# Patient Record
Sex: FEMALE | Race: BLACK OR AFRICAN AMERICAN | NOT HISPANIC OR LATINO | Employment: OTHER | URBAN - METROPOLITAN AREA
[De-identification: names, ages, dates, MRNs, and addresses within clinical notes are randomized per-mention and may not be internally consistent; named-entity substitution may affect disease eponyms.]

---

## 2017-01-01 ASSESSMENT — ENCOUNTER SYMPTOMS
EYE IRRITATION: 0
FATIGUE: 0
EYE PAIN: 0
HALLUCINATIONS: 0
SORE THROAT: 0
SMELL DISTURBANCE: 0
INCREASED ENERGY: 0
WEIGHT GAIN: 1
EYE WATERING: 1
ALTERED TEMPERATURE REGULATION: 0
SINUS CONGESTION: 1
POLYDIPSIA: 0
NECK MASS: 0
HOARSE VOICE: 0
NIGHT SWEATS: 1
DOUBLE VISION: 0
CHILLS: 0
TASTE DISTURBANCE: 0
SINUS PAIN: 1
DECREASED APPETITE: 0
TROUBLE SWALLOWING: 1
EYE REDNESS: 0
WEIGHT LOSS: 0
POLYPHAGIA: 0
FEVER: 1

## 2017-01-06 ENCOUNTER — OFFICE VISIT (OUTPATIENT)
Dept: NEPHROLOGY | Facility: CLINIC | Age: 62
End: 2017-01-06
Attending: INTERNAL MEDICINE
Payer: COMMERCIAL

## 2017-01-06 VITALS
TEMPERATURE: 98.2 F | BODY MASS INDEX: 44.05 KG/M2 | DIASTOLIC BLOOD PRESSURE: 81 MMHG | HEART RATE: 60 BPM | HEIGHT: 64 IN | WEIGHT: 258 LBS | SYSTOLIC BLOOD PRESSURE: 137 MMHG | OXYGEN SATURATION: 98 %

## 2017-01-06 DIAGNOSIS — N17.9 ACUTE RENAL FAILURE, UNSPECIFIED ACUTE RENAL FAILURE TYPE (H): ICD-10-CM

## 2017-01-06 DIAGNOSIS — N18.30 CHRONIC KIDNEY DISEASE (CKD), STAGE III (MODERATE) (H): Primary | ICD-10-CM

## 2017-01-06 DIAGNOSIS — N18.30 CHRONIC KIDNEY DISEASE (CKD), STAGE III (MODERATE) (H): ICD-10-CM

## 2017-01-06 LAB
ALBUMIN SERPL-MCNC: 3.3 G/DL (ref 3.4–5)
ANION GAP SERPL CALCULATED.3IONS-SCNC: 6 MMOL/L (ref 3–14)
BUN SERPL-MCNC: 16 MG/DL (ref 7–30)
CALCIUM SERPL-MCNC: 9.6 MG/DL (ref 8.5–10.1)
CHLORIDE SERPL-SCNC: 106 MMOL/L (ref 94–109)
CO2 SERPL-SCNC: 30 MMOL/L (ref 20–32)
CREAT SERPL-MCNC: 1.36 MG/DL (ref 0.52–1.04)
CREAT UR-MCNC: 39 MG/DL
GFR SERPL CREATININE-BSD FRML MDRD: 39 ML/MIN/1.7M2
GLUCOSE SERPL-MCNC: 90 MG/DL (ref 70–99)
HGB BLD-MCNC: 13.4 G/DL (ref 11.7–15.7)
PHOSPHATE SERPL-MCNC: 2.8 MG/DL (ref 2.5–4.5)
POTASSIUM SERPL-SCNC: 4.5 MMOL/L (ref 3.4–5.3)
PROT UR-MCNC: 0.05 G/L
PROT/CREAT 24H UR: 0.13 G/G CR (ref 0–0.2)
SODIUM SERPL-SCNC: 142 MMOL/L (ref 133–144)

## 2017-01-06 PROCEDURE — 84156 ASSAY OF PROTEIN URINE: CPT | Performed by: INTERNAL MEDICINE

## 2017-01-06 PROCEDURE — 36415 COLL VENOUS BLD VENIPUNCTURE: CPT | Performed by: INTERNAL MEDICINE

## 2017-01-06 PROCEDURE — 80069 RENAL FUNCTION PANEL: CPT | Performed by: INTERNAL MEDICINE

## 2017-01-06 PROCEDURE — 99213 OFFICE O/P EST LOW 20 MIN: CPT | Mod: ZF

## 2017-01-06 PROCEDURE — 85018 HEMOGLOBIN: CPT | Performed by: INTERNAL MEDICINE

## 2017-01-06 ASSESSMENT — PAIN SCALES - GENERAL: PAINLEVEL: NO PAIN (0)

## 2017-01-06 NOTE — PROGRESS NOTES
Nephrology Follow-Up Visit    Patient Name: Teri Bain  Patient YOB: 1955  Date of Service: January 6, 2017  Patient's PCP: Norma Serrano    Assessment and Plan:   1. MILDRED on CKD: baseline Cr ~1.1-1.3 since 2009, MILDRED related to losartan with very convincing time course, Cr now returned nearly to baseline at 1.35. Urine bland. No further work-up necessary. Should avoid NSAID's (previous heavy use) and avoid ACEI/ARB.    2. CKD stage 3: eGFR around 50, likely hypertensive disease and prior heavy NSAID use, less likely diabetic given A1C's in 5's and no retinopathy or proteinuria. Her Ca, phos, hgb, lytes, and acid/base are optimal and BP is well controlled.  -BP goal <140/90 and preferably <130/80 -> will work on weight loss reduction and not add additional medication at this point with 's/80's currently.  -Will let PCP follow her with BMP every 4-6 months. If her Cr rises to 1.5 or above in the future, she can be referred back for CKD care.    Assessment and plan was discussed with patient and she voiced her understanding and agreement.    Patient staffed with Dr. Meade. Patient to follow-up prn.    Jose Francisco Rebolledo  Nephrology Fellow  Pager: [phone]    Reason for Visit:  Teri Bain is a 61 year old female with CKD stage 3 who is here for follow-up.    HPI:  Ms. Bain is a pleasant 61 year old Af Am female with CKD stage 3 that had MILDRED on CKD with peak Cr of 3 likely related to ARB. Her Cr has returned to 1.35 with her previously baseline Cr being 1.1-1.3. She has bland UA and no significant proteinuria. She has no complaints today and feels well. She has no swelling. She is no longer taking NSAID's (took a lot previously). She denies changes in urination or dysuria.     ROS:  A comprehensive review of systems was obtained and negative, except as noted in the HPI or PMH.    Active Medical Problems:  Patient Active Problem List   Diagnosis     Classical migraine     Trochanteric  bursitis     Allergic rhinitis     Osteoarthritis     Chronic venous insufficiency     Chronic kidney disease (CKD), stage III (moderate)     Intermittent asthma     Vitamin D deficiency     Hypertension goal BP (blood pressure) < 140/90     Hyperlipidemia LDL goal <100     Health Care Home     GERD (gastroesophageal reflux disease)     Advanced directives, counseling/discussion     Acute renal failure (H)       Personal Hx:   Social History     Social History     Marital Status:      Spouse Name: Fred     Number of Children: 2     Years of Education: 13     Occupational History     Customer Service Retired     [employer]     Social History Main Topics     Smoking status: Former Smoker     Quit date: 12/31/1996     Smokeless tobacco: Never Used     Alcohol Use: Yes      Comment: wine 1-2 times per year     Drug Use: No     Sexual Activity:     Partners: Male     Other Topics Concern     Parent/Sibling W/ Cabg, Mi Or Angioplasty Before 65f 55m? No     Social History Narrative       Allergies:  Allergies   Allergen Reactions     Benazepril Rash     Claritin [Loratadine]      Codeine Nausea     Lisinopril Rash     Penicillins      Tetracycline        Medications:  Prior to Admission medications    Medication Sig Start Date End Date Taking? Authorizing Provider   predniSONE (DELTASONE) 50 MG tablet Take 1 tablet (50 mg) by mouth daily As needed for asthma exacerbation 12/21/16  Yes Norma Serrano MD   albuterol (PROAIR HFA/PROVENTIL HFA/VENTOLIN HFA) 108 (90 BASE) MCG/ACT Inhaler Inhale 1-2 puffs into the lungs every 6 hours 12/12/16  Yes Norma Serrano MD   Flunisolide HFA 80 MCG/ACT AERS Inhale 2 puffs into the lungs 2 times daily 11/4/16  Yes Norma Serrano MD   triamterene-hydrochlorothiazide (MAXZIDE) 75-50 MG per tablet Take 1 tablet by mouth daily 10/5/16  Yes Norma Serrano MD   pravastatin (PRAVACHOL) 80 MG tablet Take 1 tablet (80 mg) by mouth daily 10/5/16  Yes Norma Serrano MD  "  amLODIPine (NORVASC) 5 MG tablet Take 1 tablet (5 mg) by mouth daily 7/26/16  Yes Norma Serrano MD   Ranitidine HCl (ZANTAC PO) Take 1 tablet by mouth daily as needed for heartburn   Yes Reported, Patient   Cyanocobalamin (VITAMIN B 12 PO) Take 1 tablet by mouth daily   Yes Reported, Patient   albuterol (2.5 MG/3ML) 0.083% nebulizer solution Take 1 vial (2.5 mg) by nebulization every 6 hours as needed for shortness of breath / dyspnea or wheezing 3/2/16  Yes Surekha Chin APRN CNP   Cholecalciferol (VITAMIN D) 2000 UNITS tablet Take 4,000 Units by mouth daily 11/7/14  Yes Norma Serrano MD   cetirizine (ZYRTEC) 10 MG tablet Take 1 tablet (10 mg) by mouth every evening 11/6/14  Yes Norma Serrano MD   fluticasone (FLONASE) 50 MCG/ACT nasal spray Spray 1-2 sprays into both nostrils daily. 5/29/13  Yes Norma Serrano MD   aspirin 81 MG tablet Take 1 tablet by mouth daily. 12/20/10  Yes Norma Serrano MD   MULTI-VITAMIN OR TABS  8/12/04  Yes Norma Serrano MD   VITAMIN C 500 MG OR TABS 1-2 tablets daily 8/12/04  Yes Norma Serrano MD       Vitals:  /81 mmHg  Pulse 60  Temp(Src) 98.2  F (36.8  C) (Oral)  Ht 1.626 m (5' 4\")  Wt 117.028 kg (258 lb)  BMI 44.26 kg/m2  SpO2 98%    Exam:  GENERAL APPEARANCE: alert and in no obvious distress  HENT: mouth without ulcers or lesions, MMM  LYMPHATICS: no cervical or supraclavicular nodes  CV: regular rhythm, normal rate, no rub, no murmur  RESP: lungs clear to auscultation - no rales, rhonchi or wheezes  ABDOMEN: soft, nondistended, nontender, bowel sounds normal  EXT: no edema in LE's b/l   MS: extremities normal - no gross deformities noted, no evidence of inflammation in joints, no muscle tenderness  SKIN: no rash    Results:  Recent Results (from the past 72 hour(s))   Renal panel    Collection Time: 01/06/17 10:27 AM   Result Value Ref Range    Sodium 142 133 - 144 mmol/L    Potassium 4.5 3.4 - 5.3 mmol/L    Chloride 106 94 - " 109 mmol/L    Carbon Dioxide 30 20 - 32 mmol/L    Anion Gap 6 3 - 14 mmol/L    Glucose 90 70 - 99 mg/dL    Urea Nitrogen 16 7 - 30 mg/dL    Creatinine 1.36 (H) 0.52 - 1.04 mg/dL    GFR Estimate 39 (L) >60 mL/min/1.7m2    GFR Estimate If Black 48 (L) >60 mL/min/1.7m2    Calcium 9.6 8.5 - 10.1 mg/dL    Phosphorus 2.8 2.5 - 4.5 mg/dL    Albumin 3.3 (L) 3.4 - 5.0 g/dL   Hemoglobin    Collection Time: 01/06/17 10:27 AM   Result Value Ref Range    Hemoglobin 13.4 11.7 - 15.7 g/dL                     Answers for HPI/ROS submitted by the patient on 1/1/2017   General Symptoms: Yes  Skin Symptoms: No  HENT Symptoms: Yes  EYE SYMPTOMS: Yes  HEART SYMPTOMS: No  LUNG SYMPTOMS: No  INTESTINAL SYMPTOMS: No  URINARY SYMPTOMS: No  GYNECOLOGIC SYMPTOMS: No  BREAST SYMPTOMS: No  SKELETAL SYMPTOMS: No  BLOOD SYMPTOMS: No  NERVOUS SYSTEM SYMPTOMS: No  MENTAL HEALTH SYMPTOMS: No  Fever: Yes  Loss of appetite: No  Weight loss: No  Weight gain: Yes  Fatigue: No  Night sweats: Yes  Chills: No  Increased stress: No  Excessive hunger: No  Excessive thirst: No  Feeling hot or cold when others believe the temperature is normal: No  Loss of height: No  Post-operative complications: No  Surgical site pain: No  Hallucinations: No  Change in or Loss of Energy: No  Hyperactivity: No  Confusion: No  Ear pain: No  Ear discharge: No  Hearing loss: No  Tinnitus: No  Nosebleeds: No  Congestion: Yes  Sinus pain: Yes  Trouble swallowing: Yes   Voice hoarseness: No  Mouth sores: No  Sore throat: No  Tooth pain: No  Gum tenderness: No  Bleeding gums: No  Change in taste: No  Change in sense of smell: No  Dry mouth: No  Hearing aid used: No  Neck lump: No  Eye pain: No  Vision loss: No  Dry eyes: Yes  Watery eyes: Yes  Eye bulging: No  Double vision: No  Flashing of lights: No  Spots: No  Floaters: No  Redness: No  Crossed eyes: No  Tunnel Vision: No  Yellowing of eyes: No  Eye irritation: No    I have seen and reviewed the patient with the fellow. The  fellow's note reflects our joint assessment and plan. -- Severo Meade M.D.

## 2017-01-06 NOTE — Clinical Note
1/6/2017       RE: Teri Bain  [address]  [facility] [address]     Dear Colleague,    Thank you for referring your patient, Teri Bain, to the [facility] NEPHROLOGY at [facility]. Please see a copy of my visit note below.    Nephrology Follow-Up Visit    Patient Name: Teri Bain  Patient YOB: 1955  Date of Service: January 6, 2017  Patient's PCP: Norma Serrano    Assessment and Plan:   1. MILDRED on CKD: baseline Cr ~1.1-1.3 since 2009, MILDRED related to losartan with very convincing time course, Cr now returned nearly to baseline at 1.35. Urine bland. No further work-up necessary. Should avoid NSAID's (previous heavy use) and avoid ACEI/ARB.    2. CKD stage 3: eGFR around 50, likely hypertensive disease and prior heavy NSAID use, less likely diabetic given A1C's in 5's and no retinopathy or proteinuria. Her Ca, phos, hgb, lytes, and acid/base are optimal and BP is well controlled.  -BP goal <140/90 and preferably <130/80 -> will work on weight loss reduction and not add additional medication at this point with 's/80's currently.  -Will let PCP follow her with BMP every 4-6 months. If her Cr rises to 1.5 or above in the future, she can be referred back for CKD care.    Assessment and plan was discussed with patient and she voiced her understanding and agreement.    Patient staffed with Dr. Meade. Patient to follow-up prn.    Jose Francisco Rebolledo  Nephrology Fellow  Pager: [phone]    Reason for Visit:  Teri Bain is a 61 year old female with CKD stage 3 who is here for follow-up.    HPI:  Ms. Bain is a pleasant 61 year old Af Am female with CKD stage 3 that had MILDRED on CKD with peak Cr of 3 likely related to ARB. Her Cr has returned to 1.35 with her previously baseline Cr being 1.1-1.3. She has bland UA and no significant proteinuria. She has no complaints today and feels well. She has no swelling. She is no longer taking NSAID's  (took a lot previously). She denies changes in urination or dysuria.     ROS:  A comprehensive review of systems was obtained and negative, except as noted in the HPI or PMH.    Active Medical Problems:  Patient Active Problem List   Diagnosis     Classical migraine     Trochanteric bursitis     Allergic rhinitis     Osteoarthritis     Chronic venous insufficiency     Chronic kidney disease (CKD), stage III (moderate)     Intermittent asthma     Vitamin D deficiency     Hypertension goal BP (blood pressure) < 140/90     Hyperlipidemia LDL goal <100     Health Care Home     GERD (gastroesophageal reflux disease)     Advanced directives, counseling/discussion     Acute renal failure (H)       Personal Hx:   Social History     Social History     Marital Status:      Spouse Name: Fred     Number of Children: 2     Years of Education: 13     Occupational History     Customer Service Retired     [employer]     Social History Main Topics     Smoking status: Former Smoker     Quit date: 12/31/1996     Smokeless tobacco: Never Used     Alcohol Use: Yes      Comment: wine 1-2 times per year     Drug Use: No     Sexual Activity:     Partners: Male     Other Topics Concern     Parent/Sibling W/ Cabg, Mi Or Angioplasty Before 65f 55m? No     Social History Narrative       Allergies:  Allergies   Allergen Reactions     Benazepril Rash     Claritin [Loratadine]      Codeine Nausea     Lisinopril Rash     Penicillins      Tetracycline        Medications:  Prior to Admission medications    Medication Sig Start Date End Date Taking? Authorizing Provider   predniSONE (DELTASONE) 50 MG tablet Take 1 tablet (50 mg) by mouth daily As needed for asthma exacerbation 12/21/16  Yes Norma Serrano MD   albuterol (PROAIR HFA/PROVENTIL HFA/VENTOLIN HFA) 108 (90 BASE) MCG/ACT Inhaler Inhale 1-2 puffs into the lungs every 6 hours 12/12/16  Yes Norma Serrano MD   Flunisolide HFA 80 MCG/ACT AERS Inhale 2 puffs into the lungs 2  "times daily 11/4/16  Yes Norma Serrano MD   triamterene-hydrochlorothiazide (MAXZIDE) 75-50 MG per tablet Take 1 tablet by mouth daily 10/5/16  Yes Norma Serrano MD   pravastatin (PRAVACHOL) 80 MG tablet Take 1 tablet (80 mg) by mouth daily 10/5/16  Yes Norma Serrano MD   amLODIPine (NORVASC) 5 MG tablet Take 1 tablet (5 mg) by mouth daily 7/26/16  Yes Norma Serrano MD   Ranitidine HCl (ZANTAC PO) Take 1 tablet by mouth daily as needed for heartburn   Yes Reported, Patient   Cyanocobalamin (VITAMIN B 12 PO) Take 1 tablet by mouth daily   Yes Reported, Patient   albuterol (2.5 MG/3ML) 0.083% nebulizer solution Take 1 vial (2.5 mg) by nebulization every 6 hours as needed for shortness of breath / dyspnea or wheezing 3/2/16  Yes Surekha Chin APRN CNP   Cholecalciferol (VITAMIN D) 2000 UNITS tablet Take 4,000 Units by mouth daily 11/7/14  Yes Norma Serrano MD   cetirizine (ZYRTEC) 10 MG tablet Take 1 tablet (10 mg) by mouth every evening 11/6/14  Yes Norma Serrano MD   fluticasone (FLONASE) 50 MCG/ACT nasal spray Spray 1-2 sprays into both nostrils daily. 5/29/13  Yes Norma Serrano MD   aspirin 81 MG tablet Take 1 tablet by mouth daily. 12/20/10  Yes Norma Serrano MD   MULTI-VITAMIN OR TABS  8/12/04  Yes Norma Serrano MD   VITAMIN C 500 MG OR TABS 1-2 tablets daily 8/12/04  Yes Norma Serrano MD       Vitals:  /81 mmHg  Pulse 60  Temp(Src) 98.2  F (36.8  C) (Oral)  Ht 1.626 m (5' 4\")  Wt 117.028 kg (258 lb)  BMI 44.26 kg/m2  SpO2 98%    Exam:  GENERAL APPEARANCE: alert and in no obvious distress  HENT: mouth without ulcers or lesions, MMM  LYMPHATICS: no cervical or supraclavicular nodes  CV: regular rhythm, normal rate, no rub, no murmur  RESP: lungs clear to auscultation - no rales, rhonchi or wheezes  ABDOMEN: soft, nondistended, nontender, bowel sounds normal  EXT: no edema in LE's b/l   MS: extremities normal - no gross deformities noted, no " evidence of inflammation in joints, no muscle tenderness  SKIN: no rash    Results:  Recent Results (from the past 72 hour(s))   Renal panel    Collection Time: 01/06/17 10:27 AM   Result Value Ref Range    Sodium 142 133 - 144 mmol/L    Potassium 4.5 3.4 - 5.3 mmol/L    Chloride 106 94 - 109 mmol/L    Carbon Dioxide 30 20 - 32 mmol/L    Anion Gap 6 3 - 14 mmol/L    Glucose 90 70 - 99 mg/dL    Urea Nitrogen 16 7 - 30 mg/dL    Creatinine 1.36 (H) 0.52 - 1.04 mg/dL    GFR Estimate 39 (L) >60 mL/min/1.7m2    GFR Estimate If Black 48 (L) >60 mL/min/1.7m2    Calcium 9.6 8.5 - 10.1 mg/dL    Phosphorus 2.8 2.5 - 4.5 mg/dL    Albumin 3.3 (L) 3.4 - 5.0 g/dL   Hemoglobin    Collection Time: 01/06/17 10:27 AM   Result Value Ref Range    Hemoglobin 13.4 11.7 - 15.7 g/dL                     Answers for HPI/ROS submitted by the patient on 1/1/2017   General Symptoms: Yes  Skin Symptoms: No  HENT Symptoms: Yes  EYE SYMPTOMS: Yes  HEART SYMPTOMS: No  LUNG SYMPTOMS: No  INTESTINAL SYMPTOMS: No  URINARY SYMPTOMS: No  GYNECOLOGIC SYMPTOMS: No  BREAST SYMPTOMS: No  SKELETAL SYMPTOMS: No  BLOOD SYMPTOMS: No  NERVOUS SYSTEM SYMPTOMS: No  MENTAL HEALTH SYMPTOMS: No  Fever: Yes  Loss of appetite: No  Weight loss: No  Weight gain: Yes  Fatigue: No  Night sweats: Yes  Chills: No  Increased stress: No  Excessive hunger: No  Excessive thirst: No  Feeling hot or cold when others believe the temperature is normal: No  Loss of height: No  Post-operative complications: No  Surgical site pain: No  Hallucinations: No  Change in or Loss of Energy: No  Hyperactivity: No  Confusion: No  Ear pain: No  Ear discharge: No  Hearing loss: No  Tinnitus: No  Nosebleeds: No  Congestion: Yes  Sinus pain: Yes  Trouble swallowing: Yes   Voice hoarseness: No  Mouth sores: No  Sore throat: No  Tooth pain: No  Gum tenderness: No  Bleeding gums: No  Change in taste: No  Change in sense of smell: No  Dry mouth: No  Hearing aid used: No  Neck lump: No  Eye pain:  No  Vision loss: No  Dry eyes: Yes  Watery eyes: Yes  Eye bulging: No  Double vision: No  Flashing of lights: No  Spots: No  Floaters: No  Redness: No  Crossed eyes: No  Tunnel Vision: No  Yellowing of eyes: No  Eye irritation: No    I have seen and reviewed the patient with the fellow. The fellow's note reflects our joint assessment and plan. -- Severo Meade M.D.        Again, thank you for allowing me to participate in the care of your patient.      Sincerely,    Harshil Rebolledo MD

## 2017-01-06 NOTE — NURSING NOTE
"Chief Complaint   Patient presents with     RECHECK     3 MO Ckd stage III       Initial /81 mmHg  Pulse 60  Temp(Src) 98.2  F (36.8  C) (Oral)  Ht 1.626 m (5' 4\")  Wt 117.028 kg (258 lb)  BMI 44.26 kg/m2  SpO2 98% Estimated body mass index is 44.26 kg/(m^2) as calculated from the following:    Height as of this encounter: 1.626 m (5' 4\").    Weight as of this encounter: 117.028 kg (258 lb).  BP completed using cuff size: regular    "

## 2017-01-25 ENCOUNTER — RADIANT APPOINTMENT (OUTPATIENT)
Dept: MAMMOGRAPHY | Facility: CLINIC | Age: 62
End: 2017-01-25
Attending: FAMILY MEDICINE
Payer: COMMERCIAL

## 2017-01-25 DIAGNOSIS — Z12.31 VISIT FOR SCREENING MAMMOGRAM: ICD-10-CM

## 2017-01-25 PROCEDURE — G0202 SCR MAMMO BI INCL CAD: HCPCS

## 2017-05-24 ENCOUNTER — OFFICE VISIT (OUTPATIENT)
Dept: FAMILY MEDICINE | Facility: CLINIC | Age: 62
End: 2017-05-24
Payer: COMMERCIAL

## 2017-05-24 VITALS
OXYGEN SATURATION: 99 % | BODY MASS INDEX: 45.54 KG/M2 | HEIGHT: 64 IN | DIASTOLIC BLOOD PRESSURE: 94 MMHG | RESPIRATION RATE: 20 BRPM | TEMPERATURE: 98.1 F | SYSTOLIC BLOOD PRESSURE: 168 MMHG | WEIGHT: 266.75 LBS | HEART RATE: 90 BPM

## 2017-05-24 DIAGNOSIS — R23.2 HOT FLASHES: ICD-10-CM

## 2017-05-24 DIAGNOSIS — J30.2 SEASONAL ALLERGIC RHINITIS, UNSPECIFIED ALLERGIC RHINITIS TRIGGER: ICD-10-CM

## 2017-05-24 DIAGNOSIS — E55.9 VITAMIN D DEFICIENCY: ICD-10-CM

## 2017-05-24 DIAGNOSIS — I10 HYPERTENSION GOAL BP (BLOOD PRESSURE) < 140/90: Primary | ICD-10-CM

## 2017-05-24 DIAGNOSIS — J45.20 UNCONTROLLED INTERMITTENT ASTHMA: ICD-10-CM

## 2017-05-24 DIAGNOSIS — M79.662 PAIN OF LEFT CALF: ICD-10-CM

## 2017-05-24 PROCEDURE — 82306 VITAMIN D 25 HYDROXY: CPT | Performed by: FAMILY MEDICINE

## 2017-05-24 PROCEDURE — 80048 BASIC METABOLIC PNL TOTAL CA: CPT | Performed by: FAMILY MEDICINE

## 2017-05-24 PROCEDURE — 99214 OFFICE O/P EST MOD 30 MIN: CPT | Performed by: FAMILY MEDICINE

## 2017-05-24 PROCEDURE — 36415 COLL VENOUS BLD VENIPUNCTURE: CPT | Performed by: FAMILY MEDICINE

## 2017-05-24 RX ORDER — AMLODIPINE BESYLATE 10 MG/1
10 TABLET ORAL DAILY
Qty: 90 TABLET | Refills: 0 | Status: SHIPPED | OUTPATIENT
Start: 2017-05-24 | End: 2017-06-15

## 2017-05-24 RX ORDER — PREDNISONE 50 MG/1
50 TABLET ORAL DAILY
Qty: 5 TABLET | Refills: 0 | Status: SHIPPED | OUTPATIENT
Start: 2017-05-24 | End: 2017-10-09

## 2017-05-24 NOTE — MR AVS SNAPSHOT
After Visit Summary   5/24/2017    Teri Bain    MRN: [id number]           Patient Information     Date Of Birth          1955        Visit Information        Provider Department      5/24/2017 10:40 AM Norma Serrano MD [facility]        Today's Diagnoses     Hypertension goal BP (blood pressure) < 140/90    -  1    Seasonal allergic rhinitis, unspecified allergic rhinitis trigger        Uncontrolled intermittent asthma        Pain of left calf           Follow-ups after your visit        Follow-up notes from your care team     Return in about 1 month (around 6/24/2017).      Who to contact     If you have questions or need follow up information about today's clinic visit or your schedule please contact [facility] directly at [phone].  Normal or non-critical lab and imaging results will be communicated to you by MyChart, letter or phone within 4 business days after the clinic has received the results. If you do not hear from us within 7 days, please contact the clinic through MyChart or phone. If you have a critical or abnormal lab result, we will notify you by phone as soon as possible.  Submit refill requests through [employer] or call your pharmacy and they will forward the refill request to us. Please allow 3 business days for your refill to be completed.          Additional Information About Your Visit        MyChart Information     [employer] gives you secure access to your electronic health record. If you see a primary care provider, you can also send messages to your care team and make appointments. If you have questions, please call your primary care clinic.  If you do not have a primary care provider, please call [phone] and they will assist you.        Care EveryWhere ID     This is your Care EveryWhere ID. This could be used by other organizations to access your [place] medical records  [id number]        Your Vitals Were     Pulse  "Temperature Respirations Height Pulse Oximetry Breastfeeding?    90 98.1  F (36.7  C) (Oral) 20 5' 4\" (1.626 m) 99% No    BMI (Body Mass Index)                   45.79 kg/m2            Blood Pressure from Last 3 Encounters:   05/24/17 (!) 168/94   01/06/17 137/81   12/19/16 139/82    Weight from Last 3 Encounters:   05/24/17 266 lb 12 oz (121 kg)   01/06/17 258 lb (117 kg)   12/19/16 257 lb 4 oz (116.7 kg)              We Performed the Following     Asthma Action Plan (AAP)     Basic metabolic panel          Today's Medication Changes          These changes are accurate as of: 5/24/17 11:20 AM.  If you have any questions, ask your nurse or doctor.               These medicines have changed or have updated prescriptions.        Dose/Directions    amLODIPine 10 MG tablet   Commonly known as:  NORVASC   This may have changed:    - medication strength  - how much to take   Used for:  Hypertension goal BP (blood pressure) < 140/90   Changed by:  Norma Serrano MD        Dose:  10 mg   Take 1 tablet (10 mg) by mouth daily   Quantity:  90 tablet   Refills:  0         Stop taking these medicines if you haven't already. Please contact your care team if you have questions.     vitamin D 2000 UNITS tablet   Stopped by:  Norma Serrano MD                Where to get your medicines      These medications were sent to [facility] [address] [place] [address] [address] AT [facility] [address]  [address] [facility] [address]     Phone:  [phone]     amLODIPine 10 MG tablet    predniSONE 50 MG tablet                Primary Care Provider Office Phone # Fax #    Norma Serrano [phone]       [facility] [address]  [facility] [address]        Thank you!     Thank you for choosing [facility]  for your care. Our goal is always to provide you with excellent care. Hearing back from our patients is one way we can continue to improve our " services. Please take a few minutes to complete the written survey that you may receive in the mail after your visit with us. Thank you!             Your Updated Medication List - Protect others around you: Learn how to safely use, store and throw away your medicines at www.disposemymeds.org.          This list is accurate as of: 5/24/17 11:20 AM.  Always use your most recent med list.                   Brand Name Dispense Instructions for use    * albuterol (2.5 MG/3ML) 0.083% neb solution     30 vial    Take 1 vial (2.5 mg) by nebulization every 6 hours as needed for shortness of breath / dyspnea or wheezing       * albuterol 108 (90 BASE) MCG/ACT Inhaler    PROAIR HFA/PROVENTIL HFA/VENTOLIN HFA    1 Inhaler    Inhale 1-2 puffs into the lungs every 6 hours       amLODIPine 10 MG tablet    NORVASC    90 tablet    Take 1 tablet (10 mg) by mouth daily       ascorbic acid 500 MG tablet    VITAMIN C    60    1-2 tablets daily       aspirin 81 MG tablet      Take 1 tablet by mouth daily.       cetirizine 10 MG tablet    zyrTEC    30 tablet    Take 1 tablet (10 mg) by mouth every evening       flunisolide HFA 80 MCG/ACT Aers oral inhaler    AEROSPAN    3 Inhaler    Inhale 2 puffs into the lungs 2 times daily       fluticasone 50 MCG/ACT spray    FLONASE    1 Package    Spray 1-2 sprays into both nostrils daily.       Multi-vitamin Tabs tablet   Generic drug:  multivitamin, therapeutic with minerals          pravastatin 80 MG tablet    PRAVACHOL    90 tablet    Take 1 tablet (80 mg) by mouth daily       predniSONE 50 MG tablet    DELTASONE    5 tablet    Take 1 tablet (50 mg) by mouth daily As needed for asthma exacerbation       triamterene-hydrochlorothiazide 75-50 MG per tablet    MAXZIDE    90 tablet    Take 1 tablet by mouth daily       VITAMIN B 12 PO      Take 1 tablet by mouth daily       ZANTAC PO      Take 1 tablet by mouth daily as needed for heartburn       * Notice:  This list has 2 medication(s) that are the  same as other medications prescribed for you. Read the directions carefully, and ask your doctor or other care provider to review them with you.

## 2017-05-24 NOTE — PROGRESS NOTES
SUBJECTIVE:                                                    Teri Bain is a 61 year old female who presents to clinic today for the following health issues:      Hypertension Follow-up      Outpatient blood pressures are not being checked.    Low Salt Diet: not monitoring salt    She continues to take amlodipine and maxzide with no problems or noted side effects.            Asthma Follow-Up  She has a longstanding history of intermittent asthma.   Is going to [place] in a week and notes that she often gets asthma exacerbations there - would like to bring a script for prednisone with her.    Was ACT completed today?    Yes    ACT Total Scores 5/24/2017   ACT TOTAL SCORE (Goal Greater than or Equal to 20) 18   In the past 12 months, how many times did you visit the emergency room for your asthma without being admitted to the hospital? 0   In the past 12 months, how many times were you hospitalized overnight because of your asthma? 0       Recent asthma triggers that patient is dealing with: spring allergies      Left calf pain  x months  Intermittent  Worse when she's on her feet  Feels good to stretch the calf  No swelling.  Has tried changing her footwear but no change in symptoms.  Takes acetaminophen periodically at HS.        Problem list and histories reviewed & adjusted, as indicated.  Additional history: as documented    Patient Active Problem List   Diagnosis     Classical migraine     Trochanteric bursitis     Allergic rhinitis     Osteoarthritis     Chronic venous insufficiency     Chronic kidney disease (CKD), stage III (moderate)     Intermittent asthma     Vitamin D deficiency     Hypertension goal BP (blood pressure) < 140/90     Hyperlipidemia LDL goal <100     Health Care Home     GERD (gastroesophageal reflux disease)     Advanced directives, counseling/discussion     Acute renal failure (H)     Past Surgical History:   Procedure Laterality Date     C DEXA, BONE DENSITY, AXIAL SKEL   11/04    [facility]     C LIGATE FALLOPIAN TUBE  1977     C REMOVAL GALLBLADDER  1977     COLONOSCOPY  11/04    [facility]     COLONOSCOPY  1/5/15    [facility]     HC DILATION/CURETTAGE DIAG/THER NON OB  5/89    [facility] C     HYSTERECTOMY, PAP NO LONGER INDICATED  1/90    Hysterectomy, Vaginal, ovaries intact     SURGICAL HISTORY OF -   1987    cystoscopy       Social History   Substance Use Topics     Smoking status: Former Smoker     Quit date: 12/31/1996     Smokeless tobacco: Never Used     Alcohol use Yes      Comment: wine 1-2 times per year     Family History   Problem Relation Age of Onset     C.A.D. Mother      DIABETES Mother      Hypertension Mother      Arthritis Mother      CEREBROVASCULAR DISEASE Mother      multiple     Obesity Mother      DIABETES Father      Hypertension Father      Asthma Father      Obesity Father      DIABETES Maternal Grandfather      Depression Daughter      Asthma Daughter      Deep Vein Thrombosis (DVT) Daughter      x 1     Asthma Daughter      Asthma Brother      Obesity Brother      Coronary Artery Disease Brother      Pacemaker Brother      Asthma Grandchild      Asthma Grandchild      Asthma Grandchild      Asthma Grandchild          BP Readings from Last 3 Encounters:   05/24/17 (!) 168/94   01/06/17 137/81   12/19/16 139/82    Wt Readings from Last 3 Encounters:   05/24/17 266 lb 12 oz (121 kg)   01/06/17 258 lb (117 kg)   12/19/16 257 lb 4 oz (116.7 kg)            Reviewed and updated as needed this visit by clinical staff  Tobacco  Allergies  Med Hx  Surg Hx  Fam Hx  Soc Hx        ROS:  RESP:  NEGATIVE for shortness of breath  CV:  NEGATIVE for chest pain   NEURO: POSITIVE for mild headaches but no full-blown migraines  : POSITIVE for hot flashes recurring over past couple months    OBJECTIVE:                                                    BP (!) 168/94 (BP Location: Right arm, Patient Position: Chair, Cuff Size: Adult Large)  Pulse 90  Temp 98.1  F (36.7  C)  "(Oral)  Resp 20  Ht 5' 4\" (1.626 m)  Wt 266 lb 12 oz (121 kg)  SpO2 99%  Breastfeeding? No  BMI 45.79 kg/m2  Body mass index is 45.79 kg/(m^2).  GEN:  no apparent distress  LUNGS:  normal respiratory effort, and lungs clear to auscultation bilaterally - no rales, rhonchi or wheezes  CV: regular rate and rhythm, normal S1 S2, no S3 or S4 and no murmur, click or rub   EXTREM: no swelling, redness, varicosities of legs.  Skin is warm and dry.  She has focal tenderness to palpation over the lower left gastroc.           ASSESSMENT/PLAN:                                                        1. Hypertension goal BP (blood pressure) < 140/90  Uncontrolled.  We'll have her increase amlodipine dose to 10 mg daily and see me in 3-4 weeks.   - amLODIPine (NORVASC) 10 MG tablet; Take 1 tablet (10 mg) by mouth daily  Dispense: 90 tablet; Refill: 0  - Basic metabolic panel    2. Seasonal allergic rhinitis, unspecified allergic rhinitis trigger  Recommended she use the flonase along with the cetirizine    3. Uncontrolled intermittent asthma  Currently not wheezing but I did give her a script for prednisone burst to take with her to [place].  - Asthma Action Plan (AAP)  - predniSONE (DELTASONE) 50 MG tablet; Take 1 tablet (50 mg) by mouth daily As needed for asthma exacerbation  Dispense: 5 tablet; Refill: 0    4. Pain of left calf  Unclear etiology/diagnosis but this does seem to be musculoskeletal.  If persisting we could refer to Sports Med.    5. Vitamin D deficiency  - Vitamin D Deficiency    6. Hot flashes  Unclear why these have recurred recently.  She'll monitor symptoms for now.  We discussed management strategies (dress in layers, personal fan, cool packs).      FUTURE APPOINTMENTS:       - Follow-up visit in 1 month.    Norma Serrano MD  [facility]   "

## 2017-05-24 NOTE — NURSING NOTE
"Chief Complaint   Patient presents with     Hypertension     Asthma       Initial BP (!) 168/94 (BP Location: Right arm, Patient Position: Chair, Cuff Size: Adult Large)  Pulse 90  Temp 98.1  F (36.7  C) (Oral)  Resp 20  Ht 5' 4\" (1.626 m)  Wt 266 lb 12 oz (121 kg)  SpO2 99%  Breastfeeding? No  BMI 45.79 kg/m2 Estimated body mass index is 45.79 kg/(m^2) as calculated from the following:    Height as of this encounter: 5' 4\" (1.626 m).    Weight as of this encounter: 266 lb 12 oz (121 kg).  Medication Reconciliation: complete     Aleja Orellana MA      "

## 2017-05-25 LAB
ANION GAP SERPL CALCULATED.3IONS-SCNC: 5 MMOL/L (ref 3–14)
BUN SERPL-MCNC: 17 MG/DL (ref 7–30)
CALCIUM SERPL-MCNC: 9.4 MG/DL (ref 8.5–10.1)
CHLORIDE SERPL-SCNC: 107 MMOL/L (ref 94–109)
CO2 SERPL-SCNC: 29 MMOL/L (ref 20–32)
CREAT SERPL-MCNC: 1.21 MG/DL (ref 0.52–1.04)
DEPRECATED CALCIDIOL+CALCIFEROL SERPL-MC: 23 UG/L (ref 20–75)
GFR SERPL CREATININE-BSD FRML MDRD: 45 ML/MIN/1.7M2
GLUCOSE SERPL-MCNC: 87 MG/DL (ref 70–99)
POTASSIUM SERPL-SCNC: 4.4 MMOL/L (ref 3.4–5.3)
SODIUM SERPL-SCNC: 141 MMOL/L (ref 133–144)

## 2017-05-25 ASSESSMENT — ASTHMA QUESTIONNAIRES: ACT_TOTALSCORE: 18

## 2017-05-25 NOTE — PROGRESS NOTES
García Williamson,  Thanks for coming to clinic.  Your test results are below.  This looks good.  Your kidney function (creatinine and eGFR) continues to improve a bit.    Norma Serrano MD

## 2017-06-14 ENCOUNTER — TELEPHONE (OUTPATIENT)
Dept: FAMILY MEDICINE | Facility: CLINIC | Age: 62
End: 2017-06-14

## 2017-06-14 NOTE — PROGRESS NOTES
SUBJECTIVE:                                                    Teri Bain is a 61 year old female who presents to clinic today for the following health issues:      Hypertension Follow-up      Outpatient blood pressures are not being checked.    Low Salt Diet: not monitoring salt    At our last visit a month ago her blood pressure was quite elevated and I had her increase her amlodipine dose to 10 mg daily.     However, she developed marked bilateral lower extremity swelling.    We instructed her to cut her dose back down to 5 mg daily which she did 2 days ago and swelling has resolved - almost completely.         Asthma Follow-Up  She has a longstanding history of intermittent asthma.   When I saw her last month this was uncontrolled with flare of spring allergies.  I did give her an Rx for prednisone to bring on her trip to [facility] - in case of worsening asthma.  Was ACT completed today?    Yes    ACT Total Scores 6/15/2017   ACT TOTAL SCORE (Goal Greater than or Equal to 20) 20   In the past 12 months, how many times did you visit the emergency room for your asthma without being admitted to the hospital? 0   In the past 12 months, how many times were you hospitalized overnight because of your asthma? 0       Recent asthma triggers that patient is dealing with: None  No problems while in [place].      Hot flashes improved when she cut down on sugar.  Still has same pain in left calf.  Had more pain with the increase in swelling.          Problem list and histories reviewed & adjusted, as indicated.  Additional history: as documented    Patient Active Problem List   Diagnosis     Classical migraine     Trochanteric bursitis     Allergic rhinitis     Osteoarthritis     Chronic venous insufficiency     Chronic kidney disease (CKD), stage III (moderate)     Intermittent asthma     Vitamin D deficiency     Hypertension goal BP (blood pressure) < 140/90     Hyperlipidemia LDL goal <100     Health Care Home      GERD (gastroesophageal reflux disease)     Advanced directives, counseling/discussion     Acute renal failure (H)     Past Surgical History:   Procedure Laterality Date     C DEXA, BONE DENSITY, AXIAL SKEL  11/04    [facility]     C LIGATE FALLOPIAN TUBE  1977     C REMOVAL GALLBLADDER  1977     COLONOSCOPY  11/04    [facility]     COLONOSCOPY  1/5/15    [facility]     HC DILATION/CURETTAGE DIAG/THER NON OB  5/89    D & C     HYSTERECTOMY, PAP NO LONGER INDICATED  1/90    Hysterectomy, Vaginal, ovaries intact     SURGICAL HISTORY OF -   1987    cystoscopy       Social History   Substance Use Topics     Smoking status: Former Smoker     Quit date: 12/31/1996     Smokeless tobacco: Never Used     Alcohol use Yes      Comment: wine 1-2 times per year     Family History   Problem Relation Age of Onset     C.A.D. Mother      DIABETES Mother      Hypertension Mother      Arthritis Mother      CEREBROVASCULAR DISEASE Mother      multiple     Obesity Mother      DIABETES Father      Hypertension Father      Asthma Father      Obesity Father      DIABETES Maternal Grandfather      Depression Daughter      Asthma Daughter      Deep Vein Thrombosis (DVT) Daughter      x 1     Asthma Daughter      Asthma Brother      Obesity Brother      Coronary Artery Disease Brother      Pacemaker Brother      Asthma Grandchild      Asthma Grandchild      Asthma Grandchild      Asthma Grandchild          BP Readings from Last 3 Encounters:   06/15/17 131/75   05/24/17 (!) 168/94   01/06/17 137/81    Wt Readings from Last 3 Encounters:   06/15/17 263 lb 4 oz (119.4 kg)   05/24/17 266 lb 12 oz (121 kg)   01/06/17 258 lb (117 kg)          Reviewed and updated as needed this visit by clinical staff  Tobacco  Allergies  Meds  Med Hx  Surg Hx  Fam Hx  Soc Hx            OBJECTIVE:                                                    /75 (BP Location: Right arm, Patient Position: Chair, Cuff Size: Adult Large)  Pulse 112  Temp 98.1  F (36.7  C)  "(Oral)  Resp 16  Ht 5' 4\" (1.626 m)  Wt 263 lb 4 oz (119.4 kg)  SpO2 100%  Breastfeeding? No  BMI 45.19 kg/m2  Body mass index is 45.19 kg/(m^2).  GEN:  no apparent distress  BILATERAL LOWER EXTREMITIES:  with trace edema, no obvious lesion, skin dry with no erythema or focal warmth       ASSESSMENT/PLAN:                                                        1. Hypertension goal BP (blood pressure) < 140/90  2. Bilateral lower extremity edema  I believe she developed lower extremity swelling due to CCB dose increase.  We'll have her hold dose at 5 mg daily as she is tolerating that well.  We'll need to monitor her blood pressure on that and I've asked her to return in a month.  I think DVT is unlikely as her focal left calf pain has been chronic and her new/acute swelling was bilateral/symmetric and improved within a couple days of dropping the amlodipine dose back down to 5 mg daily.    - amLODIPine (NORVASC) 10 MG tablet; Take 0.5 tablets (5 mg) by mouth daily    3. Intermittent asthma, uncomplicated  Improved control.      FUTURE APPOINTMENTS:       - Follow-up visit in 1 month    Norma Serrano MD  [facility]   "

## 2017-06-14 NOTE — TELEPHONE ENCOUNTER
Writer called patient and reviewed message per below from Dr. Garcia.    Patient verbalized understanding and stated:  1. Did tolerate 5 mg dose, will switch to that dose    Appt scheduled with PCP on 6/15/17.  Appt date, time and location confirmed with patient.    Appt on 6/22/17 will be kept in case PCP wishes patient to return in 1 week.    ALYSSA CollierN, RN

## 2017-06-14 NOTE — TELEPHONE ENCOUNTER
Previous blood pressure was elevated. Did she tolerate Norvasc 5 mg? If she did, I would restart Norvasc 5 mg daily and schedule blood pressure visit prior to OV on 06/22/17.

## 2017-06-14 NOTE — TELEPHONE ENCOUNTER
"Patient called and spoke with writer.    Per patient:  1. Started increased Amlodipine dose of 10 mg daily on 6/4/17   A. Since then, edema in BLE and pain in LLE-\"leslie horse\"  2. Stopped taking Amlodipine altogether on 6/11/17  3. Today edema is a little bit better  4. Does stand on feet for 4 hour period three days per week  5. Right pedal edema almost completely resolved  6. Denied:    A. Chest pain   B. SOB   C. Headache-but does get headaches from time to time   D. Dizziness   E. Weakness   F. Nausea   G. Edema located elsewhere in body  7. Still taking Maxzide  8. Has follow up appt with Dr. Serrano on 6/22/17.    Covering providers-Please review and advise.    Thank you!  CIARRA Lynne, ALYSSAN, RN    "

## 2017-06-15 ENCOUNTER — OFFICE VISIT (OUTPATIENT)
Dept: FAMILY MEDICINE | Facility: CLINIC | Age: 62
End: 2017-06-15
Payer: COMMERCIAL

## 2017-06-15 VITALS
SYSTOLIC BLOOD PRESSURE: 131 MMHG | TEMPERATURE: 98.1 F | DIASTOLIC BLOOD PRESSURE: 75 MMHG | WEIGHT: 263.25 LBS | HEART RATE: 112 BPM | RESPIRATION RATE: 16 BRPM | OXYGEN SATURATION: 100 % | BODY MASS INDEX: 44.94 KG/M2 | HEIGHT: 64 IN

## 2017-06-15 DIAGNOSIS — I10 HYPERTENSION GOAL BP (BLOOD PRESSURE) < 140/90: ICD-10-CM

## 2017-06-15 DIAGNOSIS — R60.0 BILATERAL LOWER EXTREMITY EDEMA: ICD-10-CM

## 2017-06-15 DIAGNOSIS — J45.20 INTERMITTENT ASTHMA, UNCOMPLICATED: Primary | ICD-10-CM

## 2017-06-15 PROCEDURE — 99213 OFFICE O/P EST LOW 20 MIN: CPT | Performed by: FAMILY MEDICINE

## 2017-06-15 RX ORDER — AMLODIPINE BESYLATE 10 MG/1
5 TABLET ORAL DAILY
Start: 2017-06-15 | End: 2017-10-09

## 2017-06-15 NOTE — MR AVS SNAPSHOT
After Visit Summary   6/15/2017    Teri Bain    MRN: [id number]           Patient Information     Date Of Birth          1955        Visit Information        Provider Department      6/15/2017 12:20 PM Norma Serrano MD [facility]        Today's Diagnoses     Intermittent asthma, uncomplicated    -  1    Hypertension goal BP (blood pressure) < 140/90           Follow-ups after your visit        Follow-up notes from your care team     Return in about 1 month (around 7/15/2017).      Your next 10 appointments already scheduled     Jun 22, 2017  8:40 AM CDT   SHORT with Norma Serrano MD   [facility]    [address] [place] [id number] [phone]              Who to contact     If you have questions or need follow up information about today's clinic visit or your schedule please contact [facility] directly at [phone].  Normal or non-critical lab and imaging results will be communicated to you by [employer], letter or phone within 4 business days after the clinic has received the results. If you do not hear from us within 7 days, please contact the clinic through [employer] or phone. If you have a critical or abnormal lab result, we will notify you by phone as soon as possible.  Submit refill requests through [employer] or call your pharmacy and they will forward the refill request to us. Please allow 3 business days for your refill to be completed.          Additional Information About Your Visit        [employer] Information     [employer] gives you secure access to your electronic health record. If you see a primary care provider, you can also send messages to your care team and make appointments. If you have questions, please call your primary care clinic.  If you do not have a primary care provider, please call [phone] and they will assist you.        Care EveryWhere ID     This is your Care  "EveryWhere ID. This could be used by other organizations to access your [facility] medical records  [id number]        Your Vitals Were     Pulse Temperature Respirations Height Pulse Oximetry Breastfeeding?    112 98.1  F (36.7  C) (Oral) 16 5' 4\" (1.626 m) 100% No    BMI (Body Mass Index)                   45.19 kg/m2            Blood Pressure from Last 3 Encounters:   06/15/17 131/75   05/24/17 (!) 168/94   01/06/17 137/81    Weight from Last 3 Encounters:   06/15/17 263 lb 4 oz (119.4 kg)   05/24/17 266 lb 12 oz (121 kg)   01/06/17 258 lb (117 kg)              Today, you had the following     No orders found for display         Today's Medication Changes          These changes are accurate as of: 6/15/17  1:16 PM.  If you have any questions, ask your nurse or doctor.               These medicines have changed or have updated prescriptions.        Dose/Directions    amLODIPine 10 MG tablet   Commonly known as:  NORVASC   This may have changed:  how much to take   Used for:  Hypertension goal BP (blood pressure) < 140/90   Changed by:  Norma Serrano MD        Dose:  5 mg   Take 0.5 tablets (5 mg) by mouth daily   Refills:  0            Where to get your medicines      Some of these will need a paper prescription and others can be bought over the counter.  Ask your nurse if you have questions.     You don't need a prescription for these medications     amLODIPine 10 MG tablet                Primary Care Provider Office Phone # Fax #    Norma Serrano [phone]       [facility] [address] [facility] [address]        Thank you!     Thank you for choosing [facility]  for your care. Our goal is always to provide you with excellent care. Hearing back from our patients is one way we can continue to improve our services. Please take a few minutes to complete the written survey that you may receive in the mail after your visit with us. Thank you!             Your " Updated Medication List - Protect others around you: Learn how to safely use, store and throw away your medicines at www.disposemymeds.org.          This list is accurate as of: 6/15/17  1:16 PM.  Always use your most recent med list.                   Brand Name Dispense Instructions for use    * albuterol (2.5 MG/3ML) 0.083% neb solution     30 vial    Take 1 vial (2.5 mg) by nebulization every 6 hours as needed for shortness of breath / dyspnea or wheezing       * albuterol 108 (90 BASE) MCG/ACT Inhaler    PROAIR HFA/PROVENTIL HFA/VENTOLIN HFA    1 Inhaler    Inhale 1-2 puffs into the lungs every 6 hours       amLODIPine 10 MG tablet    NORVASC     Take 0.5 tablets (5 mg) by mouth daily       ascorbic acid 500 MG tablet    VITAMIN C    60    1-2 tablets daily       aspirin 81 MG tablet      Take 1 tablet by mouth daily.       cetirizine 10 MG tablet    zyrTEC    30 tablet    Take 1 tablet (10 mg) by mouth every evening       flunisolide HFA 80 MCG/ACT Aers oral inhaler    AEROSPAN    3 Inhaler    Inhale 2 puffs into the lungs 2 times daily       fluticasone 50 MCG/ACT spray    FLONASE    1 Package    Spray 1-2 sprays into both nostrils daily.       Multi-vitamin Tabs tablet   Generic drug:  multivitamin, therapeutic with minerals          pravastatin 80 MG tablet    PRAVACHOL    90 tablet    Take 1 tablet (80 mg) by mouth daily       predniSONE 50 MG tablet    DELTASONE    5 tablet    Take 1 tablet (50 mg) by mouth daily As needed for asthma exacerbation       triamterene-hydrochlorothiazide 75-50 MG per tablet    MAXZIDE    90 tablet    Take 1 tablet by mouth daily       VITAMIN B 12 PO      Take 1 tablet by mouth daily       ZANTAC PO      Take 1 tablet by mouth daily as needed for heartburn       * Notice:  This list has 2 medication(s) that are the same as other medications prescribed for you. Read the directions carefully, and ask your doctor or other care provider to review them with you.

## 2017-06-15 NOTE — NURSING NOTE
"Chief Complaint   Patient presents with     Hypertension     Asthma     Musculoskeletal Problem       Initial /75 (BP Location: Right arm, Patient Position: Chair, Cuff Size: Adult Large)  Pulse 112  Temp 98.1  F (36.7  C) (Oral)  Resp 16  Ht 5' 4\" (1.626 m)  Wt 263 lb 4 oz (119.4 kg)  SpO2 100%  Breastfeeding? No  BMI 45.19 kg/m2 Estimated body mass index is 45.19 kg/(m^2) as calculated from the following:    Height as of this encounter: 5' 4\" (1.626 m).    Weight as of this encounter: 263 lb 4 oz (119.4 kg).  Medication Reconciliation: complete     Aleja Orellana MA      "

## 2017-06-16 ASSESSMENT — ASTHMA QUESTIONNAIRES: ACT_TOTALSCORE: 20

## 2017-07-11 NOTE — PROGRESS NOTES
SUBJECTIVE:                                                    Teri Bain is a 61 year old female who presents to clinic today with her daughter for the following health issues:      Hypertension Follow-up    We had increased her amlodipine from 5 mg to 10 mg daily but she developed marked lower extremity edema on that so we did decide to drop the dose back to 5 mg when I saw her a month ago.  Today she states swelling has resolved.      She is also on maxzide.    Outpatient blood pressures are not being checked.    Low Salt Diet: low salt      Amount of exercise or physical activity: None    Problems taking medications regularly: No    Medication side effects: none    Diet: regular (no restrictions)      Problem list and histories reviewed & adjusted, as indicated.  Additional history: as documented      Past Medical History:   Diagnosis Date     Allergic rhinitis      Chronic kidney disease (CKD), stage III (moderate)      Chronic venous insufficiency      Classic migraines     fiorinal and darvocet prn     Diabetes mellitus, type 2 (H) 1998    age 42     Dysplasia of cervix     treated with hysterectomy     GERD (gastroesophageal reflux disease)      History of tobacco use     quit 1/97     Hyperlipidemia LDL goal <100      Hypertension goal BP (blood pressure) < 140/90      Mild persistent asthma      Osteoarthritis     LT ankle & L>R knee, hands     Trochanteric bursitis      Vitamin D deficiency 2/15/2010     Patient Active Problem List   Diagnosis     Classical migraine     Trochanteric bursitis     Allergic rhinitis     Osteoarthritis     Chronic venous insufficiency     Chronic kidney disease (CKD), stage III (moderate)     Intermittent asthma     Vitamin D deficiency     Hypertension goal BP (blood pressure) < 140/90     Hyperlipidemia LDL goal <100     Health Care Home     GERD (gastroesophageal reflux disease)     Advanced directives, counseling/discussion     Acute renal failure (H)      BP  "Readings from Last 3 Encounters:   07/13/17 128/74   06/15/17 131/75   05/24/17 (!) 168/94    Wt Readings from Last 3 Encounters:   07/13/17 266 lb (120.7 kg)   06/15/17 263 lb 4 oz (119.4 kg)   05/24/17 266 lb 12 oz (121 kg)           Reviewed and updated as needed this visit by clinical staff  Tobacco  Allergies  Meds       Reviewed and updated as needed this visit by Provider  Meds             OBJECTIVE:     /74 (BP Location: Right arm)  Pulse 87  Temp 98.6  F (37  C) (Oral)  Resp 12  Ht 5' 4\" (1.626 m)  Wt 266 lb (120.7 kg)  SpO2 98%  BMI 45.66 kg/m2  Body mass index is 45.66 kg/(m^2).  GEN:  no apparent distress  CV: bilateral lower extremities without edema         ASSESSMENT/PLAN:       1. Hypertension goal BP (blood pressure) < 140/90  Continue amlodipine 5 mg daily in addition to maxzide 75/50 daily.  She has a large supply of 10 mg tabs so will continue to cut those in half.      2. Hyperlipidemia LDL goal <100  Due for fasting lipid panel and routine preventive physical in October.    - pravastatin (PRAVACHOL) 80 MG tablet; Take 1 tablet (80 mg) by mouth daily  Dispense: 90 tablet; Refill: 0      Norma Serrano MD  [facility]   "

## 2017-07-13 ENCOUNTER — OFFICE VISIT (OUTPATIENT)
Dept: FAMILY MEDICINE | Facility: CLINIC | Age: 62
End: 2017-07-13
Payer: COMMERCIAL

## 2017-07-13 VITALS
WEIGHT: 266 LBS | TEMPERATURE: 98.6 F | BODY MASS INDEX: 45.41 KG/M2 | HEIGHT: 64 IN | HEART RATE: 87 BPM | OXYGEN SATURATION: 98 % | RESPIRATION RATE: 12 BRPM | DIASTOLIC BLOOD PRESSURE: 74 MMHG | SYSTOLIC BLOOD PRESSURE: 128 MMHG

## 2017-07-13 DIAGNOSIS — E78.5 HYPERLIPIDEMIA LDL GOAL <100: ICD-10-CM

## 2017-07-13 DIAGNOSIS — I10 HYPERTENSION GOAL BP (BLOOD PRESSURE) < 140/90: Primary | ICD-10-CM

## 2017-07-13 PROCEDURE — 99213 OFFICE O/P EST LOW 20 MIN: CPT | Performed by: FAMILY MEDICINE

## 2017-07-13 RX ORDER — PRAVASTATIN SODIUM 80 MG/1
80 TABLET ORAL DAILY
Qty: 90 TABLET | Refills: 0 | Status: SHIPPED | OUTPATIENT
Start: 2017-07-13 | End: 2017-12-07

## 2017-07-13 NOTE — MR AVS SNAPSHOT
After Visit Summary   7/13/2017    Teri Bain    MRN: [id number]           Patient Information     Date Of Birth          1955        Visit Information        Provider Department      7/13/2017 10:00 AM Norma Serrano MD [facility]        Today's Diagnoses     Hypertension goal BP (blood pressure) < 140/90    -  1    Hyperlipidemia LDL goal <100          Care Instructions    Schedule routine preventive physical after Oct 5, 2017.    Mammogram and Colonoscopy scheduling number: [phone].          Follow-ups after your visit        Who to contact     If you have questions or need follow up information about today's clinic visit or your schedule please contact [facility] directly at [phone].  Normal or non-critical lab and imaging results will be communicated to you by Hittahemhart, letter or phone within 4 business days after the clinic has received the results. If you do not hear from us within 7 days, please contact the clinic through Hittahemhart or phone. If you have a critical or abnormal lab result, we will notify you by phone as soon as possible.  Submit refill requests through [employer] or call your pharmacy and they will forward the refill request to us. Please allow 3 business days for your refill to be completed.          Additional Information About Your Visit        MyCharYbrain Information     [employer] gives you secure access to your electronic health record. If you see a primary care provider, you can also send messages to your care team and make appointments. If you have questions, please call your primary care clinic.  If you do not have a primary care provider, please call [phone] and they will assist you.        Care EveryWhere ID     This is your Care EveryWhere ID. This could be used by other organizations to access your [facility] medical records  [id number]        Your Vitals Were     Pulse Temperature Respirations Height Pulse Oximetry BMI  "(Body Mass Index)    87 98.6  F (37  C) (Oral) 12 5' 4\" (1.626 m) 98% 45.66 kg/m2       Blood Pressure from Last 3 Encounters:   07/13/17 128/74   06/15/17 131/75   05/24/17 (!) 168/94    Weight from Last 3 Encounters:   07/13/17 266 lb (120.7 kg)   06/15/17 263 lb 4 oz (119.4 kg)   05/24/17 266 lb 12 oz (121 kg)              Today, you had the following     No orders found for display         Where to get your medicines      These medications were sent to [facility] [address] [place] [address] [address] AT [facility] OF  [address]  [address] [facility] [address]     Phone:  [phone]     pravastatin 80 MG tablet          Primary Care Provider Office Phone # Fax #    Norma Serrano [phone]       [facility] [address] [facility] [address]        Equal Access to Services     WILDER BALLESTEROS AH: Hadii rhea ku hadasho Soomaali, waaxda luqadaha, qaybta kaalmada adeegyada, eb weeks . So [facility] [phone].    ATENCIÓN: Si habla español, tiene a nails disposición servicios gratuitos de asistencia lingüística. [facility] [phone].    We comply with applicable federal civil rights laws and [place] laws. We do not discriminate on the basis of race, color, national origin, age, disability sex, sexual orientation or gender identity.            Thank you!     Thank you for choosing [facility]  for your care. Our goal is always to provide you with excellent care. Hearing back from our patients is one way we can continue to improve our services. Please take a few minutes to complete the written survey that you may receive in the mail after your visit with us. Thank you!             Your Updated Medication List - Protect others around you: Learn how to safely use, store and throw away your medicines at [email or web address] [email or web address] Have [email or web address].          This list is accurate as of: 7/13/17 10:26 AM.  Always use your most recent med " list.                   Brand Name Dispense Instructions for use Diagnosis    * albuterol (2.5 MG/3ML) 0.083% neb solution     30 vial    Take 1 vial (2.5 mg) by nebulization every 6 hours as needed for shortness of breath / dyspnea or wheezing    Intermittent asthma, with acute exacerbation       * albuterol 108 (90 BASE) MCG/ACT Inhaler    PROAIR HFA/PROVENTIL HFA/VENTOLIN HFA    1 Inhaler    Inhale 1-2 puffs into the lungs every 6 hours    Mild persistent asthma without complication       amLODIPine 10 MG tablet    NORVASC     Take 0.5 tablets (5 mg) by mouth daily    Hypertension goal BP (blood pressure) < 140/90       ascorbic acid 500 MG tablet    VITAMIN C    60    1-2 tablets daily        aspirin 81 MG tablet      Take 1 tablet by mouth daily.    Chronic kidney disease (CKD), stage III (moderate), Type 2 diabetes, HbA1c goal < 7% (H)       cetirizine 10 MG tablet    zyrTEC    30 tablet    Take 1 tablet (10 mg) by mouth every evening    Sore throat       flunisolide HFA 80 MCG/ACT Aers oral inhaler    AEROSPAN    3 Inhaler    Inhale 2 puffs into the lungs 2 times daily    Mild persistent asthma without complication       fluticasone 50 MCG/ACT spray    FLONASE    1 Package    Spray 1-2 sprays into both nostrils daily.    Chronic rhinitis, Allergic rhinitis       Multi-vitamin Tabs tablet   Generic drug:  multivitamin, therapeutic with minerals           pravastatin 80 MG tablet    PRAVACHOL    90 tablet    Take 1 tablet (80 mg) by mouth daily    Hyperlipidemia LDL goal <100       predniSONE 50 MG tablet    DELTASONE    5 tablet    Take 1 tablet (50 mg) by mouth daily As needed for asthma exacerbation    Uncontrolled intermittent asthma       triamterene-hydrochlorothiazide 75-50 MG per tablet    MAXZIDE    90 tablet    Take 1 tablet by mouth daily    Chronic venous insufficiency, Essential hypertension with goal blood pressure less than 140/90       VITAMIN B 12 PO      Take 1 tablet by mouth daily         ZANTAC PO      Take 1 tablet by mouth daily as needed for heartburn        * Notice:  This list has 2 medication(s) that are the same as other medications prescribed for you. Read the directions carefully, and ask your doctor or other care provider to review them with you.

## 2017-07-13 NOTE — NURSING NOTE
"Chief Complaint   Patient presents with     Hypertension       Initial /74 (BP Location: Right arm)  Pulse 87  Temp 98.6  F (37  C) (Oral)  Resp 12  Ht 5' 4\" (1.626 m)  Wt 266 lb (120.7 kg)  SpO2 98%  BMI 45.66 kg/m2 Estimated body mass index is 45.66 kg/(m^2) as calculated from the following:    Height as of this encounter: 5' 4\" (1.626 m).    Weight as of this encounter: 266 lb (120.7 kg).  Medication Reconciliation: complete     Taylor Mancera CMA      "

## 2017-07-13 NOTE — PATIENT INSTRUCTIONS
Schedule routine preventive physical after Oct 5, 2017.    Mammogram and Colonoscopy scheduling number: [phone].

## 2017-10-09 ENCOUNTER — OFFICE VISIT (OUTPATIENT)
Dept: FAMILY MEDICINE | Facility: CLINIC | Age: 62
End: 2017-10-09
Payer: COMMERCIAL

## 2017-10-09 VITALS
HEIGHT: 64 IN | RESPIRATION RATE: 20 BRPM | HEART RATE: 70 BPM | BODY MASS INDEX: 44.43 KG/M2 | DIASTOLIC BLOOD PRESSURE: 72 MMHG | SYSTOLIC BLOOD PRESSURE: 128 MMHG | TEMPERATURE: 97.7 F | WEIGHT: 260.25 LBS

## 2017-10-09 DIAGNOSIS — M89.8X1 PAIN OF LEFT CLAVICLE: ICD-10-CM

## 2017-10-09 DIAGNOSIS — Z23 NEED FOR PROPHYLACTIC VACCINATION AND INOCULATION AGAINST INFLUENZA: ICD-10-CM

## 2017-10-09 DIAGNOSIS — I10 HYPERTENSION GOAL BP (BLOOD PRESSURE) < 140/90: ICD-10-CM

## 2017-10-09 DIAGNOSIS — N18.30 CHRONIC KIDNEY DISEASE (CKD), STAGE III (MODERATE) (H): ICD-10-CM

## 2017-10-09 DIAGNOSIS — J45.30 MILD PERSISTENT ASTHMA WITHOUT COMPLICATION: ICD-10-CM

## 2017-10-09 DIAGNOSIS — E78.5 HYPERLIPIDEMIA LDL GOAL <100: ICD-10-CM

## 2017-10-09 DIAGNOSIS — I87.2 CHRONIC VENOUS INSUFFICIENCY: ICD-10-CM

## 2017-10-09 DIAGNOSIS — Z00.00 ROUTINE GENERAL MEDICAL EXAMINATION AT A HEALTH CARE FACILITY: Primary | ICD-10-CM

## 2017-10-09 LAB — HGB BLD-MCNC: 13.7 G/DL (ref 11.7–15.7)

## 2017-10-09 PROCEDURE — 90686 IIV4 VACC NO PRSV 0.5 ML IM: CPT | Performed by: FAMILY MEDICINE

## 2017-10-09 PROCEDURE — 80061 LIPID PANEL: CPT | Performed by: FAMILY MEDICINE

## 2017-10-09 PROCEDURE — 85018 HEMOGLOBIN: CPT | Performed by: FAMILY MEDICINE

## 2017-10-09 PROCEDURE — 90471 IMMUNIZATION ADMIN: CPT | Performed by: FAMILY MEDICINE

## 2017-10-09 PROCEDURE — 36415 COLL VENOUS BLD VENIPUNCTURE: CPT | Performed by: FAMILY MEDICINE

## 2017-10-09 PROCEDURE — 99396 PREV VISIT EST AGE 40-64: CPT | Mod: 25 | Performed by: FAMILY MEDICINE

## 2017-10-09 PROCEDURE — 80048 BASIC METABOLIC PNL TOTAL CA: CPT | Performed by: FAMILY MEDICINE

## 2017-10-09 PROCEDURE — 82043 UR ALBUMIN QUANTITATIVE: CPT | Performed by: FAMILY MEDICINE

## 2017-10-09 RX ORDER — PREDNISONE 50 MG/1
50 TABLET ORAL DAILY
Qty: 5 TABLET | Refills: 0 | Status: SHIPPED | OUTPATIENT
Start: 2017-10-09 | End: 2018-05-07

## 2017-10-09 RX ORDER — ALBUTEROL SULFATE 0.83 MG/ML
1 SOLUTION RESPIRATORY (INHALATION) EVERY 6 HOURS PRN
Qty: 30 VIAL | Refills: 1 | Status: SHIPPED | OUTPATIENT
Start: 2017-10-09

## 2017-10-09 RX ORDER — TRIAMTERENE AND HYDROCHLOROTHIAZIDE 75; 50 MG/1; MG/1
1 TABLET ORAL DAILY
Qty: 90 TABLET | Refills: 3 | Status: SHIPPED | OUTPATIENT
Start: 2017-10-09 | End: 2018-10-11

## 2017-10-09 RX ORDER — AMLODIPINE BESYLATE 5 MG/1
5 TABLET ORAL DAILY
Qty: 90 TABLET | Refills: 3 | Status: SHIPPED | OUTPATIENT
Start: 2017-10-09 | End: 2018-10-11

## 2017-10-09 NOTE — PROGRESS NOTES
SUBJECTIVE:   CC: Teri Bain is an 62 year old woman who presents for preventive health visit.     Physical   Annual:     Getting at least 3 servings of Calcium per day::  NO    Bi-annual eye exam::  Yes    Dental care twice a year::  Yes    Sleep apnea or symptoms of sleep apnea::  None    Diet::  Low salt    Frequency of exercise::  1 day/week    Duration of exercise::  30-45 minutes    Taking medications regularly::  Yes    Medication side effects::  Other    Additional concerns today::  YES    Other non-preventive concerns to address:  1)  Left clavicle pain x 6 weeks.  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  No trauma.      Today's PHQ-2 Score:   PHQ-2 ( 1999 [employer]) 10/9/2017   Q1: Little interest or pleasure in doing things 0   Q2: Feeling down, depressed or hopeless 0   PHQ-2 Score 0   Q1: Little interest or pleasure in doing things Not at all   Q2: Feeling down, depressed or hopeless Not at all   PHQ-2 Score 0       Abuse: Current or Past(Physical, Sexual or Emotional)- No  Do you feel safe in your environment - Yes    Social History   Substance Use Topics     Smoking status: Former Smoker     Quit date: 12/31/1996     Smokeless tobacco: Never Used     Alcohol use Yes      Comment: wine 1-2 times per year     The patient does not drink >3 drinks per day nor >7 drinks per week.    Reviewed orders with patient.  Reviewed health maintenance and updated orders accordingly - Yes  BP Readings from Last 3 Encounters:   10/09/17 128/72   07/13/17 128/74   06/15/17 131/75    Wt Readings from Last 3 Encounters:   10/09/17 260 lb 4 oz (118 kg)   07/13/17 266 lb (120.7 kg)   06/15/17 263 lb 4 oz (119.4 kg)             Pertinent mammograms are reviewed under the imaging tab.    History of abnormal Pap smear: Status post benign hysterectomy. Health Maintenance and Surgical History updated.    Reviewed and updated as needed this visit by clinical staff  Tobacco  Allergies   Meds  Med Hx  Surg Hx  Fam Hx  Soc Hx        Reviewed and updated as needed this visit by Provider  Tobacco  Meds  Med Hx  Surg Hx  Fam Hx  Soc Hx       Past Medical History:   Diagnosis Date     Allergic rhinitis      Chronic kidney disease (CKD), stage III (moderate)      Chronic venous insufficiency      Classic migraines     fiorinal and darvocet prn     Diabetes mellitus, type 2 (H)     age 42     Dysplasia of cervix     treated with hysterectomy     GERD (gastroesophageal reflux disease)      History of tobacco use     quit      Hyperlipidemia LDL goal <100      Hypertension goal BP (blood pressure) < 140/90      Mild persistent asthma      Osteoarthritis     LT ankle & L>R knee, hands     Trochanteric bursitis      Vitamin D deficiency 2/15/2010      Past Surgical History:   Procedure Laterality Date     C DEXA, BONE DENSITY, AXIAL SKEL      WNL     C LIGATE FALLOPIAN TUBE       COLONOSCOPY      [place]     COLONOSCOPY  1/5/15    [facility]NL[facility]     HC DILATION/CURETTAGE DIAG/THER NON OB      D & C     HC REMOVAL GALLBLADDER       HYSTERECTOMY, PAP NO LONGER INDICATED      Hysterectomy, Vaginal, ovaries intact     SURGICAL HISTORY OF -       cystoscopy     Obstetric History       T2      L2     SAB0   TAB0   Ectopic0   Multiple0   Live Births0       # Outcome Date GA Lbr René/2nd Weight Sex Delivery Anes PTL Lv   2 Term            1 Term                     ROS:  CONST: NEGATIVE for fevers/chills/sweats, unexplained weight loss/gain, and fatigue  EYES: NEGATIVE for change in vision  ENT: NEGATIVE for difficulty hearing/tinnitus, and problems with teeth/gums  BREAST: NEGATIVE for breast lump/discharge  CV: NEGATIVE for chest pain/discomfort, leg pain with exercise, and palpitations  RESP: POSITIVE for cough/wheeze (recent URI), and NEGATIVE for difficulty breathing  GI: NEGATIVE for abdominal pain, blood in bowel movement, and  "nausea/vomiting/diarrhea  : NEGATIVE for nighttime urination, leaking urine, unusual vaginal bleeding, penile/vaginal discharge, and concerns about sexual function  MS: POSITIVE for muscle/joint pain (knees)  SKIN: NEGATIVE for rash or mole change  NEURO: POSITIVE for headaches, and NEGATIVE for dizziness/lightheadedness, numbness, memory loss, and loss of coordination  PSYCH: NEGATIVE for anxiety/stress, problems with sleep, and depression  HEME: NEGATIVE for unexplained lumps, and easy bruising/bleeding  ENDO: NEGATIVE for excessive thirst or urination  ALL: NEGATIVE for hay fever/allergies      OBJECTIVE:   /72 (BP Location: Right arm, Patient Position: Chair, Cuff Size: Adult Large)  Pulse 70  Temp 97.7  F (36.5  C) (Oral)  Resp 20  Ht 5' 4\" (1.626 m)  Wt 260 lb 4 oz (118 kg)  Breastfeeding? No  BMI 44.67 kg/m2  EXAM:  GENERAL: healthy, alert and no distress  EYES: Eyes grossly normal to inspection, PERRL and conjunctivae and sclerae normal  HENT: ear canals and TM's normal, nose and mouth without ulcers or lesions  NECK: no adenopathy, no asymmetry, masses, or scars and thyroid normal to palpation  RESP: lungs clear to auscultation - no rales, rhonchi or wheezes  BREAST: normal without masses, tenderness or nipple discharge and no palpable axillary masses or adenopathy  CV: regular rate and rhythm, normal S1 S2, no S3 or S4, no murmur, click or rub, no peripheral edema and peripheral pulses strong  CHEST: left mid-clavicle is somewhat tender to palpation   ABDOMEN: soft, nontender, no hepatosplenomegaly, no masses and bowel sounds normal  MS: no gross musculoskeletal defects noted, no edema  SKIN: no suspicious lesions or rashes  NEURO: Normal strength and tone, mentation intact and speech normal  PSYCH: mentation appears normal, affect normal/bright    ASSESSMENT/PLAN:     1. Routine general medical examination at a health care facility      2. Need for prophylactic vaccination and inoculation " against influenza  - FLU VAC, SPLIT VIRUS IM > 3 YO (QUADRIVALENT) [41347]  - Vaccine Administration, Initial [74956]    3. Chronic kidney disease (CKD), stage III (moderate)  - Albumin Random Urine Quantitative with Creat Ratio  - Basic metabolic panel  - Hemoglobin    4. Hypertension goal BP (blood pressure) < 140/90  stable/well controlled   - Albumin Random Urine Quantitative with Creat Ratio  - Basic metabolic panel  - triamterene-hydrochlorothiazide (MAXZIDE) 75-50 MG per tablet; Take 1 tablet by mouth daily  Dispense: 90 tablet; Refill: 3  - amLODIPine (NORVASC) 5 MG tablet; Take 1 tablet (5 mg) by mouth daily  Dispense: 90 tablet; Refill: 3    5. Chronic venous insufficiency  stable/well controlled   - triamterene-hydrochlorothiazide (MAXZIDE) 75-50 MG per tablet; Take 1 tablet by mouth daily  Dispense: 90 tablet; Refill: 3    6. Hyperlipidemia LDL goal <100  - Lipid panel reflex to direct LDL    7. Mild persistent asthma without complication  stable/well controlled - refilled her prednisone burst for her to have on hand for future exacerbations  - flunisolide HFA (AEROSPAN) 80 MCG/ACT AERS oral inhaler; Inhale 2 puffs into the lungs 2 times daily  Dispense: 3 Inhaler; Refill: 3  - predniSONE (DELTASONE) 50 MG tablet; Take 1 tablet (50 mg) by mouth daily As needed for asthma exacerbation  Dispense: 5 tablet; Refill: 0  - albuterol (2.5 MG/3ML) 0.083% neb solution; Take 1 vial (2.5 mg) by nebulization every 6 hours as needed for shortness of breath / dyspnea or wheezing  Dispense: 30 vial; Refill: 1      8. Pain of left clavicle  Unclear etiology.  Recommended she monitor for now and avoid lifting heavy objects.  Patient instructed to contact clinic if symptoms persist, worsen, or do not resolve as anticipated.         COUNSELING:  Reviewed preventive health counseling, as reflected in patient instructions          Wt Readings from Last 5 Encounters:   10/09/17 260 lb 4 oz (118 kg)   07/13/17 266 lb (120.7  "kg)   06/15/17 263 lb 4 oz (119.4 kg)   05/24/17 266 lb 12 oz (121 kg)   01/06/17 258 lb (117 kg)      Estimated body mass index is 44.67 kg/(m^2) as calculated from the following:    Height as of this encounter: 5' 4\" (1.626 m).    Weight as of this encounter: 260 lb 4 oz (118 kg).   Weight management plan: Discussed healthy diet and exercise guidelines and patient will follow up in 12 months in clinic to re-evaluate.    Counseling Resources:  ATP IV Guidelines  Pooled Cohorts Equation Calculator  Breast Cancer Risk Calculator  FRAX Risk Assessment  ICSI Preventive Guidelines  Dietary Guidelines for Americans, 2010  [employer]'s MyPlate  ASA Prophylaxis  Lung CA Screening    Norma Serrano MD  [facility]        Answers for HPI/ROS submitted by the patient on 10/9/2017   PHQ-2 Score: 0    Injectable Influenza Immunization Documentation    1.  Is the person to be vaccinated sick today?   No    2. Does the person to be vaccinated have an allergy to a component   of the vaccine?   No    3. Has the person to be vaccinated ever had a serious reaction   to influenza vaccine in the past?   No    4. Has the person to be vaccinated ever had Guillain-Barré syndrome?   No    Form completed by Aleja Orellana MA           "

## 2017-10-09 NOTE — NURSING NOTE
"Chief Complaint   Patient presents with     Physical       Initial /72 (BP Location: Right arm, Patient Position: Chair, Cuff Size: Adult Large)  Pulse 70  Temp 97.7  F (36.5  C) (Oral)  Resp 20  Ht 5' 4\" (1.626 m)  Wt 260 lb 4 oz (118 kg)  Breastfeeding? No  BMI 44.67 kg/m2 Estimated body mass index is 44.67 kg/(m^2) as calculated from the following:    Height as of this encounter: 5' 4\" (1.626 m).    Weight as of this encounter: 260 lb 4 oz (118 kg).  Medication Reconciliation: complete   Aleja Orellana MA      "

## 2017-10-09 NOTE — MR AVS SNAPSHOT
After Visit Summary   10/9/2017    Teri Bain    MRN: [id number]           Patient Information     Date Of Birth          1955        Visit Information        Provider Department      10/9/2017 10:40 AM Norma Serrano MD [facility]        Today's Diagnoses     Routine general medical examination at a health care facility    -  1    Need for prophylactic vaccination and inoculation against influenza        Chronic kidney disease (CKD), stage III (moderate)        Hypertension goal BP (blood pressure) < 140/90        Hyperlipidemia LDL goal <100        Chronic venous insufficiency        Essential hypertension with goal blood pressure less than 140/90        Mild persistent asthma without complication        Uncontrolled intermittent asthma        Intermittent asthma, with acute exacerbation          Care Instructions      Preventive Health Recommendations  Female Ages 50 - 64    Yearly exam: See your health care provider every year in order to  o Review health changes.   o Discuss preventive care.    o Review your medicines if your doctor has prescribed any.      Get a Pap test every three years (unless you have an abnormal result and your provider advises testing more often).    If you get Pap tests with HPV test, you only need to test every 5 years, unless you have an abnormal result.     You do not need a Pap test if your uterus was removed (hysterectomy) and you have not had cancer.    You should be tested each year for STDs (sexually transmitted diseases) if you're at risk.     Have a mammogram every 1 to 2 years.    Have a colonoscopy at age 50, or have a yearly FIT test (stool test). These exams screen for colon cancer.      Have a cholesterol test every 5 years, or more often if advised.    Have a diabetes test (fasting glucose) every three years. If you are at risk for diabetes, you should have this test more often.     If you are at risk for osteoporosis (brittle  bone disease), think about having a bone density scan (DEXA).    Shots: Get a flu shot each year. Get a tetanus shot every 10 years.    Nutrition:     Eat at least 5 servings of fruits and vegetables each day.    Eat whole-grain bread, whole-wheat pasta and brown rice instead of white grains and rice.    Talk to your provider about Calcium and Vitamin D.     Lifestyle    Exercise at least 150 minutes a week (30 minutes a day, 5 days a week). This will help you control your weight and prevent disease.    Limit alcohol to one drink per day.    No smoking.     Wear sunscreen to prevent skin cancer.     See your dentist every six months for an exam and cleaning.    See your eye doctor every 1 to 2 years.            Follow-ups after your visit        Who to contact     If you have questions or need follow up information about today's clinic visit or your schedule please contact [facility] directly at [phone].  Normal or non-critical lab and imaging results will be communicated to you by Mobeehart, letter or phone within 4 business days after the clinic has received the results. If you do not hear from us within 7 days, please contact the clinic through Informatics In Contextt or phone. If you have a critical or abnormal lab result, we will notify you by phone as soon as possible.  Submit refill requests through [email or web address] or call your pharmacy and they will forward the refill request to us. Please allow 3 business days for your refill to be completed.          Additional Information About Your Visit        [email or web address] Information     [email or web address] gives you secure access to your electronic health record. If you see a primary care provider, you can also send messages to your care team and make appointments. If you have questions, please call your primary care clinic.  If you do not have a primary care provider, please call [phone] and they will assist you.        Care EveryWhere ID     This is your Care EveryWhere ID. This  "could be used by other organizations to access your [facility] records  [id number]        Your Vitals Were     Pulse Temperature Respirations Height Breastfeeding? BMI (Body Mass Index)    70 97.7  F (36.5  C) (Oral) 20 5' 4\" (1.626 m) No 44.67 kg/m2       Blood Pressure from Last 3 Encounters:   10/09/17 128/72   07/13/17 128/74   06/15/17 131/75    Weight from Last 3 Encounters:   10/09/17 260 lb 4 oz (118 kg)   07/13/17 266 lb (120.7 kg)   06/15/17 263 lb 4 oz (119.4 kg)              We Performed the Following     Albumin Random Urine Quantitative with Creat Ratio     Basic metabolic panel     FLU VAC, SPLIT VIRUS IM > 3 YO (QUADRIVALENT) [01823]     Hemoglobin     Lipid panel reflex to direct LDL     Vaccine Administration, Initial [93272]          Today's Medication Changes          These changes are accurate as of: 10/9/17 11:57 AM.  If you have any questions, ask your nurse or doctor.               Start taking these medicines.        Dose/Directions    amLODIPine 5 MG tablet   Commonly known as:  NORVASC   Used for:  Hypertension goal BP (blood pressure) < 140/90   Started by:  Norma Serrano MD        Dose:  5 mg   Take 1 tablet (5 mg) by mouth daily   Quantity:  90 tablet   Refills:  3            Where to get your medicines      [facility] [address] [place] [address] [address] AT [facility] OF [address]  [address] [facility] [address]     Phone:  [phone]     albuterol (2.5 MG/3ML) 0.083% neb solution    amLODIPine 5 MG tablet    flunisolide HFA 80 MCG/ACT Aers oral inhaler    predniSONE 50 MG tablet    triamterene-hydrochlorothiazide 75-50 MG per tablet                Primary Care Provider Office Phone # Fax #    Norma Serrano [phone] [address]  [facility] [address]        Equal Access to Services     DEBBIE BALLESTEROS AH: July Jack, marija tang, newton lambert, " eb jaimes quinten castellon'aan ah. So [facility] [phone].    ATENCIÓN: Si rachanala ilan, tiene a nails disposición servicios gratuitos de asistencia lingüística. Julito sam [phone].    We comply with applicable federal civil rights laws and [place] laws. We do not discriminate on the basis of race, color, national origin, age, disability, sex, sexual orientation, or gender identity.            Thank you!     Thank you for choosing [facility]  for your care. Our goal is always to provide you with excellent care. Hearing back from our patients is one way we can continue to improve our services. Please take a few minutes to complete the written survey that you may receive in the mail after your visit with us. Thank you!             Your Updated Medication List - Protect others around you: Learn how to safely use, store and throw away your medicines at www.disposemymeds.org.          This list is accurate as of: 10/9/17 11:57 AM.  Always use your most recent med list.                   Brand Name Dispense Instructions for use Diagnosis    * albuterol 108 (90 BASE) MCG/ACT Inhaler    PROAIR HFA/PROVENTIL HFA/VENTOLIN HFA    1 Inhaler    Inhale 1-2 puffs into the lungs every 6 hours    Mild persistent asthma without complication       * albuterol (2.5 MG/3ML) 0.083% neb solution     30 vial    Take 1 vial (2.5 mg) by nebulization every 6 hours as needed for shortness of breath / dyspnea or wheezing    Intermittent asthma, with acute exacerbation       amLODIPine 5 MG tablet    NORVASC    90 tablet    Take 1 tablet (5 mg) by mouth daily    Hypertension goal BP (blood pressure) < 140/90       ascorbic acid 500 MG tablet    VITAMIN C    60    1-2 tablets daily        aspirin 81 MG tablet      Take 1 tablet by mouth daily.    Chronic kidney disease (CKD), stage III (moderate), Type 2 diabetes, HbA1c goal < 7% (H)       cetirizine 10 MG tablet    zyrTEC    30 tablet    Take 1 tablet (10 mg) by mouth  every evening    Sore throat       flunisolide HFA 80 MCG/ACT Aers oral inhaler    AEROSPAN    3 Inhaler    Inhale 2 puffs into the lungs 2 times daily    Mild persistent asthma without complication       fluticasone 50 MCG/ACT spray    FLONASE    1 Package    Spray 1-2 sprays into both nostrils daily.    Chronic rhinitis, Allergic rhinitis       Multi-vitamin Tabs tablet   Generic drug:  multivitamin, therapeutic with minerals           pravastatin 80 MG tablet    PRAVACHOL    90 tablet    Take 1 tablet (80 mg) by mouth daily    Hyperlipidemia LDL goal <100       predniSONE 50 MG tablet    DELTASONE    5 tablet    Take 1 tablet (50 mg) by mouth daily As needed for asthma exacerbation    Uncontrolled intermittent asthma       triamterene-hydrochlorothiazide 75-50 MG per tablet    MAXZIDE    90 tablet    Take 1 tablet by mouth daily    Chronic venous insufficiency, Essential hypertension with goal blood pressure less than 140/90       VITAMIN B 12 PO      Take 1 tablet by mouth daily        ZANTAC PO      Take 1 tablet by mouth daily as needed for heartburn        * Notice:  This list has 2 medication(s) that are the same as other medications prescribed for you. Read the directions carefully, and ask your doctor or other care provider to review them with you.

## 2017-10-10 LAB
ANION GAP SERPL CALCULATED.3IONS-SCNC: 12 MMOL/L (ref 3–14)
BUN SERPL-MCNC: 28 MG/DL (ref 7–30)
CALCIUM SERPL-MCNC: 9.9 MG/DL (ref 8.5–10.1)
CHLORIDE SERPL-SCNC: 104 MMOL/L (ref 94–109)
CHOLEST SERPL-MCNC: 221 MG/DL
CO2 SERPL-SCNC: 24 MMOL/L (ref 20–32)
CREAT SERPL-MCNC: 1.34 MG/DL (ref 0.52–1.04)
CREAT UR-MCNC: 88 MG/DL
GFR SERPL CREATININE-BSD FRML MDRD: 40 ML/MIN/1.7M2
GLUCOSE SERPL-MCNC: 84 MG/DL (ref 70–99)
HDLC SERPL-MCNC: 93 MG/DL
LDLC SERPL CALC-MCNC: 116 MG/DL
MICROALBUMIN UR-MCNC: <5 MG/L
MICROALBUMIN/CREAT UR: NORMAL MG/G CR (ref 0–25)
NONHDLC SERPL-MCNC: 128 MG/DL
POTASSIUM SERPL-SCNC: 4.2 MMOL/L (ref 3.4–5.3)
SODIUM SERPL-SCNC: 140 MMOL/L (ref 133–144)
TRIGL SERPL-MCNC: 61 MG/DL

## 2017-10-10 NOTE — PROGRESS NOTES
García Williamson,  Overall this looks stable but with your chronic kidney disease I do still recommend that you take a daily statin.  (When you are taking the pravastatin your LDL runs around 70!)  Do you want to try a low dose of atorvastatin instead?  Or do you want to check with your insurance on price/preferred statins?  For now you do still have the pravastatin prescription on file with your pharmacy (that we sent in July).  Norma Serrano MD

## 2017-10-12 ENCOUNTER — MYC MEDICAL ADVICE (OUTPATIENT)
Dept: FAMILY MEDICINE | Facility: CLINIC | Age: 62
End: 2017-10-12

## 2017-10-12 NOTE — TELEPHONE ENCOUNTER
I called Teri to discuss her sx.    PATIENT COMPLAINS OF :  --Mild URI type symptoms since last Friday evening.  --She thought she was going to beat the cold but Tuesday she started feeling worse.  --She has a cough and her chest feels some tightness when she coughs.  --She is having some wheezing.  --When she coughs she says she can feel her chest loosen up.  --She has used her rescue inhaler but has not tried her neb yet.      DENIES:  --No chest pain.  --No nausea.  --No colored sputum.  --No pain anywhere per patient.  --No fever.    PLAN :   --Per protocol can try home care.  --I encouraged her to try her neb and follow instructions on her neb and MDI.  --I reviewed she should call back 24/7 if sx worsen or do not improve within a few days.  --I reviewed home care from Wheezing guideline but encouraged her to avoid OTC medication if she is feeling only mildly ill and is able to sleep.  She has cold remedy for people with HTN.  --I encouraged her to stay home if possible because she does have asthma so needs to be more careful of URI's.  --Patient agrees with this plan, she did not want to come in for appointment.    Protocol consulted - Wheezing.  Luana French Telephone Triage Protocols For Nurses 5th Edition guideline.    Sarahy Gregory RN

## 2017-12-07 DIAGNOSIS — E78.5 HYPERLIPIDEMIA LDL GOAL <100: ICD-10-CM

## 2017-12-07 NOTE — TELEPHONE ENCOUNTER
Medication Detail      Disp Refills Start End RAJENDRA   pravastatin (PRAVACHOL) 80 MG tablet 90 tablet 0 7/13/2017  No   Sig: Take 1 tablet (80 mg) by mouth daily     lov 10/9/17

## 2017-12-11 RX ORDER — PRAVASTATIN SODIUM 80 MG/1
TABLET ORAL
Qty: 90 TABLET | Refills: 1 | Status: SHIPPED | OUTPATIENT
Start: 2017-12-11 | End: 2018-06-18

## 2017-12-11 NOTE — TELEPHONE ENCOUNTER
Routing refill request to provider for review/approval because:  Labs out of range:  Creatinine and LDL    Dr. Serrano-please review and sign if agree.    Thank you!  MARCUS Velazco, RN          Creatinine   Date Value Ref Range Status   10/09/2017 1.34 (H) 0.52 - 1.04 mg/dL Final   ]    Requested Prescriptions   Pending Prescriptions Disp Refills     pravastatin (PRAVACHOL) 80 MG tablet [Pharmacy Med Name: PRAVASTATIN 80MG TABLETS] 90 tablet 0     Sig: TAKE 1 TABLET(80 MG) BY MOUTH DAILY    Statins Protocol Passed    12/7/2017 11:30 AM       Passed - LDL on file in past 12 months    Recent Labs   Lab Test  10/09/17   1206   LDL  116*            Passed - No abnormal creatine kinase in past 12 months    No lab results found.         Passed - Recent or future visit with authorizing provider    Patient had office visit in the last year or has a visit in the next 30 days with authorizing provider.  See chart review.              Passed - Patient is age 18 or older       Passed - No active pregnancy on record       Passed - No positive pregnancy test in past 12 months

## 2017-12-26 ENCOUNTER — OFFICE VISIT (OUTPATIENT)
Dept: FAMILY MEDICINE | Facility: CLINIC | Age: 62
End: 2017-12-26
Payer: COMMERCIAL

## 2017-12-26 VITALS
TEMPERATURE: 102.1 F | RESPIRATION RATE: 14 BRPM | OXYGEN SATURATION: 95 % | WEIGHT: 253 LBS | BODY MASS INDEX: 43.43 KG/M2 | SYSTOLIC BLOOD PRESSURE: 139 MMHG | HEART RATE: 106 BPM | DIASTOLIC BLOOD PRESSURE: 87 MMHG

## 2017-12-26 DIAGNOSIS — J10.1 INFLUENZA A: Primary | ICD-10-CM

## 2017-12-26 DIAGNOSIS — J45.21 INTERMITTENT ASTHMA WITH ACUTE EXACERBATION, UNSPECIFIED ASTHMA SEVERITY: ICD-10-CM

## 2017-12-26 LAB
FLUAV+FLUBV AG SPEC QL: NEGATIVE
FLUAV+FLUBV AG SPEC QL: POSITIVE
SPECIMEN SOURCE: ABNORMAL

## 2017-12-26 PROCEDURE — 99213 OFFICE O/P EST LOW 20 MIN: CPT | Performed by: NURSE PRACTITIONER

## 2017-12-26 PROCEDURE — 87804 INFLUENZA ASSAY W/OPTIC: CPT | Performed by: NURSE PRACTITIONER

## 2017-12-26 RX ORDER — OSELTAMIVIR PHOSPHATE 75 MG/1
75 CAPSULE ORAL 2 TIMES DAILY
Qty: 10 CAPSULE | Refills: 0 | Status: SHIPPED | OUTPATIENT
Start: 2017-12-26 | End: 2018-05-07

## 2017-12-26 RX ORDER — PREDNISONE 20 MG/1
20 TABLET ORAL 2 TIMES DAILY
Qty: 10 TABLET | Refills: 0 | Status: SHIPPED | OUTPATIENT
Start: 2017-12-26 | End: 2018-05-07

## 2017-12-26 NOTE — PROGRESS NOTES
Teri,    You tested positive for influenza A.  We will continue with the tamiflu and prednisone per our plan in clinic.    Surekha Avina, CNP

## 2017-12-26 NOTE — LETTER
[address] [address]  Phone: [phone]    December 26, 2017        Teri Bain  [address] [facility] [address]          To whom it may concern:    RE: Teri Bain    Patient was seen and treated today at our clinic and missed work.  She was diagnosed with influenza and instructed not to return to work until symptoms resolve, which I would anticipate to be Tuesday 1/2/18.    Please contact me for questions or concerns.      Sincerely,        SUZY Escamilla CNP

## 2017-12-26 NOTE — PATIENT INSTRUCTIONS
1.  For influenza start tamiflu and prednisone 1 pill twice a day for 5 days.  Follow up if worsening cough, breathing, fever, or not improving over the next week.  Stay home to avoid spreading to others, dont go out until fevr is gone.  Letter for work.

## 2017-12-26 NOTE — MR AVS SNAPSHOT
After Visit Summary   12/26/2017    Teri Bain    MRN: [id number]           Patient Information     Date Of Birth          1955        Visit Information        Provider Department      12/26/2017 8:40 AM Surekha Avina APRN CNP [facility]        Today's Diagnoses     Fever    -  1    Influenza-like illness          Care Instructions    1.  For influenza start tamiflu and prednisone 1 pill twice a day for 5 days.  Follow up if worsening cough, breathing, fever, or not improving over the next week.  Stay home to avoid spreading to others, dont go out until fevr is gone.  Letter for work.            Follow-ups after your visit        Who to contact     If you have questions or need follow up information about today's clinic visit or your schedule please contact [facility] directly at [phone].  Normal or non-critical lab and imaging results will be communicated to you by MyChart, letter or phone within 4 business days after the clinic has received the results. If you do not hear from us within 7 days, please contact the clinic through trippiecehart or phone. If you have a critical or abnormal lab result, we will notify you by phone as soon as possible.  Submit refill requests through SavvyCard or call your pharmacy and they will forward the refill request to us. Please allow 3 business days for your refill to be completed.          Additional Information About Your Visit        MyChart Information     SavvyCard gives you secure access to your electronic health record. If you see a primary care provider, you can also send messages to your care team and make appointments. If you have questions, please call your primary care clinic.  If you do not have a primary care provider, please call [phone] and they will assist you.        Care EveryWhere ID     This is your Care EveryWhere ID. This could be used by other organizations to access your [facility]  records  [id number]        Your Vitals Were     Pulse Temperature Respirations Pulse Oximetry BMI (Body Mass Index)       106 102.1  F (38.9  C) (Oral) 14 95% 43.43 kg/m2        Blood Pressure from Last 3 Encounters:   12/26/17 139/87   10/09/17 128/72   07/13/17 128/74    Weight from Last 3 Encounters:   12/26/17 253 lb (114.8 kg)   10/09/17 260 lb 4 oz (118 kg)   07/13/17 266 lb (120.7 kg)              We Performed the Following     Influenza A/B antigen          Today's Medication Changes          These changes are accurate as of: 12/26/17  8:50 AM.  If you have any questions, ask your nurse or doctor.               Start taking these medicines.        Dose/Directions    oseltamivir 75 MG capsule   Commonly known as:  TAMIFLU   Used for:  Influenza-like illness   Started by:  Surekha Avina APRN CNP        Dose:  75 mg   Take 1 capsule (75 mg) by mouth 2 times daily   Quantity:  10 capsule   Refills:  0         These medicines have changed or have updated prescriptions.        Dose/Directions    * predniSONE 50 MG tablet   Commonly known as:  DELTASONE   This may have changed:  Another medication with the same name was added. Make sure you understand how and when to take each.   Used for:  Mild persistent asthma without complication   Changed by:  Norma Serrano MD        Dose:  50 mg   Take 1 tablet (50 mg) by mouth daily As needed for asthma exacerbation   Quantity:  5 tablet   Refills:  0       * predniSONE 20 MG tablet   Commonly known as:  DELTASONE   This may have changed:  You were already taking a medication with the same name, and this prescription was added. Make sure you understand how and when to take each.   Used for:  Influenza-like illness   Changed by:  Surekha Avina APRN CNP        Dose:  20 mg   Take 1 tablet (20 mg) by mouth 2 times daily   Quantity:  10 tablet   Refills:  0       * Notice:  This list has 2 medication(s) that are the same as other medications prescribed  for you. Read the directions carefully, and ask your doctor or other care provider to review them with you.         Where to get your medicines      These medications were sent to [facility] [address] [place] [address] [place], [place] - [address] AT [facility] OF [address]  [address] [place] [address]     Phone:  [phone]     oseltamivir 75 MG capsule    predniSONE 20 MG tablet                Primary Care Provider Office Phone # Fax #    Norma Serrano [phone] [address]  [facility] [address]        Equal Access to Services     DEBBIE BALLESTEROS : Hadii aad ku hadasho Sobrandonali, waaxda luqadaha, qaybta kaalmada adeegyada, waxelmer weeks . So [facility] [phone].    ATENCIÓN: Si habla español, tiene a nails disposición servicios gratuitos de asistencia lingüística. [facility] [phone].    We comply with applicable federal civil rights laws and [place] laws. We do not discriminate on the basis of race, color, national origin, age, disability, sex, sexual orientation, or gender identity.            Thank you!     Thank you for choosing [facility]  for your care. Our goal is always to provide you with excellent care. Hearing back from our patients is one way we can continue to improve our services. Please take a few minutes to complete the written survey that you may receive in the mail after your visit with us. Thank you!             Your Updated Medication List - Protect others around you: Learn how to safely use, store and throw away your medicines at www.disposemymeds.org.          This list is accurate as of: 12/26/17  8:50 AM.  Always use your most recent med list.                   Brand Name Dispense Instructions for use Diagnosis    * albuterol 108 (90 BASE) MCG/ACT Inhaler    PROAIR HFA/PROVENTIL HFA/VENTOLIN HFA    1 Inhaler    Inhale 1-2 puffs into the lungs every 6 hours    Mild persistent asthma without complication        * albuterol (2.5 MG/3ML) 0.083% neb solution     30 vial    Take 1 vial (2.5 mg) by nebulization every 6 hours as needed for shortness of breath / dyspnea or wheezing    Mild persistent asthma without complication       amLODIPine 5 MG tablet    NORVASC    90 tablet    Take 1 tablet (5 mg) by mouth daily    Hypertension goal BP (blood pressure) < 140/90       ascorbic acid 500 MG tablet    VITAMIN C    60    1-2 tablets daily        aspirin 81 MG tablet      Take 1 tablet by mouth daily.    Chronic kidney disease (CKD), stage III (moderate), Type 2 diabetes, HbA1c goal < 7% (H)       cetirizine 10 MG tablet    zyrTEC    30 tablet    Take 1 tablet (10 mg) by mouth every evening    Sore throat       flunisolide HFA 80 MCG/ACT Aers oral inhaler    AEROSPAN    3 Inhaler    Inhale 2 puffs into the lungs 2 times daily    Mild persistent asthma without complication       fluticasone 50 MCG/ACT spray    FLONASE    1 Package    Spray 1-2 sprays into both nostrils daily.    Chronic rhinitis, Allergic rhinitis       Multi-vitamin Tabs tablet   Generic drug:  multivitamin, therapeutic with minerals           oseltamivir 75 MG capsule    TAMIFLU    10 capsule    Take 1 capsule (75 mg) by mouth 2 times daily    Influenza-like illness       pravastatin 80 MG tablet    PRAVACHOL    90 tablet    TAKE 1 TABLET(80 MG) BY MOUTH DAILY    Hyperlipidemia LDL goal <100       * predniSONE 50 MG tablet    DELTASONE    5 tablet    Take 1 tablet (50 mg) by mouth daily As needed for asthma exacerbation    Mild persistent asthma without complication       * predniSONE 20 MG tablet    DELTASONE    10 tablet    Take 1 tablet (20 mg) by mouth 2 times daily    Influenza-like illness       triamterene-hydrochlorothiazide 75-50 MG per tablet    MAXZIDE    90 tablet    Take 1 tablet by mouth daily    Chronic venous insufficiency, Hypertension goal BP (blood pressure) < 140/90       VITAMIN B 12 PO      Take 1 tablet by mouth daily        ZANTAC  PO      Take 1 tablet by mouth daily as needed for heartburn        * Notice:  This list has 4 medication(s) that are the same as other medications prescribed for you. Read the directions carefully, and ask your doctor or other care provider to review them with you.

## 2017-12-26 NOTE — PROGRESS NOTES
SUBJECTIVE:   Teri Bain is a 62 year old female who presents to clinic today for the following health issues:      Acute Illness   Acute illness concerns: cough, headache, fever  Onset: 3 days    Fever: no     Chills/Sweats: YES    Headache (location?): YES    Sinus Pressure:no    Conjunctivitis:  burning    Ear Pain: no    Rhinorrhea: YES    Congestion: YES    Sore Throat: no     Cough: YES-productive of clear sputum    Wheeze: YES    Decreased Appetite: YES    Nausea: YES    Vomiting: no     Diarrhea:  no     Dysuria/Freq.: no     Fatigue/Achiness: no     Sick/Strep Exposure: YES     Therapies Tried and outcome: tylenol, inhalers, benzoate      Developed chest tightness and heaviness 12/24/17.  She gave herself a neb and was using inhalers which helped.  Breathing was improved last night, but then developed rhinitis, fever, she is coughing a lot and developed chest and abd soreness.  Not monitoring temp at home but thinks developed 12/24/17.  No facial pain, does have headache from cough.  Not currently short of breath.      Great grandson went to  Friday with temp of 103, tested positive for influenza.      No abd pain, no diarrhea.  No dysuria.      Hx of int asthma.    Patient Active Problem List   Diagnosis     Classical migraine     Trochanteric bursitis     Allergic rhinitis     Osteoarthritis     Chronic venous insufficiency     Chronic kidney disease (CKD), stage III (moderate)     Intermittent asthma     Vitamin D deficiency     Hypertension goal BP (blood pressure) < 140/90     Hyperlipidemia LDL goal <100     Health Care Home     GERD (gastroesophageal reflux disease)     Advanced directives, counseling/discussion     Acute renal failure (H)     Past Surgical History:   Procedure Laterality Date     C DEXA, BONE DENSITY, AXIAL SKEL  11/04    [facility]     C LIGATE FALLOPIAN TUBE  1977     COLONOSCOPY  11/04    [facility]     COLONOSCOPY  1/5/15    [facility]     HC DILATION/CURETTAGE DIAG/THER NON  OB  5/89    [facility]     HC REMOVAL GALLBLADDER  1977     HYSTERECTOMY, PAP NO LONGER INDICATED  1/90    Hysterectomy, Vaginal, ovaries intact     SURGICAL HISTORY OF -   1987    cystoscopy       Social History   Substance Use Topics     Smoking status: Former Smoker     Quit date: 12/31/1996     Smokeless tobacco: Never Used     Alcohol use Yes      Comment: wine 1-2 times per year     Family History   Problem Relation Age of Onset     C.A.D. Mother      DIABETES Mother      Hypertension Mother      Arthritis Mother      CEREBROVASCULAR DISEASE Mother      multiple     Obesity Mother      DIABETES Father      Hypertension Father      Asthma Father      Obesity Father      DIABETES Maternal Grandfather      Depression Daughter      Asthma Daughter      Deep Vein Thrombosis (DVT) Daughter      x 1     Asthma Daughter      Asthma Brother      Obesity Brother      Coronary Artery Disease Brother      Pacemaker Brother      Asthma Grandchild      Asthma Grandchild      Asthma Grandchild      Asthma Grandchild      Asthma Daughter      Both daughters & 5 grand children         Current Outpatient Prescriptions   Medication Sig Dispense Refill     oseltamivir (TAMIFLU) 75 MG capsule Take 1 capsule (75 mg) by mouth 2 times daily 10 capsule 0     predniSONE (DELTASONE) 20 MG tablet Take 1 tablet (20 mg) by mouth 2 times daily 10 tablet 0     pravastatin (PRAVACHOL) 80 MG tablet TAKE 1 TABLET(80 MG) BY MOUTH DAILY 90 tablet 1     triamterene-hydrochlorothiazide (MAXZIDE) 75-50 MG per tablet Take 1 tablet by mouth daily 90 tablet 3     flunisolide HFA (AEROSPAN) 80 MCG/ACT AERS oral inhaler Inhale 2 puffs into the lungs 2 times daily 3 Inhaler 3     predniSONE (DELTASONE) 50 MG tablet Take 1 tablet (50 mg) by mouth daily As needed for asthma exacerbation 5 tablet 0     albuterol (2.5 MG/3ML) 0.083% neb solution Take 1 vial (2.5 mg) by nebulization every 6 hours as needed for shortness of breath / dyspnea or wheezing 30 vial 1      amLODIPine (NORVASC) 5 MG tablet Take 1 tablet (5 mg) by mouth daily 90 tablet 3     albuterol (PROAIR HFA/PROVENTIL HFA/VENTOLIN HFA) 108 (90 BASE) MCG/ACT Inhaler Inhale 1-2 puffs into the lungs every 6 hours 1 Inhaler 3     Ranitidine HCl (ZANTAC PO) Take 1 tablet by mouth daily as needed for heartburn       Cyanocobalamin (VITAMIN B 12 PO) Take 1 tablet by mouth daily       cetirizine (ZYRTEC) 10 MG tablet Take 1 tablet (10 mg) by mouth every evening 30 tablet 0     fluticasone (FLONASE) 50 MCG/ACT nasal spray Spray 1-2 sprays into both nostrils daily. 1 Package 1     aspirin 81 MG tablet Take 1 tablet by mouth daily.       MULTI-VITAMIN OR TABS   0     VITAMIN C 500 MG OR TABS 1-2 tablets daily 60 0       ROS:  Const, HEENT, Resp as above, otherwise negative       OBJECTIVE:                                                    /87 (BP Location: Left arm)  Pulse 106  Temp 102.1  F (38.9  C) (Oral)  Resp 14  Wt 253 lb (114.8 kg)  SpO2 95%  BMI 43.43 kg/m2   GENERAL APPEARANCE: healthy, alert and no distress  EYES: conjunctiva with injection bilaterally   HENT: ear canals and TM's normal and nose and mouth without ulcers or lesions  NECK: no adenopathy  RESP: lungs clear to auscultation - no rales, rhonchi or wheezes  CV: regular rates and rhythm, normal S1 S2, no S3 or S4 and no murmur, click or rub  PSYCH: mentation appears normal and affect normal/bright     Results for orders placed or performed in visit on 12/26/17   Influenza A/B antigen   Result Value Ref Range    Influenza A/B Agn Specimen Nasal     Influenza A Positive (A) NEG^Negative    Influenza B Negative NEG^Negative          ASSESSMENT/PLAN:                                                    (J10.1) Influenza A  (primary encounter diagnosis)  Comment: with comorbidity of asthma.  Considered complication of pneumonia buyt clear lungs today   Plan: Influenza A/B antigen, oseltamivir (TAMIFLU) 75        MG capsule, predniSONE (DELTASONE) 20  MG tablet        Start tamiflu and prednisone, discussed staying home to avoid exposing others, lot of fluids and rest. Cont albuterol every 4 hours.  Reviewed risk of pneumonia, she will follow up if worsening symptoms or not improving by next week.  Letter for off work this week.    (R50.9) Fever  Plan: Influenza A/B antigen        As above     (J45.21) Intermittent asthma with acute exacerbation, unspecified asthma severity  Plan: prednisone as above         See Patient Instructions    Surekha Avina, [facility]    Patient Instructions   1.  For influenza start tamiflu and prednisone 1 pill twice a day for 5 days.  Follow up if worsening cough, breathing, fever, or not improving over the next week.  Stay home to avoid spreading to others, dont go out until fevr is gone.  Letter for work.

## 2018-01-02 ENCOUNTER — TELEPHONE (OUTPATIENT)
Dept: FAMILY MEDICINE | Facility: CLINIC | Age: 63
End: 2018-01-02

## 2018-01-02 NOTE — TELEPHONE ENCOUNTER
"Patient called and spoke with writer.    Per patient:  1. Seen in clinic on 12/26/17 and diagnosed with the flu   A. Prescribed medication (Tamiflu and Prednisone)  2. On 12/30/17 dizziness upon standing   A. Sat down to steady herself  3. Improved yesterday and today but still dizziness with standing/change in position  4. Drinks \"lots\" of water    Writer discussed with patient when body is fighting an illness, can become dehydrated quicker, so very important to drink 6-8, 8 oz water daily.    Writer also recommended patient change positions slowly-make sure you feel steady on feet before walking.    Patient verbalized understanding and in agreement with plan.    Patient also asks for input from covering providers.    Covering providers-Please review and advise.    Thank you!  ALYSSA VelazcoN, RN    "

## 2018-01-03 NOTE — TELEPHONE ENCOUNTER
Gave pt this message from DM.  Pt is pushing fluids.  Will continue to assess her sx.  She was getting off work this am. Still has some dizziness.  JACLYN Branham

## 2018-01-03 NOTE — TELEPHONE ENCOUNTER
Agree with below. Likely due to orthostatic hypotension, push fluids and change positions slowly. Follow up if symptoms worsen or persist.  DM

## 2018-01-11 ASSESSMENT — ASTHMA QUESTIONNAIRES: ACT_TOTALSCORE: 19

## 2018-05-04 ENCOUNTER — TELEPHONE (OUTPATIENT)
Dept: FAMILY MEDICINE | Facility: CLINIC | Age: 63
End: 2018-05-04

## 2018-05-04 DIAGNOSIS — J45.30 MILD PERSISTENT ASTHMA WITHOUT COMPLICATION: ICD-10-CM

## 2018-05-04 RX ORDER — ALBUTEROL SULFATE 90 UG/1
1-2 AEROSOL, METERED RESPIRATORY (INHALATION) EVERY 6 HOURS
Qty: 1 INHALER | Refills: 0 | Status: SHIPPED | OUTPATIENT
Start: 2018-05-04 | End: 2018-10-11

## 2018-05-04 NOTE — TELEPHONE ENCOUNTER
"S-(situation): Patient reporting intermittent wheezing for the past month or so. Patient confirms increased cough with intermittent mucus production. Patient states she is able to complete all work, house and life work without issue. Increased use of nebulizer and increase ue in rescue inhaler. Patient states notices the wheezing when at rest. Patient taking generic \"allergy relief\" denies fever. Claritin with some relief. ACT score today : 13  Patient speaking clearly in full sentences with no audible wheezing or coughing while on phone call.     B-(background): Patient dx with intermittent asthma. Patient confirms allergies. Patient prescribes: albuterol (PROAIR HFA/PROVENTIL HFA/VENTOLIN HFA) 108 (90 BASE)   albuterol (2.5 MG/3ML) 0.083% neb solution    Patient has had Prednisone prescribed previously    A-(assessment): Patient with allergy induced asthma    R-(recommendations): Patient to see Dr. Serrano Monday 5/7. Patient declined the need to be seen today in office visit. Patient states \" I am able to wait.\" Appt. On 5/7 with PCP on 5/7. Albuterol ordered. Patient to seek immediate medical attention if SOB worsens and if wheezing worsens. Patient in agreement with plan and verbalizes understanding.   Thanks!   Effie Mckeon RN      "

## 2018-05-05 ASSESSMENT — ASTHMA QUESTIONNAIRES: ACT_TOTALSCORE: 13

## 2018-05-05 NOTE — PROGRESS NOTES
"SUBJECTIVE:  Teri Bain, a 62 year old female, is here to discuss the following issues:     Wheezing   x1 month  Wakes her up at night  Associated with cough.  Also allergy symptoms with itchy eyes.  Cough is sometimes productive.  Uses aerospan but is almost out so has been cutting back on that.  Using albuterol MDI 2 puffs twice daily.  Used albuterol nebs last weekend but had gets a lot of jittery side effects.    Has used prednisone in the past - most recently in December when she had influenza.    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      Problem list and histories reviewed & updated, as indicated.  Patient Active Problem List   Diagnosis     Classical migraine     Trochanteric bursitis     Allergic rhinitis     Osteoarthritis     Chronic venous insufficiency     Chronic kidney disease (CKD), stage III (moderate)     Intermittent asthma     Vitamin D deficiency     Hypertension goal BP (blood pressure) < 140/90     Hyperlipidemia LDL goal <100     Health Care Home     GERD (gastroesophageal reflux disease)     Advanced directives, counseling/discussion     Acute renal failure (H)       BP Readings from Last 3 Encounters:   05/07/18 137/79   12/26/17 139/87   10/09/17 128/72    Wt Readings from Last 3 Encounters:   05/07/18 262 lb 8 oz (119.1 kg)   12/26/17 253 lb (114.8 kg)   10/09/17 260 lb 4 oz (118 kg)             ROS:  CONST: NEGATIVE for fever    ENT: NEGATIVE for nasal congestion, sinus pain, sore throat.    OBJECTIVE:    /79 (Patient Position: Sitting, Cuff Size: Adult Large)  Pulse 68  Temp 98.1  F (36.7  C) (Oral)  Resp 12  Ht 5' 4\" (1.626 m)  Wt 262 lb 8 oz (119.1 kg)  SpO2 97%  BMI 45.06 kg/m2  GENERAL: No apparent distress   HEAD: Normocephalic.  EYES:  No discharge or erythema.   NOSE: Normal without discharge.  LUNGS: Normal respiratory effort.  Wheezes diffusely (exp>insp).  HEART: Regular rhythm. Normal S1/S2. No murmurs.  SKIN: Clear. No significant rash, " abnormal pigmentation or lesions  NEUROLOGIC: No focal findings. Cranial nerves grossly intact. Normal gait, strength and tone      ASSESSMENT/PLAN:  1. Mild persistent asthma with exacerbation  I considered increasing her therapy and switching her ICS to a combined ICS-LABA.  However, as she has not recently been using her ICS consistently I decided to stick with ICS alone and encouraged her to use this regularly.  We'll also treat with prednisone burst.    - Asthma Action Plan (AAP)  - flunisolide HFA (AEROSPAN) 80 MCG/ACT AERS oral inhaler; Inhale 2 puffs into the lungs 2 times daily  Dispense: 3 Inhaler; Refill: 3  - predniSONE (DELTASONE) 50 MG tablet; Take 1 tablet (50 mg) by mouth daily As needed for asthma exacerbation  Dispense: 5 tablet; Refill: 0    2. Chronic seasonal allergic rhinitis due to pollen  - fluticasone (FLONASE) 50 MCG/ACT spray; Spray 1-2 sprays into both nostrils daily  Dispense: 1 Bottle; Refill: 11    3. Hypertension goal BP (blood pressure) < 140/90  BP is a bit high today but she is having asthma exacerbation.  She will be returning shortly and we'll reassess BP then.   - BASIC METABOLIC PANEL    4. Screening for human immunodeficiency virus without presence of risk factors  - HIV Antigen Antibody Combo    5. Encounter for screening mammogram for breast cancer  - MA Screening Digital Bilateral; Future       Patient Instructions   If the aerospan is not covered we'll try flovent.    Start on the prednisone.    Start using flonase.          Norma Serrano MD   [facility]

## 2018-05-07 ENCOUNTER — OFFICE VISIT (OUTPATIENT)
Dept: FAMILY MEDICINE | Facility: CLINIC | Age: 63
End: 2018-05-07
Payer: COMMERCIAL

## 2018-05-07 VITALS
HEART RATE: 68 BPM | WEIGHT: 262.5 LBS | SYSTOLIC BLOOD PRESSURE: 137 MMHG | HEIGHT: 64 IN | DIASTOLIC BLOOD PRESSURE: 79 MMHG | RESPIRATION RATE: 12 BRPM | BODY MASS INDEX: 44.82 KG/M2 | OXYGEN SATURATION: 97 % | TEMPERATURE: 98.1 F

## 2018-05-07 DIAGNOSIS — Z11.4 SCREENING FOR HUMAN IMMUNODEFICIENCY VIRUS WITHOUT PRESENCE OF RISK FACTORS: ICD-10-CM

## 2018-05-07 DIAGNOSIS — J45.31 MILD PERSISTENT ASTHMA WITH EXACERBATION: Primary | ICD-10-CM

## 2018-05-07 DIAGNOSIS — J30.1 CHRONIC SEASONAL ALLERGIC RHINITIS DUE TO POLLEN: ICD-10-CM

## 2018-05-07 DIAGNOSIS — Z12.31 ENCOUNTER FOR SCREENING MAMMOGRAM FOR BREAST CANCER: ICD-10-CM

## 2018-05-07 DIAGNOSIS — I10 HYPERTENSION GOAL BP (BLOOD PRESSURE) < 140/90: ICD-10-CM

## 2018-05-07 PROCEDURE — 99214 OFFICE O/P EST MOD 30 MIN: CPT | Performed by: FAMILY MEDICINE

## 2018-05-07 PROCEDURE — 87389 HIV-1 AG W/HIV-1&-2 AB AG IA: CPT | Performed by: FAMILY MEDICINE

## 2018-05-07 PROCEDURE — 36415 COLL VENOUS BLD VENIPUNCTURE: CPT | Performed by: FAMILY MEDICINE

## 2018-05-07 PROCEDURE — 80048 BASIC METABOLIC PNL TOTAL CA: CPT | Performed by: FAMILY MEDICINE

## 2018-05-07 RX ORDER — FLUTICASONE PROPIONATE 50 MCG
1-2 SPRAY, SUSPENSION (ML) NASAL DAILY
Qty: 1 BOTTLE | Refills: 11 | Status: SHIPPED | OUTPATIENT
Start: 2018-05-07 | End: 2019-10-14

## 2018-05-07 RX ORDER — PREDNISONE 50 MG/1
50 TABLET ORAL DAILY
Qty: 5 TABLET | Refills: 0 | Status: SHIPPED | OUTPATIENT
Start: 2018-05-07 | End: 2018-06-28

## 2018-05-07 NOTE — MR AVS SNAPSHOT
After Visit Summary   5/7/2018    Teri Bain    MRN: [id number]           Patient Information     Date Of Birth          1955        Visit Information        Provider Department      5/7/2018 2:00 PM Norma Serrano MD [facility]        Today's Diagnoses     Mild persistent asthma without complication    -  1    Encounter for screening mammogram for breast cancer        Chronic seasonal allergic rhinitis due to pollen        Screening for human immunodeficiency virus without presence of risk factors        Hypertension goal BP (blood pressure) < 140/90          Care Instructions    If the aerospan is not covered we'll try flovent.    Start on the prednisone.    Start using flonase.              Follow-ups after your visit        Your next 10 appointments already scheduled     May 09, 2018 10:15 AM CDT   MA SCREENING DIGITAL BILATERAL with [id number]   [facility]    [address] [place] [address], [address]  [facility] [address] [phone]           Do not use any powder, lotion or deodorant under your arms or on your breast. If you do, we will ask you to remove it before your exam.  Wear comfortable, two-piece clothing.  If you have any allergies, tell your care team.  Bring any previous mammograms from other facilities or have them mailed to the breast center.              Future tests that were ordered for you today     Open Future Orders        Priority Expected Expires Ordered    MA Screening Digital Bilateral Routine  5/7/2019 5/7/2018            Who to contact     If you have questions or need follow up information about today's clinic visit or your schedule please contact [facility] directly at [phone].  Normal or non-critical lab and imaging results will be communicated to you by MyChart, letter or phone within 4 business days after the clinic has received the results. If you do not hear from us within 7 days, please  "contact the clinic through [employer] or phone. If you have a critical or abnormal lab result, we will notify you by phone as soon as possible.  Submit refill requests through [employer] or call your pharmacy and they will forward the refill request to us. Please allow 3 business days for your refill to be completed.          Additional Information About Your Visit        [employer] Information     [employer] gives you secure access to your electronic health record. If you see a primary care provider, you can also send messages to your care team and make appointments. If you have questions, please call your primary care clinic.  If you do not have a primary care provider, please call [phone] and they will assist you.        Care EveryWhere ID     This is your Care EveryWhere ID. This could be used by other organizations to access your [facility] medical records  [id number]        Your Vitals Were     Pulse Temperature Respirations Height Pulse Oximetry BMI (Body Mass Index)    68 98.1  F (36.7  C) (Oral) 12 5' 4\" (1.626 m) 97% 45.06 kg/m2       Blood Pressure from Last 3 Encounters:   05/07/18 137/79   12/26/17 139/87   10/09/17 128/72    Weight from Last 3 Encounters:   05/07/18 262 lb 8 oz (119.1 kg)   12/26/17 253 lb (114.8 kg)   10/09/17 260 lb 4 oz (118 kg)              We Performed the Following     Asthma Action Plan (AAP)     BASIC METABOLIC PANEL     HIV Antigen Antibody Combo          Today's Medication Changes          These changes are accurate as of 5/7/18  2:28 PM.  If you have any questions, ask your nurse or doctor.               These medicines have changed or have updated prescriptions.        Dose/Directions    predniSONE 50 MG tablet   Commonly known as:  DELTASONE   This may have changed:  Another medication with the same name was removed. Continue taking this medication, and follow the directions you see here.   Used for:  Mild persistent asthma without complication   Changed by:  Norma Serrano, " MD        Dose:  50 mg   Take 1 tablet (50 mg) by mouth daily As needed for asthma exacerbation   Quantity:  5 tablet   Refills:  0            Where to get your medicines      These medications were sent to [facility] [address] [place] [address] [place] - [address] AT [facility] [address]  [address] [place] [address]     Phone:  [phone]     flunisolide HFA 80 MCG/ACT Aers oral inhaler    fluticasone 50 MCG/ACT spray    predniSONE 50 MG tablet                Primary Care Provider Office Phone # Fax #    Norma Serrano [phone]       [address]  [facility] [address]        Equal Access to Services     DEBBIE BALLESTEROS : July juarezo Soishmael, waaxda luqadaha, qaybta kaalmada adeegyada, eb garcia. So [facility] [phone].    ATENCIÓN: Si habla español, tiene a nails disposición servicios gratuitos de asistencia lingüística. [facility] [phone].    We comply with applicable federal civil rights laws and [place] laws. We do not discriminate on the basis of race, color, national origin, age, disability, sex, sexual orientation, or gender identity.            Thank you!     Thank you for choosing [facility]  for your care. Our goal is always to provide you with excellent care. Hearing back from our patients is one way we can continue to improve our services. Please take a few minutes to complete the written survey that you may receive in the mail after your visit with us. Thank you!             Your Updated Medication List - Protect others around you: Learn how to safely use, store and throw away your medicines at www.disposemymeds.org.          This list is accurate as of 5/7/18  2:28 PM.  Always use your most recent med list.                   Brand Name Dispense Instructions for use Diagnosis    * albuterol (2.5 MG/3ML) 0.083% neb solution     30 vial    Take 1 vial (2.5 mg) by nebulization every 6 hours as  needed for shortness of breath / dyspnea or wheezing    Mild persistent asthma without complication       * albuterol 108 (90 Base) MCG/ACT Inhaler    PROAIR HFA/PROVENTIL HFA/VENTOLIN HFA    1 Inhaler    Inhale 1-2 puffs into the lungs every 6 hours    Mild persistent asthma without complication       amLODIPine 5 MG tablet    NORVASC    90 tablet    Take 1 tablet (5 mg) by mouth daily    Hypertension goal BP (blood pressure) < 140/90       ascorbic acid 500 MG tablet    VITAMIN C    60    1-2 tablets daily        aspirin 81 MG tablet      Take 1 tablet by mouth daily.    Chronic kidney disease (CKD), stage III (moderate), Type 2 diabetes, HbA1c goal < 7% (H)       cetirizine 10 MG tablet    zyrTEC    30 tablet    Take 1 tablet (10 mg) by mouth every evening    Sore throat       flunisolide HFA 80 MCG/ACT Aers oral inhaler    AEROSPAN    3 Inhaler    Inhale 2 puffs into the lungs 2 times daily    Mild persistent asthma without complication       fluticasone 50 MCG/ACT spray    FLONASE    1 Bottle    Spray 1-2 sprays into both nostrils daily    Chronic seasonal allergic rhinitis due to pollen       Multi-vitamin Tabs tablet   Generic drug:  multivitamin, therapeutic with minerals           pravastatin 80 MG tablet    PRAVACHOL    90 tablet    TAKE 1 TABLET(80 MG) BY MOUTH DAILY    Hyperlipidemia LDL goal <100       predniSONE 50 MG tablet    DELTASONE    5 tablet    Take 1 tablet (50 mg) by mouth daily As needed for asthma exacerbation    Mild persistent asthma without complication       triamterene-hydrochlorothiazide 75-50 MG per tablet    MAXZIDE    90 tablet    Take 1 tablet by mouth daily    Chronic venous insufficiency, Hypertension goal BP (blood pressure) < 140/90       VITAMIN B 12 PO      Take 1 tablet by mouth daily        ZANTAC PO      Take 1 tablet by mouth daily as needed for heartburn        * Notice:  This list has 2 medication(s) that are the same as other medications prescribed for you. Read the  directions carefully, and ask your doctor or other care provider to review them with you.

## 2018-05-07 NOTE — LETTER
My Asthma Action Plan  Name: Teri Bain   YOB: 1955  Date: 5/7/2018   My doctor: Norma Serrano MD   My clinic: [facility]        My Control Medicine: Flunisolide (Aerospan) 80 mcg inhaler  My Rescue Medicine: Albuterol (Proair/Ventolin/Proventil) inhaler -  My Oral Steroid Medicine: Prednisone 50 mg My Asthma Severity: mild persistent  Avoid your asthma triggers: smoke, upper respiratory infections, dust mites, pollens, animal dander, mold, humidity and strong odors and fumes  None            GREEN ZONE   Good Control    I feel good    No cough or wheeze    Can work, sleep and play without asthma symptoms       Take your asthma control medicine every day.     1. If exercise triggers your asthma, take your rescue medication    15 minutes before exercise or sports, and    During exercise if you have asthma symptoms  2. Spacer to use with inhaler: If you have a spacer, make sure to use it with your inhaler             YELLOW ZONE Getting Worse  I have ANY of these:    I do not feel good    Cough or wheeze    Chest feels tight    Wake up at night   1. Keep taking your Green Zone medications  2. Start taking your rescue medicine:    every 20 minutes for up to 1 hour. Then every 4 hours for 24-48 hours.  3. If you stay in the Yellow Zone for more than 12-24 hours, contact your doctor.  4. If you do not return to the Green Zone in 12-24 hours or you get worse, start taking your oral steroid medicine if prescribed by your provider.           RED ZONE Medical Alert - Get Help  I have ANY of these:    I feel awful    Medicine is not helping    Breathing getting harder    Trouble walking or talking    Nose opens wide to breathe       1. Take your rescue medicine NOW  2. If your provider has prescribed an oral steroid medicine, start taking it NOW  3. Call your doctor NOW  4. If you are still in the Red Zone after 20 minutes and you have not reached your doctor:    Take your rescue  medicine again and    Call 911 or go to the emergency room right away    See your regular doctor within 2 weeks of an Emergency Room or Urgent Care visit for follow-up treatment.          Annual Reminders:  Meet with Asthma Educator,  Flu Shot in the Fall, consider Pneumonia Vaccination for patients with asthma (aged 19 and older).    [facility] [address] [place] [address] [address] AT [facility] [address]  [facility] - A MAIL ORDER PHMAC[facility]                      Asthma Triggers  How To Control Things That Make Your Asthma Worse    Triggers are things that make your asthma worse.  Look at the list below to help you find your triggers and what you can do about them.  You can help prevent asthma flare-ups by staying away from your triggers.      Trigger                                                          What you can do   Cigarette Smoke  Tobacco smoke can make asthma worse. Do not allow smoking in your home, car or around you.  Be sure no one smokes at a child s day care or school.  If you smoke, ask your health care provider for ways to help you quit.  Ask family members to quit too.  Ask your health care provider for a referral to Quit Plan to help you quit smoking, or call 1-002-260-PLAN.     Colds, Flu, Bronchitis  These are common triggers of asthma. Wash your hands often.  Don t touch your eyes, nose or mouth.  Get a flu shot every year.     Dust Mites  These are tiny bugs that live in cloth or carpet. They are too small to see. Wash sheets and blankets in hot water every week.   Encase pillows and mattress in dust mite proof covers.  Avoid having carpet if you can. If you have carpet, vacuum weekly.   Use a dust mask and HEPA vacuum.   Pollen and Outdoor Mold  Some people are allergic to trees, grass, or weed pollen, or molds. Try to keep your windows closed.  Limit time out doors when pollen count is high.   Ask you health care provider about taking medicine during  allergy season.     Animal Dander  Some people are allergic to skin flakes, urine or saliva from pets with fur or feathers. Keep pets with fur or feathers out of your home.    If you can t keep the pet outdoors, then keep the pet out of your bedroom.  Keep the bedroom door closed.  Keep pets off cloth furniture and away from stuffed toys.     Mice, Rats, and Cockroaches  Some people are allergic to the waste from these pests.   Cover food and garbage.  Clean up spills and food crumbs.  Store grease in the refrigerator.   Keep food out of the bedroom.   Indoor Mold  This can be a trigger if your home has high moisture. Fix leaking faucets, pipes, or other sources of water.   Clean moldy surfaces.  Dehumidify basement if it is damp and smelly.   Smoke, Strong Odors, and Sprays  These can reduce air quality. Stay away from strong odors and sprays, such as perfume, powder, hair spray, paints, smoke incense, paint, cleaning products, candles and new carpet.   Exercise or Sports  Some people with asthma have this trigger. Be active!  Ask your doctor about taking medicine before sports or exercise to prevent symptoms.    Warm up for 5-10 minutes before and after sports or exercise.     Other Triggers of Asthma  Cold air:  Cover your nose and mouth with a scarf.  Sometimes laughing or crying can be a trigger.  Some medicines and food can trigger asthma.

## 2018-05-07 NOTE — PATIENT INSTRUCTIONS
If the aerospan is not covered we'll try flovent.    Start on the prednisone.    Start using flonase.

## 2018-05-08 LAB
ANION GAP SERPL CALCULATED.3IONS-SCNC: 9 MMOL/L (ref 3–14)
BUN SERPL-MCNC: 22 MG/DL (ref 7–30)
CALCIUM SERPL-MCNC: 10 MG/DL (ref 8.5–10.1)
CHLORIDE SERPL-SCNC: 105 MMOL/L (ref 94–109)
CO2 SERPL-SCNC: 29 MMOL/L (ref 20–32)
CREAT SERPL-MCNC: 1.31 MG/DL (ref 0.52–1.04)
GFR SERPL CREATININE-BSD FRML MDRD: 41 ML/MIN/1.7M2
GLUCOSE SERPL-MCNC: 82 MG/DL (ref 70–99)
HIV 1+2 AB+HIV1 P24 AG SERPL QL IA: NONREACTIVE
POTASSIUM SERPL-SCNC: 4.1 MMOL/L (ref 3.4–5.3)
SODIUM SERPL-SCNC: 143 MMOL/L (ref 133–144)

## 2018-05-08 ASSESSMENT — ASTHMA QUESTIONNAIRES: ACT_TOTALSCORE: 13

## 2018-05-09 ENCOUNTER — RADIANT APPOINTMENT (OUTPATIENT)
Dept: MAMMOGRAPHY | Facility: CLINIC | Age: 63
End: 2018-05-09
Attending: FAMILY MEDICINE
Payer: COMMERCIAL

## 2018-05-09 DIAGNOSIS — Z12.31 ENCOUNTER FOR SCREENING MAMMOGRAM FOR BREAST CANCER: ICD-10-CM

## 2018-05-09 PROCEDURE — 77067 SCR MAMMO BI INCL CAD: CPT

## 2018-05-14 DIAGNOSIS — J45.31 MILD PERSISTENT ASTHMA WITH EXACERBATION: ICD-10-CM

## 2018-05-15 NOTE — TELEPHONE ENCOUNTER
flunisolide HFA (AEROSPAN) 80 MCG/ACT AERS oral inhaler        Last Written Prescription Date:  5/7/2018  Last Fill Quantity: 3,   # refills: 3  Last Office Visit: 5/7/2018  Future Office visit:       Routing refill request to provider for review/approval because:  Drug not on the [facility], [facility] or [facility] refill protocol or controlled substance

## 2018-05-17 ENCOUNTER — TELEPHONE (OUTPATIENT)
Dept: FAMILY MEDICINE | Facility: CLINIC | Age: 63
End: 2018-05-17

## 2018-05-20 NOTE — TELEPHONE ENCOUNTER
The message states that her plan doesn't cover aerospan .... but the loaded medication is aerospan - for a year.  And I just sent a script for aerospan for a year - on 5/7.  But her plan apparently isn't covering that.  So why are you sending me another refill request for the same medication that is not covered?   I don't think that is going to help the patient here.    Let's have her find out what inhaled corticosteroid her formulary covers and then approve that one.    Norma Serrano MD

## 2018-05-21 NOTE — TELEPHONE ENCOUNTER
"Patient will reluctantly call her insurance, but states that last year they would not cover any inhaled corticosteroid and she cannot afford $600 for an inhaler.  Says, \"I can guarantee you they aren't going to cover.\"    Patient had a $50 copay for the albuterol.  Regla Noguera RN    "

## 2018-05-22 NOTE — TELEPHONE ENCOUNTER
I will forward to Emili Koehler, PharmD to see if there are discount plans available for an inhaled corticosteroid.

## 2018-05-24 NOTE — TELEPHONE ENCOUNTER
It looks like based upon her formulary that Asmanex HFA or Twisthaler OR Flovent are the two inhaled corticosteroids covered.  Flovent does not currently have any co-pay cards.  Asmanex does have a one time voucher for a free month and a Co-pay care that reduces the cost by $90 per month.      Likely Asmanex (either HFA or Twisthaler) would be the best formulary option to start with given the potential savings.  There may be a fluticasone generic coming out later this year that would also have some savings, but it is not yet on the market.    Carlitos Hernandez, SayraD, BCACP  Medication Therapy Management Pharmacist  Pager: [phone]

## 2018-05-25 ENCOUNTER — TELEPHONE (OUTPATIENT)
Dept: FAMILY MEDICINE | Facility: CLINIC | Age: 63
End: 2018-05-25

## 2018-05-25 NOTE — TELEPHONE ENCOUNTER
Prior Authorization Approval    Authorization Effective Date: 4/25/2018  Authorization Expiration Date: 5/25/2019  Medication: asmanex 30 Approved  Approved Dose/Quantity: 1/30 days  Reference #: [id number]   Insurance Company: [employer] - Phone [phone] Fax [phone]  Which Pharmacy is filling the prescription (Not needed for infusion/clinic administered): [facility] [address] [place] [address] AT [facility] [address]  Pharmacy Notified: Yes  Patient Notified: Yes

## 2018-05-25 NOTE — TELEPHONE ENCOUNTER
Talked to patient and discussed message from Dr. Serrano and pharmacist. Pt to follow up with [facility].    ALYSSA LunaN, RN  [facility]

## 2018-05-25 NOTE — TELEPHONE ENCOUNTER
Prior Authorization Retail Medication Request    Medication/Dose: asmanex 30  ICD code (if different than what is on RX):  unnknown  Previously Tried and Failed:  unknown  Rationale:  unknown    Insurance Name:  unknown  Insurance ID:   Key: [id number]      Pharmacy Information (if different than what is on RX)  Name:  [facility]  Phone:  [phone]

## 2018-05-25 NOTE — TELEPHONE ENCOUNTER
PA Initiation    Medication: asmanex 30  Insurance Company: [employer] - Phone [phone] Fax [phone]  Pharmacy Filling the Rx: [facility] [address] [place] [address] [address] AT [facility] OF  [address]  Filling Pharmacy Phone: [phone]  Filling Pharmacy Fax:    Start Date: 5/25/2018    Central Prior Authorization Team   Phone: [phone]

## 2018-06-18 DIAGNOSIS — E78.5 HYPERLIPIDEMIA LDL GOAL <100: ICD-10-CM

## 2018-06-18 RX ORDER — PRAVASTATIN SODIUM 80 MG/1
TABLET ORAL
Qty: 90 TABLET | Refills: 1 | Status: SHIPPED | OUTPATIENT
Start: 2018-06-18 | End: 2018-10-12

## 2018-06-18 NOTE — TELEPHONE ENCOUNTER
"Requested Prescriptions   Pending Prescriptions Disp Refills     pravastatin (PRAVACHOL) 80 MG tablet [Pharmacy Med Name: PRAVASTATIN 80MG TABLETS] 90 tablet 0     Sig: TAKE 1 TABLET(80 MG) BY MOUTH DAILY    Statins Protocol Passed    6/18/2018 10:39 AM       Passed - LDL on file in past 12 months    Recent Labs   Lab Test  10/09/17   1206   LDL  116*            Passed - No abnormal creatine kinase in past 12 months    No lab results found.            Passed - Recent (12 mo) or future (30 days) visit within the authorizing provider's specialty    Patient had office visit in the last 12 months or has a visit in the next 30 days with authorizing provider or within the authorizing provider's specialty.  See \"Patient Info\" tab in inbasket, or \"Choose Columns\" in Meds & Orders section of the refill encounter.           Passed - Patient is age 18 or older       Passed - No active pregnancy on record       Passed - No positive pregnancy test in past 12 months        Last office visit 5/7/18    Prescription approved per [facility] Refill Protocol.    Signed Prescriptions:                        Disp   Refills    pravastatin (PRAVACHOL) 80 MG tablet       90 tab*1        Sig: TAKE 1 TABLET(80 MG) BY MOUTH DAILY  Authorizing Provider: KELLY HOWARD  Ordering User: BUSHRA SHAFER      Closing encounter - no further actions needed at this time    Bushra Shafer RN    "

## 2018-06-26 NOTE — PROGRESS NOTES
SUBJECTIVE:  Teri Bain, a 62 year old female, is here to discuss the following issues:     ASTHMA FOLLOW-UP    She has a longstanding history of mild persistent asthma.   Unfortunately this has been poorly controlled as she has not been able to afford any inhaled corticosteroids.    She's been working on getting an inhaled steroid covered through a patient assistance plan.    She is using albuterol 2-3 times/day.  Current triggers are the hot and humid weather.  Was ACT completed today?  No      FOOT PAIN  Left foot pain since a woman stepped on the foot with high heels in April.  Pain persists over lateral metatarsals.  Gets numbness/tingling at work when she's on her feet.  No swelling.      RASH  Itchy and sore along pubic hair line.  No intravaginal symptoms.      Problem list and histories reviewed & updated, as indicated.  Patient Active Problem List   Diagnosis     Classical migraine     Trochanteric bursitis     Allergic rhinitis     Osteoarthritis     Chronic venous insufficiency     Chronic kidney disease (CKD), stage III (moderate)     Intermittent asthma     Vitamin D deficiency     Hypertension goal BP (blood pressure) < 140/90     Hyperlipidemia LDL goal <100     Health Care Home     GERD (gastroesophageal reflux disease)     Advanced directives, counseling/discussion     Acute renal failure (H)     Morbid obesity (H)       BP Readings from Last 3 Encounters:   06/28/18 128/83   05/07/18 137/79   12/26/17 139/87    Wt Readings from Last 3 Encounters:   06/28/18 264 lb 8 oz (120 kg)   05/07/18 262 lb 8 oz (119.1 kg)   12/26/17 253 lb (114.8 kg)           ROS:  CONST: NEGATIVE for fevers/chills/sweats, unexplained weight loss/gain, and fatigue  EYES: NEGATIVE for change in vision  ENT: NEGATIVE for difficulty hearing/tinnitus, and problems with teeth/gums  BREAST: NEGATIVE for breast lump/discharge  CV: POSITIVE for chest pain/discomfort and palpitations.  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  Chronic episodic palpitations lasting seconds to minutes with no associated dizziness, chest pain, shortness of breath.    RESP: POSITIVE for cough/wheeze, and NEGATIVE for difficulty breathing  GI: NEGATIVE for abdominal pain, blood in bowel movement, and nausea/vomiting/diarrhea  : NEGATIVE for nighttime urination, leaking urine, unusual vaginal bleeding, vaginal discharge, and concerns about sexual function  MS: NEGATIVE for muscle/joint pain  SKIN: NEGATIVE for mole change  NEURO: NEGATIVE for headache, dizziness/lightheadedness, memory loss, and loss of coordination  PSYCH: NEGATIVE for anxiety/stress, problems with sleep, and depression  HEME: NEGATIVE for unexplained lumps, and easy bruising/bleeding  ENDO: NEGATIVE for excessive thirst or urination  ALL: POSITIVE for hay fever/allergies     OBJECTIVE:    /83  Pulse 76  Temp 98.7  F (37.1  C) (Oral)  Resp 16  Ht 5' 4.5\" (1.638 m)  Wt 264 lb 8 oz (120 kg)  SpO2 100%  BMI 44.7 kg/m2  GEN:  no apparent distress  EYES: PERRL, conjunctivae and sclerae clear   LUNGS:  normal respiratory effort, and lungs with scattered expiratory wheezes   CV: regular rate and rhythm, normal S1 S2, no S3 or S4, no murmur, click or rub and bilateral lower extremities without edema   CHEST:  focal tenderness to palpation over costo-chondral joints   SKIN:  normal to inspection and palpation, no rashes or abnormal-appearing lesions of lower abdomen/mons pubis  FOOT:  left foot with no swelling, no warmth, no redness. no ecchymoses.  She has marked pes planus and bunion deformity.  Mild focal tenderness to palpation over lateral metatarsals.   PSYCH: mood/affect appear normal/bright.        ASSESSMENT/PLAN:  1. Mild persistent asthma with exacerbation  I sent a script for prednisone to her pharmacy and gave her one to carry on her upcoming trip.  Hopefully she can get an inhaled corticosteroid soon.    - predniSONE (DELTASONE) " 50 MG tablet; Take 1 tablet (50 mg) by mouth daily As needed for asthma exacerbation  Dispense: 5 tablet; Refill: 0    2. Left foot pain  Discussed that I think this is still related to soft-tissue injury but she is concerned about a stress fracture so I did x-ray foot.  I recommended that she follow-up with podiatry.    - XR Foot Left G/E 3 Views; Future  - PODIATRY/FOOT & ANKLE SURGERY REFERRAL    3. Costochondral pain  Recommended ice and OTC analgesics.      4. Body mass index (BMI) of 40.0-44.9 in adult (H)  Noted weight gain and encouraged her to monitory portions, diet, exercise.      5. Itchy skin  No clear rash but we'll treat with topical steroid cream for possible emerging neurodermatitis.    - triamcinolone (KENALOG) 0.1 % cream; Apply sparingly to affected area three times daily for 14 days.  Dispense: 15 g; Refill: 0       Patient Instructions   Try using the triamcinolone cream to the itchy pubic area.    If your foot xray is normal but the pain and numbness/tingling persists schedule with Dr. Hess - podiatry at [facility].        Norma Serrano MD   [facility]

## 2018-06-28 ENCOUNTER — OFFICE VISIT (OUTPATIENT)
Dept: FAMILY MEDICINE | Facility: CLINIC | Age: 63
End: 2018-06-28
Payer: COMMERCIAL

## 2018-06-28 ENCOUNTER — RADIANT APPOINTMENT (OUTPATIENT)
Dept: GENERAL RADIOLOGY | Facility: CLINIC | Age: 63
End: 2018-06-28
Attending: FAMILY MEDICINE
Payer: COMMERCIAL

## 2018-06-28 VITALS
SYSTOLIC BLOOD PRESSURE: 128 MMHG | DIASTOLIC BLOOD PRESSURE: 83 MMHG | HEART RATE: 76 BPM | OXYGEN SATURATION: 100 % | BODY MASS INDEX: 44.07 KG/M2 | WEIGHT: 264.5 LBS | HEIGHT: 65 IN | TEMPERATURE: 98.7 F | RESPIRATION RATE: 16 BRPM

## 2018-06-28 DIAGNOSIS — J45.31 MILD PERSISTENT ASTHMA WITH EXACERBATION: Primary | ICD-10-CM

## 2018-06-28 DIAGNOSIS — M79.672 LEFT FOOT PAIN: ICD-10-CM

## 2018-06-28 DIAGNOSIS — L29.9 ITCHY SKIN: ICD-10-CM

## 2018-06-28 DIAGNOSIS — R07.89 COSTOCHONDRAL PAIN: ICD-10-CM

## 2018-06-28 PROCEDURE — 73630 X-RAY EXAM OF FOOT: CPT | Mod: LT

## 2018-06-28 PROCEDURE — 99214 OFFICE O/P EST MOD 30 MIN: CPT | Performed by: FAMILY MEDICINE

## 2018-06-28 RX ORDER — PREDNISONE 50 MG/1
50 TABLET ORAL DAILY
Qty: 5 TABLET | Refills: 0 | Status: SHIPPED | OUTPATIENT
Start: 2018-06-28 | End: 2019-03-11

## 2018-06-28 RX ORDER — TRIAMCINOLONE ACETONIDE 1 MG/G
CREAM TOPICAL
Qty: 15 G | Refills: 0 | Status: SHIPPED | OUTPATIENT
Start: 2018-06-28 | End: 2019-10-14

## 2018-06-28 RX ORDER — PREDNISONE 50 MG/1
50 TABLET ORAL DAILY
Qty: 5 TABLET | Refills: 0 | Status: SHIPPED | OUTPATIENT
Start: 2018-06-28 | End: 2018-06-28

## 2018-06-28 NOTE — PATIENT INSTRUCTIONS
Try using the triamcinolone cream to the itchy pubic area.    If your foot xray is normal but the pain and numbness/tingling persists schedule with Dr. Hess - podiatry at [facility].

## 2018-06-28 NOTE — MR AVS SNAPSHOT
After Visit Summary   6/28/2018    Teri Bain    MRN: [id number]           Patient Information     Date Of Birth          1955        Visit Information        Provider Department      6/28/2018 9:40 AM Norma Serrano MD [facility]        Today's Diagnoses     Mild persistent asthma with exacerbation    -  1    Left foot pain        Costochondral pain        Body mass index (BMI) of 40.0-44.9 in adult (H)        Itchy skin          Care Instructions    Try using the triamcinolone cream to the itchy pubic area.    If your foot xray is normal but the pain and numbness/tingling persists schedule with Dr. Hess - podiatry at [facility].                Follow-ups after your visit        Additional Services     PODIATRY/FOOT & ANKLE SURGERY REFERRAL       Your provider has referred you to: [facility]: [facility] [phone]   [email or web address]    Please be aware that coverage of these services is subject to the terms and limitations of your health insurance plan.  Call member services at your health plan with any benefit or coverage questions.      Please bring the following to your appointment:  >>   Any x-rays, CTs or MRIs which have been performed.  Contact the facility where they were done to arrange for  prior to your scheduled appointment.  Any new CT, MRI or other procedures ordered by your specialist must be performed at a [facility] facility or coordinated by your clinic's referral office.    >>   List of current medications   >>   This referral request   >>   Any documents/labs given to you for this referral                  Future tests that were ordered for you today     Open Future Orders        Priority Expected Expires Ordered    XR Foot Left G/E 3 Views Routine 6/28/2018 6/28/2019 6/28/2018            Who to contact     If you have questions or need follow up information about today's clinic visit or  "your schedule please contact [facility] directly at [phone].  Normal or non-critical lab and imaging results will be communicated to you by Tripeesehart, letter or phone within 4 business days after the clinic has received the results. If you do not hear from us within 7 days, please contact the clinic through [employer] or phone. If you have a critical or abnormal lab result, we will notify you by phone as soon as possible.  Submit refill requests through [employer] or call your pharmacy and they will forward the refill request to us. Please allow 3 business days for your refill to be completed.          Additional Information About Your Visit        Mo[employer] Information     [employer] gives you secure access to your electronic health record. If you see a primary care provider, you can also send messages to your care team and make appointments. If you have questions, please call your primary care clinic.  If you do not have a primary care provider, please call [phone] and they will assist you.        Care EveryWhere ID     This is your Care EveryWhere ID. This could be used by other organizations to access your [facility] medical records  [id number]        Your Vitals Were     Pulse Temperature Respirations Height Pulse Oximetry BMI (Body Mass Index)    76 98.7  F (37.1  C) (Oral) 16 5' 4.5\" (1.638 m) 100% 44.7 kg/m2       Blood Pressure from Last 3 Encounters:   06/28/18 128/83   05/07/18 137/79   12/26/17 139/87    Weight from Last 3 Encounters:   06/28/18 264 lb 8 oz (120 kg)   05/07/18 262 lb 8 oz (119.1 kg)   12/26/17 253 lb (114.8 kg)              We Performed the Following     PAF COMPLETED     PODIATRY/FOOT & ANKLE SURGERY REFERRAL          Today's Medication Changes          These changes are accurate as of 6/28/18 10:17 AM.  If you have any questions, ask your nurse or doctor.               Start taking these medicines.        Dose/Directions    predniSONE 50 MG tablet   Commonly known as:  " DELTASONE   Used for:  Mild persistent asthma with exacerbation   Started by:  Norma Serrano MD        Dose:  50 mg   Take 1 tablet (50 mg) by mouth daily As needed for asthma exacerbation   Quantity:  5 tablet   Refills:  0       triamcinolone 0.1 % cream   Commonly known as:  KENALOG   Used for:  Itchy skin   Started by:  Norma Serrano MD        Apply sparingly to affected area three times daily for 14 days.   Quantity:  15 g   Refills:  0         Stop taking these medicines if you haven't already. Please contact your care team if you have questions.     flunisolide HFA 80 MCG/ACT Aers oral inhaler   Commonly known as:  AEROSPAN   Stopped by:  Norma Serrano MD                Where to get your medicines      These medications were sent to [facility] [address] [place] [address] [address] AT [facility] [address]  [address] [facility] [address]     Phone:  [phone]     triamcinolone 0.1 % cream         Some of these will need a paper prescription and others can be bought over the counter.  Ask your nurse if you have questions.     Bring a paper prescription for each of these medications     predniSONE 50 MG tablet                Primary Care Provider Office Phone # Fax #    Norma Serrano [phone] [address]  [facility] [address]        Equal Access to Services     DEBBIE BALLESTEROS AH: Hadii rhea juarezo Soishmael, waaxda luqadaha, qaybta kaalmada fausto, eb weeks . So [facility] [phone].    ATENCIÓN: Si habla español, tiene a nails disposición servicios gratuitos de asistencia lingüística. Llame al [phone].    We comply with applicable federal civil rights laws and [place] laws. We do not discriminate on the basis of race, color, national origin, age, disability, sex, sexual orientation, or gender identity.            Thank you!     Thank you for choosing [facility]  for your care.  Our goal is always to provide you with excellent care. Hearing back from our patients is one way we can continue to improve our services. Please take a few minutes to complete the written survey that you may receive in the mail after your visit with us. Thank you!             Your Updated Medication List - Protect others around you: Learn how to safely use, store and throw away your medicines at www.disposemymeds.org.          This list is accurate as of 6/28/18 10:17 AM.  Always use your most recent med list.                   Brand Name Dispense Instructions for use Diagnosis    * albuterol (2.5 MG/3ML) 0.083% neb solution     30 vial    Take 1 vial (2.5 mg) by nebulization every 6 hours as needed for shortness of breath / dyspnea or wheezing    Mild persistent asthma without complication       * albuterol 108 (90 Base) MCG/ACT Inhaler    PROAIR HFA/PROVENTIL HFA/VENTOLIN HFA    1 Inhaler    Inhale 1-2 puffs into the lungs every 6 hours    Mild persistent asthma without complication       amLODIPine 5 MG tablet    NORVASC    90 tablet    Take 1 tablet (5 mg) by mouth daily    Hypertension goal BP (blood pressure) < 140/90       ascorbic acid 500 MG tablet    VITAMIN C    60    1-2 tablets daily        aspirin 81 MG tablet      Take 1 tablet by mouth daily.    Chronic kidney disease (CKD), stage III (moderate), Type 2 diabetes, HbA1c goal < 7% (H)       cetirizine 10 MG tablet    zyrTEC    30 tablet    Take 1 tablet (10 mg) by mouth every evening    Sore throat       fluticasone 50 MCG/ACT spray    FLONASE    1 Bottle    Spray 1-2 sprays into both nostrils daily    Chronic seasonal allergic rhinitis due to pollen       mometasone 220 MCG/INH Inhaler    ASMANEX 30 METERED DOSES    3 Inhaler    Inhale 1 puff into the lungs daily    Mild persistent asthma with exacerbation       Multi-vitamin Tabs tablet   Generic drug:  multivitamin, therapeutic with minerals           pravastatin 80 MG tablet    PRAVACHOL    90  tablet    TAKE 1 TABLET(80 MG) BY MOUTH DAILY    Hyperlipidemia LDL goal <100       predniSONE 50 MG tablet    DELTASONE    5 tablet    Take 1 tablet (50 mg) by mouth daily As needed for asthma exacerbation    Mild persistent asthma with exacerbation       triamcinolone 0.1 % cream    KENALOG    15 g    Apply sparingly to affected area three times daily for 14 days.    Itchy skin       triamterene-hydrochlorothiazide 75-50 MG per tablet    MAXZIDE    90 tablet    Take 1 tablet by mouth daily    Chronic venous insufficiency, Hypertension goal BP (blood pressure) < 140/90       VITAMIN B 12 PO      Take 1 tablet by mouth daily        ZANTAC PO      Take 1 tablet by mouth daily as needed for heartburn        * Notice:  This list has 2 medication(s) that are the same as other medications prescribed for you. Read the directions carefully, and ask your doctor or other care provider to review them with you.

## 2018-07-02 ENCOUNTER — TELEPHONE (OUTPATIENT)
Dept: FAMILY MEDICINE | Facility: CLINIC | Age: 63
End: 2018-07-02

## 2018-07-19 ENCOUNTER — OFFICE VISIT (OUTPATIENT)
Dept: PODIATRY | Facility: CLINIC | Age: 63
End: 2018-07-19
Payer: COMMERCIAL

## 2018-07-19 VITALS
DIASTOLIC BLOOD PRESSURE: 80 MMHG | WEIGHT: 264 LBS | SYSTOLIC BLOOD PRESSURE: 122 MMHG | HEIGHT: 65 IN | BODY MASS INDEX: 43.99 KG/M2

## 2018-07-19 DIAGNOSIS — S90.32XA CONTUSION OF LEFT FOOT, INITIAL ENCOUNTER: ICD-10-CM

## 2018-07-19 DIAGNOSIS — R20.2 PARESTHESIA OF LEFT FOOT: ICD-10-CM

## 2018-07-19 DIAGNOSIS — M79.672 LEFT FOOT PAIN: Primary | ICD-10-CM

## 2018-07-19 PROCEDURE — 99203 OFFICE O/P NEW LOW 30 MIN: CPT | Performed by: PODIATRIST

## 2018-07-19 NOTE — PROGRESS NOTES
ASSESSMENT/PLAN:    Encounter Diagnoses   Name Primary?     Left foot pain Yes     Contusion of left foot, initial encounter      Paresthesia of left foot        Stress reaction of bone unlikely, given type of injury.     Ice  Rest  CAM walker protection and immobilization    She reported some intermittentnumbness in the foot.  She experiences this when standing at work.  I think this will resolve and might be from direct injury to a dorsal foot nerve or from associated edema.      Follow up if symptoms worsen or are persisting in a month.    Body mass index is 44.62 kg/(m^2).    Weight management plan: Patient was referred to their PCP to discuss a diet and exercise plan.      Lan Hess DPM, FACFAS, MS    [facility] Department of Podiatry/Foot & Ankle Surgery      ____________________________________________________________________    HPI:       I was asked by Dr. Serrano to evaluate this patient regarding left foot pain.     Chief Complaint: left foot pain  Onset of problem: 3 weeks; her left foot was stepped on by a person wearing a stiletto high heeled shoe.  Pain/ discomfort is described as:  Deep ache, throbbing, tingling  Ratin/10   Frequency:  daily    The pain is made worse with standing at work  Previous treatment: She saw Dr. Serrano. X-rays were done.  *  Patient Active Problem List   Diagnosis     Classical migraine     Trochanteric bursitis     Allergic rhinitis     Osteoarthritis     Chronic venous insufficiency     Chronic kidney disease (CKD), stage III (moderate)     Intermittent asthma     Vitamin D deficiency     Hypertension goal BP (blood pressure) < 140/90     Hyperlipidemia LDL goal <100     Health Care Home     GERD (gastroesophageal reflux disease)     Advanced directives, counseling/discussion     Acute renal failure (H)     Morbid obesity (H)   *  *  Past Surgical History:   Procedure Laterality Date     C DEXA, BONE DENSITY, AXIAL SKEL      [facility]     C LIGATE FALLOPIAN TUBE   1977     COLONOSCOPY  11/04    [facility]     COLONOSCOPY  1/5/15    [facility]     HC DILATION/CURETTAGE DIAG/THER NON OB  5/89    D & C     HC REMOVAL GALLBLADDER  1977     HYSTERECTOMY, PAP NO LONGER INDICATED  1/90    Hysterectomy, Vaginal, ovaries intact     SURGICAL HISTORY OF -   1987    cystoscopy   *  *  Current Outpatient Prescriptions   Medication Sig Dispense Refill     albuterol (2.5 MG/3ML) 0.083% neb solution Take 1 vial (2.5 mg) by nebulization every 6 hours as needed for shortness of breath / dyspnea or wheezing 30 vial 1     albuterol (PROAIR HFA/PROVENTIL HFA/VENTOLIN HFA) 108 (90 Base) MCG/ACT Inhaler Inhale 1-2 puffs into the lungs every 6 hours 1 Inhaler 0     amLODIPine (NORVASC) 5 MG tablet Take 1 tablet (5 mg) by mouth daily 90 tablet 3     aspirin 81 MG tablet Take 1 tablet by mouth daily.       cetirizine (ZYRTEC) 10 MG tablet Take 1 tablet (10 mg) by mouth every evening 30 tablet 0     Cyanocobalamin (VITAMIN B 12 PO) Take 1 tablet by mouth daily       fluticasone (FLONASE) 50 MCG/ACT spray Spray 1-2 sprays into both nostrils daily 1 Bottle 11     mometasone (ASMANEX 30 METERED DOSES) 220 MCG/INH Inhaler Inhale 1 puff into the lungs daily 3 Inhaler 3     MULTI-VITAMIN OR TABS   0     pravastatin (PRAVACHOL) 80 MG tablet TAKE 1 TABLET(80 MG) BY MOUTH DAILY 90 tablet 1     predniSONE (DELTASONE) 50 MG tablet Take 1 tablet (50 mg) by mouth daily As needed for asthma exacerbation 5 tablet 0     Ranitidine HCl (ZANTAC PO) Take 1 tablet by mouth daily as needed for heartburn       triamcinolone (KENALOG) 0.1 % cream Apply sparingly to affected area three times daily for 14 days. 15 g 0     triamterene-hydrochlorothiazide (MAXZIDE) 75-50 MG per tablet Take 1 tablet by mouth daily 90 tablet 3     VITAMIN C 500 MG OR TABS 1-2 tablets daily 60 0       ROS:     A 10-point review of systems was performed and is positive for that noted in the HPI and as seen below.  All other areas are negative.  "    Numbness in feet?  yes   Calf pain with walking? no  Recent foot/ankle injury? no  Weight change over past 12 months? 11# gain  Self perception as overweight? yes  Recent flu-like symptoms? no  Joint pain other than feet ? Knees and low back    Social History: Employment:  yes;  Exercise/Physical activity:  no;  Tobacco use:  no  Social History     Social History     Marital status:      Spouse name: Fred     Number of children: 2     Years of education: 13     Occupational History     Customer Service Retired     [employer]     Social History Main Topics     Smoking status: Former Smoker     Quit date: 12/31/1996     Smokeless tobacco: Never Used     Alcohol use Yes      Comment: wine 1-2 times per year     Drug use: No     Sexual activity: Yes     Partners: Male     Other Topics Concern     Parent/Sibling W/ Cabg, Mi Or Angioplasty Before 65f 55m? No     My brother had a pace maker put in feb. 2017 he is 63     Social History Narrative       Family history:  Family History   Problem Relation Age of Onset     C.A.D. Mother      Diabetes Mother      Hypertension Mother      Arthritis Mother      Cerebrovascular Disease Mother      multiple     Obesity Mother      Diabetes Father      Hypertension Father      Asthma Father      Obesity Father      Diabetes Maternal Grandfather      Depression Daughter      Asthma Daughter      Deep Vein Thrombosis (DVT) Daughter      x 1     Asthma Daughter      Asthma Brother      Obesity Brother      Coronary Artery Disease Brother      Pacemaker Brother      Asthma Grandchild      Asthma Grandchild      Asthma Grandchild      Asthma Grandchild      Asthma Daughter      Both daughters & 5 grand children       Rheumatoid arthritis:  parent  Foot Problems: parent - gout  Diabetes: parent      EXAM:    Vitals: Ht 5' 4.5\" (1.638 m)  Wt 264 lb (119.7 kg)  BMI 44.62 kg/m2  BMI: Body mass index is 44.62 kg/(m^2).  Height: 5' 4.5\"    Constitutional/ general:  Pt is in no " apparent distress, appears well-nourished.  Cooperative with history and physical exam.     Vascular:  Pedal pulses are palpable bilaterally for both the DP and PT arteries.  CFT < 3 sec.  No edema.  Pedal hair growth noted.     Neuro:  Alert and oriented x 3. Coordinated gait.  Light touch sensation is intact to the L4, L5, S1 distributions. No obvious deficits.  No evidence of neurological-based weakness, spasticity, or contracture in the lower extremities.     Derm: Normal texture and turgor.  No erythema, ecchymosis, or cyanosis.  No open lesions.     Musculoskeletal:    Lower extremity muscle strength is normal.  Patient is ambulatory without an assistive device or brace .  Decrease in medial longitudinal arch with weight bearing.  Hallux abducto valgus bilateral foot.  The deformity is not fully reducible in the transverse plane - there is tracking.  Pain on palpation over the left 4th metatarsal region.     Radiographic Exam:  X-Ray Findings:  I personally reviewed the left foot  films.  No acute findings, specifically around the 4th metatarsal region.      Lan Hess DPM, FACFAS, MS    [place] [facility] Department of Podiatry/Foot & Ankle Surgery

## 2018-07-19 NOTE — LETTER
2018         RE: Teri Bain  [address]  [facility] [address]        Dear Colleague,    Thank you for referring your patient, Teri Bain, to the [facility]. Please see a copy of my visit note below.      ASSESSMENT/PLAN:    Encounter Diagnoses   Name Primary?     Left foot pain Yes     Contusion of left foot, initial encounter      Paresthesia of left foot        Stress reaction of bone unlikely, given type of injury.     Ice  Rest  CAM walker protection and immobilization    She reported some intermittentnumbness in the foot.  She experiences this when standing at work.  I think this will resolve and might be from direct injury to a dorsal foot nerve or from associated edema.      Follow up if symptoms worsen or are persisting in a month.    Body mass index is 44.62 kg/(m^2).    Weight management plan: Patient was referred to their PCP to discuss a diet and exercise plan.      Lan Hess, DAYLIN, FACFAS, MS    [place] Department of Podiatry/Foot & Ankle Surgery      ____________________________________________________________________    HPI:       I was asked by Dr. Serrano to evaluate this patient regarding left foot pain.     Chief Complaint: left foot pain  Onset of problem: 3 weeks; her left foot was stepped on by a person wearing a stiletto high heeled shoe.  Pain/ discomfort is described as:  Deep ache, throbbing, tingling  Ratin/10   Frequency:  daily    The pain is made worse with standing at work  Previous treatment: She saw Dr. Serrano. X-rays were done.  *  Patient Active Problem List   Diagnosis     Classical migraine     Trochanteric bursitis     Allergic rhinitis     Osteoarthritis     Chronic venous insufficiency     Chronic kidney disease (CKD), stage III (moderate)     Intermittent asthma     Vitamin D deficiency     Hypertension goal BP (blood pressure) < 140/90     Hyperlipidemia LDL goal <100     Health Care Home     GERD (gastroesophageal  reflux disease)     Advanced directives, counseling/discussion     Acute renal failure (H)     Morbid obesity (H)   *  *  Past Surgical History:   Procedure Laterality Date     C DEXA, BONE DENSITY, AXIAL SKEL  11/04    [facility]     C LIGATE FALLOPIAN TUBE  1977     COLONOSCOPY  11/04    [facility]     COLONOSCOPY  1/5/15    [facility]     HC DILATION/CURETTAGE DIAG/THER NON OB  5/89    D & C     HC REMOVAL GALLBLADDER  1977     HYSTERECTOMY, PAP NO LONGER INDICATED  1/90    Hysterectomy, Vaginal, ovaries intact     SURGICAL HISTORY OF -   1987    cystoscopy   *  *  Current Outpatient Prescriptions   Medication Sig Dispense Refill     albuterol (2.5 MG/3ML) 0.083% neb solution Take 1 vial (2.5 mg) by nebulization every 6 hours as needed for shortness of breath / dyspnea or wheezing 30 vial 1     albuterol (PROAIR HFA/PROVENTIL HFA/VENTOLIN HFA) 108 (90 Base) MCG/ACT Inhaler Inhale 1-2 puffs into the lungs every 6 hours 1 Inhaler 0     amLODIPine (NORVASC) 5 MG tablet Take 1 tablet (5 mg) by mouth daily 90 tablet 3     aspirin 81 MG tablet Take 1 tablet by mouth daily.       cetirizine (ZYRTEC) 10 MG tablet Take 1 tablet (10 mg) by mouth every evening 30 tablet 0     Cyanocobalamin (VITAMIN B 12 PO) Take 1 tablet by mouth daily       fluticasone (FLONASE) 50 MCG/ACT spray Spray 1-2 sprays into both nostrils daily 1 Bottle 11     mometasone (ASMANEX 30 METERED DOSES) 220 MCG/INH Inhaler Inhale 1 puff into the lungs daily 3 Inhaler 3     MULTI-VITAMIN OR TABS   0     pravastatin (PRAVACHOL) 80 MG tablet TAKE 1 TABLET(80 MG) BY MOUTH DAILY 90 tablet 1     predniSONE (DELTASONE) 50 MG tablet Take 1 tablet (50 mg) by mouth daily As needed for asthma exacerbation 5 tablet 0     Ranitidine HCl (ZANTAC PO) Take 1 tablet by mouth daily as needed for heartburn       triamcinolone (KENALOG) 0.1 % cream Apply sparingly to affected area three times daily for 14 days. 15 g 0     triamterene-hydrochlorothiazide (MAXZIDE) 75-50 MG per  tablet Take 1 tablet by mouth daily 90 tablet 3     VITAMIN C 500 MG OR TABS 1-2 tablets daily 60 0       ROS:     A 10-point review of systems was performed and is positive for that noted in the HPI and as seen below.  All other areas are negative.     Numbness in feet?  yes   Calf pain with walking? no  Recent foot/ankle injury? no  Weight change over past 12 months? 11# gain  Self perception as overweight? yes  Recent flu-like symptoms? no  Joint pain other than feet ? Knees and low back    Social History: Employment:  yes;  Exercise/Physical activity:  no;  Tobacco use:  no  Social History     Social History     Marital status:      Spouse name: Fred     Number of children: 2     Years of education: 13     Occupational History     Customer Service Retired     [employer]     Social History Main Topics     Smoking status: Former Smoker     Quit date: 12/31/1996     Smokeless tobacco: Never Used     Alcohol use Yes      Comment: wine 1-2 times per year     Drug use: No     Sexual activity: Yes     Partners: Male     Other Topics Concern     Parent/Sibling W/ Cabg, Mi Or Angioplasty Before 65f 55m? No     My brother had a pace maker put in feb. 2017 he is 63     Social History Narrative       Family history:  Family History   Problem Relation Age of Onset     C.A.D. Mother      Diabetes Mother      Hypertension Mother      Arthritis Mother      Cerebrovascular Disease Mother      multiple     Obesity Mother      Diabetes Father      Hypertension Father      Asthma Father      Obesity Father      Diabetes Maternal Grandfather      Depression Daughter      Asthma Daughter      Deep Vein Thrombosis (DVT) Daughter      x 1     Asthma Daughter      Asthma Brother      Obesity Brother      Coronary Artery Disease Brother      Pacemaker Brother      Asthma Grandchild      Asthma Grandchild      Asthma Grandchild      Asthma Grandchild      Asthma Daughter      Both daughters & 5 grand children       Rheumatoid  "arthritis:  parent  Foot Problems: parent - gout  Diabetes: parent      EXAM:    Vitals: Ht 5' 4.5\" (1.638 m)  Wt 264 lb (119.7 kg)  BMI 44.62 kg/m2  BMI: Body mass index is 44.62 kg/(m^2).  Height: 5' 4.5\"    Constitutional/ general:  Pt is in no apparent distress, appears well-nourished.  Cooperative with history and physical exam.     Vascular:  Pedal pulses are palpable bilaterally for both the DP and PT arteries.  CFT < 3 sec.  No edema.  Pedal hair growth noted.     Neuro:  Alert and oriented x 3. Coordinated gait.  Light touch sensation is intact to the L4, L5, S1 distributions. No obvious deficits.  No evidence of neurological-based weakness, spasticity, or contracture in the lower extremities.     Derm: Normal texture and turgor.  No erythema, ecchymosis, or cyanosis.  No open lesions.     Musculoskeletal:    Lower extremity muscle strength is normal.  Patient is ambulatory without an assistive device or brace .  Decrease in medial longitudinal arch with weight bearing.  Hallux abducto valgus bilateral foot.  The deformity is not fully reducible in the transverse plane - there is tracking.  Pain on palpation over the left 4th metatarsal region.     Radiographic Exam:  X-Ray Findings:  I personally reviewed the left foot  films.  No acute findings, specifically around the 4th metatarsal region.      Lan Hess DPM, FACFAS, MS    [place] Department of Podiatry/Foot & Ankle Surgery                Again, thank you for allowing me to participate in the care of your patient.        Sincerely,        Lan Hess DPM    "

## 2018-07-19 NOTE — MR AVS SNAPSHOT
"              After Visit Summary   7/19/2018    Teri Bain    MRN: [id number]           Patient Information     Date Of Birth          1955        Visit Information        Provider Department      7/19/2018 9:30 AM Lan Hess DPM [facility]        Today's Diagnoses     Left foot pain    -  1    Contusion of left foot, initial encounter        Paresthesia of left foot           Follow-ups after your visit        Who to contact     If you have questions or need follow up information about today's clinic visit or your schedule please contact [facility] directly at [phone].  Normal or non-critical lab and imaging results will be communicated to you by AlienVaulthart, letter or phone within 4 business days after the clinic has received the results. If you do not hear from us within 7 days, please contact the clinic through AlienVaulthart or phone. If you have a critical or abnormal lab result, we will notify you by phone as soon as possible.  Submit refill requests through WhatsOpen or call your pharmacy and they will forward the refill request to us. Please allow 3 business days for your refill to be completed.          Additional Information About Your Visit        MyChart Information     WhatsOpen gives you secure access to your electronic health record. If you see a primary care provider, you can also send messages to your care team and make appointments. If you have questions, please call your primary care clinic.  If you do not have a primary care provider, please call [phone] and they will assist you.        Care EveryWhere ID     This is your Care EveryWhere ID. This could be used by other organizations to access your [place] medical records  [id number]        Your Vitals Were     Height BMI (Body Mass Index)                5' 4.5\" (1.638 m) 44.62 kg/m2           Blood Pressure from Last 3 Encounters:   06/28/18 128/83   05/07/18 137/79   12/26/17 139/87    Weight from " Last 3 Encounters:   07/19/18 264 lb (119.7 kg)   06/28/18 264 lb 8 oz (120 kg)   05/07/18 262 lb 8 oz (119.1 kg)              Today, you had the following     No orders found for display         Today's Medication Changes          These changes are accurate as of 7/19/18 10:13 AM.  If you have any questions, ask your nurse or doctor.               Start taking these medicines.        Dose/Directions    order for DME   Used for:  Contusion of left foot, initial encounter, Left foot pain, Paresthesia of left foot   Started by:  Lan Hess DPM        Equipment being ordered: short CAM walker   Quantity:  1 Device   Refills:  0            Where to get your medicines      Some of these will need a paper prescription and others can be bought over the counter.  Ask your nurse if you have questions.     Bring a paper prescription for each of these medications     order for DME                Primary Care Provider Office Phone # Fax #    Norma Serrano [phone]       [facility] [place] [address]        Equal Access to Services     [facility]E [facility] AH: Hadii rhea ku hadasho Soomaali, waaxda luqadaha, qaybta kaalmada adeegyada, waxelmer weeks . So [facility] [place] [phone].    ATENCIÓN: Si habla español, tiene a nails disposición servicios gratuitos de asistencia lingüística. Llame al [phone].    We comply with applicable federal civil rights laws and [place] laws. We do not discriminate on the basis of race, color, national origin, age, disability, sex, sexual orientation, or gender identity.            Thank you!     Thank you for choosing [facility]  for your care. Our goal is always to provide you with excellent care. Hearing back from our patients is one way we can continue to improve our services. Please take a few minutes to complete the written survey that you may receive in the mail after your visit with us. Thank you!             Your Updated  Medication List - Protect others around you: Learn how to safely use, store and throw away your medicines at www.disposemymeds.org.          This list is accurate as of 7/19/18 10:13 AM.  Always use your most recent med list.                   Brand Name Dispense Instructions for use Diagnosis    * albuterol (2.5 MG/3ML) 0.083% neb solution     30 vial    Take 1 vial (2.5 mg) by nebulization every 6 hours as needed for shortness of breath / dyspnea or wheezing    Mild persistent asthma without complication       * albuterol 108 (90 Base) MCG/ACT Inhaler    PROAIR HFA/PROVENTIL HFA/VENTOLIN HFA    1 Inhaler    Inhale 1-2 puffs into the lungs every 6 hours    Mild persistent asthma without complication       amLODIPine 5 MG tablet    NORVASC    90 tablet    Take 1 tablet (5 mg) by mouth daily    Hypertension goal BP (blood pressure) < 140/90       ascorbic acid 500 MG tablet    VITAMIN C    60    1-2 tablets daily        aspirin 81 MG tablet      Take 1 tablet by mouth daily.    Chronic kidney disease (CKD), stage III (moderate), Type 2 diabetes, HbA1c goal < 7% (H)       cetirizine 10 MG tablet    zyrTEC    30 tablet    Take 1 tablet (10 mg) by mouth every evening    Sore throat       fluticasone 50 MCG/ACT spray    FLONASE    1 Bottle    Spray 1-2 sprays into both nostrils daily    Chronic seasonal allergic rhinitis due to pollen       mometasone 220 MCG/INH Inhaler    ASMANEX 30 METERED DOSES    3 Inhaler    Inhale 1 puff into the lungs daily    Mild persistent asthma with exacerbation       Multi-vitamin Tabs tablet   Generic drug:  multivitamin, therapeutic with minerals           order for DME     1 Device    Equipment being ordered: short CAM walker    Contusion of left foot, initial encounter, Left foot pain, Paresthesia of left foot       pravastatin 80 MG tablet    PRAVACHOL    90 tablet    TAKE 1 TABLET(80 MG) BY MOUTH DAILY    Hyperlipidemia LDL goal <100       predniSONE 50 MG tablet    DELTASONE    5  tablet    Take 1 tablet (50 mg) by mouth daily As needed for asthma exacerbation    Mild persistent asthma with exacerbation       triamcinolone 0.1 % cream    KENALOG    15 g    Apply sparingly to affected area three times daily for 14 days.    Itchy skin       triamterene-hydrochlorothiazide 75-50 MG per tablet    MAXZIDE    90 tablet    Take 1 tablet by mouth daily    Chronic venous insufficiency, Hypertension goal BP (blood pressure) < 140/90       VITAMIN B 12 PO      Take 1 tablet by mouth daily        ZANTAC PO      Take 1 tablet by mouth daily as needed for heartburn        * Notice:  This list has 2 medication(s) that are the same as other medications prescribed for you. Read the directions carefully, and ask your doctor or other care provider to review them with you.

## 2018-07-23 ENCOUNTER — TELEPHONE (OUTPATIENT)
Dept: PODIATRY | Facility: CLINIC | Age: 63
End: 2018-07-23

## 2018-07-23 NOTE — TELEPHONE ENCOUNTER
Reason for Call:  Other call back    Detailed comments: Pt saw Dr Hess at [facility] 7/19/18.  He gave her a boot for her lt foot.  Pt tried the boot, but her hip started to hurt and she became nauseous.    Phone Number Patient can be reached at: Home number on file [phone] (home)    Best Time: anytime    Can we leave a detailed message on this number? YES    Call taken on 7/23/2018 at 11:37 AM by LENNY MCCOY

## 2018-07-23 NOTE — TELEPHONE ENCOUNTER
Hip pain is likely from the weight of the CAM walker.  If the hip pain persists, she will have to discontinue using it.      If her foot is still hurting, I recommend ice, rest, Tylenol.  Physical therapy might be helpful.  If pain persists or worsens, an MRI can be done. I recommend she give it more time.    Lan Hess DPM, FACFAS, MS    [facility]

## 2018-08-10 ENCOUNTER — OFFICE VISIT (OUTPATIENT)
Dept: FAMILY MEDICINE | Facility: CLINIC | Age: 63
End: 2018-08-10
Payer: COMMERCIAL

## 2018-08-10 ENCOUNTER — RADIANT APPOINTMENT (OUTPATIENT)
Dept: GENERAL RADIOLOGY | Facility: CLINIC | Age: 63
End: 2018-08-10
Attending: FAMILY MEDICINE
Payer: COMMERCIAL

## 2018-08-10 VITALS
DIASTOLIC BLOOD PRESSURE: 74 MMHG | HEART RATE: 78 BPM | HEIGHT: 65 IN | RESPIRATION RATE: 16 BRPM | SYSTOLIC BLOOD PRESSURE: 132 MMHG | OXYGEN SATURATION: 98 % | TEMPERATURE: 98.4 F | BODY MASS INDEX: 44.32 KG/M2 | WEIGHT: 266 LBS

## 2018-08-10 DIAGNOSIS — M25.531 PAIN IN JOINT, FOREARM, RIGHT: ICD-10-CM

## 2018-08-10 DIAGNOSIS — M89.8X1 PAIN OF RIGHT CLAVICLE: ICD-10-CM

## 2018-08-10 DIAGNOSIS — M89.8X1 PAIN OF RIGHT CLAVICLE: Primary | ICD-10-CM

## 2018-08-10 PROCEDURE — 99213 OFFICE O/P EST LOW 20 MIN: CPT | Performed by: FAMILY MEDICINE

## 2018-08-10 PROCEDURE — 73090 X-RAY EXAM OF FOREARM: CPT | Mod: RT

## 2018-08-10 PROCEDURE — 73000 X-RAY EXAM OF COLLAR BONE: CPT | Mod: RT

## 2018-08-10 NOTE — MR AVS SNAPSHOT
"              After Visit Summary   8/10/2018    Teri Bain    MRN: [id number]           Patient Information     Date Of Birth          1955        Visit Information        Provider Department      8/10/2018 2:00 PM Flash Goddard MD [facility]        Today's Diagnoses     Pain of right clavicle    -  1    Pain in joint, forearm, right           Follow-ups after your visit        Who to contact     If you have questions or need follow up information about today's clinic visit or your schedule please contact [facility] directly at [phone].  Normal or non-critical lab and imaging results will be communicated to you by MyChart, letter or phone within 4 business days after the clinic has received the results. If you do not hear from us within 7 days, please contact the clinic through iZocahart or phone. If you have a critical or abnormal lab result, we will notify you by phone as soon as possible.  Submit refill requests through TravelMuse or call your pharmacy and they will forward the refill request to us. Please allow 3 business days for your refill to be completed.          Additional Information About Your Visit        MyChart Information     TravelMuse gives you secure access to your electronic health record. If you see a primary care provider, you can also send messages to your care team and make appointments. If you have questions, please call your primary care clinic.  If you do not have a primary care provider, please call [phone] and they will assist you.        Care EveryWhere ID     This is your Care EveryWhere ID. This could be used by other organizations to access your [place] medical records  [id number]        Your Vitals Were     Pulse Temperature Respirations Height Pulse Oximetry BMI (Body Mass Index)    78 98.4  F (36.9  C) (Oral) 16 5' 4.5\" (1.638 m) 98% 44.95 kg/m2       Blood Pressure from Last 3 Encounters:   08/10/18 132/74 "   07/19/18 122/80   06/28/18 128/83    Weight from Last 3 Encounters:   08/10/18 266 lb (120.7 kg)   07/19/18 264 lb (119.7 kg)   06/28/18 264 lb 8 oz (120 kg)               Primary Care Provider Office Phone # Fax #    Norma Serrano [phone]       [address]  [facility] [address]        Equal Access to Services     DEBBIE BALLESTEROS : Hadii aad ku hadasho Soomaali, waaxda luqadaha, qaybta kaalmada adeegyada, waxay idiin hayaan adeeg kharash la'kathie . So [facility] [phone].    ATENCIÓN: Si habla español, tiene a nails disposición servicios gratuitos de asistencia lingüística. [facility] [phone].    We comply with applicable federal civil rights laws and [place] laws. We do not discriminate on the basis of race, color, national origin, age, disability, sex, sexual orientation, or gender identity.            Thank you!     Thank you for choosing [facility]  for your care. Our goal is always to provide you with excellent care. Hearing back from our patients is one way we can continue to improve our services. Please take a few minutes to complete the written survey that you may receive in the mail after your visit with us. Thank you!             Your Updated Medication List - Protect others around you: Learn how to safely use, store and throw away your medicines at www.disposemymeds.org.          This list is accurate as of 8/10/18  6:05 PM.  Always use your most recent med list.                   Brand Name Dispense Instructions for use Diagnosis    * albuterol (2.5 MG/3ML) 0.083% neb solution     30 vial    Take 1 vial (2.5 mg) by nebulization every 6 hours as needed for shortness of breath / dyspnea or wheezing    Mild persistent asthma without complication       * albuterol 108 (90 Base) MCG/ACT inhaler    PROAIR HFA/PROVENTIL HFA/VENTOLIN HFA    1 Inhaler    Inhale 1-2 puffs into the lungs every 6 hours    Mild persistent asthma without complication       amLODIPine 5 MG  tablet    NORVASC    90 tablet    Take 1 tablet (5 mg) by mouth daily    Hypertension goal BP (blood pressure) < 140/90       ascorbic acid 500 MG tablet    VITAMIN C    60    1-2 tablets daily        aspirin 81 MG tablet      Take 1 tablet by mouth daily.    Chronic kidney disease (CKD), stage III (moderate), Type 2 diabetes, HbA1c goal < 7% (H)       cetirizine 10 MG tablet    zyrTEC    30 tablet    Take 1 tablet (10 mg) by mouth every evening    Sore throat       fluticasone 50 MCG/ACT spray    FLONASE    1 Bottle    Spray 1-2 sprays into both nostrils daily    Chronic seasonal allergic rhinitis due to pollen       mometasone 220 MCG/INH Inhaler    ASMANEX 30 METERED DOSES    3 Inhaler    Inhale 1 puff into the lungs daily    Mild persistent asthma with exacerbation       Multi-vitamin Tabs tablet   Generic drug:  multivitamin, therapeutic with minerals           order for DME     1 Device    Equipment being ordered: short CAM walker    Contusion of left foot, initial encounter, Left foot pain, Paresthesia of left foot       pravastatin 80 MG tablet    PRAVACHOL    90 tablet    TAKE 1 TABLET(80 MG) BY MOUTH DAILY    Hyperlipidemia LDL goal <100       predniSONE 50 MG tablet    DELTASONE    5 tablet    Take 1 tablet (50 mg) by mouth daily As needed for asthma exacerbation    Mild persistent asthma with exacerbation       triamcinolone 0.1 % cream    KENALOG    15 g    Apply sparingly to affected area three times daily for 14 days.    Itchy skin       triamterene-hydrochlorothiazide 75-50 MG per tablet    MAXZIDE    90 tablet    Take 1 tablet by mouth daily    Chronic venous insufficiency, Hypertension goal BP (blood pressure) < 140/90       VITAMIN B 12 PO      Take 1 tablet by mouth daily        ZANTAC PO      Take 1 tablet by mouth daily as needed for heartburn        * Notice:  This list has 2 medication(s) that are the same as other medications prescribed for you. Read the directions carefully, and ask your  doctor or other care provider to review them with you.

## 2018-08-10 NOTE — PROGRESS NOTES
"  SUBJECTIVE:   Teri Bain is a 62 year old female who presents to clinic today for the following health issues:      Musculoskeletal problem due to fall      Duration: 4 days post fall in front of the house    Description  Location: pain in chest, arms, wrists, legs    Intensity:  moderate    Accompanying signs and symptoms:  radiation of pain from right chest/clavicle area towards shoulder and back, weakness bilateral wrists and swelling of the right right and arm.    History  Previous similar problem: no   Previous evaluation:  none    Precipitating or alleviating factors:  Trauma or overuse: YES- fall  Aggravating factors include: walking and lifting, rotating wrists    Therapies tried and outcome: Aleve    She had returned home from a trip to the airport and due to construction on the street which made for uneven ground.    Hasn't yet been to the doctor.    Pain is worst in chest on right side. Also notes pain in wrist area.    No rib pain with laughing. Notes it on occasion with breathing deeply.    EXAM:  /74 (BP Location: Left arm, Patient Position: Sitting)  Pulse 78  Temp 98.4  F (36.9  C) (Oral)  Resp 16  Ht 5' 4.5\" (1.638 m)  Wt 266 lb (120.7 kg)  SpO2 98%  BMI 44.95 kg/m2  Constitutional: Healthy, alert, no distress   Cardiovascular: RRR. No murmurs   Musculoskeletal: tenderness of right clavicle, discomfort with pronation/supination of forearm, minimal discomfort with gentle pressure applied by patient to tender area of chest wall. Ambulates without difficulty, minimal discomfort and minimal swelling of left shin/tibial region.    Xrays, no fracture of forearm or clavicle, await radiology read    ASSESSMENT    ICD-10-CM    1. Pain of right clavicle M89.8X1 XR Clavicle Right   2. Pain in joint, forearm, right M25.531 XR Forearm Right 2 Views      Plan:  Consistent with bruising of ribs, clavicle, right forearm, left tibia/shin  Anticipate healing in coming 3-4 weeks.  Recommended " tylenol/ibuprofen/aleve one at a time if needed for pain.  If not healing as expected may consider PT.    Flash Peñaloza MD  Family Medicine Physician

## 2018-10-11 ENCOUNTER — OFFICE VISIT (OUTPATIENT)
Dept: FAMILY MEDICINE | Facility: CLINIC | Age: 63
End: 2018-10-11
Payer: COMMERCIAL

## 2018-10-11 VITALS
BODY MASS INDEX: 45.38 KG/M2 | DIASTOLIC BLOOD PRESSURE: 80 MMHG | RESPIRATION RATE: 16 BRPM | WEIGHT: 268.5 LBS | TEMPERATURE: 97.1 F | SYSTOLIC BLOOD PRESSURE: 139 MMHG | OXYGEN SATURATION: 100 % | HEART RATE: 69 BPM

## 2018-10-11 DIAGNOSIS — Z23 NEED FOR PROPHYLACTIC VACCINATION AND INOCULATION AGAINST INFLUENZA: ICD-10-CM

## 2018-10-11 DIAGNOSIS — E78.5 HYPERLIPIDEMIA LDL GOAL <100: ICD-10-CM

## 2018-10-11 DIAGNOSIS — N18.30 CHRONIC KIDNEY DISEASE (CKD), STAGE III (MODERATE) (H): ICD-10-CM

## 2018-10-11 DIAGNOSIS — M25.511 ARTHRALGIA OF RIGHT ACROMIOCLAVICULAR JOINT: ICD-10-CM

## 2018-10-11 DIAGNOSIS — I10 HYPERTENSION GOAL BP (BLOOD PRESSURE) < 140/90: ICD-10-CM

## 2018-10-11 DIAGNOSIS — M25.531 RIGHT WRIST PAIN: ICD-10-CM

## 2018-10-11 DIAGNOSIS — I87.2 CHRONIC VENOUS INSUFFICIENCY: ICD-10-CM

## 2018-10-11 DIAGNOSIS — Z00.00 ROUTINE GENERAL MEDICAL EXAMINATION AT A HEALTH CARE FACILITY: Primary | ICD-10-CM

## 2018-10-11 DIAGNOSIS — J45.30 MILD PERSISTENT ASTHMA WITHOUT COMPLICATION: ICD-10-CM

## 2018-10-11 LAB — HGB BLD-MCNC: 13.6 G/DL (ref 11.7–15.7)

## 2018-10-11 PROCEDURE — 90471 IMMUNIZATION ADMIN: CPT | Performed by: FAMILY MEDICINE

## 2018-10-11 PROCEDURE — 82043 UR ALBUMIN QUANTITATIVE: CPT | Performed by: FAMILY MEDICINE

## 2018-10-11 PROCEDURE — 36415 COLL VENOUS BLD VENIPUNCTURE: CPT | Performed by: FAMILY MEDICINE

## 2018-10-11 PROCEDURE — 80061 LIPID PANEL: CPT | Performed by: FAMILY MEDICINE

## 2018-10-11 PROCEDURE — 99396 PREV VISIT EST AGE 40-64: CPT | Mod: 25 | Performed by: FAMILY MEDICINE

## 2018-10-11 PROCEDURE — 90682 RIV4 VACC RECOMBINANT DNA IM: CPT | Performed by: FAMILY MEDICINE

## 2018-10-11 PROCEDURE — 80053 COMPREHEN METABOLIC PANEL: CPT | Performed by: FAMILY MEDICINE

## 2018-10-11 PROCEDURE — 85018 HEMOGLOBIN: CPT | Performed by: FAMILY MEDICINE

## 2018-10-11 PROCEDURE — 99213 OFFICE O/P EST LOW 20 MIN: CPT | Mod: 25 | Performed by: FAMILY MEDICINE

## 2018-10-11 PROCEDURE — 99207 C PAF COMPLETED  NO CHARGE: CPT | Performed by: FAMILY MEDICINE

## 2018-10-11 RX ORDER — AMLODIPINE BESYLATE 5 MG/1
5 TABLET ORAL DAILY
Qty: 90 TABLET | Refills: 3 | Status: SHIPPED | OUTPATIENT
Start: 2018-10-11 | End: 2019-10-15

## 2018-10-11 RX ORDER — ALBUTEROL SULFATE 90 UG/1
1-2 AEROSOL, METERED RESPIRATORY (INHALATION) EVERY 6 HOURS
Qty: 1 INHALER | Refills: 0 | Status: SHIPPED | OUTPATIENT
Start: 2018-10-11 | End: 2019-12-06

## 2018-10-11 RX ORDER — TRIAMTERENE AND HYDROCHLOROTHIAZIDE 75; 50 MG/1; MG/1
1 TABLET ORAL DAILY
Qty: 90 TABLET | Refills: 3 | Status: SHIPPED | OUTPATIENT
Start: 2018-10-11 | End: 2019-03-11

## 2018-10-11 NOTE — PROGRESS NOTES

## 2018-10-11 NOTE — LETTER
[facility]  [address] [place] [address] [id number] [phone]        April 25, 2019    Teri Bain  [address] [facility] [address]              Dear Teri Bain    This is to remind you that your non-fasting lab is due.    You may call our office at [phone] to schedule an appointment.    Please disregard this notice if you have already had your labs drawn or made an appointment.        Sincerely,        Norma Serrano MD

## 2018-10-11 NOTE — PROGRESS NOTES
"   SUBJECTIVE:   CC: Teri Bain is an 63 year old woman who presents for preventive health visit.     Physical   Annual:     Getting at least 3 servings of Calcium per day:  Yes    Bi-annual eye exam:  Yes    Dental care twice a year:  NO    Sleep apnea or symptoms of sleep apnea:  None    Frequency of exercise:  None    Taking medications regularly:  Yes    Medication side effects:  Muscle aches and Lightheadedness    Additional concerns today:  YES        PROBLEMS TO ADD ON...  1) Wrist and chest pain.  Had a bad fall in early August.  Was walking in a dark street and tripped, falling forward.  \"I flew through the air.\"  She landed on her outstretched arms and chest.  She was seen in urgent care and had x-rays of right forearm and clavicle.  She continues to have right wrist pain and upper right chest pain along the clavicle.  The wrist pain is particularly bothersome as she is RHD and has pain with holding a coffee cup, opening a jar, etc.   No numbness, tingling, weakness of arm.        Today's PHQ-2 Score:   PHQ-2 ( 1999 Pfizer) 10/8/2018   Q1: Little interest or pleasure in doing things 1   Q2: Feeling down, depressed or hopeless 0   PHQ-2 Score 1   Q1: Little interest or pleasure in doing things Several days   Q2: Feeling down, depressed or hopeless Not at all   PHQ-2 Score 1       Abuse: Current or Past(Physical, Sexual or Emotional)- NO  Do you feel safe in your environment - Yes    Social History   Substance Use Topics     Smoking status: Former Smoker     Quit date: 12/31/1996     Smokeless tobacco: Never Used     Alcohol use Yes      Comment: wine 1-2 times per year     Alcohol Use 10/8/2018   If you drink alcohol do you typically have greater than 3 drinks per day OR greater than 7 drinks per week? No   No flowsheet data found.    Reviewed orders with patient.  Reviewed health maintenance and updated orders accordingly - Yes  BP Readings from Last 3 Encounters:   10/11/18 139/80   08/10/18 132/74 "   18 122/80    Wt Readings from Last 3 Encounters:   10/11/18 268 lb 8 oz (121.8 kg)   08/10/18 266 lb (120.7 kg)   18 264 lb (119.7 kg)             Pertinent mammograms are reviewed under the imaging tab.  History of abnormal Pap smear: Status post benign hysterectomy. Health Maintenance and Surgical History updated.  PAP / HPV 2005   PAP NIL     Reviewed and updated as needed this visit by clinical staff  Tobacco  Allergies  Meds  Med Hx  Surg Hx  Fam Hx  Soc Hx        Reviewed and updated as needed this visit by Provider        Past Medical History:   Diagnosis Date     Allergic rhinitis      Chronic kidney disease (CKD), stage III (moderate) (H)      Chronic venous insufficiency      Classic migraines     fiorinal and darvocet prn     Diabetes mellitus, type 2 (H)     age 42     Dysplasia of cervix     treated with hysterectomy     GERD (gastroesophageal reflux disease)      History of tobacco use     quit      Hyperlipidemia LDL goal <100      Hypertension goal BP (blood pressure) < 140/90      Mild persistent asthma      Osteoarthritis     LT ankle & L>R knee, hands     Trochanteric bursitis      Vitamin D deficiency 2/15/2010      Past Surgical History:   Procedure Laterality Date     C DEXA, BONE DENSITY, AXIAL SKEL      WNL     C LIGATE FALLOPIAN TUBE       COLONOSCOPY      [place] [facility]     COLONOSCOPY  1/5/15    [facility] [place] [facility]     HC DILATION/CURETTAGE DIAG/THER NON OB      D & C     HC REMOVAL GALLBLADDER       HYSTERECTOMY, PAP NO LONGER INDICATED      Hysterectomy, Vaginal, ovaries intact     SURGICAL HISTORY OF -       cystoscopy     Obstetric History       T2      L2     SAB0   TAB0   Ectopic0   Multiple0   Live Births0       # Outcome Date GA Lbr René/2nd Weight Sex Delivery Anes PTL Lv   2 Term            1 Term                   Review of Systems  CONST: NEGATIVE for fevers/chills/sweats, unexplained weight loss/gain, and  fatigue  EYES: NEGATIVE for change in vision  ENT: NEGATIVE for difficulty hearing/tinnitus, and problems with teeth/gums  BREAST: NEGATIVE for breast lump/discharge  CV: NEGATIVE for leg pain with exercise, and palpitations  RESP: NEGATIVE for cough/wheeze, and difficulty breathing  GI: NEGATIVE for abdominal pain, blood in bowel movement, and nausea/vomiting/diarrhea  : NEGATIVE for nighttime urination, leaking urine, unusual vaginal bleeding, vaginal discharge, and concerns about sexual function  SKIN: NEGATIVE for rash or mole change  NEURO: POSITIVE for headaches, NEGATIVE for dizziness/lightheadedness, numbness, memory loss, and loss of coordination  PSYCH: NEGATIVE for anxiety/stress, problems with sleep, and depression  HEME: NEGATIVE for unexplained lumps, and easy bruising/bleeding  ENDO: NEGATIVE for excessive thirst or urination  ALL: NEGATIVE for hay fever/allergies      OBJECTIVE:   /80  Pulse 69  Temp 97.1  F (36.2  C) (Oral)  Resp 16  Wt 268 lb 8 oz (121.8 kg)  SpO2 100%  BMI 45.38 kg/m2  Physical Exam  GENERAL APPEARANCE: healthy, alert and no distress  EYES: Eyes grossly normal to inspection, PERRL and conjunctivae and sclerae normal  HENT: ear canals and TM's normal, nose and mouth without ulcers or lesions, oropharynx clear and oral mucous membranes moist  NECK: no adenopathy, no asymmetry, masses, or scars and thyroid normal to palpation  RESP: lungs clear to auscultation - no rales, rhonchi or wheezes  BREAST: normal without masses, tenderness or nipple discharge and no palpable axillary masses or adenopathy  CV: regular rate and rhythm, normal S1 S2, no S3 or S4, no murmur, click or rub, no peripheral edema and peripheral pulses strong  ABDOMEN: soft, nontender, no hepatosplenomegaly, no masses and bowel sounds normal  MS: no musculoskeletal defects are noted and gait is age appropriate without ataxia  SKIN: no suspicious lesions or rashes  NEURO: Normal strength and tone,  sensory exam grossly normal, mentation intact and speech normal  PSYCH: mentation appears normal and affect normal/bright    SHOULDER:  Inspection of right shoulder reveals no deformity/asymmetry.  ROM is full and painless.  There is tenderness to palpation over the AC joint.  WRIST:  right wrist with focal tenderness to palpation over 1st CMC.        ASSESSMENT/PLAN:     1. Routine general medical examination at a health care facility  Recommended dental visit.      2. Mild persistent asthma without complication  - albuterol (PROAIR HFA/PROVENTIL HFA/VENTOLIN HFA) 108 (90 Base) MCG/ACT inhaler; Inhale 1-2 puffs into the lungs every 6 hours  Dispense: 1 Inhaler; Refill: 0    3. Hypertension goal BP (blood pressure) < 140/90  stable/well controlled   - amLODIPine (NORVASC) 5 MG tablet; Take 1 tablet (5 mg) by mouth daily  Dispense: 90 tablet; Refill: 3  - triamterene-hydrochlorothiazide (MAXZIDE) 75-50 MG per tablet; Take 1 tablet by mouth daily  Dispense: 90 tablet; Refill: 3  - Comprehensive metabolic panel    4. Chronic venous insufficiency  stable/well controlled   - triamterene-hydrochlorothiazide (MAXZIDE) 75-50 MG per tablet; Take 1 tablet by mouth daily  Dispense: 90 tablet; Refill: 3    5. Chronic kidney disease (CKD), stage III (moderate) (H)  - Hemoglobin  - Comprehensive metabolic panel  - Albumin Random Urine Quantitative with Creat Ratio    6. Hyperlipidemia LDL goal <100  - Lipid panel reflex to direct LDL Fasting    7. Right wrist pain  I reviewed the x-rays from urgent care - no fractures.  May be exacerbation of CMC OA due to the trauma.  I recommended hand therapy.  Consider Ortho Hand referral  - [facility], HAND, AND CHIROPRACTIC REFERRAL; Future    8. Arthralgia of right acromioclavicular joint  Continue rest.  Consider Physical Therapy vs AC joint injection vs Sports Med referral.      9. Need for prophylactic vaccination and inoculation against influenza  - FLU VACCINE, (RIV4) RECOMBINANT HA  , IM  "(FluBlok, egg free) [09291]- >18 YRS (FMG recommended  50-64 YRS)  - Vaccine Administration, Initial [09716]     COUNSELING:  Reviewed preventive health counseling, as reflected in patient instructions    BP Readings from Last 1 Encounters:   10/11/18 139/80     Estimated body mass index is 45.38 kg/(m^2) as calculated from the following:    Height as of 8/10/18: 5' 4.5\" (1.638 m).    Weight as of this encounter: 268 lb 8 oz (121.8 kg).      Weight management plan: Discussed healthy diet and exercise guidelines and patient will follow up in 12 months in clinic to re-evaluate.    Counseling Resources:  ATP IV Guidelines  Pooled Cohorts Equation Calculator  Breast Cancer Risk Calculator  FRAX Risk Assessment  ICSI Preventive Guidelines  Dietary Guidelines for Americans, 2010  USDA's MyPlate  ASA Prophylaxis  Lung CA Screening    Norma Serrano MD  [facility]      "

## 2018-10-11 NOTE — MR AVS SNAPSHOT
After Visit Summary   10/11/2018    Teri Bain    MRN: [id number]           Patient Information     Date Of Birth          1955        Visit Information        Provider Department      10/11/2018 10:20 AM Norma Serrano MD [facility]        Today's Diagnoses     Routine general medical examination at a health care facility    -  1    Mild persistent asthma without complication        Hypertension goal BP (blood pressure) < 140/90        Chronic venous insufficiency        Chronic kidney disease (CKD), stage III (moderate) (H)        Hyperlipidemia LDL goal <100        Right wrist pain          Care Instructions      Preventive Health Recommendations  Female Ages 50 - 64    Yearly exam: See your health care provider every year in order to  o Review health changes.   o Discuss preventive care.    o Review your medicines if your doctor has prescribed any.      Get a Pap test every three years (unless you have an abnormal result and your provider advises testing more often).    If you get Pap tests with HPV test, you only need to test every 5 years, unless you have an abnormal result.     You do not need a Pap test if your uterus was removed (hysterectomy) and you have not had cancer.    You should be tested each year for STDs (sexually transmitted diseases) if you're at risk.     Have a mammogram every 1 to 2 years.    Have a colonoscopy at age 50, or have a yearly FIT test (stool test). These exams screen for colon cancer.      Have a cholesterol test every 5 years, or more often if advised.    Have a diabetes test (fasting glucose) every three years. If you are at risk for diabetes, you should have this test more often.     If you are at risk for osteoporosis (brittle bone disease), think about having a bone density scan (DEXA).    Shots: Get a flu shot each year. Get a tetanus shot every 10 years.    Nutrition:     Eat at least 5 servings of fruits and vegetables each  day.    Eat whole-grain bread, whole-wheat pasta and brown rice instead of white grains and rice.    Get adequate Calcium and Vitamin D.     Lifestyle    Exercise at least 150 minutes a week (30 minutes a day, 5 days a week). This will help you control your weight and prevent disease.    Limit alcohol to one drink per day.    No smoking.     Wear sunscreen to prevent skin cancer.     See your dentist every six months for an exam and cleaning.    See your eye doctor every 1 to 2 years.            Follow-ups after your visit        Additional Services     [facility], HAND, AND CHIROPRACTIC REFERRAL       Physical Therapy, Hand Therapy and Chiropractic Care are available through:  *[facility]  *Hand Therapy (Occupational Therapy or Physical Therapy)  *[facility]    Call one number to schedule at any of the above locations: [phone].    Physical therapy, Hand therapy and/or Chiropractic care has been recommended by your physician as an excellent treatment option to reduce pain and help people return to normal activities, including sports.  Therapy and/or chiropractic care services are a great complement or alternative to expensive and invasive surgery, injections, or long-term use of prescription medications. The primary goal is to identify the underlying problem and provide you the tools to manage your condition on your own.     Please be aware that coverage of these services is subject to the terms and limitations of your health insurance plan.  Call member services at your health plan with any benefit or coverage questions.      Please bring the following to your appointment:  *Your personal calendar for scheduling future appointments  *Comfortable clothing                  Future tests that were ordered for you today     Open Future Orders        Priority Expected Expires Ordered    [facility] HAND, AND CHIROPRACTIC REFERRAL Routine  10/11/2019 10/11/2018            Who  to contact     If you have questions or need follow up information about today's clinic visit or your schedule please contact [facility] directly at [phone].  Normal or non-critical lab and imaging results will be communicated to you by Amakemhart, letter or phone within 4 business days after the clinic has received the results. If you do not hear from us within 7 days, please contact the clinic through Amakemhart or phone. If you have a critical or abnormal lab result, we will notify you by phone as soon as possible.  Submit refill requests through [employer] or call your pharmacy and they will forward the refill request to us. Please allow 3 business days for your refill to be completed.          Additional Information About Your Visit        Road Hero Information     [employer] gives you secure access to your electronic health record. If you see a primary care provider, you can also send messages to your care team and make appointments. If you have questions, please call your primary care clinic.  If you do not have a primary care provider, please call [phone] and they will assist you.        Care EveryWhere ID     This is your Care EveryWhere ID. This could be used by other organizations to access your [facility] medical records  [id number]        Your Vitals Were     Pulse Temperature Respirations Pulse Oximetry BMI (Body Mass Index)       69 97.1  F (36.2  C) (Oral) 16 100% 45.38 kg/m2        Blood Pressure from Last 3 Encounters:   10/11/18 139/80   08/10/18 132/74   07/19/18 122/80    Weight from Last 3 Encounters:   10/11/18 268 lb 8 oz (121.8 kg)   08/10/18 266 lb (120.7 kg)   07/19/18 264 lb (119.7 kg)              We Performed the Following     Albumin Random Urine Quantitative with Creat Ratio     Comprehensive metabolic panel     Hemoglobin     Lipid panel reflex to direct LDL Fasting          Where to get your medicines      These medications were sent to [id number] -  [place], [place] - [address] AT [facility] [address]  [address], [place] [address]     Phone:  [phone]     albuterol 108 (90 Base) MCG/ACT inhaler    amLODIPine 5 MG tablet    triamterene-hydrochlorothiazide 75-50 MG per tablet          Primary Care Provider Office Phone # Fax #    Norma Serrano [phone]       [address]  [facility] [address]        Equal Access to Services     [facility]: Hadii aad ku hadasho Soomaali, waaxda luqadaha, qaybta kaalmada adeegyada, waxelmer powellin hayaan erin weeks . So [facility] [phone].    ATENCIÓN: Si habla español, tiene a nails disposición servicios gratuitos de asistencia lingüística. [facility] [phone].    We comply with applicable federal civil rights laws and [place] laws. We do not discriminate on the basis of race, color, national origin, age, disability, sex, sexual orientation, or gender identity.            Thank you!     Thank you for choosing [facility]  for your care. Our goal is always to provide you with excellent care. Hearing back from our patients is one way we can continue to improve our services. Please take a few minutes to complete the written survey that you may receive in the mail after your visit with us. Thank you!             Your Updated Medication List - Protect others around you: Learn how to safely use, store and throw away your medicines at www.disposemymeds.org.          This list is accurate as of 10/11/18 10:59 AM.  Always use your most recent med list.                   Brand Name Dispense Instructions for use Diagnosis    * albuterol (2.5 MG/3ML) 0.083% neb solution     30 vial    Take 1 vial (2.5 mg) by nebulization every 6 hours as needed for shortness of breath / dyspnea or wheezing    Mild persistent asthma without complication       * albuterol 108 (90 Base) MCG/ACT inhaler    PROAIR HFA/PROVENTIL HFA/VENTOLIN HFA    1 Inhaler    Inhale 1-2 puffs  into the lungs every 6 hours    Mild persistent asthma without complication       amLODIPine 5 MG tablet    NORVASC    90 tablet    Take 1 tablet (5 mg) by mouth daily    Hypertension goal BP (blood pressure) < 140/90       ascorbic acid 500 MG tablet    VITAMIN C    60    1-2 tablets daily        aspirin 81 MG tablet      Take 1 tablet by mouth daily.    Chronic kidney disease (CKD), stage III (moderate) (H), Type 2 diabetes, HbA1c goal < 7% (H)       cetirizine 10 MG tablet    zyrTEC    30 tablet    Take 1 tablet (10 mg) by mouth every evening    Sore throat       fluticasone 50 MCG/ACT spray    FLONASE    1 Bottle    Spray 1-2 sprays into both nostrils daily    Chronic seasonal allergic rhinitis due to pollen       mometasone 220 MCG/INH Inhaler    ASMANEX 30 METERED DOSES    3 Inhaler    Inhale 1 puff into the lungs daily    Mild persistent asthma with exacerbation       Multi-vitamin Tabs tablet   Generic drug:  multivitamin, therapeutic with minerals           order for DME     1 Device    Equipment being ordered: short CAM walker    Contusion of left foot, initial encounter, Left foot pain, Paresthesia of left foot       pravastatin 80 MG tablet    PRAVACHOL    90 tablet    TAKE 1 TABLET(80 MG) BY MOUTH DAILY    Hyperlipidemia LDL goal <100       predniSONE 50 MG tablet    DELTASONE    5 tablet    Take 1 tablet (50 mg) by mouth daily As needed for asthma exacerbation    Mild persistent asthma with exacerbation       triamcinolone 0.1 % cream    KENALOG    15 g    Apply sparingly to affected area three times daily for 14 days.    Itchy skin       triamterene-hydrochlorothiazide 75-50 MG per tablet    MAXZIDE    90 tablet    Take 1 tablet by mouth daily    Chronic venous insufficiency, Hypertension goal BP (blood pressure) < 140/90       VITAMIN B 12 PO      Take 1 tablet by mouth daily        ZANTAC PO      Take 1 tablet by mouth daily as needed for heartburn        * Notice:  This list has 2 medication(s)  that are the same as other medications prescribed for you. Read the directions carefully, and ask your doctor or other care provider to review them with you.

## 2018-10-12 LAB
ALBUMIN SERPL-MCNC: 3.8 G/DL (ref 3.4–5)
ALP SERPL-CCNC: 58 U/L (ref 40–150)
ALT SERPL W P-5'-P-CCNC: 32 U/L (ref 0–50)
ANION GAP SERPL CALCULATED.3IONS-SCNC: 7 MMOL/L (ref 3–14)
AST SERPL W P-5'-P-CCNC: 27 U/L (ref 0–45)
BILIRUB SERPL-MCNC: 0.7 MG/DL (ref 0.2–1.3)
BUN SERPL-MCNC: 31 MG/DL (ref 7–30)
CALCIUM SERPL-MCNC: 9.6 MG/DL (ref 8.5–10.1)
CHLORIDE SERPL-SCNC: 105 MMOL/L (ref 94–109)
CHOLEST SERPL-MCNC: 163 MG/DL
CO2 SERPL-SCNC: 28 MMOL/L (ref 20–32)
CREAT SERPL-MCNC: 1.61 MG/DL (ref 0.52–1.04)
CREAT UR-MCNC: 106 MG/DL
GFR SERPL CREATININE-BSD FRML MDRD: 32 ML/MIN/1.7M2
GLUCOSE SERPL-MCNC: 76 MG/DL (ref 70–99)
HDLC SERPL-MCNC: 78 MG/DL
LDLC SERPL CALC-MCNC: 71 MG/DL
MICROALBUMIN UR-MCNC: 9 MG/L
MICROALBUMIN/CREAT UR: 8.85 MG/G CR (ref 0–25)
NONHDLC SERPL-MCNC: 85 MG/DL
POTASSIUM SERPL-SCNC: 4 MMOL/L (ref 3.4–5.3)
PROT SERPL-MCNC: 7.9 G/DL (ref 6.8–8.8)
SODIUM SERPL-SCNC: 140 MMOL/L (ref 133–144)
TRIGL SERPL-MCNC: 69 MG/DL

## 2018-10-12 RX ORDER — PRAVASTATIN SODIUM 80 MG/1
80 TABLET ORAL EVERY EVENING
Qty: 90 TABLET | Refills: 3 | Status: SHIPPED | OUTPATIENT
Start: 2018-10-12 | End: 2019-03-11

## 2018-10-12 ASSESSMENT — ASTHMA QUESTIONNAIRES: ACT_TOTALSCORE: 20

## 2018-10-12 NOTE — PROGRESS NOTES
García Williamson,  Your lipid panel (cholesterol) results are fantastic and I sent authorization to your pharmacy to continue filling the pravastatin at the same dose for another year.     Your hemoglobin and urine albumin (protein) level look good but your kidney function (creatinine and eGFR) has worsened a bit.  I'd like to recheck this in 2-4 weeks and see if it returns to your prior baseline or if the creatinine remains more elevated.  (If it remains elevated we should probably have you revisit the kidney doctor at the .)  Please schedule a lab-only appointment in 2-4 weeks.   Norma Serrano MD

## 2018-10-16 DIAGNOSIS — I87.2 CHRONIC VENOUS INSUFFICIENCY: ICD-10-CM

## 2018-10-16 DIAGNOSIS — I10 HYPERTENSION GOAL BP (BLOOD PRESSURE) < 140/90: ICD-10-CM

## 2018-10-16 NOTE — TELEPHONE ENCOUNTER
"Requested Prescriptions   Pending Prescriptions Disp Refills     triamterene-hydrochlorothiazide (MAXZIDE) 75-50 MG per tablet [Pharmacy Med Name: TRIAMTERENE 75MG/ HCTZ 50MG TABLETS]  Last Written Prescription Date:  10/11/2018  Last Fill Quantity: 90 tablet,  # refills: 3   Last Office Visit: 10/11/2018   Future Office Visit:      90 tablet 0     Sig: TAKE 1 TABLET BY MOUTH DAILY    Diuretics (Including Combos) Protocol Failed    10/16/2018  3:04 AM       Failed - Normal serum creatinine on file in past 12 months    Recent Labs   Lab Test  10/11/18   1116   CR  1.61*           Passed - Blood pressure under 140/90 in past 12 months    BP Readings from Last 3 Encounters:   10/11/18 139/80   08/10/18 132/74   07/19/18 122/80          Passed - Recent (12 mo) or future (30 days) visit within the authorizing provider's specialty    Patient had office visit in the last 12 months or has a visit in the next 30 days with authorizing provider or within the authorizing provider's specialty.  See \"Patient Info\" tab in inbasket, or \"Choose Columns\" in Meds & Orders section of the refill encounter.           Passed - Patient is age 18 or older       Passed - No active pregancy on record       Passed - Normal serum potassium on file in past 12 months    Recent Labs   Lab Test  10/11/18   1116   POTASSIUM  4.0           Passed - Normal serum sodium on file in past 12 months    Recent Labs   Lab Test  10/11/18   1116   NA  140           Passed - No positive pregnancy test in past 12 months          "

## 2018-10-18 RX ORDER — TRIAMTERENE AND HYDROCHLOROTHIAZIDE 75; 50 MG/1; MG/1
TABLET ORAL
Qty: 90 TABLET | Refills: 0 | OUTPATIENT
Start: 2018-10-18

## 2018-10-18 NOTE — TELEPHONE ENCOUNTER
Medication refill declined as duplicate   rx sent by pcp on 10/11/18     Maddy Cruz Registered Nurse   [facility]

## 2019-01-03 DIAGNOSIS — E78.5 HYPERLIPIDEMIA LDL GOAL <100: ICD-10-CM

## 2019-01-04 RX ORDER — PRAVASTATIN SODIUM 80 MG/1
TABLET ORAL
Qty: 90 TABLET | Refills: 0 | OUTPATIENT
Start: 2019-01-04

## 2019-01-04 NOTE — TELEPHONE ENCOUNTER
"Requested Prescriptions   Pending Prescriptions Disp Refills     pravastatin (PRAVACHOL) 80 MG tablet [Pharmacy Med Name: PRAVASTATIN 80MG TABLETS]  This medication may be not due for a refill  Last Written Prescription Date:  10/12/2018  Last Fill Quantity: 90 tabs,  # refills: 3   Last office visit: 10/11/2018 with prescribing provider:  Maggie   Future Office Visit:     90 tablet 0     Sig: TAKE 1 TABLET(80 MG) BY MOUTH DAILY    Statins Protocol Passed - 1/3/2019  6:30 PM       Passed - LDL on file in past 12 months    Recent Labs   Lab Test 10/11/18  1116   LDL 71            Passed - No abnormal creatine kinase in past 12 months    No lab results found.            Passed - Recent (12 mo) or future (30 days) visit within the authorizing provider's specialty    Patient had office visit in the last 12 months or has a visit in the next 30 days with authorizing provider or within the authorizing provider's specialty.  See \"Patient Info\" tab in inbasket, or \"Choose Columns\" in Meds & Orders section of the refill encounter.             Passed - Patient is age 18 or older       Passed - No active pregnancy on record       Passed - No positive pregnancy test in past 12 months          "

## 2019-03-11 ENCOUNTER — OFFICE VISIT (OUTPATIENT)
Dept: FAMILY MEDICINE | Facility: CLINIC | Age: 64
End: 2019-03-11
Payer: COMMERCIAL

## 2019-03-11 ENCOUNTER — ANCILLARY PROCEDURE (OUTPATIENT)
Dept: GENERAL RADIOLOGY | Facility: CLINIC | Age: 64
End: 2019-03-11
Attending: FAMILY MEDICINE
Payer: COMMERCIAL

## 2019-03-11 ENCOUNTER — TELEPHONE (OUTPATIENT)
Dept: FAMILY MEDICINE | Facility: CLINIC | Age: 64
End: 2019-03-11

## 2019-03-11 VITALS
RESPIRATION RATE: 16 BRPM | OXYGEN SATURATION: 95 % | TEMPERATURE: 98.4 F | DIASTOLIC BLOOD PRESSURE: 78 MMHG | BODY MASS INDEX: 43.26 KG/M2 | HEART RATE: 80 BPM | WEIGHT: 256 LBS | SYSTOLIC BLOOD PRESSURE: 118 MMHG

## 2019-03-11 DIAGNOSIS — R07.81 PLEURITIC CHEST PAIN: ICD-10-CM

## 2019-03-11 DIAGNOSIS — R51.9 NONINTRACTABLE HEADACHE, UNSPECIFIED CHRONICITY PATTERN, UNSPECIFIED HEADACHE TYPE: ICD-10-CM

## 2019-03-11 DIAGNOSIS — R05.9 COUGH: Primary | ICD-10-CM

## 2019-03-11 DIAGNOSIS — E78.5 HYPERLIPIDEMIA LDL GOAL <100: ICD-10-CM

## 2019-03-11 DIAGNOSIS — J45.31 MILD PERSISTENT ASTHMA WITH EXACERBATION: ICD-10-CM

## 2019-03-11 DIAGNOSIS — R05.9 COUGH: ICD-10-CM

## 2019-03-11 DIAGNOSIS — J06.9 ACUTE URI: ICD-10-CM

## 2019-03-11 PROCEDURE — 99214 OFFICE O/P EST MOD 30 MIN: CPT | Performed by: FAMILY MEDICINE

## 2019-03-11 PROCEDURE — 71046 X-RAY EXAM CHEST 2 VIEWS: CPT

## 2019-03-11 RX ORDER — PRAVASTATIN SODIUM 80 MG/1
80 TABLET ORAL EVERY EVENING
Qty: 90 TABLET | Refills: 3 | Status: SHIPPED | OUTPATIENT
Start: 2019-03-11 | End: 2019-10-15

## 2019-03-11 RX ORDER — PREDNISONE 50 MG/1
50 TABLET ORAL DAILY
Qty: 5 TABLET | Refills: 0 | Status: SHIPPED | OUTPATIENT
Start: 2019-03-11 | End: 2019-12-13

## 2019-03-11 NOTE — TELEPHONE ENCOUNTER
"Patient called and spoke with writer.    Per patient:  1. On 3/6/19 was at bible study and chest got tight   A. Tightness continued on 3/7/19 and started coughing   B. Nebulizer BID on 3/7/19   C. Nebulizer in AM of 3/8/19 and chest tightness resolved  2. Cough improved but persistent and she feels it \"deep down\" in her chest  3. Hot flashes and chills-thinks she is febrile  4. Took EmergenC and over the counter cough medicine  5. Not SOB and no chest pain  6. Runny nose  7. Appt scheduled for today at 1000 with Dr. Serrano  8. Currently at work    Writer recommended:  1. Keep today's appt  2. Use Nebulizer or rescue inhaler as needed  3. Stay well hydrated  4. Breathe steam to help with congestion  5. If increasing SOB, despite use of nebulizer/rescue inhaler and/or chest pain, emergency room for evaluation  6. Call back with any new, changing or worsening symptoms    Patient verbalized understanding and in agreement with plan.  CIARRA Lucero, ALYSSAN, RN       "

## 2019-03-11 NOTE — PATIENT INSTRUCTIONS
Your symptoms and exam today indicate that you have a viral upper respiratory illness.  This includes viral rhinosinusitis and viral bronchitis.  Antibiotics do not help viral illnesses; the best remedies treat the symptoms (see below).  The typical course of a viral illness is that you feel rather miserable for the first few days - with sore throat, runny nose/nasal congestion, cough, and sometimes fever and body aches.  You should start to feel better after about 5-7 days and much better by 10-14 days.  If you develop sudden worsening of symptoms or fever after the first 5-7 days, or if you have persistence of your symptoms beyond 14 days, let us know as you may have developed a secondary bacterial infection.      For symptom relief I suggest tryin. Steam.  Take a long, hot shower.  Or if you don't want to get in the shower just run it with the bathroom door shut for a few minutes and breathe the steam.  2. Drink hot liquids frequently such as tea or hot water with honey and lemon.  3. Acetaminophen (Tylenol) and ibuprofen (Motrin or Advil) as needed for headache, sore throat, body aches, or fever.  4. For loosening phlegm and sputum try guaifenesin which is available in many combination products or alone as plain Robitussin or plain Mucinex.  5.  For cough suppression try dextromethorphan (DM) which is available in combination with guaifenesin (Robitussin DM or Mucinex DM) or as a plain syrup (Delsym). Or try Zarbee's Natural (honey-based) cough remedies.   6. For nasal congestion try:    An oral decongestant.  The only decongestant I recommend is pseudoephedrine. Ask the pharmacist for the over the counter (but real) pseudoephedrine - not phenylephrine.  This can raise your blood pressure and heart rate so do not use this if you have hypertension.      Afrin spray for up to 3 days.  This can also raise your blood pressure and heart rate so do not use this if you have hypertension.  (And never use afrin  nasal spray for more than 3 days as there is a risk of developing tolerance and rebound/worsening nasal congestion if used longer than this.)    Nealmed sinus rinses.    Nasal steroid spray such as nasacort or flonase, which are over-the-counter.  7. And most importantly: plenty of rest and sleep

## 2019-03-11 NOTE — PROGRESS NOTES
SUBJECTIVE:   Teri Bain is a 63 year old female who presents to clinic today with daughter for the following health issues:      Acute Illness   Acute illness concerns: URI  Onset: x 5 days    Fever: YES    Chills/Sweats: YES    Headache (location?): YES    Sinus Pressure:YES    Conjunctivitis:  YES: both    Ear Pain: no    Rhinorrhea: YES    Congestion: YES    Sore Throat: no    Chest tightness: YES     Cough: YES    Wheeze: YES    Decreased Appetite: YES    Nausea: YES    Vomiting: no    Diarrhea:  YES- a little    Dysuria/Freq.: no    Fatigue/Achiness: YES    Sick/Strep Exposure: no     Therapies Tried and outcome: HBP cold and flu    Coughing is better but fever/chills started yesterday.  Some dyspnea on exertion.    Not sleeping.      Back Pain     Duration: since this morning        Specific cause: none - started after she walked into cooler at work this morning    Description:   Location of pain: upper back right - near shoulder blade  Character of pain: constant sharp  Pain radiation: none  New numbness or weakness in arms, not attributed to pain:  no     Intensity: Currently 8/10    History:   Pain interferes with job: YES  History of back problems: no prior back problems  Any previous MRI or X-rays: None  Sees a specialist for back pain:  No  Therapies tried without relief: acetaminophen (Tylenol)    Alleviating factors:   Improved by: none    Precipitating factors:  Worsened by: Coughing and breathing, sneezing, turning to the right    Problem list and histories reviewed & adjusted, as indicated.  Additional history: as documented    BP Readings from Last 3 Encounters:   03/11/19 118/78   10/11/18 139/80   08/10/18 132/74    Wt Readings from Last 3 Encounters:   03/11/19 116.1 kg (256 lb)   10/11/18 121.8 kg (268 lb 8 oz)   08/10/18 120.7 kg (266 lb)           Reviewed and updated as needed this visit by clinical staff  Tobacco  Allergies  Meds  Problems  Med Hx  Surg Hx  Fam Hx  Soc Hx         Reviewed and updated as needed this visit by Provider  Meds  Problems         ROS:  Constitutional, HEENT, cardiovascular, pulmonary, GI, , musculoskeletal, neuro, skin, endocrine and psych systems are negative, except as otherwise noted.    OBJECTIVE:     /78 (BP Location: Right arm, Patient Position: Sitting, Cuff Size: Adult Large)   Pulse 80   Temp 98.4  F (36.9  C) (Oral)   Resp 16   Wt 116.1 kg (256 lb)   SpO2 95%   BMI 43.26 kg/m    Body mass index is 43.26 kg/m .  GEN:  no apparent distress, coughing and sneezing during visit  EYES: sclerae and conjunctivae clear with no discharge   ENT: external ears and nose without lesions or scars, TM's and canals clear bilaterally and oropharynx clear with moist mucus membranes and normal landmarks  NECK:  Supple without adenopathy, mass, or thyromegaly  LUNGS:  normal respiratory effort, and lungs clear to auscultation bilaterally - no rales, rhonchi or wheezes  CV: regular rate and rhythm, normal S1 S2, no S3 or S4 and no murmur, click or rub   BACK:  There is focal tenderness to palpation over right rhomboids/medial border of scapula    Diagnostic Test Results:  CXR - negative by my interpretation - no evidence of pneumonia or pleural fluid    ASSESSMENT/PLAN:         ICD-10-CM    1. Cough R05 XR Chest 2 Views   2. Pleuritic chest pain R07.81 XR Chest 2 Views   3. Acute URI J06.9    4. Mild persistent asthma with exacerbation J45.31 predniSONE (DELTASONE) 50 MG tablet   5. Nonintractable headache, unspecified chronicity pattern, unspecified headache type R51 predniSONE (DELTASONE) 50 MG tablet   6. Hyperlipidemia LDL goal <100 E78.5 pravastatin (PRAVACHOL) 80 MG tablet     We'll treat URI/asthma exacerbation with prednisone.  She has responded well to short bursts of oral prednisone in the past and has not needed prednisone for past 9 months.  Discussed that if symptoms worsen or do not improve over the next several days she can call clinic and  I would add antibiotic at that point.      For her posterior chest pain we discussed that this could represent pleural irritation and/or muscle strain from coughing.  I recommended local therapies - ice and/or heat.  She'll continue to avoid NSAIDs.  Reviewed that she can use acetaminophen prn.          Patient Instructions   Your symptoms and exam today indicate that you have a viral upper respiratory illness.  This includes viral rhinosinusitis and viral bronchitis.  Antibiotics do not help viral illnesses; the best remedies treat the symptoms (see below).  The typical course of a viral illness is that you feel rather miserable for the first few days - with sore throat, runny nose/nasal congestion, cough, and sometimes fever and body aches.  You should start to feel better after about 5-7 days and much better by 10-14 days.  If you develop sudden worsening of symptoms or fever after the first 5-7 days, or if you have persistence of your symptoms beyond 14 days, let us know as you may have developed a secondary bacterial infection.      For symptom relief I suggest tryin. Steam.  Take a long, hot shower.  Or if you don't want to get in the shower just run it with the bathroom door shut for a few minutes and breathe the steam.  2. Drink hot liquids frequently such as tea or hot water with honey and lemon.  3. Acetaminophen (Tylenol) and ibuprofen (Motrin or Advil) as needed for headache, sore throat, body aches, or fever.  4. For loosening phlegm and sputum try guaifenesin which is available in many combination products or alone as plain Robitussin or plain Mucinex.  5.  For cough suppression try dextromethorphan (DM) which is available in combination with guaifenesin (Robitussin DM or Mucinex DM) or as a plain syrup (Delsym). Or try Zarbee's Natural (honey-based) cough remedies.   6. For nasal congestion try:    An oral decongestant.  The only decongestant I recommend is pseudoephedrine. Ask the pharmacist for  the over the counter (but real) pseudoephedrine - not phenylephrine.  This can raise your blood pressure and heart rate so do not use this if you have hypertension.      Afrin spray for up to 3 days.  This can also raise your blood pressure and heart rate so do not use this if you have hypertension.  (And never use afrin nasal spray for more than 3 days as there is a risk of developing tolerance and rebound/worsening nasal congestion if used longer than this.)    Nealmed sinus rinses.    Nasal steroid spray such as nasacort or flonase, which are over-the-counter.  7. And most importantly: plenty of rest and sleep        Norma Serrano MD  [facility]

## 2019-05-06 ENCOUNTER — OFFICE VISIT (OUTPATIENT)
Dept: FAMILY MEDICINE | Facility: CLINIC | Age: 64
End: 2019-05-06
Payer: COMMERCIAL

## 2019-05-06 VITALS
RESPIRATION RATE: 16 BRPM | OXYGEN SATURATION: 98 % | BODY MASS INDEX: 44.48 KG/M2 | HEART RATE: 61 BPM | TEMPERATURE: 97.2 F | SYSTOLIC BLOOD PRESSURE: 132 MMHG | DIASTOLIC BLOOD PRESSURE: 77 MMHG | HEIGHT: 65 IN | WEIGHT: 267 LBS

## 2019-05-06 DIAGNOSIS — I87.2 CHRONIC VENOUS INSUFFICIENCY: ICD-10-CM

## 2019-05-06 DIAGNOSIS — J45.31 MILD PERSISTENT ASTHMA WITH EXACERBATION: ICD-10-CM

## 2019-05-06 DIAGNOSIS — M25.511 CHRONIC RIGHT SHOULDER PAIN: Primary | ICD-10-CM

## 2019-05-06 DIAGNOSIS — G89.29 CHRONIC RIGHT SHOULDER PAIN: Primary | ICD-10-CM

## 2019-05-06 DIAGNOSIS — I10 HYPERTENSION GOAL BP (BLOOD PRESSURE) < 140/90: ICD-10-CM

## 2019-05-06 PROCEDURE — 99214 OFFICE O/P EST MOD 30 MIN: CPT | Performed by: FAMILY MEDICINE

## 2019-05-06 PROCEDURE — 36415 COLL VENOUS BLD VENIPUNCTURE: CPT | Performed by: FAMILY MEDICINE

## 2019-05-06 PROCEDURE — 80048 BASIC METABOLIC PNL TOTAL CA: CPT | Performed by: FAMILY MEDICINE

## 2019-05-06 RX ORDER — TRIAMTERENE AND HYDROCHLOROTHIAZIDE 75; 50 MG/1; MG/1
TABLET ORAL
COMMUNITY
Start: 2019-05-01 | End: 2019-05-06

## 2019-05-06 RX ORDER — TRIAMTERENE AND HYDROCHLOROTHIAZIDE 75; 50 MG/1; MG/1
1 TABLET ORAL DAILY
Qty: 90 TABLET | Refills: 3 | Status: SHIPPED | OUTPATIENT
Start: 2019-05-06 | End: 2019-10-15

## 2019-05-06 ASSESSMENT — ASTHMA QUESTIONNAIRES
QUESTION_2 LAST FOUR WEEKS HOW OFTEN HAVE YOU HAD SHORTNESS OF BREATH: ONCE A DAY
QUESTION_3 LAST FOUR WEEKS HOW OFTEN DID YOUR ASTHMA SYMPTOMS (WHEEZING, COUGHING, SHORTNESS OF BREATH, CHEST TIGHTNESS OR PAIN) WAKE YOU UP AT NIGHT OR EARLIER THAN USUAL IN THE MORNING: NOT AT ALL
ACT_TOTALSCORE: 20
QUESTION_5 LAST FOUR WEEKS HOW WOULD YOU RATE YOUR ASTHMA CONTROL: WELL CONTROLLED
QUESTION_4 LAST FOUR WEEKS HOW OFTEN HAVE YOU USED YOUR RESCUE INHALER OR NEBULIZER MEDICATION (SUCH AS ALBUTEROL): ONCE A WEEK OR LESS
QUESTION_1 LAST FOUR WEEKS HOW MUCH OF THE TIME DID YOUR ASTHMA KEEP YOU FROM GETTING AS MUCH DONE AT WORK, SCHOOL OR AT HOME: NONE OF THE TIME

## 2019-05-06 ASSESSMENT — MIFFLIN-ST. JEOR: SCORE: 1754.04
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
## 2019-05-06 NOTE — LETTER
My Asthma Action Plan  Name: Teri Bain   YOB: 1955  Date: 5/6/2019   My doctor: Norma Serrano MD   My clinic: [facility]        My Control Medicine: Albuterol  My Rescue Medicine: Asmanex (mometasone) one puff daily  My Oral Steroid Medicine: Prednisone My Asthma Severity: mild persistent  Avoid asthma triggers: smoke, upper respiratory infections, dust mites, pollens, animal dander, insects/rodents, mold, humidity, strong odors and fumes and cold air            GREEN ZONE   Good Control    I feel good    No cough or wheeze    Can work, sleep and play without asthma symptoms       Take your asthma control medicine every day.     1. If exercise triggers your asthma, take your rescue medication    15 minutes before exercise or sports, and    During exercise if you have asthma symptoms  2. Spacer to use with inhaler: If you have a spacer, make sure to use it with your inhaler             YELLOW ZONE Getting Worse  I have ANY of these:    I do not feel good    Cough or wheeze    Chest feels tight    Wake up at night   1. Keep taking your Green Zone medications  2. Start taking your rescue medicine:    every 20 minutes for up to 1 hour. Then every 4 hours for 24-48 hours.  3. If you stay in the Yellow Zone for more than 12-24 hours, contact your doctor.  4. If you do not return to the Green Zone in 12-24 hours or you get worse, start taking your oral steroid medicine if prescribed by your provider.           RED ZONE Medical Alert - Get Help  I have ANY of these:    I feel awful    Medicine is not helping    Breathing getting harder    Trouble walking or talking    Nose opens wide to breathe       1. Take your rescue medicine NOW  2. If your provider has prescribed an oral steroid medicine, start taking it NOW  3. Call your doctor NOW  4. If you are still in the Red Zone after 20 minutes and you have not reached your doctor:    Take your rescue medicine again and    Call 911 or  go to the emergency room right away    See your regular doctor within 2 weeks of an Emergency Room or Urgent Care visit for follow-up treatment.          Annual Reminders:  Meet with Asthma Educator,  Flu Shot in the Fall, consider Pneumonia Vaccination for patients with asthma (aged 19 and older).    Pharmacy:    [facility] [address] [place] [address] AVE AT [facility] OF  [address] - A MAIL ORDER PHMACY                          Asthma Triggers  How To Control Things That Make Your Asthma Worse    Triggers are things that make your asthma worse.  Look at the list below to help you find your triggers and what you can do about them.  You can help prevent asthma flare-ups by staying away from your triggers.      Trigger                                                          What you can do   Cigarette Smoke  Tobacco smoke can make asthma worse. Do not allow smoking in your home, car or around you.  Be sure no one smokes at a child s day care or school.  If you smoke, ask your health care provider for ways to help you quit.  Ask family members to quit too.  Ask your health care provider for a referral to Quit Plan to help you quit smoking, or call 3-133-569-PLAN.     Colds, Flu, Bronchitis  These are common triggers of asthma. Wash your hands often.  Don t touch your eyes, nose or mouth.  Get a flu shot every year.     Dust Mites  These are tiny bugs that live in cloth or carpet. They are too small to see. Wash sheets and blankets in hot water every week.   Encase pillows and mattress in dust mite proof covers.  Avoid having carpet if you can. If you have carpet, vacuum weekly.   Use a dust mask and HEPA vacuum.   Pollen and Outdoor Mold  Some people are allergic to trees, grass, or weed pollen, or molds. Try to keep your windows closed.  Limit time out doors when pollen count is high.   Ask you health care provider about taking medicine during allergy season.     Animal  Dander  Some people are allergic to skin flakes, urine or saliva from pets with fur or feathers. Keep pets with fur or feathers out of your home.    If you can t keep the pet outdoors, then keep the pet out of your bedroom.  Keep the bedroom door closed.  Keep pets off cloth furniture and away from stuffed toys.     Mice, Rats, and Cockroaches  Some people are allergic to the waste from these pests.   Cover food and garbage.  Clean up spills and food crumbs.  Store grease in the refrigerator.   Keep food out of the bedroom.   Indoor Mold  This can be a trigger if your home has high moisture. Fix leaking faucets, pipes, or other sources of water.   Clean moldy surfaces.  Dehumidify basement if it is damp and smelly.   Smoke, Strong Odors, and Sprays  These can reduce air quality. Stay away from strong odors and sprays, such as perfume, powder, hair spray, paints, smoke incense, paint, cleaning products, candles and new carpet.   Exercise or Sports  Some people with asthma have this trigger. Be active!  Ask your doctor about taking medicine before sports or exercise to prevent symptoms.    Warm up for 5-10 minutes before and after sports or exercise.     Other Triggers of Asthma  Cold air:  Cover your nose and mouth with a scarf.  Sometimes laughing or crying can be a trigger.  Some medicines and food can trigger asthma.

## 2019-05-07 LAB
ANION GAP SERPL CALCULATED.3IONS-SCNC: 5 MMOL/L (ref 3–14)
BUN SERPL-MCNC: 16 MG/DL (ref 7–30)
CALCIUM SERPL-MCNC: 9 MG/DL (ref 8.5–10.1)
CHLORIDE SERPL-SCNC: 109 MMOL/L (ref 94–109)
CO2 SERPL-SCNC: 28 MMOL/L (ref 20–32)
CREAT SERPL-MCNC: 1.19 MG/DL (ref 0.52–1.04)
GFR SERPL CREATININE-BSD FRML MDRD: 48 ML/MIN/{1.73_M2}
GLUCOSE SERPL-MCNC: 77 MG/DL (ref 70–99)
POTASSIUM SERPL-SCNC: 4.2 MMOL/L (ref 3.4–5.3)
SODIUM SERPL-SCNC: 142 MMOL/L (ref 133–144)

## 2019-05-07 ASSESSMENT — ASTHMA QUESTIONNAIRES: ACT_TOTALSCORE: 20

## 2019-05-09 NOTE — RESULT ENCOUNTER NOTE
García Williamson,  Your results look just fine.  Your kidney function (creatinine and eGFR) is improved from your baseline.      Norma Serrano MD

## 2019-05-23 ENCOUNTER — ANCILLARY PROCEDURE (OUTPATIENT)
Dept: MAMMOGRAPHY | Facility: CLINIC | Age: 64
End: 2019-05-23
Attending: FAMILY MEDICINE
Payer: COMMERCIAL

## 2019-05-23 PROCEDURE — 77067 SCR MAMMO BI INCL CAD: CPT

## 2019-06-04 NOTE — TELEPHONE ENCOUNTER
RECORDS RECEIVED FROM: Chronic right shoulder/clavicle pain. Referred by Norma Serrano. Records in [facility]. No history of surgery. Xray done at  Urgent Care 08/10/18.   DATE RECEIVED: 5/4   NOTES STATUS DETAILS   OFFICE NOTE from referring provider Internal  Norma Serrano   OFFICE NOTE from other specialist Internal Flash Goddard MD      DISCHARGE SUMMARY from hospital N/A    DISCHARGE REPORT from the ER N/A    OPERATIVE REPORT N/A    MEDICATION LIST Internal    IMPLANT RECORD/STICKER N/A    LABS     CBC/DIFF N/A    CULTURES N/A    INJECTIONS DONE IN RADIOLOGY N/A    MRI n/a    CT SCAN N/A    XRAYS (IMAGES & REPORTS) Internal 8/10/18   TUMOR     PATHOLOGY  Slides & report N/A

## 2019-06-05 ENCOUNTER — PRE VISIT (OUTPATIENT)
Dept: ORTHOPEDICS | Facility: CLINIC | Age: 64
End: 2019-06-05

## 2019-06-05 ENCOUNTER — OFFICE VISIT (OUTPATIENT)
Dept: ORTHOPEDICS | Facility: CLINIC | Age: 64
End: 2019-06-05
Attending: FAMILY MEDICINE
Payer: COMMERCIAL

## 2019-06-05 VITALS
BODY MASS INDEX: 45.12 KG/M2 | DIASTOLIC BLOOD PRESSURE: 74 MMHG | SYSTOLIC BLOOD PRESSURE: 137 MMHG | HEART RATE: 76 BPM | HEIGHT: 65 IN

## 2019-06-05 DIAGNOSIS — M79.18 CERVICAL MYOFASCIAL PAIN SYNDROME: Primary | ICD-10-CM

## 2019-06-05 RX ORDER — CYCLOBENZAPRINE HCL 10 MG
10 TABLET ORAL
Qty: 31 TABLET | Refills: 0 | Status: SHIPPED | OUTPATIENT
Start: 2019-06-05 | End: 2019-07-20

## 2019-06-05 NOTE — PROGRESS NOTES
Subjective:   Teri Bain is a 63 year old female who complains of shoulder and chest pain, right sided is worse than left. She states that she was getting off a shuttle in front of her house, when she tripped over a lip in the pavement. She tried to catch herself but landed right on her chest.     Background:   Date of injury: 08/2018   Duration of symptoms: 10 months  Aggravating factors: Lifting anything, use of the arms  Relieving Factors: acetaminophen  Prior Evaluation: Prior Physician Evalutation: Dr. Serrano and X-rays    Sleeping isn't too bad.   R>L with pain up into anterior neck along SCM, scalenes.    Better with resting.    She never had pain in this area before.     PAST MEDICAL, SOCIAL, SURGICAL AND FAMILY HISTORY: She  has a past medical history of Allergic rhinitis, Chronic kidney disease (CKD), stage III (moderate) (H), Chronic venous insufficiency, Classic migraines, Diabetes mellitus, type 2 (H) (1998), Dysplasia of cervix, GERD (gastroesophageal reflux disease), History of tobacco use, Hyperlipidemia LDL goal <100, Hypertension goal BP (blood pressure) < 140/90, Mild persistent asthma, Osteoarthritis, Trochanteric bursitis, and Vitamin D deficiency (2/15/2010).      She  has a past surgical history that includes hysterectomy, pap no longer indicated (1/90); REMOVAL GALLBLADDER (1977); LIGATE FALLOPIAN TUBE (1977); surgical history of -  (1987); DILATION/CURETTAGE DIAG/THER NON OB (5/89); Colonoscopy (11/04); DEXA, BONE DENSITY, AXIAL SKEL (11/04); and colonoscopy (1/5/15).     Fam Hx:  Her family history includes Arthritis in her mother; Asthma in her brother, daughter, daughter, daughter, father, grandchild, grandchild, grandchild, and grandchild; C.A.D. in her mother; Cerebrovascular Disease in her mother; Coronary Artery Disease in her brother; Deep Vein Thrombosis (DVT) in her daughter; Depression in her daughter; Diabetes in her father, maternal grandfather, and mother; Hypertension  "in her father and mother; Obesity in her brother, father, and mother; Pacemaker in her brother.      SH:  She reports that she quit smoking about 22 years ago. She has never used smokeless tobacco. She reports that she drinks alcohol. She reports that she does not use drugs.    ALLERGIES: She is allergic to benazepril; claritin [loratadine]; codeine; lisinopril; losartan; penicillins; and tetracycline.    CURRENT MEDICATIONS: She has a current medication list which includes the following prescription(s): albuterol, albuterol, amlodipine, aspirin, cetirizine, chlorpheniramine-acetaminophen, cyanocobalamin, fluticasone, mometasone, multi-vitamin, pravastatin, prednisone, ranitidine hcl, triamcinolone, triamterene-hctz, and vitamin c.     REVIEW OF SYSTEMS: 6 point review of systems is negative except as noted above.     Exam:   /74   Pulse 76   Ht 1.638 m (5' 4.5\")   BMI 45.12 kg/m             CONSTITUTIONAL: healthy, alert and no distress  NEUROLOGIC: Non-focal  PSYCHIATRIC: affect normal/bright and mentation appears normal.    MUSCULOSKELETAL:   Neck;  FROM but pain with extension, left side bending and right rotation that localizes to the anterior scalene and RIGHT SCM muscles, nttp over the posterior neck, sp or paracervical musculature, Neg Spurling's  Chest:  +Swelling and ttp over the RIGHT sternoclavicular jt with minor ttp over the LEFT scj   Minor ttp over the R AC joint but not the LEFT  Nttp over the first rib B  R shoulder:  FROM and 5-/5 strength but this reproduces pain over the scj on the RIGHT  Clavicle has mild ttp over the mid portion  Reflexes B UE 2+=  Neg Toledo's  Sensation intact B UE            Assessment/Plan:   A:  Probable occult right sternoclavicular subluxation/dislocation vs medial clavicular fracture vs arthritis of the SCJ   Cervical myofascial pain of the RIGHT SCM and the anterior scalenes  P:  I have reviewed all of her imaging. I have explained to her that it is " difficult to see SCJ injuries on xray and that a CT scan would the next best option.  Given the troubles she is having with sleeping, I have prescribed flexeril 10 mg at bedtime to help with pain.  She can also try heat to her tight muscles and an OTC lidocaine patch for pain.   RTC in Sports Medicine for f/u of the CT scan.     Kellee Montilla MD, CAQ, FACSM, CCD  [facility]  Sports Medicine and Bone Health  Team Physician;  Athletics

## 2019-06-05 NOTE — LETTER
6/5/2019      RE: eTri Bain  [address]  [facility] [address]        Subjective:   Teri Bain is a 63 year old female who complains of shoulder and chest pain, right sided is worse than left. She states that she was getting off a shuttle in front of her house, when she tripped over a lip in the pavement. She tried to catch herself but landed right on her chest.     Background:   Date of injury: 08/2018   Duration of symptoms: 10 months  Aggravating factors: Lifting anything, use of the arms  Relieving Factors: acetaminophen  Prior Evaluation: Prior Physician Evalutation: Dr. Serrano and X-rays    Sleeping isn't too bad.   R>L with pain up into anterior neck along SCM, scalenes.    Better with resting.    She never had pain in this area before.     PAST MEDICAL, SOCIAL, SURGICAL AND FAMILY HISTORY: She  has a past medical history of Allergic rhinitis, Chronic kidney disease (CKD), stage III (moderate) (H), Chronic venous insufficiency, Classic migraines, Diabetes mellitus, type 2 (H) (1998), Dysplasia of cervix, GERD (gastroesophageal reflux disease), History of tobacco use, Hyperlipidemia LDL goal <100, Hypertension goal BP (blood pressure) < 140/90, Mild persistent asthma, Osteoarthritis, Trochanteric bursitis, and Vitamin D deficiency (2/15/2010).      She  has a past surgical history that includes hysterectomy, pap no longer indicated (1/90); REMOVAL GALLBLADDER (1977); LIGATE FALLOPIAN TUBE (1977); surgical history of -  (1987); DILATION/CURETTAGE DIAG/THER NON OB (5/89); Colonoscopy (11/04); DEXA, BONE DENSITY, AXIAL SKEL (11/04); and colonoscopy (1/5/15).     Fam Hx:  Her family history includes Arthritis in her mother; Asthma in her brother, daughter, daughter, daughter, father, grandchild, grandchild, grandchild, and grandchild; C.A.D. in her mother; Cerebrovascular Disease in her mother; Coronary Artery Disease in her brother; Deep Vein Thrombosis (DVT) in her daughter; Depression  "in her daughter; Diabetes in her father, maternal grandfather, and mother; Hypertension in her father and mother; Obesity in her brother, father, and mother; Pacemaker in her brother.      SH:  She reports that she quit smoking about 22 years ago. She has never used smokeless tobacco. She reports that she drinks alcohol. She reports that she does not use drugs.    ALLERGIES: She is allergic to benazepril; claritin [loratadine]; codeine; lisinopril; losartan; penicillins; and tetracycline.    CURRENT MEDICATIONS: She has a current medication list which includes the following prescription(s): albuterol, albuterol, amlodipine, aspirin, cetirizine, chlorpheniramine-acetaminophen, cyanocobalamin, fluticasone, mometasone, multi-vitamin, pravastatin, prednisone, ranitidine hcl, triamcinolone, triamterene-hctz, and vitamin c.     REVIEW OF SYSTEMS: 6 point review of systems is negative except as noted above.     Exam:   /74   Pulse 76   Ht 1.638 m (5' 4.5\")   BMI 45.12 kg/m              CONSTITUTIONAL: healthy, alert and no distress  NEUROLOGIC: Non-focal  PSYCHIATRIC: affect normal/bright and mentation appears normal.    MUSCULOSKELETAL:   Neck;  FROM but pain with extension, left side bending and right rotation that localizes to the anterior scalene and RIGHT SCM muscles, nttp over the posterior neck, sp or paracervical musculature, Neg Spurling's  Chest:  +Swelling and ttp over the RIGHT sternoclavicular jt with minor ttp over the LEFT scj   Minor ttp over the R AC joint but not the LEFT  Nttp over the first rib B  R shoulder:  FROM and 5-/5 strength but this reproduces pain over the scj on the RIGHT  Clavicle has mild ttp over the mid portion  Reflexes B UE 2+=  Neg Toledo's  Sensation intact B UE            Assessment/Plan:   A:  Probable occult right sternoclavicular subluxation/dislocation vs medial clavicular fracture vs arthritis of the SCJ   Cervical myofascial pain of the RIGHT SCM and the anterior " alison  P:  I have reviewed all of her imaging. I have explained to her that it is difficult to see SCJ injuries on xray and that a CT scan would the next best option.  Given the troubles she is having with sleeping, I have prescribed flexeril 10 mg at bedtime to help with pain.  She can also try heat to her tight muscles and an OTC lidocaine patch for pain.   RTC in Sports Medicine for f/u of the CT scan.     Kellee Montilla MD, CAQ, FACSM, CCD  [facility]  Sports Medicine and Bone Health  Team Physician;  Athletics      Kellee Montilla MD

## 2019-06-06 ENCOUNTER — ANCILLARY PROCEDURE (OUTPATIENT)
Dept: CT IMAGING | Facility: CLINIC | Age: 64
End: 2019-06-06
Attending: FAMILY MEDICINE
Payer: COMMERCIAL

## 2019-06-06 DIAGNOSIS — M79.18 CERVICAL MYOFASCIAL PAIN SYNDROME: ICD-10-CM

## 2019-06-14 ENCOUNTER — TELEPHONE (OUTPATIENT)
Dept: ORTHOPEDICS | Facility: CLINIC | Age: 64
End: 2019-06-14

## 2019-06-14 ENCOUNTER — OFFICE VISIT (OUTPATIENT)
Dept: ORTHOPEDICS | Facility: CLINIC | Age: 64
End: 2019-06-14
Payer: COMMERCIAL

## 2019-06-14 VITALS — HEIGHT: 65 IN | BODY MASS INDEX: 44.48 KG/M2 | RESPIRATION RATE: 18 BRPM | WEIGHT: 267 LBS

## 2019-06-14 DIAGNOSIS — M19.019 ARTHRITIS OF STERNOCLAVICULAR JOINT: Primary | ICD-10-CM

## 2019-06-14 DIAGNOSIS — M19.019 ARTHRITIS OF STERNOCLAVICULAR JOINT: ICD-10-CM

## 2019-06-14 LAB
BASOPHILS # BLD AUTO: 0 10E9/L (ref 0–0.2)
BASOPHILS NFR BLD AUTO: 0.6 %
CRP SERPL-MCNC: 12.6 MG/L (ref 0–8)
DIFFERENTIAL METHOD BLD: ABNORMAL
EOSINOPHIL # BLD AUTO: 0.4 10E9/L (ref 0–0.7)
EOSINOPHIL NFR BLD AUTO: 7.4 %
ERYTHROCYTE [DISTWIDTH] IN BLOOD BY AUTOMATED COUNT: 15.3 % (ref 10–15)
ERYTHROCYTE [SEDIMENTATION RATE] IN BLOOD BY WESTERGREN METHOD: 12 MM/H (ref 0–30)
HCT VFR BLD AUTO: 42 % (ref 35–47)
HGB BLD-MCNC: 14.5 G/DL (ref 11.7–15.7)
IMM GRANULOCYTES # BLD: 0 10E9/L (ref 0–0.4)
IMM GRANULOCYTES NFR BLD: 0.4 %
LYMPHOCYTES # BLD AUTO: 2.3 10E9/L (ref 0.8–5.3)
LYMPHOCYTES NFR BLD AUTO: 44.4 %
MCH RBC QN AUTO: 25 PG (ref 26.5–33)
MCHC RBC AUTO-ENTMCNC: 34.5 G/DL (ref 31.5–36.5)
MCV RBC AUTO: 72 FL (ref 78–100)
MONOCYTES # BLD AUTO: 0.5 10E9/L (ref 0–1.3)
MONOCYTES NFR BLD AUTO: 8.9 %
NEUTROPHILS # BLD AUTO: 2 10E9/L (ref 1.6–8.3)
NEUTROPHILS NFR BLD AUTO: 38.3 %
NRBC # BLD AUTO: 0 10*3/UL
NRBC BLD AUTO-RTO: 0 /100
PLATELET # BLD AUTO: 243 10E9/L (ref 150–450)
RBC # BLD AUTO: 5.8 10E12/L (ref 3.8–5.2)
RHEUMATOID FACT SER NEPH-ACNC: <20 IU/ML (ref 0–20)
WBC # BLD AUTO: 5.3 10E9/L (ref 4–11)

## 2019-06-14 RX ORDER — DEXAMETHASONE SODIUM PHOSPHATE 4 MG/ML
INJECTION, SOLUTION INTRA-ARTICULAR; INTRALESIONAL; INTRAMUSCULAR; INTRAVENOUS; SOFT TISSUE
Qty: 5 ML | Refills: 1 | Status: SHIPPED | OUTPATIENT
Start: 2019-06-14 | End: 2019-10-14

## 2019-06-14 ASSESSMENT — MIFFLIN-ST. JEOR: SCORE: 1754.04

## 2019-06-14 NOTE — PROGRESS NOTES
Subjective:   Teri Bain is a 63 year old female who presents for follow up of b/l sternoclavicular joint pain.     History: in august 2018 she fell on her chest after tripping on a curb and has had pain since.     She had an event last week when lifting heaving water bottle packages (24 pack of water) where her symptoms flared.     Her mother has a history of hip OA. No known family history of rheumatologic disease. Teri also has knee, ankle, and bilateral CMC arthritis.     PAST MEDICAL, SOCIAL, SURGICAL AND FAMILY HISTORY: She  has a past medical history of Allergic rhinitis, Chronic kidney disease (CKD), stage III (moderate) (H), Chronic venous insufficiency, Classic migraines, Diabetes mellitus, type 2 (H) (1998), Dysplasia of cervix, GERD (gastroesophageal reflux disease), History of tobacco use, Hyperlipidemia LDL goal <100, Hypertension goal BP (blood pressure) < 140/90, Mild persistent asthma, Osteoarthritis, Trochanteric bursitis, and Vitamin D deficiency (2/15/2010).      She  has a past surgical history that includes hysterectomy, pap no longer indicated (1/90); REMOVAL GALLBLADDER (1977); LIGATE FALLOPIAN TUBE (1977); surgical history of -  (1987); DILATION/CURETTAGE DIAG/THER NON OB (5/89); Colonoscopy (11/04); DEXA, BONE DENSITY, AXIAL SKEL (11/04); and colonoscopy (1/5/15).     Fam Hx:  Her family history includes Arthritis in her mother; Asthma in her brother, daughter, daughter, daughter, father, grandchild, grandchild, grandchild, and grandchild; C.A.D. in her mother; Cerebrovascular Disease in her mother; Coronary Artery Disease in her brother; Deep Vein Thrombosis (DVT) in her daughter; Depression in her daughter; Diabetes in her father, maternal grandfather, and mother; Hypertension in her father and mother; Obesity in her brother, father, and mother; Pacemaker in her brother.      SH:  She reports that she quit smoking about 22 years ago. She has never used smokeless tobacco. She reports  that she drinks alcohol. She reports that she does not use drugs.    ALLERGIES: She is allergic to benazepril; claritin [loratadine]; codeine; lisinopril; losartan; penicillins; and tetracycline.    CURRENT MEDICATIONS: She has a current medication list which includes the following prescription(s): albuterol, albuterol, amlodipine, aspirin, cetirizine, chlorpheniramine-acetaminophen, cyanocobalamin, cyclobenzaprine, fluticasone, mometasone, multi-vitamin, pravastatin, prednisone, ranitidine hcl, triamcinolone, triamterene-hctz, and vitamin c.          Exam:   There were no vitals taken for this visit.      CONSTITUTIONAL: healthy, alert and no distress  HEAD: Normocephalic. No masses, lesions, tenderness or abnormalities  SKIN: no suspicious lesions or rashes  GAIT: normal  NEUROLOGIC: Non-focal  PSYCHIATRIC: affect normal/bright and mentation appears normal.    CT reviewed today showing subchondral cysts of the medial clavicle (R>L) at sternoclavicular joints.       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 Roldan Darling MD  Primary Care Sports Medicine Fellow

## 2019-06-14 NOTE — TELEPHONE ENCOUNTER
[facility] Call Center    Phone Message    May a detailed message be left on voicemail: yes    Reason for Call: Medication Question or concern regarding medication   Prescription Clarification  Name of Medication: Diclofenac  Prescribing Provider: Dr. Montilla   Pharmacy: [facility]   What on the order needs clarification? Will need medication Rx dosed out in Frequency and grams.  Currently just states use pea sized.          Action Taken: Message routed to:  [facility] ump sports

## 2019-06-14 NOTE — TELEPHONE ENCOUNTER
After discussing with  she stated 1 gram to use as an amount this information was relayed to Shy.

## 2019-06-14 NOTE — LETTER
6/14/2019      RE: Teri Bain  [address]  [facility] [address]        Subjective:   Teri Bain is a 63 year old female who presents for follow up of b/l sternoclavicular joint pain.     History: in august 2018 she fell on her chest after tripping on a curb and has had pain since.     She had an event last week when lifting heaving water bottle packages (24 pack of water) where her symptoms flared.     Her mother has a history of hip OA. No known family history of rheumatologic disease. Teri also has knee, ankle, and bilateral CMC arthritis.     PAST MEDICAL, SOCIAL, SURGICAL AND FAMILY HISTORY: She  has a past medical history of Allergic rhinitis, Chronic kidney disease (CKD), stage III (moderate) (H), Chronic venous insufficiency, Classic migraines, Diabetes mellitus, type 2 (H) (1998), Dysplasia of cervix, GERD (gastroesophageal reflux disease), History of tobacco use, Hyperlipidemia LDL goal <100, Hypertension goal BP (blood pressure) < 140/90, Mild persistent asthma, Osteoarthritis, Trochanteric bursitis, and Vitamin D deficiency (2/15/2010).      She  has a past surgical history that includes hysterectomy, pap no longer indicated (1/90); REMOVAL GALLBLADDER (1977); LIGATE FALLOPIAN TUBE (1977); surgical history of -  (1987); DILATION/CURETTAGE DIAG/THER NON OB (5/89); Colonoscopy (11/04); DEXA, BONE DENSITY, AXIAL SKEL (11/04); and colonoscopy (1/5/15).     Fam Hx:  Her family history includes Arthritis in her mother; Asthma in her brother, daughter, daughter, daughter, father, grandchild, grandchild, grandchild, and grandchild; C.A.D. in her mother; Cerebrovascular Disease in her mother; Coronary Artery Disease in her brother; Deep Vein Thrombosis (DVT) in her daughter; Depression in her daughter; Diabetes in her father, maternal grandfather, and mother; Hypertension in her father and mother; Obesity in her brother, father, and mother; Pacemaker in her brother.      SH:  She reports  that she quit smoking about 22 years ago. She has never used smokeless tobacco. She reports that she drinks alcohol. She reports that she does not use drugs.    ALLERGIES: She is allergic to benazepril; claritin [loratadine]; codeine; lisinopril; losartan; penicillins; and tetracycline.    CURRENT MEDICATIONS: She has a current medication list which includes the following prescription(s): albuterol, albuterol, amlodipine, aspirin, cetirizine, chlorpheniramine-acetaminophen, cyanocobalamin, cyclobenzaprine, fluticasone, mometasone, multi-vitamin, pravastatin, prednisone, ranitidine hcl, triamcinolone, triamterene-hctz, and vitamin c.          Exam:   There were no vitals taken for this visit.      CONSTITUTIONAL: healthy, alert and no distress  HEAD: Normocephalic. No masses, lesions, tenderness or abnormalities  SKIN: no suspicious lesions or rashes  GAIT: normal  NEUROLOGIC: Non-focal  PSYCHIATRIC: affect normal/bright and mentation appears normal.    CT reviewed today showing subchondral cysts of the medial clavicle (R>L) at sternoclavicular joints.       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 Roldan Darling MD  Primary Care Sports Medicine Fellow      Attending Note:   I have personally examined this patient and have reviewed the clinical presentation and progress note with the fellow. I agree with the treatment plan as outlined. The plan was  formulated with the fellow on the day of the patient's visit. I have reviewed all imaging with the fellow and agree with the findings in the documentation.     Kelele Montilla MD, CAQ, CCD  [facility]  Sports Medicine and Bone Health    Kellee Montilla MD

## 2019-06-17 LAB
ANA SER QL IF: NEGATIVE
CCP AB SER IA-ACNC: 2 U/ML

## 2019-06-17 NOTE — PROGRESS NOTES
Attending Note:   I have personally examined this patient and have reviewed the clinical presentation and progress note with the fellow. I agree with the treatment plan as outlined. The plan was formulated with the fellow on the day of the patient's visit. I have reviewed all imaging with the fellow and agree with the findings in the documentation.     Kellee Montilla MD, CAQ, CCD  [facility]  Sports Medicine and Bone Health

## 2019-06-18 PROBLEM — M19.012: Status: ACTIVE | Noted: 2019-06-18

## 2019-06-18 PROBLEM — M19.011: Status: ACTIVE | Noted: 2019-06-18

## 2019-06-19 DIAGNOSIS — M19.019 ARTHRITIS OF STERNOCLAVICULAR JOINT: ICD-10-CM

## 2019-06-21 ENCOUNTER — TELEPHONE (OUTPATIENT)
Dept: ORTHOPEDICS | Facility: CLINIC | Age: 64
End: 2019-06-21

## 2019-06-21 NOTE — TELEPHONE ENCOUNTER
[facility] Call Center    Phone Message    May a detailed message be left on voicemail: yes    Reason for Call: Other: Patient called as her left side now has flared up.  Patient is wondering if she can stop using the steroid cream and take some ibuprofen or tylenol, to reduce the inflammation.  Please follow up with patient.  Thank you!     Action Taken: Message routed to:  [facility]

## 2019-06-23 NOTE — TELEPHONE ENCOUNTER
"diclofenac (VOLTAREN) 1 % topical gel   \"Fax received from [employer] on 06/14/19 that reads, \"Please clarify how many times per day patient can use and how many grams per dose should be used. Medication comes with a dosing card for 2 or 4 grams\"  "

## 2019-06-25 NOTE — TELEPHONE ENCOUNTER
Per Dr. Darling, the patient should not be taking ibuprofen due to her kidney disease. She is ok to use voltaren gel or tylenol for the pain on the left side. The patient verbalized understanding.

## 2019-06-28 ENCOUNTER — THERAPY VISIT (OUTPATIENT)
Dept: PHYSICAL THERAPY | Facility: CLINIC | Age: 64
End: 2019-06-28
Payer: COMMERCIAL

## 2019-06-28 DIAGNOSIS — M19.019 ARTHRITIS OF STERNOCLAVICULAR JOINT: ICD-10-CM

## 2019-06-28 PROCEDURE — 97161 PT EVAL LOW COMPLEX 20 MIN: CPT | Mod: GP | Performed by: PHYSICAL THERAPIST

## 2019-06-28 PROCEDURE — 97112 NEUROMUSCULAR REEDUCATION: CPT | Mod: GP | Performed by: PHYSICAL THERAPIST

## 2019-06-28 PROCEDURE — 97033 APP MDLTY 1+IONTPHRSIS EA 15: CPT | Mod: GP | Performed by: PHYSICAL THERAPIST

## 2019-06-28 NOTE — PROGRESS NOTES
[place] [facility] Initial Evaluation  Subjective:    Teri Bain being seen for right shoulder and not sure how to spell the rest.   Date of Onset: 8/2018. Where condition occurred: in the community.Problem occurred: i fail down in front of my house  and reported as 8/10 on pain scale. General health as reported by patient is good. Pertinent medical history includes:  Asthma, calf pain-swelling-warmth, chest pain, diabetes, high blood pressure, kidney disease, overweight, pain at night/rest and rheumatoid arthritis.  Medical allergies: other. Other medical allergies details: cehpalosporins penicillins tetracyclines and related codeine sulf 30mg tablets lisinopril 10 mg tablets.    Current medications:  High blood pressure medication, muscle relaxants and pain medication.   Primary job tasks include:  Lifting/carrying, pushing/pulling, repetitive tasks and other. Other job/home tasks details: standing for 4 hours.  Pain is described as sharp and is constant. Pain is the same all the time. Since onset symptoms are gradually worsening. Special tests:  CT scan and x-ray. Previous treatment includes other. There was moderate improvement following previous treatment.   Patient is semi retired 3 days a week at [employer]. Restrictions include:  Working in normal job with restrictions.      Red flags:  Chest pain and pain at rest/night.                    Pain worsens with lifting, reaching.  Difficulty sleeping on side.  Pain seems to be switching sides as well.  Patient is RHD.    Objective:  System                   Shoulder Evaluation:  ROM:  AROM:    Flexion:  Left:  160    Right:  160    Abduction:  Left: 150   Right:  150    Internal Rotation:  Left:  T12    Right:  T12  External Rotation:  Left:  80    Right:  80                      Strength:    Flexion: Left:5-/5    Pain: -    Right: 5-/5      Pain:  -    Abduction:  Left: 5-/5   Pain:-    Right: 5-/5      Pain:-    Internal Rotation:   Left:5/5      Pain:-    Right: 5/5      Pain:-  External Rotation:   Left:5-/5      Pain:-   Right:5-/5      Pain:-                Palpation:    Left shoulder tenderness present at:  Sternoclavicular and Upper Trap  Right shoulder tenderness present at: Sternoclavicular and Upper Trap  Mobility Tests:  normal                                                 General     ROS    Assessment/Plan:    Patient is a 63 year old female with both sides shoulder complaints.    Patient has the following significant findings with corresponding treatment plan.                Diagnosis 1:  Bilateral sternoclavicular Joint arthritis     Pain -  hot/cold therapy, US, electric stimulation, manual therapy, splint/taping/bracing/orthotics, self management, education and home program  Decreased strength - therapeutic exercise and therapeutic activities  Impaired balance - neuro re-education and therapeutic activities  Decreased proprioception - neuro re-education and therapeutic activities  Impaired muscle performance - neuro re-education  Decreased function - therapeutic activities    Therapy Evaluation Codes:   1) History comprised of:   Personal factors that impact the plan of care:      None.    Comorbidity factors that impact the plan of care are:      None.     Medications impacting care: None.  2) Examination of Body Systems comprised of:   Body structures and functions that impact the plan of care:      Shoulder.   Activity limitations that impact the plan of care are:      Lifting.  3) Clinical presentation characteristics are:   Stable/Uncomplicated.  4) Decision-Making    Low complexity using standardized patient assessment instrument and/or measureable assessment of functional outcome.  Cumulative Therapy Evaluation is: Low complexity.    Previous and current functional limitations:  (See Goal Flow Sheet for this information)    Short term and Long term goals: (See Goal Flow Sheet for this information)     Communication  ability:  Patient appears to be able to clearly communicate and understand verbal and written communication and follow directions correctly.  Treatment Explanation - The following has been discussed with the patient:   RX ordered/plan of care  Anticipated outcomes  Possible risks and side effects  This patient would benefit from PT intervention to resume normal activities.   Rehab potential is good.    Frequency:  1 X week, once daily  Duration:  for 6 weeks  Discharge Plan:  Achieve all LTG.  Independent in home treatment program.  Reach maximal therapeutic benefit.    Please refer to the daily flowsheet for treatment today, total treatment time and time spent performing 1:1 timed codes.

## 2019-06-28 NOTE — PROGRESS NOTES
[place] [place] [facility] Initial Evaluation  Subjective:  HPI            Objective:  System    Physical Exam    General     ROS    Assessment/Plan:    {REHAB NOTES:102596}

## 2019-07-02 ENCOUNTER — THERAPY VISIT (OUTPATIENT)
Dept: PHYSICAL THERAPY | Facility: CLINIC | Age: 64
End: 2019-07-02
Payer: COMMERCIAL

## 2019-07-02 DIAGNOSIS — M19.019 ARTHRITIS OF STERNOCLAVICULAR JOINT: Primary | ICD-10-CM

## 2019-07-02 PROCEDURE — 97112 NEUROMUSCULAR REEDUCATION: CPT | Mod: GP | Performed by: PHYSICAL THERAPIST

## 2019-07-02 PROCEDURE — 97033 APP MDLTY 1+IONTPHRSIS EA 15: CPT | Mod: GP | Performed by: PHYSICAL THERAPIST

## 2019-07-09 ENCOUNTER — THERAPY VISIT (OUTPATIENT)
Dept: PHYSICAL THERAPY | Facility: CLINIC | Age: 64
End: 2019-07-09
Payer: COMMERCIAL

## 2019-07-09 DIAGNOSIS — M19.019 ARTHRITIS OF STERNOCLAVICULAR JOINT: Primary | ICD-10-CM

## 2019-07-09 PROCEDURE — 97033 APP MDLTY 1+IONTPHRSIS EA 15: CPT | Mod: GP | Performed by: PHYSICAL THERAPIST

## 2019-07-09 PROCEDURE — 97112 NEUROMUSCULAR REEDUCATION: CPT | Mod: GP | Performed by: PHYSICAL THERAPIST

## 2019-07-17 ENCOUNTER — THERAPY VISIT (OUTPATIENT)
Dept: PHYSICAL THERAPY | Facility: CLINIC | Age: 64
End: 2019-07-17
Payer: COMMERCIAL

## 2019-07-17 DIAGNOSIS — M19.019 ARTHRITIS OF STERNOCLAVICULAR JOINT: Primary | ICD-10-CM

## 2019-07-17 PROCEDURE — 97033 APP MDLTY 1+IONTPHRSIS EA 15: CPT | Mod: GP | Performed by: PHYSICAL THERAPIST

## 2019-07-17 PROCEDURE — 97112 NEUROMUSCULAR REEDUCATION: CPT | Mod: GP | Performed by: PHYSICAL THERAPIST

## 2019-07-19 DIAGNOSIS — M79.18 CERVICAL MYOFASCIAL PAIN SYNDROME: ICD-10-CM

## 2019-07-20 NOTE — TELEPHONE ENCOUNTER
cyclobenzaprine (FLEXERIL) 10 MG tablet  Last Written Prescription Date:  6/5/19  Last Fill Quantity: 31,   # refills: 0  Last Office Visit :6/14/19  Future Office visit:  none    Routing refill request to provider for review/approval because: not on protocol

## 2019-07-22 ENCOUNTER — THERAPY VISIT (OUTPATIENT)
Dept: PHYSICAL THERAPY | Facility: CLINIC | Age: 64
End: 2019-07-22
Payer: COMMERCIAL

## 2019-07-22 DIAGNOSIS — M19.019 ARTHRITIS OF STERNOCLAVICULAR JOINT: Primary | ICD-10-CM

## 2019-07-22 PROCEDURE — 97033 APP MDLTY 1+IONTPHRSIS EA 15: CPT | Mod: GP | Performed by: PHYSICAL THERAPIST

## 2019-07-22 PROCEDURE — 97112 NEUROMUSCULAR REEDUCATION: CPT | Mod: GP | Performed by: PHYSICAL THERAPIST

## 2019-07-23 DIAGNOSIS — I10 HYPERTENSION GOAL BP (BLOOD PRESSURE) < 140/90: ICD-10-CM

## 2019-07-23 RX ORDER — AMLODIPINE BESYLATE 5 MG/1
TABLET ORAL
Qty: 0.1 TABLET | Refills: 0 | OUTPATIENT
Start: 2019-07-23

## 2019-07-23 NOTE — TELEPHONE ENCOUNTER
Refused Prescriptions:                       Disp   Refills    amLODIPine (NORVASC) 5 MG tablet [Pharmacy*0.1 ta*0        Sig: TAKE 1 TABLET(5 MG) BY MOUTH DAILY  Refused By: BUSHRA SHAFER  Reason for Refusal: Duplicate

## 2019-07-23 NOTE — TELEPHONE ENCOUNTER
"Requested Prescriptions   Pending Prescriptions Disp Refills     amLODIPine (NORVASC) 5 MG tablet [Pharmacy Med Name: AMLODIPINE BESYLATE 5MG TABLETS] 90 tablet 0     Sig: TAKE 1 TABLET(5 MG) BY MOUTH DAILY  Last Written Prescription Date:  10/11/2018  Last Fill Quantity: 90 tablet,  # refills: 3   Last Office Visit: 5/6/2019   Future Office Visit:            Calcium Channel Blockers Protocol  Failed - 7/23/2019  9:36 AM        Failed - Normal serum creatinine on file in past 12 months     Recent Labs   Lab Test 05/06/19  1131   CR 1.19*           Passed - Blood pressure under 140/90 in past 12 months     BP Readings from Last 3 Encounters:   06/05/19 137/74   05/06/19 132/77   03/11/19 118/78           Passed - Recent (12 mo) or future (30 days) visit within the authorizing provider's specialty     Patient had office visit in the last 12 months or has a visit in the next 30 days with authorizing provider or within the authorizing provider's specialty.  See \"Patient Info\" tab in inbasket, or \"Choose Columns\" in Meds & Orders section of the refill encounter.            Passed - Medication is active on med list        Passed - Patient is age 18 or older        Passed - No active pregnancy on record        Passed - No positive pregnancy test in past 12 months          "

## 2019-07-24 RX ORDER — CYCLOBENZAPRINE HCL 10 MG
10 TABLET ORAL
Qty: 31 TABLET | Refills: 0 | Status: SHIPPED | OUTPATIENT
Start: 2019-07-24 | End: 2019-08-25

## 2019-08-07 ENCOUNTER — TELEPHONE (OUTPATIENT)
Dept: FAMILY MEDICINE | Facility: CLINIC | Age: 64
End: 2019-08-07

## 2019-08-07 NOTE — TELEPHONE ENCOUNTER
The patient is requesting a return call as she has questions regarding her inhalers. It was not entirely clear to the writer what specifically her questions(s) are.

## 2019-08-07 NOTE — TELEPHONE ENCOUNTER
Norma Serrano MD,  Patient states he insurance will be changing September of 2019 to [employer]    Patient states she is currently getting her mometasone (ASMANEX 30 METERED DOSES) 220 MCG/INH inhaler through [facility].  - She is wondering if she should cancel this service    Writer advised patient to hold off on canceling and waiting until insurance change happens. When her insurance changes, pharmacy will contact us to see if PA is needed. Patient agreeable to this. Patient states she has enough inhaler for right now and is not in need of any more.     Thank You!  Kiley Curry, RN  Triage Nurse

## 2019-08-23 DIAGNOSIS — M79.18 CERVICAL MYOFASCIAL PAIN SYNDROME: ICD-10-CM

## 2019-08-25 NOTE — TELEPHONE ENCOUNTER
cyclobenzaprine (FLEXERIL) 10 MG tablet  Last Written Prescription Date:  7/24/19  Last Fill Quantity: 31,   # refills: 0  Last Office Visit :6/14/19  Future Office visit:  None    Routing refill request to provider for review/approval because: not on protocol

## 2019-08-28 RX ORDER — CYCLOBENZAPRINE HCL 10 MG
10 TABLET ORAL
Qty: 31 TABLET | Refills: 0 | Status: SHIPPED | OUTPATIENT
Start: 2019-08-28 | End: 2019-10-14

## 2019-10-03 ENCOUNTER — HEALTH MAINTENANCE LETTER (OUTPATIENT)
Age: 64
End: 2019-10-03

## 2019-10-09 NOTE — PROGRESS NOTES
"SUBJECTIVE:   Teri Bain is a 64 year old female who presents for Preventive Visit.  Are you in the first 12 months of your Medicare coverage?  Yes,  Visual Acuity:  Right Eye: 20/40   Left Eye: 20/20  Both Eyes: 20/25    Healthy Habits:     In general, how would you rate your overall health?  Good    Frequency of exercise:  None    Do you usually eat at least 4 servings of fruit and vegetables a day, include whole grains    & fiber and avoid regularly eating high fat or \"junk\" foods?  No    Taking medications regularly:  Yes    Medication side effects:  None    Ability to successfully perform activities of daily living:  No assistance needed    Home Safety:  Lack of grab bars in the bathroom    Hearing Impairment:  No hearing concerns    In the past 6 months, have you been bothered by leaking of urine?  No    In general, how would you rate your overall mental or emotional health?  Excellent      PHQ-2 Total Score: 0    Additional concerns today:  No    Other non-preventive concerns to address:  1)  Palpitations  Gets occasional episodes of rapid heart beat.  She had an episode recently.  She was with grandson and got into her car after shopping.  Sudden onset of palpitations that lasted no more than a minute.  No associated chest pain, shortness of breath, dizziness, nausea.  She's had similar symptoms before but rarely - maybe a couple times per year.  Never with associated symptoms.  She does have chronic chest pain from sternoclavicular joint pain - presumably OA.      Do you feel safe in your environment? Yes    Do you have a Health Care Directive? No: Advance care planning reviewed with patient; information given to patient to review.      Fall risk  Fallen 2 or more times in the past year?: No  Any fall with injury in the past year?: Yes  Timed Up and Go Test (>13.5 is fall risk; contact physician) : 11.5    Cognitive Screening   1) Repeat 3 items (Leader, Season, Table)    2) Clock draw: NORMAL   3) 3 " item recall: Recalls 2 objects   Results: NORMAL clock, 1-2 items recalled: COGNITIVE IMPAIRMENT LESS LIKELY    Mini-CogTM Copyright SUGEY Ndiaye. Licensed by the author for use in [facility]; reprinted with permission (remi@[facility]). All rights reserved.      Do you have sleep apnea, excessive snoring or daytime drowsiness?: no    Reviewed and updated as needed this visit by clinical staff  Tobacco  Allergies  Meds  Problems  Med Hx  Surg Hx  Fam Hx  Soc Hx          Reviewed and updated as needed this visit by Provider  Allergies  Meds  Problems        Social History     Tobacco Use     Smoking status: Former Smoker     Packs/day: 0.00     Last attempt to quit: 1996     Years since quittin.8     Smokeless tobacco: Never Used   Substance Use Topics     Alcohol use: Yes     Comment: wine 1-2 times per year         Alcohol Use 10/14/2019   Prescreen: >3 drinks/day or >7 drinks/week? No   Prescreen: >3 drinks/day or >7 drinks/week? -         Current providers sharing in care for this patient include:   Patient Care Team:  Norma Serrano MD as PCP - General  Norma Serrano MD as Assigned PCP    The following health maintenance items are reviewed in Epic and correct as of today:  Health Maintenance   Topic Date Due     ZOSTER IMMUNIZATION (1 of 2) 2004     INFLUENZA VACCINE (1) 2019     FALL RISK ASSESSMENT  2019     LIPID  10/11/2019     MICROALBUMIN  10/11/2019     PNEUMOCOCCAL IMMUNIZATION 65+ LOW/MEDIUM RISK (1 of 2 - PCV13) 2019     MEDICARE ANNUAL WELLNESS VISIT  10/11/2019     BMP  2019     ASTHMA CONTROL TEST  2019     ASTHMA ACTION PLAN  2020     MAMMO SCREENING  2021     DTAP/TDAP/TD IMMUNIZATION (3 - Td) 10/03/2022     ADVANCE CARE PLANNING  2023     COLONOSCOPY  2025     DEXA  Completed     HEPATITIS C SCREENING  Completed     HIV SCREENING  Completed     MIGRAINE ACTION PLAN  Completed     PHQ-2  Completed      "IPV IMMUNIZATION  Aged Out     MENINGITIS IMMUNIZATION  Aged Out     BP Readings from Last 3 Encounters:   10/14/19 108/72   06/05/19 137/74   05/06/19 132/77    Wt Readings from Last 3 Encounters:   10/14/19 118.8 kg (262 lb)   06/14/19 121.1 kg (267 lb)   05/06/19 121.1 kg (267 lb)           Review of Systems   Constitutional: Negative for chills and fever.   HENT: Negative for congestion, ear pain, hearing loss and sore throat.    Eyes: Negative for pain and visual disturbance.   Respiratory: Negative for cough and shortness of breath.    Cardiovascular: Positive for palpitations. Negative for chest pain and peripheral edema.   Gastrointestinal: Positive for heartburn. Negative for abdominal pain, constipation, diarrhea, hematochezia and nausea.   Breasts:  Negative for tenderness, breast mass and discharge.   Genitourinary: Negative for dysuria, frequency, genital sores, hematuria, pelvic pain, urgency, vaginal bleeding and vaginal discharge.   Musculoskeletal: Positive for myalgias. Negative for arthralgias and joint swelling.   Skin: Negative for rash.   Neurological: Positive for headaches. Negative for dizziness, weakness and paresthesias.   Psychiatric/Behavioral: Negative for mood changes. The patient is not nervous/anxious.          OBJECTIVE:   /72 (BP Location: Right arm, Patient Position: Sitting, Cuff Size: Adult Large)   Pulse 89   Temp 96.5  F (35.8  C) (Tympanic)   Resp 16   Ht 1.638 m (5' 4.5\")   Wt 118.8 kg (262 lb)   SpO2 98%   Breastfeeding? No   BMI 44.28 kg/m   Estimated body mass index is 44.28 kg/m  as calculated from the following:    Height as of this encounter: 1.638 m (5' 4.5\").    Weight as of this encounter: 118.8 kg (262 lb).  Physical Exam  GENERAL APPEARANCE: healthy, alert and no distress  EYES: Eyes grossly normal to inspection, PERRL and conjunctivae and sclerae normal  HENT: ear canals and TM's normal, nose and mouth without ulcers or lesions, oropharynx clear and " oral mucous membranes moist  NECK: no adenopathy, no asymmetry, masses, or scars and thyroid normal to palpation  RESP: lungs clear to auscultation - no rales, rhonchi or wheezes  BREAST: normal without masses, tenderness or nipple discharge and no palpable axillary masses or adenopathy  CV: regular rate and rhythm, normal S1 S2, no S3 or S4, no murmur, click or rub, no peripheral edema and peripheral pulses strong  ABDOMEN: soft, nontender, no hepatosplenomegaly, no masses and bowel sounds normal  MS: no musculoskeletal defects are noted and gait is age appropriate without ataxia  SKIN: no suspicious lesions or rashes  NEURO: Normal strength and tone, sensory exam grossly normal, mentation intact and speech normal  PSYCH: mentation appears normal and affect normal/bright    Diagnostic Test Results:  Labs reviewed in Epic    ASSESSMENT / PLAN:     1. Encounter for Medicare annual wellness exam  IPPE    2. Palpitations  With no associated symptoms.  As her last EKG was done over 5 years ago I did repeat this today and it showed NSR with no change compared to prior.  I discussed with her that she should notify us if her episodes become more frequent, longer in duration, or associated with symptoms such as chest pain, shortness of breath, dizziness, nausea.  - EKG 12-lead complete w/read - Clinics    3. Gastroesophageal reflux disease, esophagitis presence not specified  We discussed current FDA warning about potential impurities in the ranitidine supply and I recommended switching [id number] to famotidine.     - famotidine (PEPCID) 20 MG tablet; Take 1 tablet (20 mg) by mouth daily as needed (heartburn)  Dispense: 30 tablet; Refill: 11    4. Need for prophylactic vaccination and inoculation against influenza  - INFLUENZA (HIGH DOSE) 3 VALENT VACCINE [87321]  - Vaccine Administration, Initial [74968]    5. Need for vaccination  - PPSV23, IM/SUBQ (2+ YRS) - [id number]  - ADMIN PNEUMOCOCCAL VACCINE    6. Hypertension  "goal BP (blood pressure) < 140/90  stable/well controlled   - Albumin Random Urine Quantitative with Creat Ratio  - BASIC METABOLIC PANEL    7. Itchy skin  - triamcinolone (KENALOG) 0.1 % external cream; Apply sparingly to affected area three times daily for up to 14 days.  Dispense: 15 g; Refill: 0    8. Hyperlipidemia LDL goal <100  - Lipid panel reflex to direct LDL Fasting    9. Chronic seasonal allergic rhinitis due to pollen  - fluticasone (FLONASE) 50 MCG/ACT nasal spray; Spray 1-2 sprays into both nostrils daily  Dispense: 16 g; Refill: 11     End of Life Planning:  Patient currently has an advanced directive: No.  I have verified the patient's ablity to prepare an advanced directive/make health care decisions.  Literature was provided to assist patient in preparing an advanced directive.    COUNSELING:  Reviewed preventive health counseling, as reflected in patient instructions    Estimated body mass index is 44.28 kg/m  as calculated from the following:    Height as of this encounter: 1.638 m (5' 4.5\").    Weight as of this encounter: 118.8 kg (262 lb).  Weight management plan: Discussed healthy diet and exercise guidelines          Appropriate preventive services were discussed with this patient, including applicable screening as appropriate for cardiovascular disease, diabetes, osteopenia/osteoporosis, and glaucoma.  As appropriate for age/gender, discussed screening for colorectal cancer, prostate cancer, breast cancer, and cervical cancer. Checklist reviewing preventive services available has been given to the patient.    Reviewed patients plan of care and provided an AVS. The Basic Care Plan (routine screening as documented in Health Maintenance) for Teri meets the Care Plan requirement. This Care Plan has been established and reviewed with the Patient.    Counseling Resources:  ATP IV Guidelines  Pooled Cohorts Equation Calculator  Breast Cancer Risk Calculator  FRAX Risk Assessment  ICSI Preventive " Guidelines  Dietary Guidelines for Americans, 2010  USDA's MyPlate  ASA Prophylaxis  Lung CA Screening    Norma Serrano MD  [facility] [place]    Identified Health Risks:    She is at risk for lack of exercise and has been provided with information to increase physical activity for the benefit of her well-being.  The patient was counseled and encouraged to consider modifying their diet and eating habits. She was provided with information on recommended healthy diet options.  She is at risk for falling and has been provided with information to reduce the risk of falling at home.

## 2019-10-14 ENCOUNTER — OFFICE VISIT (OUTPATIENT)
Dept: FAMILY MEDICINE | Facility: CLINIC | Age: 64
End: 2019-10-14
Payer: COMMERCIAL

## 2019-10-14 VITALS
HEIGHT: 65 IN | RESPIRATION RATE: 16 BRPM | BODY MASS INDEX: 43.65 KG/M2 | WEIGHT: 262 LBS | OXYGEN SATURATION: 98 % | SYSTOLIC BLOOD PRESSURE: 108 MMHG | TEMPERATURE: 96.5 F | DIASTOLIC BLOOD PRESSURE: 72 MMHG | HEART RATE: 89 BPM

## 2019-10-14 DIAGNOSIS — Z23 NEED FOR PROPHYLACTIC VACCINATION AND INOCULATION AGAINST INFLUENZA: ICD-10-CM

## 2019-10-14 DIAGNOSIS — J30.1 CHRONIC SEASONAL ALLERGIC RHINITIS DUE TO POLLEN: ICD-10-CM

## 2019-10-14 DIAGNOSIS — L29.9 ITCHY SKIN: ICD-10-CM

## 2019-10-14 DIAGNOSIS — I87.2 CHRONIC VENOUS INSUFFICIENCY: ICD-10-CM

## 2019-10-14 DIAGNOSIS — R00.2 PALPITATIONS: ICD-10-CM

## 2019-10-14 DIAGNOSIS — E78.5 HYPERLIPIDEMIA LDL GOAL <100: ICD-10-CM

## 2019-10-14 DIAGNOSIS — K21.9 GASTROESOPHAGEAL REFLUX DISEASE, ESOPHAGITIS PRESENCE NOT SPECIFIED: ICD-10-CM

## 2019-10-14 DIAGNOSIS — Z00.00 ENCOUNTER FOR MEDICARE ANNUAL WELLNESS EXAM: Primary | ICD-10-CM

## 2019-10-14 DIAGNOSIS — I10 HYPERTENSION GOAL BP (BLOOD PRESSURE) < 140/90: ICD-10-CM

## 2019-10-14 DIAGNOSIS — Z23 NEED FOR VACCINATION: ICD-10-CM

## 2019-10-14 PROCEDURE — G0008 ADMIN INFLUENZA VIRUS VAC: HCPCS | Performed by: FAMILY MEDICINE

## 2019-10-14 PROCEDURE — 93000 ELECTROCARDIOGRAM COMPLETE: CPT | Performed by: FAMILY MEDICINE

## 2019-10-14 PROCEDURE — 82043 UR ALBUMIN QUANTITATIVE: CPT | Performed by: FAMILY MEDICINE

## 2019-10-14 PROCEDURE — G0402 INITIAL PREVENTIVE EXAM: HCPCS | Performed by: FAMILY MEDICINE

## 2019-10-14 PROCEDURE — G0009 ADMIN PNEUMOCOCCAL VACCINE: HCPCS | Performed by: FAMILY MEDICINE

## 2019-10-14 PROCEDURE — 99213 OFFICE O/P EST LOW 20 MIN: CPT | Mod: 25 | Performed by: FAMILY MEDICINE

## 2019-10-14 PROCEDURE — 36415 COLL VENOUS BLD VENIPUNCTURE: CPT | Performed by: FAMILY MEDICINE

## 2019-10-14 PROCEDURE — 90662 IIV NO PRSV INCREASED AG IM: CPT | Performed by: FAMILY MEDICINE

## 2019-10-14 PROCEDURE — 80048 BASIC METABOLIC PNL TOTAL CA: CPT | Performed by: FAMILY MEDICINE

## 2019-10-14 PROCEDURE — 90732 PPSV23 VACC 2 YRS+ SUBQ/IM: CPT | Performed by: FAMILY MEDICINE

## 2019-10-14 PROCEDURE — 80061 LIPID PANEL: CPT | Performed by: FAMILY MEDICINE

## 2019-10-14 RX ORDER — FLUTICASONE PROPIONATE 50 MCG
1-2 SPRAY, SUSPENSION (ML) NASAL DAILY
Qty: 16 G | Refills: 11 | Status: SHIPPED | OUTPATIENT
Start: 2019-10-14 | End: 2020-12-15

## 2019-10-14 RX ORDER — TRIAMCINOLONE ACETONIDE 1 MG/G
CREAM TOPICAL
Qty: 15 G | Refills: 0 | Status: SHIPPED | OUTPATIENT
Start: 2019-10-14 | End: 2020-05-26

## 2019-10-14 RX ORDER — FAMOTIDINE 20 MG/1
20 TABLET, FILM COATED ORAL DAILY PRN
Qty: 30 TABLET | Refills: 11 | Status: SHIPPED | OUTPATIENT
Start: 2019-10-14 | End: 2020-10-26

## 2019-10-14 ASSESSMENT — ENCOUNTER SYMPTOMS
DYSURIA: 0
ABDOMINAL PAIN: 0
JOINT SWELLING: 0
NAUSEA: 0
BREAST MASS: 0
CONSTIPATION: 0
PALPITATIONS: 1
NERVOUS/ANXIOUS: 0
CHILLS: 0
PALPITATIONS: 0
FREQUENCY: 0
WEAKNESS: 0
COUGH: 0
SHORTNESS OF BREATH: 0
DIZZINESS: 0
PARESTHESIAS: 0
HEMATOCHEZIA: 0
EYE PAIN: 0
DIARRHEA: 0
SORE THROAT: 0
HEARTBURN: 1
HEADACHES: 1
HEMATURIA: 0
FEVER: 0
ARTHRALGIAS: 0
MYALGIAS: 1

## 2019-10-14 ASSESSMENT — MIFFLIN-ST. JEOR: SCORE: 1726.36

## 2019-10-14 ASSESSMENT — ACTIVITIES OF DAILY LIVING (ADL): CURRENT_FUNCTION: NO ASSISTANCE NEEDED

## 2019-10-14 NOTE — PATIENT INSTRUCTIONS
Check with your insurance or the pharmacy on coverage of Shingrix (the new shingles vaccine).  For medicare patients it is usually best (cheapest) to get this at the pharmacy.  The pharmacist can administer the injection.    The Shingrix vaccination is a 2-shot series.  The second dose is given 6 months after the first.  (It cannot be given sooner than 2 months after the first dose - at the earliest.)  You should be aware that patients are reporting feeling a bit under the weather after the injection, including fatigue, malaise, and low-grade fever that may last a couple days.  Rarely patients can get a mild, shingles-like rash.   But these symptoms are much better than the Shingles disease.          Patient Education   Preventive Health Recommendations    See your health care provider every year to    Review health changes.     Discuss preventive care.      Review your medicines if your doctor has prescribed any.    You no longer need a yearly Pap test unless you've had an abnormal Pap test in the past 10 years. If you have vaginal symptoms, such as bleeding or discharge, be sure to talk with your provider about a Pap test.    Every 1 to 2 years, have a mammogram.  If you are over 69, talk with your health care provider about whether or not you want to continue having screening mammograms.    Every 10 years, have a colonoscopy. Or, have a yearly FIT test (stool test). These exams will check for colon cancer.     Have a cholesterol test every 5 years, or more often if your doctor advises it.     Have a diabetes test (fasting glucose) every three years. If you are at risk for diabetes, you should have this test more often.     At age 65, have a bone density scan (DEXA) to check for osteoporosis (brittle bone disease).    Shots:    Get a flu shot each year.    Get a tetanus shot every 10 years.    Talk to your doctor about your pneumonia vaccines. There are now two you should receive - Pneumovax (PPSV 23) and Prevnar  (PCV 13).    Talk to your pharmacist about the shingles vaccine.    Talk to your doctor about the hepatitis B vaccine.    Nutrition:     Eat at least 5 servings of fruits and vegetables each day.    Eat whole-grain bread, whole-wheat pasta and brown rice instead of white grains and rice.    Get adequate Calcium and Vitamin D.     Lifestyle    Exercise at least 150 minutes a week (30 minutes a day, 5 days a week). This will help you control your weight and prevent disease.    Limit alcohol to one drink per day.    No smoking.     Wear sunscreen to prevent skin cancer.     See your dentist twice a year for an exam and cleaning.    See your eye doctor every 1 to 2 years to screen for conditions such as glaucoma, macular degeneration and cataracts.    Personalized Prevention Plan  You are due for the preventive services outlined below.  Your care team is available to assist you in scheduling these services.  If you have already completed any of these items, please share that information with your care team to update in your medical record.  Health Maintenance Due   Topic Date Due     Zoster (Shingles) Vaccine (1 of 2) 09/20/2004     Flu Vaccine (1) 09/01/2019     FALL RISK ASSESSMENT  09/20/2019     Cholesterol Lab  10/11/2019     Kidney Microalbumin Urine Test  10/11/2019     Pneumococcal Vaccine (1 of 2 - PCV13) 09/20/2019     Annual Wellness Visit  10/11/2019     Basic Metabolic Panel  11/06/2019     Asthma Control Test  11/06/2019       Exercise for a Healthier Heart     Exercise with a friend. When activity is fun, you're more likely to stick with it.   You may wonder how you can improve the health of your heart. If you re thinking about exercise, you re on the right track. You don t need to become an athlete, but you do need a certain amount of brisk exercise to help strengthen your heart. If you have been diagnosed with a heart condition, your doctor may recommend exercise to help stabilize your condition. To  help make exercise a habit, choose safe, fun activities.  Be sure to check with your healthcare provider before starting an exercise program.   Why exercise?  Exercising regularly offers many healthy rewards. It can help you do all of the following:    Improve your blood cholesterol level to help prevent further heart trouble    Lower your blood pressure to help prevent a stroke or heart attack    Control diabetes, or reduce your risk of getting this disease    Improve your heart and lung function    Reach and maintain a healthy weight    Make your muscles stronger and more limber so you can stay active    Prevent falls and fractures by slowing the loss of bone mass (osteoporosis)    Manage stress better    Reduce your blood pressure    Improve your sense of self and your body image  Exercise tips  Ease into your routine. Set small goals. Then build on them.  Exercise on most days. Aim for a total of 150 or more minutes of moderate to  vigorous intensity activity each week. Consider 40 minutes, 3 to 4 times a week. For best results, activity should last for 40 minutes on average. It is OK to work up to the 40 minute period over time. Examples of moderate-intensity activity is walking 1 mile in 15 minutes or 30 to 45 minutes of yard work.  Step up your daily activity level. Along with your exercise program, try being more active throughout the day. Walk instead of drive. Do more household tasks or yard work.  Choose one or more activities you enjoy. Walking is one of the easiest things you can do. You can also try swimming, riding a bike, dancing, or taking an exercise class.  Stop exercising and call your doctor if you:    Have chest pain or feel dizzy or lightheaded    Feel burning, tightness, pressure, or heaviness in your chest, neck, shoulders, back, or arms    Have unusual shortness of breath    Have increased joint or muscle pain    Have palpitations or an irregular heartbeat   Date Last Reviewed: 5/1/2016     5941-5156 [employer]. [address] [place] [phone]. All rights reserved. This information is not intended as a substitute for professional medical care. Always follow your healthcare professional's instructions.          Understanding USDA MyPlate  The USDA (U.S. Department of Agriculture) has guidelines to help you make healthy food choices. These are called MyPlate. MyPlate shows the food groups that make up healthy meals using the image of a place setting. Before you eat, think about the healthiest choices for what to put onto your plate or into your cup or bowl. To learn more about building a healthy plate, visit www.choosemyplate.gov.    The food groups    Fruits. Any fruit or 100% fruit juice counts as part of the Fruit Group. Fruits may be fresh, canned, frozen, or dried, and may be whole, cut-up, or pureed. Make half your plate fruits and vegetables.    Vegetables. Any vegetable or 100% vegetable juice counts as a member of the Vegetable Group. Vegetables may be fresh, frozen, canned, or dried. They can be served raw or cooked and may be whole, cut-up, or mashed. Make half your plate fruits and vegetables.    Grains. All foods made from grains are part of the Grains Group. These include wheat, rice, oats, cornmeal, and barley such as bread, pasta, oatmeal, cereal, tortillas, and grits. Grains should be no more than a quarter of your plate. At least half of your grains should be whole grains.    Protein. This group includes meat, poultry, seafood, beans and peas, eggs, processed soy products (like tofu), nuts (including nut butters), and seeds. Make protein choices no more than a quarter of your plate. Meat and poultry choices should be lean or low fat.    Dairy. All fluid milk products and foods made from milk that contain calcium, like yogurt and cheese, are part of the Dairy Group. (Foods that have little calcium, such as cream, butter, and cream cheese, are not part of the  group.) Most dairy choices should be low-fat or fat-free.    Oils. These are fats that are liquid at room temperature. They include canola, corn, olive, soybean, and sunflower oil. Foods that are mainly oil include mayonnaise, certain salad dressings, and soft margarines. You should have only 5 to 7 teaspoons of oils a day. You probably already get this much from the food you eat.  Date Last Reviewed: 8/1/2017 2000-2018 [employer]. [address] [place] [address]. All rights reserved. This information is not intended as a substitute for professional medical care. Always follow your healthcare professional's instructions.          Preventing Falls in the Home  An adult or child can fall for many reasons. If you are an older adult, you may fall because your reaction time slows down. Your muscles and joints may get stiff, weak, or less flexible because of illness, medicines, or a physical condition. These things can also make a child more likely to fall or be injured in a fall.  Other health problems that make falls more likely include:    Arthritis    Dizziness or lightheadedness when you get out of bed (orthostatic hypotension)    History of a stroke    Dizziness    Anemia    Certain medicines taken for mental illness    Problems with balance or gait    History of falls with or without an injury    Changes in vision (vision impairment)    Changes in thinking skills and memory (cognitive impairment)  Injuries from a fall can include broken bones, dislocated joints, and cuts. When these injuries are serious enough, they can make it impossible for you or a child who is injured in a fall to live on his or her own.  Prevention tips  To help prevent falls and fall-related injuries, follow the tips below.   Floors  Make floors safer by doing the following:     Put nonskid pads under area rugs.    Remove throw rugs.    Replace worn floor coverings.    Tack carpets firmly to each step on carpeted  stairs. Put nonskid strips on the edges of uncarpeted stairs.    Keep floors and stairs free of clutter and cords.    Arrange furniture so there are clear pathways.    Clean up any spills right away.    Wear shoes that fit.  Bathrooms    Make bathrooms safer by doing the following:     Install grab bars in the tub or shower.    Apply nonskid strips or put a nonskid rubber mat in the tub or shower.    Sit on a bath chair to bathe.    Use bathmats with nonskid backing.  Lighting and the environment  Improve lighting in your home by doing the following:     Keep a flashlight in each room. Or put a lamp next to the bed within easy reach.    Put nightlights in the bedrooms, hallways, kitchen, and bathrooms.    Make sure all stairways have good lighting.    Take your time when going up and down stairs.    Put handrails on both sides of stairs and in walkways for more support. To prevent injury to your wrist or arm, don t use handrails to pull yourself up.    Install grab bars to pull yourself up.    Move or rearrange items that you use often. This will make them easier to find or reach.    Look at your home to find any safety hazards. Especially look at doorways, walkways, and the driveway. Remove or repair any safety problems that you find.  Date Last Reviewed: 8/1/2016 2000-2018 [employer]. [address] [facility], [place] [address]. All rights reserved. This information is not intended as a substitute for professional medical care. Always follow your healthcare professional's instructions.

## 2019-10-14 NOTE — NURSING NOTE
Clinic Administered Medication Documentation    MEDICATION LIST:   Injectable Medication Documentation    Patient was given Fluzone High Dose. Prior to medication administration, verified patients identity using patient s name and date of birth. Please see MAR and medication order for additional information. Patient instructed to remain in clinic for 15 minutes and report any adverse reaction to staff immediately .      Was entire vial of medication used? Yes  Vial/Syringe: Syringe  Expiration Date:  03/26/2020  Was this medication supplied by the patient? No     Janene Simon MA on 10/14/2019 at 10:41 AM    Clinic Administered Medication Documentation    MEDICATION LIST:   Injectable Medication Documentation    Patient was given PPSV23. Prior to medication administration, verified patients identity using patient s name and date of birth. Please see MAR and medication order for additional information. Patient instructed to remain in clinic for 15 minutes and report any adverse reaction to staff immediately .      Was entire vial of medication used? Yes  Vial/Syringe: Syringe  Expiration Date:  03/28/2021  Was this medication supplied by the patient? No     Prior to immunization administration, verified patients identity using patient s name and date of birth. Please see Immunization Activity for additional information.     Screening Questionnaire for Adult Immunization    Are you sick today?   No   Do you have allergies to medications, food, a vaccine component or latex?   No   Have you ever had a serious reaction after receiving a vaccination?   No   Do you have a long-term health problem with heart disease, lung disease, asthma, kidney disease, metabolic disease (e.g. diabetes), anemia, or other blood disorder?   No   Do you have cancer, leukemia, HIV/AIDS, or any other immune system problem?   No   In the past 3 months, have you taken medications that affect  your immune system, such as prednisone, other steroids,  or anticancer drugs; drugs for the treatment of rheumatoid arthritis, Crohn s disease, or psoriasis; or have you had radiation treatments?   No   Have you had a seizure, or a brain or other nervous system problem?   No   During the past year, have you received a transfusion of blood or blood     products, or been given immune (gamma) globulin or antiviral drug?   No   For women: Are you pregnant or is there a chance you could become        pregnant during the next month?   No   Have you received any vaccinations in the past 4 weeks?   No     Immunization questionnaire answers were all negative.        Per orders of Dr. Serrano, injection of PPSV23 given by Janene Simon MA. Patient instructed to remain in clinic for 15 minutes afterwards, and to report any adverse reaction to me immediately.       Screening performed by Janene Simon MA on 10/14/2019 at 11:07 AM.

## 2019-10-15 LAB
ANION GAP SERPL CALCULATED.3IONS-SCNC: 6 MMOL/L (ref 3–14)
BUN SERPL-MCNC: 30 MG/DL (ref 7–30)
CALCIUM SERPL-MCNC: 9.7 MG/DL (ref 8.5–10.1)
CHLORIDE SERPL-SCNC: 104 MMOL/L (ref 94–109)
CHOLEST SERPL-MCNC: 163 MG/DL
CO2 SERPL-SCNC: 29 MMOL/L (ref 20–32)
CREAT SERPL-MCNC: 1.42 MG/DL (ref 0.52–1.04)
CREAT UR-MCNC: 110 MG/DL
GFR SERPL CREATININE-BSD FRML MDRD: 39 ML/MIN/{1.73_M2}
GLUCOSE SERPL-MCNC: 87 MG/DL (ref 70–99)
HDLC SERPL-MCNC: 73 MG/DL
LDLC SERPL CALC-MCNC: 74 MG/DL
MICROALBUMIN UR-MCNC: 8 MG/L
MICROALBUMIN/CREAT UR: 7.09 MG/G CR (ref 0–25)
NONHDLC SERPL-MCNC: 90 MG/DL
POTASSIUM SERPL-SCNC: 4.3 MMOL/L (ref 3.4–5.3)
SODIUM SERPL-SCNC: 139 MMOL/L (ref 133–144)
TRIGL SERPL-MCNC: 81 MG/DL

## 2019-10-15 RX ORDER — PRAVASTATIN SODIUM 80 MG/1
80 TABLET ORAL EVERY EVENING
Qty: 90 TABLET | Refills: 3 | Status: SHIPPED | OUTPATIENT
Start: 2019-10-15 | End: 2020-08-12

## 2019-10-15 RX ORDER — TRIAMTERENE AND HYDROCHLOROTHIAZIDE 75; 50 MG/1; MG/1
1 TABLET ORAL DAILY
Qty: 90 TABLET | Refills: 3 | Status: SHIPPED | OUTPATIENT
Start: 2019-10-15 | End: 2020-08-12

## 2019-10-15 RX ORDER — AMLODIPINE BESYLATE 5 MG/1
5 TABLET ORAL DAILY
Qty: 90 TABLET | Refills: 3 | Status: SHIPPED | OUTPATIENT
Start: 2019-10-15 | End: 2020-08-11

## 2019-10-15 NOTE — RESULT ENCOUNTER NOTE
García Williamson,  Your results all look stable.  This includes lipid panel (cholesterol) results, urine albumin (protein) level, and basic metabolic panel results (blood salts, blood sugar, and kidney function).  I renewed your meds for another year.  Norma Serrano MD

## 2019-12-05 ENCOUNTER — NURSE TRIAGE (OUTPATIENT)
Dept: FAMILY MEDICINE | Facility: CLINIC | Age: 64
End: 2019-12-05

## 2019-12-05 NOTE — TELEPHONE ENCOUNTER
Patient called stating she has had a cough & sinus issues for a while now and not sure if she needs to be seen or what should try at home?    Ok to leave a message

## 2019-12-05 NOTE — TELEPHONE ENCOUNTER
"    Additional Information    Negative: Difficulty breathing, and not from stuffy nose (e.g., not relieved by cleaning out the nose)    Negative: SEVERE headache and has fever    Patient sounds very sick or weak to the triager    Answer Assessment - Initial Assessment Questions  1. LOCATION: \"Where does it hurt?\"       Patient declines sinus pain  2. ONSET: \"When did the sinus pain start?\"  (e.g., hours, days)       Declines  3. SEVERITY: \"How bad is the pain?\"   (Scale 1-10; mild, moderate or severe)    - MILD (1-3): doesn't interfere with normal activities     - MODERATE (4-7): interferes with normal activities (e.g., work or school) or awakens from sleep    - SEVERE (8-10): excruciating pain and patient unable to do any normal activities         Declines  4. RECURRENT SYMPTOM: \"Have you ever had sinus problems before?\" If so, ask: \"When was the last time?\" and \"What happened that time?\"       Declines  5. NASAL CONGESTION: \"Is the nose blocked?\" If so, ask, \"Can you open it or must you breathe through the mouth?\"      Patient reports nasal congestion  6.  FEVER: \"Do you have a fever?\" If so, ask: \"What is it, how was it measured, and when did it start?\"       Patient declines fever -- she does reports she goes from being chilled to hot very easily recently  8. OTHER SYMPTOMS: \"Do you have any other symptoms?\" (e.g., sore throat, cough, earache, difficulty breathing)      Patient reports cough and wheezing. She declines difficulty breathing -- she is using her inhaler to help with wheezing.    Protocols used: SINUS PAIN AND CONGESTION-A-OH      "

## 2019-12-06 ENCOUNTER — OFFICE VISIT (OUTPATIENT)
Dept: FAMILY MEDICINE | Facility: CLINIC | Age: 64
End: 2019-12-06
Payer: COMMERCIAL

## 2019-12-06 VITALS
SYSTOLIC BLOOD PRESSURE: 147 MMHG | HEIGHT: 65 IN | TEMPERATURE: 99.4 F | OXYGEN SATURATION: 94 % | WEIGHT: 262 LBS | DIASTOLIC BLOOD PRESSURE: 84 MMHG | HEART RATE: 100 BPM | BODY MASS INDEX: 43.65 KG/M2

## 2019-12-06 DIAGNOSIS — J01.90 ACUTE NON-RECURRENT SINUSITIS, UNSPECIFIED LOCATION: Primary | ICD-10-CM

## 2019-12-06 DIAGNOSIS — I10 HYPERTENSION GOAL BP (BLOOD PRESSURE) < 140/90: ICD-10-CM

## 2019-12-06 DIAGNOSIS — J45.31 MILD PERSISTENT ASTHMA WITH ACUTE EXACERBATION: ICD-10-CM

## 2019-12-06 LAB
FLUAV+FLUBV AG SPEC QL: NEGATIVE
FLUAV+FLUBV AG SPEC QL: NEGATIVE
SPECIMEN SOURCE: NORMAL

## 2019-12-06 PROCEDURE — 87804 INFLUENZA ASSAY W/OPTIC: CPT | Performed by: NURSE PRACTITIONER

## 2019-12-06 PROCEDURE — 99214 OFFICE O/P EST MOD 30 MIN: CPT | Performed by: NURSE PRACTITIONER

## 2019-12-06 RX ORDER — ALBUTEROL SULFATE 90 UG/1
1-2 AEROSOL, METERED RESPIRATORY (INHALATION) EVERY 6 HOURS
Qty: 1 INHALER | Refills: 3 | Status: SHIPPED | OUTPATIENT
Start: 2019-12-06 | End: 2020-04-30

## 2019-12-06 RX ORDER — AZITHROMYCIN 250 MG/1
TABLET, FILM COATED ORAL
Qty: 6 TABLET | Refills: 0 | Status: SHIPPED | OUTPATIENT
Start: 2019-12-06 | End: 2019-12-13

## 2019-12-06 RX ORDER — PREDNISONE 20 MG/1
40 TABLET ORAL DAILY
Qty: 10 TABLET | Refills: 0 | Status: SHIPPED | OUTPATIENT
Start: 2019-12-06 | End: 2019-12-13

## 2019-12-06 ASSESSMENT — MIFFLIN-ST. JEOR: SCORE: 1726.36

## 2019-12-06 ASSESSMENT — PAIN SCALES - GENERAL: PAINLEVEL: NO PAIN (0)

## 2019-12-06 NOTE — PROGRESS NOTES
Subjective     Teri Bain is a 64 year old female who presents to clinic today for the following health issues:    HPI   ENT Symptoms             Symptoms: cc Present Absent Comment   Fever/Chills  x     Fatigue  x     Muscle Aches  x     Eye Irritation  x  Burning   Sneezing  x     Nasal Garry/Drg  x     Sinus Pressure/Pain   x    Loss of smell   x    Dental pain  x     Sore Throat  x  Burning from cough   Swollen Glands   x    Ear Pain/Fullness   x    Cough  x  Yellow mucus   Wheeze  x     Chest Pain  x     Shortness of breath  x     Rash   x    Other         Symptom duration:  about 3 days ago   Symptom severity:  moderate   Treatments tried:  Inhaler & Coricidin   Contacts:  work       Pleasant 64 year old female presents with the above concerns. upper respiratory infection x2 months but worse this week. Now with wheezing, tight chest, face burning. Had a flu vaccine in Oct. Using inhaler more - 3 times yesterday.    Patient Active Problem List   Diagnosis     Classical migraine     Trochanteric bursitis     Allergic rhinitis     Osteoarthritis     Chronic venous insufficiency     Chronic kidney disease (CKD), stage III (moderate) (H)     Mild persistent asthma     Vitamin D deficiency     Hypertension goal BP (blood pressure) < 140/90     Hyperlipidemia LDL goal <100     Health Care Home     GERD (gastroesophageal reflux disease)     Advanced directives, counseling/discussion     Acute renal failure (H)     Morbid obesity (H)     Osteoarthritis of both sternoclavicular joints     Past Surgical History:   Procedure Laterality Date     C DEXA, BONE DENSITY, AXIAL SKEL  11/04    [facility]     C LIGATE FALLOPIAN TUBE  1977     [facility]  11/04    [facility]     [facility]OSCOPY  1/5/15    [facility]     [facility] DILATION/CURETTAGE DIAG/THER NON OB  5/89    [facility]     HC REMOVAL GALLBLADDER  1977     HYSTERECTOMY, PAP NO LONGER INDICATED  1/90    Hysterectomy, Vaginal, ovaries intact     SURGICAL HISTORY OF -   1987     cystoscopy       Social History     Tobacco Use     Smoking status: Former Smoker     Packs/day: 0.00     Last attempt to quit: 1996     Years since quittin.9     Smokeless tobacco: Never Used   Substance Use Topics     Alcohol use: Yes     Comment: wine 1-2 times per year     Family History   Problem Relation Age of Onset     C.A.D. Mother      Diabetes Mother      Hypertension Mother      Arthritis Mother      Cerebrovascular Disease Mother         multiple     Obesity Mother      Diabetes Father      Hypertension Father      Asthma Father      Obesity Father      Diabetes Maternal Grandfather      Depression Daughter      Asthma Daughter      Deep Vein Thrombosis (DVT) Daughter         x 1     Asthma Daughter      Asthma Brother      Obesity Brother      Coronary Artery Disease Brother      Pacemaker Brother      Asthma Grandchild      Asthma Grandchild      Asthma Grandchild      Asthma Grandchild      Asthma Daughter         Both daughters & 5 grand children         Current Outpatient Medications   Medication Sig Dispense Refill     albuterol (2.5 MG/3ML) 0.083% neb solution Take 1 vial (2.5 mg) by nebulization every 6 hours as needed for shortness of breath / dyspnea or wheezing 30 vial 1     albuterol (PROAIR HFA/PROVENTIL HFA/VENTOLIN HFA) 108 (90 Base) MCG/ACT inhaler Inhale 1-2 puffs into the lungs every 6 hours 1 Inhaler 3     amLODIPine (NORVASC) 5 MG tablet Take 1 tablet (5 mg) by mouth daily 90 tablet 3     aspirin 81 MG tablet Take 1 tablet by mouth daily.       azithromycin (ZITHROMAX) 250 MG tablet Take 2 tablets (500 mg) by mouth daily for 1 day, THEN 1 tablet (250 mg) daily for 4 days. 6 tablet 0     cetirizine (ZYRTEC) 10 MG tablet Take 1 tablet (10 mg) by mouth every evening 30 tablet 0     Chlorpheniramine-Acetaminophen (CORICIDIN HBP COLD/FLU PO)        Cyanocobalamin (VITAMIN B 12 PO) Take 1 tablet by mouth daily       famotidine (PEPCID) 20 MG tablet Take 1 tablet (20 mg) by mouth  "daily as needed (heartburn) 30 tablet 11     fluticasone (FLONASE) 50 MCG/ACT nasal spray Spray 1-2 sprays into both nostrils daily 16 g 11     mometasone (ASMANEX 30 METERED DOSES) 220 MCG/INH inhaler Inhale 1 puff into the lungs daily 3 Inhaler 3     MULTI-VITAMIN OR TABS   0     pravastatin (PRAVACHOL) 80 MG tablet Take 1 tablet (80 mg) by mouth every evening 90 tablet 3     predniSONE (DELTASONE) 20 MG tablet Take 2 tablets (40 mg) by mouth daily for 5 days 10 tablet 0     predniSONE (DELTASONE) 50 MG tablet Take 50 mg by mouth daily As needed for asthma exacerbation. 5 tablet 0     triamcinolone (KENALOG) 0.1 % external cream Apply sparingly to affected area three times daily for up to 14 days. 15 g 0     triamterene-HCTZ (MAXZIDE) 75-50 MG tablet Take 1 tablet by mouth daily 90 tablet 3     VITAMIN C 500 MG OR TABS 1-2 tablets daily 60 0     Allergies   Allergen Reactions     Benazepril Rash     Claritin [Loratadine]      Codeine Nausea     Lisinopril Rash     Losartan      MILDRED - avoid ACEi/ARBs     Penicillins      Tetracycline      BP Readings from Last 3 Encounters:   12/06/19 (!) 147/84   10/14/19 108/72   06/05/19 137/74    Wt Readings from Last 3 Encounters:   12/06/19 118.8 kg (262 lb)   10/14/19 118.8 kg (262 lb)   06/14/19 121.1 kg (267 lb)                      Reviewed and updated as needed this visit by Provider  Meds  Problems         Review of Systems   ROS COMP: Constitutional, HEENT, cardiovascular, pulmonary, gi and gu systems are negative, except as otherwise noted.      Objective    BP (!) 147/84   Pulse 100   Temp 99.4  F (37.4  C) (Oral)   Ht 1.638 m (5' 4.5\")   Wt 118.8 kg (262 lb)   SpO2 94%   BMI 44.28 kg/m    Body mass index is 44.28 kg/m .  Physical Exam   GENERAL: healthy, alert and no distress  EYES: Eyes grossly normal to inspection, PERRL and conjunctivae and sclerae normal  HENT: ear canals and TM's normal, nose and mouth without ulcers or lesions  NECK: no adenopathy, no " "asymmetry, masses, or scars and thyroid normal to palpation  RESP: lungs clear to auscultation - no rales, rhonchi or wheezes  CV: regular rate and rhythm, normal S1 S2, no S3 or S4, no murmur, click or rub, no peripheral edema and peripheral pulses strong  MS: no gross musculoskeletal defects noted, no edema  PSYCH: mentation appears normal, affect normal/bright    Diagnostic Test Results:  Labs reviewed in Epic  Results for orders placed or performed in visit on 12/06/19 (from the past 24 hour(s))   Influenza A/B antigen   Result Value Ref Range    Influenza A/B Agn Specimen Nasal     Influenza A Negative NEG^Negative    Influenza B Negative NEG^Negative           Assessment & Plan     1. Acute non-recurrent sinusitis, unspecified location  Allergic to penicillin and tetracycline. Will treat with azithromycin due to allergies as well as prednisone and albuterol. Follow up if not improving in 1 week.   - Influenza A/B antigen    2. Mild persistent asthma with acute exacerbation  Continue albuterol. Start prednisone. ACT 13 today.       BMI:   Estimated body mass index is 44.28 kg/m  as calculated from the following:    Height as of this encounter: 1.638 m (5' 4.5\").    Weight as of this encounter: 118.8 kg (262 lb).           See Patient Instructions    Return in about 1 week (around 12/13/2019), or if symptoms worsen or fail to improve.    The benefits, risks and potential side effects were discussed in detail. Black box warnings discussed as relevant. All patient questions were answered. The patient was instructed to follow up immediately if any adverse reactions develop.    Return precautions discussed, including when to seek urgent/emergent care.    Patient verbalizes understanding and agrees with plan of care. Patient stable for discharge.      SUZY Sanchez [facility]      "

## 2019-12-06 NOTE — LETTER
December 6, 2019      Teri Bain  [address]  [facility] [address]        To Whom It May Concern,      Below are Teri's allergies:       Allergies   Allergen Reactions     Benazepril Rash     Claritin [Loratadine]      Codeine Nausea     Lisinopril Rash     Losartan      MILDRED - avoid ACEi/ARBs     Penicillins      Tetracycline        Current Outpatient Medications   Medication Sig Dispense Refill     albuterol (2.5 MG/3ML) 0.083% neb solution Take 1 vial (2.5 mg) by nebulization every 6 hours as needed for shortness of breath / dyspnea or wheezing 30 vial 1     albuterol (PROAIR HFA/PROVENTIL HFA/VENTOLIN HFA) 108 (90 Base) MCG/ACT inhaler Inhale 1-2 puffs into the lungs every 6 hours 1 Inhaler 3     amLODIPine (NORVASC) 5 MG tablet Take 1 tablet (5 mg) by mouth daily 90 tablet 3     aspirin 81 MG tablet Take 1 tablet by mouth daily.       azithromycin (ZITHROMAX) 250 MG tablet Take 2 tablets (500 mg) by mouth daily for 1 day, THEN 1 tablet (250 mg) daily for 4 days. 6 tablet 0     cetirizine (ZYRTEC) 10 MG tablet Take 1 tablet (10 mg) by mouth every evening 30 tablet 0     Chlorpheniramine-Acetaminophen (CORICIDIN HBP COLD/FLU PO)        Cyanocobalamin (VITAMIN B 12 PO) Take 1 tablet by mouth daily       famotidine (PEPCID) 20 MG tablet Take 1 tablet (20 mg) by mouth daily as needed (heartburn) 30 tablet 11     fluticasone (FLONASE) 50 MCG/ACT nasal spray Spray 1-2 sprays into both nostrils daily 16 g 11     mometasone (ASMANEX 30 METERED DOSES) 220 MCG/INH inhaler Inhale 1 puff into the lungs daily 3 Inhaler 3     MULTI-VITAMIN OR TABS   0     pravastatin (PRAVACHOL) 80 MG tablet Take 1 tablet (80 mg) by mouth every evening 90 tablet 3     predniSONE (DELTASONE) 20 MG tablet Take 2 tablets (40 mg) by mouth daily for 5 days 10 tablet 0     predniSONE (DELTASONE) 50 MG tablet Take 50 mg by mouth daily As needed for asthma exacerbation. 5 tablet 0     triamcinolone (KENALOG) 0.1 % external cream  Apply sparingly to affected area three times daily for up to 14 days. 15 g 0     triamterene-HCTZ (MAXZIDE) 75-50 MG tablet Take 1 tablet by mouth daily 90 tablet 3     VITAMIN C 500 MG OR TABS 1-2 tablets daily 60 0           Sincerely,        Vanessa Sanchez, APRN CNP

## 2019-12-09 ENCOUNTER — TELEPHONE (OUTPATIENT)
Dept: FAMILY MEDICINE | Facility: CLINIC | Age: 64
End: 2019-12-09

## 2019-12-09 NOTE — TELEPHONE ENCOUNTER
Reason for Call:  Other call back -  symptoms    Detailed comments: Patient was seen in [facility] on 12/6 & states that rxs that she got that day does not seem to be working. Patient states that the provider she saw was treating symptoms like a sinus infection but she is concerned about her cough & URI. Please advise, thank you!    *Pharmacy loaded*    Phone Number Patient can be reached at: Home number on file [phone] (home)    Best Time: anytime    Can we leave a detailed message on this number? YES    Call taken on 12/9/2019 at 7:12 AM by Naila Metzger

## 2019-12-09 NOTE — TELEPHONE ENCOUNTER
Informed patient of provider's message below. Patient verbalized understanding.    PHIL Velez MA

## 2019-12-09 NOTE — TELEPHONE ENCOUNTER
Noted thank you.  I treated with azithromycin due to drug allergies.  If symptoms not improving, recommend clinic visit this week

## 2019-12-09 NOTE — TELEPHONE ENCOUNTER
I talked to pt.  She was seen at [facility] clinic.  Per pt she was given prednisone, azithromax, inhalers.  Neg for influenza.      PT says she just can't cough anything up. Wanted something for cough.  Rec robitussin DM every 4 hours.  Push fluids.  Hot tea and honey.    Told pt I would also sent this to Vanessa Sanchez.    If sx continue or worsen, pt should be seen in clinic for f/u this week.  JACLYN Branham

## 2019-12-10 ASSESSMENT — ASTHMA QUESTIONNAIRES: ACT_TOTALSCORE: 11

## 2019-12-13 ENCOUNTER — OFFICE VISIT (OUTPATIENT)
Dept: FAMILY MEDICINE | Facility: CLINIC | Age: 64
End: 2019-12-13
Payer: COMMERCIAL

## 2019-12-13 ENCOUNTER — ANCILLARY PROCEDURE (OUTPATIENT)
Dept: GENERAL RADIOLOGY | Facility: CLINIC | Age: 64
End: 2019-12-13
Attending: NURSE PRACTITIONER
Payer: COMMERCIAL

## 2019-12-13 VITALS
RESPIRATION RATE: 16 BRPM | OXYGEN SATURATION: 96 % | TEMPERATURE: 98.4 F | SYSTOLIC BLOOD PRESSURE: 126 MMHG | BODY MASS INDEX: 42.15 KG/M2 | HEIGHT: 65 IN | WEIGHT: 253 LBS | DIASTOLIC BLOOD PRESSURE: 85 MMHG | HEART RATE: 102 BPM

## 2019-12-13 DIAGNOSIS — J20.9 ACUTE BRONCHITIS, UNSPECIFIED ORGANISM: Primary | ICD-10-CM

## 2019-12-13 DIAGNOSIS — R05.9 COUGH: ICD-10-CM

## 2019-12-13 DIAGNOSIS — J45.31 MILD PERSISTENT ASTHMA WITH ACUTE EXACERBATION: ICD-10-CM

## 2019-12-13 PROCEDURE — 71046 X-RAY EXAM CHEST 2 VIEWS: CPT

## 2019-12-13 PROCEDURE — 99214 OFFICE O/P EST MOD 30 MIN: CPT | Performed by: NURSE PRACTITIONER

## 2019-12-13 RX ORDER — PREDNISONE 20 MG/1
40 TABLET ORAL DAILY
Qty: 10 TABLET | Refills: 0 | Status: SHIPPED | OUTPATIENT
Start: 2019-12-13 | End: 2019-12-23

## 2019-12-13 ASSESSMENT — PAIN SCALES - GENERAL: PAINLEVEL: NO PAIN (0)

## 2019-12-13 ASSESSMENT — MIFFLIN-ST. JEOR: SCORE: 1685.54

## 2019-12-13 NOTE — PATIENT INSTRUCTIONS
Start prednisone   Neb every 4-6 hours as needed  We will let you know radiology results when they are back  Follow up if worsening or not improving in 3 days  Patient Education     Viral or Bacterial Bronchitis with Wheezing (Adult)    Bronchitis is an infection of the air passages. It often occurs during a cold and is usually caused by a virus. Symptoms include cough with mucus (phlegm) and low-grade fever. This illness is contagious during the first few days and is spread through the air by coughing and sneezing, or by direct contact (touching the sick person and then touching your own eyes, nose, or mouth).  If there is a lot of inflammation, air flow is restricted. The air passages may also go into spasm, especially if you have asthma. This causes wheezing and difficulty breathing even in people who do not have asthma.  Bronchitis usually lasts 7 to 14 days. The wheezing should improve with treatment during the first week. An inhaler is often prescribed to relax the air passages and stop wheezing. Antibiotics will be prescribed if your doctor thinks there is also a secondary bacterial infection.  Home care    If symptoms are severe, rest at home for the first 2 to 3 days. When you go back to your usual activities, don't let yourself get too tired.    Dont s'moke. Also avoid being exposed to secondhand smoke.    You may use over-the-counter medicine to control fever or pain, unless another medicine was prescribed. Note: If you have chronic liver or kidney disease or have ever had a stomach ulcer or gastrointestinal bleeding, talk with your healthcare provider before using these medicines. Also talk to your provider if you are taking medicine to prevent blood clots.) Aspirin should never be given to anyone younger than 18 years of age who is ill with a viral infection or fever. It may cause severe liver or brain damage.    Your appetite may be poor, so a light diet is fine. Stay well hydrated by drinking 6 to 8  glasses of fluids per day (such as water, soft drinks, sports drinks, juices, tea, or soup). Extra fluids will help loosen secretions in the nose and lungs.    Over-the-counter cough, cold, and sore-throat medicines will not shorten the length of the illness, but they may be helpful to reduce symptoms. (Note: Don't use decongestants if you have high blood pressure.)    If you were given an inhaler, use it exactly as directed. If you need to use it more often than prescribed, your condition may be worsening. If this happens, contact your healthcare provider.    If prescribed, finish all antibiotic medicine, even if you are feeling better after only a few days.  Follow-up care  Follow up with your healthcare provider, or as advised. If you had an X-ray or ECG (electrocardiogram), a specialist will review it. You will be notified of any new findings that may affect your care.  If you are age 65 or older, or if you have a chronic lung disease or condition that affects your immune system, or you smoke, ask your healthcare provider about getting a pneumococcal vaccine and a yearly flu shot (influenza vaccine).  When to seek medical advice  Call your healthcare provider right away if any of these occur:    Fever of 100.4 F (38 C) or higher, or as directed by your healthcare provider    Coughing up increasing amounts of colored sputum    Weakness, drowsiness, headache, facial pain, ear pain, or a stiff neck  Call 911  Call 911 if any of these occur.    Coughing up blood    Worsening weakness, drowsiness, headache, or stiff neck    Increased wheezing not helped with medication, shortness of breath, or pain with breathing  Date Last Reviewed: 6/1/2018 2000-2018 [employer]. [address] [place] [address]. All rights reserved. This information is not intended as a substitute for professional medical care. Always follow your healthcare professional's instructions.

## 2019-12-13 NOTE — PROGRESS NOTES
Subjective     Teri Bain is a 64 year old female who presents to clinic today for the following health issues:    HPI   ENT Symptoms Follow up.             Symptoms: cc Present Absent Comment   Fever/Chills   x    Fatigue  x     Muscle Aches  x     Eye Irritation  x     Sneezing  x     Nasal Garry/Drg  x     Sinus Pressure/Pain  x     Loss of smell   x Loss of taste   Dental pain   x    Sore Throat   x dry   Swollen Glands   x    Ear Pain/Fullness   x    Cough  x  Coughing up yellow and green phlegm   Wheeze   x    Chest Pain  x  Chest feel congested   Shortness of breath  x  Winded easily   Rash   x    Other  x  Dizziness      Symptom duration:  over a week   Symptom severity:  moderate    Treatments tried:  inhaler, coricidin, robitussin DM, zithromax, prednisone   Contacts:  work     I saw her 12/6 for same symptoms and treated with the above. Negative flu at that time.      Patient Active Problem List   Diagnosis     Classical migraine     Trochanteric bursitis     Allergic rhinitis     Osteoarthritis     Chronic venous insufficiency     Chronic kidney disease (CKD), stage III (moderate) (H)     Mild persistent asthma     Vitamin D deficiency     Hypertension goal BP (blood pressure) < 140/90     Hyperlipidemia LDL goal <100     Health Care Home     GERD (gastroesophageal reflux disease)     Advanced directives, counseling/discussion     Acute renal failure (H)     Morbid obesity (H)     Osteoarthritis of both sternoclavicular joints     Past Surgical History:   Procedure Laterality Date     C DEXA, BONE DENSITY, AXIAL SKEL  11/04    [facility]     C LIGATE FALLOPIAN TUBE  1977     [facility]  11/04    [facility]     [facility]COPY  1/5/15    [facility]     [facility] DILATION/CURETTAGE DIAG/THER NON OB  5/89    [facility]     HC REMOVAL GALLBLADDER  1977     HYSTERECTOMY, PAP NO LONGER INDICATED  1/90    Hysterectomy, Vaginal, ovaries intact     SURGICAL HISTORY OF -   1987    cystoscopy       Social History     Tobacco Use      Smoking status: Former Smoker     Packs/day: 0.00     Last attempt to quit: 1996     Years since quittin.9     Smokeless tobacco: Never Used   Substance Use Topics     Alcohol use: Yes     Comment: wine 1-2 times per year     Family History   Problem Relation Age of Onset     C.A.D. Mother      Diabetes Mother      Hypertension Mother      Arthritis Mother      Cerebrovascular Disease Mother         multiple     Obesity Mother      Diabetes Father      Hypertension Father      Asthma Father      Obesity Father      Diabetes Maternal Grandfather      Depression Daughter      Asthma Daughter      Deep Vein Thrombosis (DVT) Daughter         x 1     Asthma Daughter      Asthma Brother      Obesity Brother      Coronary Artery Disease Brother      Pacemaker Brother      Asthma Grandchild      Asthma Grandchild      Asthma Grandchild      Asthma Grandchild      Asthma Daughter         Both daughters & 5 grand children         Current Outpatient Medications   Medication Sig Dispense Refill     albuterol (2.5 MG/3ML) 0.083% neb solution Take 1 vial (2.5 mg) by nebulization every 6 hours as needed for shortness of breath / dyspnea or wheezing 30 vial 1     albuterol (PROAIR HFA/PROVENTIL HFA/VENTOLIN HFA) 108 (90 Base) MCG/ACT inhaler Inhale 1-2 puffs into the lungs every 6 hours 1 Inhaler 3     amLODIPine (NORVASC) 5 MG tablet Take 1 tablet (5 mg) by mouth daily 90 tablet 3     aspirin 81 MG tablet Take 1 tablet by mouth daily.       cetirizine (ZYRTEC) 10 MG tablet Take 1 tablet (10 mg) by mouth every evening 30 tablet 0     Chlorpheniramine-Acetaminophen (CORICIDIN HBP COLD/FLU PO)        Cyanocobalamin (VITAMIN B 12 PO) Take 1 tablet by mouth daily       famotidine (PEPCID) 20 MG tablet Take 1 tablet (20 mg) by mouth daily as needed (heartburn) 30 tablet 11     fluticasone (FLONASE) 50 MCG/ACT nasal spray Spray 1-2 sprays into both nostrils daily 16 g 11     mometasone (ASMANEX 30 METERED DOSES) 220 MCG/INH  "inhaler Inhale 1 puff into the lungs daily 3 Inhaler 3     MULTI-VITAMIN OR TABS   0     pravastatin (PRAVACHOL) 80 MG tablet Take 1 tablet (80 mg) by mouth every evening 90 tablet 3     predniSONE (DELTASONE) 20 MG tablet Take 2 tablets (40 mg) by mouth daily 10 tablet 0     triamcinolone (KENALOG) 0.1 % external cream Apply sparingly to affected area three times daily for up to 14 days. 15 g 0     triamterene-HCTZ (MAXZIDE) 75-50 MG tablet Take 1 tablet by mouth daily 90 tablet 3     VITAMIN C 500 MG OR TABS 1-2 tablets daily 60 0     Allergies   Allergen Reactions     Benazepril Rash     Claritin [Loratadine]      Codeine Nausea     Lisinopril Rash     Losartan      MILDRED - avoid ACEi/ARBs     Penicillins      Tetracycline      BP Readings from Last 3 Encounters:   12/13/19 126/85   12/06/19 (!) 147/84   10/14/19 108/72    Wt Readings from Last 3 Encounters:   12/13/19 114.8 kg (253 lb)   12/06/19 118.8 kg (262 lb)   10/14/19 118.8 kg (262 lb)                      Reviewed and updated as needed this visit by Provider  Tobacco  Allergies  Meds  Problems  Med Hx  Surg Hx  Fam Hx         Review of Systems   ROS COMP: Constitutional, HEENT, cardiovascular, pulmonary, GI, , musculoskeletal, neuro, skin, endocrine and psych systems are negative, except as otherwise noted.      Objective    /85 (BP Location: Right arm, Patient Position: Sitting, Cuff Size: Adult Large)   Pulse 102   Temp 98.4  F (36.9  C) (Oral)   Resp 16   Ht 1.638 m (5' 4.5\")   Wt 114.8 kg (253 lb)   SpO2 96%   BMI 42.76 kg/m    Body mass index is 42.76 kg/m .  Physical Exam   GENERAL: healthy, alert and no distress  EYES: Eyes grossly normal to inspection, PERRL and conjunctivae and sclerae normal  HENT: ear canals and TM's normal, nose and mouth without ulcers or lesions  NECK: no adenopathy, no asymmetry, masses, or scars and thyroid normal to palpation  RESP: mild exp wheezing, otherwise CTA  CV: regular rate and rhythm, normal " S1 S2, no S3 or S4, no murmur, click or rub  MS: no gross musculoskeletal defects noted, no edema  PSYCH: mentation appears normal, affect normal/bright    Diagnostic Test Results:  Labs reviewed in Epic  Xray - CHEST TWO VIEWS   12/13/2019 11:31 AM      HISTORY: Productive cough, shortness of breath x1 week.     COMPARISON: 3/11/2019.                                                                      IMPRESSION: No acute cardiopulmonary disease.     MAHAD SHABAZZ MD        Assessment & Plan       ICD-10-CM    1. Acute bronchitis, unspecified organism J20.9 XR Chest 2 Views   2. Mild persistent asthma with acute exacerbation J45.31 predniSONE (DELTASONE) 20 MG tablet   Start prednisone   Neb every 4-6 hours as needed  X-ray normal - I don't feel further antibiotics needed  Follow up if worsening or not improving in 3 days       See Patient Instructions    Return in about 3 days (around 12/16/2019), or if symptoms worsen or fail to improve.    The benefits, risks and potential side effects were discussed in detail. Black box warnings discussed as relevant. All patient questions were answered. The patient was instructed to follow up immediately if any adverse reactions develop.    Return precautions discussed, including when to seek urgent/emergent care.    Patient verbalizes understanding and agrees with plan of care. Patient stable for discharge.      SUZY Sanchez [facility]

## 2019-12-22 NOTE — PROGRESS NOTES
"Subjective     Teri Bain is a 64 year old female who presents to clinic today for the following health issues:    HPI   ED/UC Followup:    Facility:  [facility]  Date of visit: 12/6/2019 and 12/13/2019  Reason for visit: cough  Current Status: still experiencing symptoms     On 12/6 tested negative for influenza and was treated with azithromycin for sinusitis and prednisone for asthma exacerbation  On 12/13 CXR was negative and she was given another 5-day burst of prednisone and instructed to do nebs Q4-6 hours prn.    Acute Illness   Acute illness concerns: ongoing cough  Onset:  4 weeks    Fever: no    Chills/Sweats: YES    Headache (location?): no    Sinus Pressure: no    Conjunctivitis:  no    Ear Pain: YES: both (left is worse)    Rhinorrhea: no, if she blows her nose it is thick and has blood it in    Congestion: YES    Sore Throat: YES     Cough: YES-worsening over time, produces thick white sputum    Wheeze: no    Decreased Appetite: YES    Nausea: YES- sometimes    Vomiting: no    Diarrhea:  no    Dysuria/Freq.: no    Fatigue/Achiness: YES - neck    Sick/Strep Exposure: unknown     Therapies Tried and outcome: Rx from &UC, tylenol and robitussin DM none have helped    Still copious, thick nasal drainage.    Tried flonase briefly but she noticed a \"bad smell\" in her nose when she used it.    Feels cough is getting better but taking a long time.  Some shortness of breath if she's walking  Is leaving for [place] on Jan 13.      BP Readings from Last 3 Encounters:   12/23/19 122/80   12/13/19 126/85   12/06/19 (!) 147/84    Wt Readings from Last 3 Encounters:   12/23/19 115.2 kg (254 lb)   12/13/19 114.8 kg (253 lb)   12/06/19 118.8 kg (262 lb)           Reviewed and updated as needed this visit by Provider  Meds  Problems         Review of Systems   ROS COMP: Constitutional, HEENT, cardiovascular, pulmonary, gi and gu systems are negative, except as otherwise noted.    " "  Objective    /80 (BP Location: Right arm, Patient Position: Sitting, Cuff Size: Adult Large)   Pulse 70   Temp 97.8  F (36.6  C) (Oral)   Resp 14   Wt 115.2 kg (254 lb)   SpO2 100%   Breastfeeding No   BMI 42.93 kg/m    Body mass index is 42.93 kg/m .  Physical Exam   EYES: sclerae and conjunctivae clear with no discharge  ENT: external ears and nose without lesions or scars, canals clear bilaterally, oropharynx clear with moist mucus membranes and normal landmarks, right TM pearly grey with normal light reflex and left TM with fluid    NECK:  Supple without adenopathy, mass, or thyromegaly  LUNGS:  normal respiratory effort, and lungs with scattered wheezes throughout  CV: regular rate and rhythm, normal S1 S2, no S3 or S4 and no murmur, click or rub   SKIN: Clear. No significant rash, abnormal pigmentation or lesions     Diagnostic Test Results:  Labs reviewed in Epic        Assessment & Plan       ICD-10-CM    1. Acute sinusitis with symptoms > 10 days J01.90 doxycycline monohydrate (MONODOX) 100 MG capsule   2. OME (otitis media with effusion), left H65.92    3. Mild persistent asthma with acute exacerbation J45.31      I think she has a partially-treated sinusitis.  Discussed that preferred antibiotic regimen for sinusitis would be augmentin or doxycycline but she has allergies to both penicillins and tetracyclines.  Another alternative would be a respiratory quinalone but I really recommend avoiding those due to side effect profile.  As her \"allergy\" to tetracyclines consisted of nausea many years ago I encouraged her to try a course of doxycycline.  I also recommended resuming the nasal steroid spray.  I'm hopeful that the asthma will improve with resolution of the sinusitis but if cough persists we could consider a longer course of prednisone with a taper.  She will let me know.      Patient Instructions   Try the doxycycline.  I think you may have a partially-treated sinus infection.  Let me " know if you have significant nausea.    Use your albuterol twice daily.    Try the flonase again.    Let me know if the cough persists.  We could do another round of prednisone but if we did I would do a longer course with a slow taper.      Return for recheck as needed if symptoms persist or worsen.    Norma Serrano MD  [facility]

## 2019-12-23 ENCOUNTER — OFFICE VISIT (OUTPATIENT)
Dept: FAMILY MEDICINE | Facility: CLINIC | Age: 64
End: 2019-12-23
Payer: COMMERCIAL

## 2019-12-23 VITALS
HEART RATE: 70 BPM | BODY MASS INDEX: 42.93 KG/M2 | WEIGHT: 254 LBS | RESPIRATION RATE: 14 BRPM | SYSTOLIC BLOOD PRESSURE: 122 MMHG | TEMPERATURE: 97.8 F | DIASTOLIC BLOOD PRESSURE: 80 MMHG | OXYGEN SATURATION: 100 %

## 2019-12-23 DIAGNOSIS — J45.31 MILD PERSISTENT ASTHMA WITH ACUTE EXACERBATION: ICD-10-CM

## 2019-12-23 DIAGNOSIS — H65.92 OME (OTITIS MEDIA WITH EFFUSION), LEFT: ICD-10-CM

## 2019-12-23 DIAGNOSIS — J01.90 ACUTE SINUSITIS WITH SYMPTOMS > 10 DAYS: Primary | ICD-10-CM

## 2019-12-23 PROCEDURE — 99214 OFFICE O/P EST MOD 30 MIN: CPT | Performed by: FAMILY MEDICINE

## 2019-12-23 RX ORDER — DOXYCYCLINE 100 MG/1
100 CAPSULE ORAL 2 TIMES DAILY
Qty: 20 CAPSULE | Refills: 0 | Status: SHIPPED | OUTPATIENT
Start: 2019-12-23 | End: 2020-02-17

## 2019-12-23 NOTE — PATIENT INSTRUCTIONS
Try the doxycycline.  I think you may have a partially-treated sinus infection.  Let me know if you have significant nausea.    Use your albuterol twice daily.    Try the flonase again.    Let me know if the cough persists.  We could do another round of prednisone but if we did I would do a longer course with a slow taper.

## 2019-12-30 ENCOUNTER — TELEPHONE (OUTPATIENT)
Dept: FAMILY MEDICINE | Facility: CLINIC | Age: 64
End: 2019-12-30

## 2019-12-30 DIAGNOSIS — R05.9 COUGH: Primary | ICD-10-CM

## 2019-12-30 DIAGNOSIS — J45.31 MILD PERSISTENT ASTHMA WITH ACUTE EXACERBATION: ICD-10-CM

## 2019-12-30 RX ORDER — PREDNISONE 20 MG/1
20 TABLET ORAL DAILY
Refills: 0 | Status: CANCELLED | OUTPATIENT
Start: 2019-12-30

## 2019-12-30 RX ORDER — PREDNISONE 10 MG/1
40 TABLET ORAL DAILY
Qty: 30 TABLET | Refills: 0 | Status: SHIPPED | OUTPATIENT
Start: 2019-12-30 | End: 2020-07-27

## 2019-12-30 NOTE — TELEPHONE ENCOUNTER
Reason for Call:  Other prescription    Detailed comments: Pt was informed to contact PCP if she was still coughing and she is. She states she was supposed to be getting a new script when she called in. Please use below pharmacy.       [id number] - [place], [place] - [address] AT [facility] [address]  [address]  [place] [address]  Phone: [phone] Fax: [phone]      Phone Number Patient can be reached at: Home number on file [phone] (home)    Best Time: anytime    Can we leave a detailed message on this number? YES    Call taken on 12/30/2019 at 1:41 PM by Polina Bravo

## 2019-12-30 NOTE — TELEPHONE ENCOUNTER
12/23/19 OV-  Let me know if the cough persists.  We could do another round of prednisone but if we did I would do a longer course with a slow taper.    Pended pharmacy.  JACLYN Branham

## 2019-12-30 NOTE — TELEPHONE ENCOUNTER
Dr. Serrano-Please review.    Patient reports cough is persistent.    Prednisone pended.    Thank you!  ALYSSA VelazcoN, RN

## 2019-12-30 NOTE — TELEPHONE ENCOUNTER
Writer called patient and reviewed Prednisone prescribed by Dr. Serrano.    Patient verbalized understanding and in agreement with plan.  MARCUS Velazco, RN

## 2020-01-07 ENCOUNTER — TELEPHONE (OUTPATIENT)
Dept: FAMILY MEDICINE | Facility: CLINIC | Age: 65
End: 2020-01-07

## 2020-01-07 NOTE — TELEPHONE ENCOUNTER
Panel Management Review      Patient has the following on her problem list:     Asthma review     ACT Total Scores 12/9/2019   ACT TOTAL SCORE -   ASTHMA ER VISITS -   ASTHMA HOSPITALIZATIONS -   ACT TOTAL SCORE (Goal Greater than or Equal to 20) 11   In the past 12 months, how many times did you visit the emergency room for your asthma without being admitted to the hospital? 0   In the past 12 months, how many times were you hospitalized overnight because of your asthma? 0      1. Is Asthma diagnosis on the Problem List? Yes    2. Is Asthma listed on Health Maintenance? Yes    3. Patient is due for:  Updated ACT due to low score on 12/23/19. Score today 1/7/2020 is 7.      Composite cancer screening  Chart review shows that this patient is due/due soon for the following None  Summary:    Patient is due/failing the following:   Updated ACT due to low score on 12/23/19 - 11. Today 13    Action needed:   Updated ACT with Score of 13    Type of outreach:    Phone, spoke to patient.  completed ACT    Questions for provider review:    Spoke with Teri and updated her ACT. Score of 13. I told her I would contact her again in 2 weeks.                                                                                                                                     Marilee Whitman, NA/R       Chart routed to Provider / Dr. Norma Serrano .

## 2020-01-08 ASSESSMENT — ASTHMA QUESTIONNAIRES: ACT_TOTALSCORE: 13

## 2020-02-17 NOTE — PROGRESS NOTES
"Subjective     Teri Bain is a 64 year old female who presents to clinic today for the following health issues:    HPI   Acute Illness   Acute illness concerns: cough  Onset: x 1 week    Fever: YES    Chills/Sweats: YES    Headache (location?): no    Sinus Pressure:YES    Conjunctivitis:  no    Ear Pain: YES: left    Rhinorrhea: no    Congestion: YES    Sore Throat: YES - intermittent      Cough: YES - productive    Wheeze: no    Decreased Appetite: YES    Nausea: no    Vomiting: no    Diarrhea:  YES - yesterday    Dysuria/Freq.: no    Fatigue/Achiness: YES    Sick/Strep Exposure: no     Therapies Tried and outcome: salt water gargle, Flonase all which helped somewhat    Started with sore throat - left side  Feels a little shortness of breath   Daughter tested positive for influenza earlier this week    BP Readings from Last 3 Encounters:   02/19/20 110/62   12/23/19 122/80   12/13/19 126/85    Wt Readings from Last 3 Encounters:   02/19/20 119.3 kg (263 lb)   12/23/19 115.2 kg (254 lb)   12/13/19 114.8 kg (253 lb)           She'd also like to discuss/review weights:  Wt Readings from Last 10 Encounters:   02/19/20 119.3 kg (263 lb)   12/23/19 115.2 kg (254 lb)   12/13/19 114.8 kg (253 lb)   12/06/19 118.8 kg (262 lb)   10/14/19 118.8 kg (262 lb)   06/14/19 121.1 kg (267 lb)   05/06/19 121.1 kg (267 lb)   03/11/19 116.1 kg (256 lb)   10/11/18 121.8 kg (268 lb 8 oz)   08/10/18 120.7 kg (266 lb)        Reviewed and updated as needed this visit by Provider  Meds  Problems         Review of Systems   ROS COMP: Constitutional, HEENT, cardiovascular, pulmonary, gi and gu systems are negative, except as otherwise noted.      Objective    /62 (BP Location: Left arm, Patient Position: Sitting, Cuff Size: Adult Large)   Pulse 80   Temp 98  F (36.7  C) (Oral)   Resp 14   Ht 1.638 m (5' 4.5\")   Wt 119.3 kg (263 lb)   SpO2 99%   Breastfeeding No   BMI 44.45 kg/m    Body mass index is 44.45 kg/m .  Physical " Exam   GEN:  no apparent distress, sounds somewhat congested  EYES: sclerae and conjunctivae clear with no discharge  ENT: external ears and nose without lesions or scars, TM's and canals clear bilaterally and oropharynx clear with moist mucus membranes and normal landmarks  FACE: normal to inspection, and with mild tenderness to palpation over bilateral maxillary sinuses   NECK:  Supple without adenopathy, mass, or thyromegaly  LUNGS:  normal respiratory effort, and lungs clear to auscultation bilaterally - no rales, rhonchi or wheezes  CV: regular rate and rhythm, normal S1 S2, no S3 or S4 and no murmur, click or rub     Diagnostic Test Results:  Labs reviewed in Epic        Assessment & Plan     (J06.9) Acute URI  (primary encounter diagnosis)  Comment: likely viral  Plan: Reviewed that this is most likely viral in etiology as there is no clinical evidence today for a bacterial respiratory infection (such as strep throat, ear infection, sinusitis, pneumonia).   Continue symptomatic cares which I reviewed (below).  If symptoms not improving by early next week she will call clinic and I will prescribe a course of doxy.      (Z68.41) Body mass index (BMI) of 40.0-44.9 in adult (H)  Comment: weight is up from last visit  Plan: Discussed weight management strategies and she plans to try a ketogenic diet and increase exercise.            Patient Instructions   Your symptoms and exam today indicate that you have a viral upper respiratory illness.  This includes viral rhinosinusitis and viral bronchitis.  Antibiotics do not help viral illnesses; the best remedies treat the symptoms (see below).  The typical course of a viral illness is that you feel rather miserable for the first few days - with sore throat, runny nose/nasal congestion, cough, and sometimes fever and body aches.  You should start to feel better after about 5-7 days and defnitely better by 10-14 days.  If you develop sudden worsening of symptoms or fever  after the first 5-7 days, or if you have persistence of your symptoms beyond 14 days, let us know as you may have developed a secondary bacterial infection.      For symptom relief I suggest tryin. Steam.  Take a long, hot shower.  Or if you don't want to get in the shower just run it with the bathroom door shut for a few minutes and breathe the steam.  2. Drink hot liquids frequently such as tea or hot water with honey and lemon.  3. Acetaminophen (Tylenol) and ibuprofen (Motrin or Advil) as needed for headache, sore throat, body aches, or fever.  4. For loosening phlegm and sputum try guaifenesin which is available in many combination products or alone as plain Robitussin or plain Mucinex.  5.  For cough suppression try dextromethorphan (DM) which is available in combination with guaifenesin (Robitussin DM or Mucinex DM) or as a plain syrup (Delsym). Or try Zarbee's Natural (honey-based) cough remedies.   6. For nasal congestion try:    Nealmed sinus rinses.    Nasal steroid spray such as nasacort or flonase, which are over-the-counter.  7. And most importantly: plenty of rest and sleep       No follow-ups on file.    Norma Serrano MD  [facility]

## 2020-02-19 ENCOUNTER — OFFICE VISIT (OUTPATIENT)
Dept: FAMILY MEDICINE | Facility: CLINIC | Age: 65
End: 2020-02-19
Payer: COMMERCIAL

## 2020-02-19 VITALS
BODY MASS INDEX: 43.82 KG/M2 | OXYGEN SATURATION: 99 % | TEMPERATURE: 98 F | DIASTOLIC BLOOD PRESSURE: 62 MMHG | WEIGHT: 263 LBS | HEIGHT: 65 IN | HEART RATE: 80 BPM | RESPIRATION RATE: 14 BRPM | SYSTOLIC BLOOD PRESSURE: 110 MMHG

## 2020-02-19 DIAGNOSIS — J06.9 ACUTE URI: Primary | ICD-10-CM

## 2020-02-19 PROCEDURE — 99213 OFFICE O/P EST LOW 20 MIN: CPT | Performed by: FAMILY MEDICINE

## 2020-02-19 ASSESSMENT — MIFFLIN-ST. JEOR: SCORE: 1730.9

## 2020-02-19 ASSESSMENT — ASTHMA QUESTIONNAIRES
QUESTION_1 LAST FOUR WEEKS HOW MUCH OF THE TIME DID YOUR ASTHMA KEEP YOU FROM GETTING AS MUCH DONE AT WORK, SCHOOL OR AT HOME: NONE OF THE TIME
QUESTION_2 LAST FOUR WEEKS HOW OFTEN HAVE YOU HAD SHORTNESS OF BREATH: ONCE OR TWICE A WEEK
QUESTION_5 LAST FOUR WEEKS HOW WOULD YOU RATE YOUR ASTHMA CONTROL: WELL CONTROLLED
QUESTION_4 LAST FOUR WEEKS HOW OFTEN HAVE YOU USED YOUR RESCUE INHALER OR NEBULIZER MEDICATION (SUCH AS ALBUTEROL): ONCE A WEEK OR LESS
ACT_TOTALSCORE: 21
QUESTION_3 LAST FOUR WEEKS HOW OFTEN DID YOUR ASTHMA SYMPTOMS (WHEEZING, COUGHING, SHORTNESS OF BREATH, CHEST TIGHTNESS OR PAIN) WAKE YOU UP AT NIGHT OR EARLIER THAN USUAL IN THE MORNING: ONCE OR TWICE

## 2020-02-19 NOTE — PATIENT INSTRUCTIONS
Your symptoms and exam today indicate that you have a viral upper respiratory illness.  This includes viral rhinosinusitis and viral bronchitis.  Antibiotics do not help viral illnesses; the best remedies treat the symptoms (see below).  The typical course of a viral illness is that you feel rather miserable for the first few days - with sore throat, runny nose/nasal congestion, cough, and sometimes fever and body aches.  You should start to feel better after about 5-7 days and defnitely better by 10-14 days.  If you develop sudden worsening of symptoms or fever after the first 5-7 days, or if you have persistence of your symptoms beyond 14 days, let us know as you may have developed a secondary bacterial infection.      For symptom relief I suggest tryin. Steam.  Take a long, hot shower.  Or if you don't want to get in the shower just run it with the bathroom door shut for a few minutes and breathe the steam.  2. Drink hot liquids frequently such as tea or hot water with honey and lemon.  3. Acetaminophen (Tylenol) and ibuprofen (Motrin or Advil) as needed for headache, sore throat, body aches, or fever.  4. For loosening phlegm and sputum try guaifenesin which is available in many combination products or alone as plain Robitussin or plain Mucinex.  5.  For cough suppression try dextromethorphan (DM) which is available in combination with guaifenesin (Robitussin DM or Mucinex DM) or as a plain syrup (Delsym). Or try Zarbee's Natural (honey-based) cough remedies.   6. For nasal congestion try:    Nealmed sinus rinses.    Nasal steroid spray such as nasacort or flonase, which are over-the-counter.  7. And most importantly: plenty of rest and sleep

## 2020-02-20 ASSESSMENT — ASTHMA QUESTIONNAIRES: ACT_TOTALSCORE: 21

## 2020-02-27 ENCOUNTER — ANCILLARY PROCEDURE (OUTPATIENT)
Dept: GENERAL RADIOLOGY | Facility: CLINIC | Age: 65
End: 2020-02-27
Attending: FAMILY MEDICINE
Payer: COMMERCIAL

## 2020-02-27 ENCOUNTER — OFFICE VISIT (OUTPATIENT)
Dept: FAMILY MEDICINE | Facility: CLINIC | Age: 65
End: 2020-02-27
Payer: COMMERCIAL

## 2020-02-27 VITALS
SYSTOLIC BLOOD PRESSURE: 130 MMHG | WEIGHT: 264 LBS | TEMPERATURE: 98.3 F | RESPIRATION RATE: 16 BRPM | HEART RATE: 65 BPM | BODY MASS INDEX: 44.62 KG/M2 | DIASTOLIC BLOOD PRESSURE: 74 MMHG | OXYGEN SATURATION: 100 %

## 2020-02-27 DIAGNOSIS — M25.561 ACUTE PAIN OF RIGHT KNEE: Primary | ICD-10-CM

## 2020-02-27 DIAGNOSIS — S83.91XA SPRAIN OF RIGHT KNEE, UNSPECIFIED LIGAMENT, INITIAL ENCOUNTER: ICD-10-CM

## 2020-02-27 DIAGNOSIS — M25.561 ACUTE PAIN OF RIGHT KNEE: ICD-10-CM

## 2020-02-27 DIAGNOSIS — S63.501A WRIST SPRAIN, RIGHT, INITIAL ENCOUNTER: ICD-10-CM
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
## 2020-02-27 PROCEDURE — 73562 X-RAY EXAM OF KNEE 3: CPT | Mod: RT

## 2020-02-27 PROCEDURE — 99213 OFFICE O/P EST LOW 20 MIN: CPT | Performed by: FAMILY MEDICINE

## 2020-02-27 NOTE — PATIENT INSTRUCTIONS
Patient Education     Knee Sprain    A sprain is an injury to the ligaments or capsule that holds a joint together. There are no broken bones. Most sprains take 3 to 6 weeks to heal. If it a severe sprain where the ligament is completely torn, it can take months to recover.  Most knee sprains are treated with a splint, knee immobilizer brace, or elastic wrap for support. Severe sprains may rarely require surgery.  Home care    Stay off the injured leg as much as possible until you can walk on it without pain. If you have a lot of pain with walking, crutches or a walker may be prescribed. (These can be rented or purchased at many pharmacies and surgical or orthopedic supply stores). Follow your healthcare provider's advice about when to begin putting weight on that leg.    Keep your leg elevated to reduce pain and swelling. When sleeping, place a pillow under the injured leg. When sitting, support the injured leg so it is above heart level. This is very important during the first 48 hours.    Apply an ice pack over the injured area for 15 to 20 minutes every 3 to 6 hours. You should do this for the first 24 to 48 hours. You can make an ice pack by filling a plastic bag that seals at the top with ice cubes and then wrapping it with a thin towel. Continue to use ice packs for relief of pain and swelling as needed. As the ice melts, be careful to avoid getting your wrap, splint, or cast wet. After 48 hours, apply heat (warm shower or warm bath) for 15 to 20 minutes several times a day, or alternate ice and heat. You can place the ice pack directly over the splint. If you have to wear a hook-and-loop knee brace, you can open it to apply the ice pack, or heat, directly to the knee. Never put ice directly on the skin. Always wrap the ice in a towel or other type of cloth.    You may use over-the-counter pain medicine to control pain, unless another pain medicine was prescribed. If you have chronic liver or kidney disease  or ever had a stomach ulcer or gastrointestinal bleeding, talk with your healthcare provider before using these medicines.    If you were given a splint, keep it completely dry at all times. Bathe with your splint out of the water, protected with 2 large plastic bags, sealed with rubber bands or tape at the top end. If a fiberglass splint gets wet, you can dry it with a hair dryer set to cool. If you have a hook-and-loop knee brace, you can remove this to bathe, unless told otherwise.  Follow-up care  Follow up with your doctor as advised. Any X-rays you had today don t show any broken bones, breaks, or fractures. Sometimes fractures don t show up on the first X-ray. Bruises and sprains can sometimes hurt as much as a fracture. These injuries can take time to heal completely. If your symptoms don t improve or they get worse, talk with your doctor. You may need a repeat X-ray. If X-rays were taken, you will be told of any new findings that may affect your care.  Call 911  Call 911 if you have:     Shortness of breath     Chest pain  When to seek medical advice  Call your healthcare provider right away if any of these occur:    The splint or knee immobilizer brace becomes wet or soft    The fiberglass cast or splint remains wet for more than 24 hours    Pain or swelling increases    The injured leg or toes become cold, blue, numb, or tingly  Date Last Reviewed: 5/1/2018 2000-2019 [employer]. [address] [place] [id number]. All rights reserved. This information is not intended as a substitute for professional medical care. Always follow your healthcare professional's instructions.

## 2020-02-27 NOTE — PROGRESS NOTES
Subjective     Teri Bain is a 64 year old female who presents to clinic today for the following health issues:    HPI   Musculoskeletal problem/pain      Duration: 02/24/2020    Description  Location: both knees and behind left leg tenderness    Intensity:  Severe at first, now mild    Accompanying signs and symptoms: swelling    History  Previous similar problem: no   Previous evaluation:  none    Precipitating or alleviating factors:  Trauma or overuse: YES- fell on ice  Aggravating factors include: climbing stairs    Therapies tried and outcome: acetaminophen which helped somewhat    She was walking in her back yard on her way to work at 4 am - stepped on a board that slipped out from under her.  She landed on her right knee and right outstretched arm.  Her left leg stretched out to the side.      Has pain in right wrist, right knee, left knee, left foot, left posterior thigh.    No locking or catching or giveway but occasional sense of instability in the right knee.      BP Readings from Last 3 Encounters:   02/27/20 130/74   02/19/20 110/62   12/23/19 122/80    Wt Readings from Last 3 Encounters:   02/27/20 119.7 kg (264 lb)   02/19/20 119.3 kg (263 lb)   12/23/19 115.2 kg (254 lb)            Reviewed and updated as needed this visit by Provider  Meds  Problems         Review of Systems         Objective    /74 (BP Location: Right arm, Patient Position: Sitting, Cuff Size: Adult Large)   Pulse 65   Temp 98.3  F (36.8  C) (Oral)   Resp 16   Wt 119.7 kg (264 lb)   SpO2 100%   Breastfeeding No   BMI 44.62 kg/m    Body mass index is 44.62 kg/m .  Physical Exam   GEN:  no apparent distress  SKIN: Clear. No bruising on lower extremities  KNEE:  Right knee with effusion.  No erythema or warmth.  There is tenderness to palpation over the medial (and less so lateral) joint lines.  There is tenderness with patellar balottment.  Negative Anterior Drawer.  Range of motion limited by effusion.     Left  knee with no effusion, erythema, or warmth.  No tenderness to palpation over the medial and lateral joint lines.  Negative Anterior Drawer.  Full range of motion.  No pain with hyperflexion.   FOOT:  Left foot and ankle with no swelling, no warmth, no redness. no ecchymoses.  No focal tenderness to palpation over metatarsals or malleoli.   WRIST:  Right wrist with no focal tenderness to palpation.  FROM.  No snuffbox tenderness to palpation.      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 Acute pain of right knee M25.561 XR Knee Right 3 Views     SPORTS MEDICINE REFERRAL     order for DME   2. Sprain of right knee, unspecified ligament, initial encounter S83.91XA XR Knee Right 3 Views     SPORTS MEDICINE REFERRAL     order for DME   3. Fall due to slipping on ice or snow, initial encounter W00.9XXA XR Knee Right 3 Views     SPORTS MEDICINE REFERRAL     order for DME   4. Wrist sprain, right, initial encounter S63.501A         Discussed anticipated course of gradual healing.  I gave her a soft knee support to wear for comfort.  Recommended ice, rest, elevation.  If symptoms worsen I recommend she follow-up with Sports Med.      See Patient Instructions    No follow-ups on file.    Norma Serrano MD  [facility]

## 2020-04-30 ENCOUNTER — NURSE TRIAGE (OUTPATIENT)
Dept: NURSING | Facility: CLINIC | Age: 65
End: 2020-04-30

## 2020-04-30 DIAGNOSIS — J45.31 MILD PERSISTENT ASTHMA WITH ACUTE EXACERBATION: ICD-10-CM

## 2020-04-30 RX ORDER — ALBUTEROL SULFATE 90 UG/1
1-2 AEROSOL, METERED RESPIRATORY (INHALATION) EVERY 6 HOURS
Qty: 1 INHALER | Refills: 3 | Status: SHIPPED | OUTPATIENT
Start: 2020-04-30 | End: 2020-08-11

## 2020-04-30 NOTE — TELEPHONE ENCOUNTER
Routing refill request to provider for review/approval because:  --Protocol failed because protocol requires  six month asthma visit.        ACT Total Scores 12/9/2019 1/7/2020 2/19/2020   ACT TOTAL SCORE - - -   ASTHMA ER VISITS - - -   ASTHMA HOSPITALIZATIONS - - -   ACT TOTAL SCORE (Goal Greater than or Equal to 20) 11 13 21   In the past 12 months, how many times did you visit the emergency room for your asthma without being admitted to the hospital? 0 0 0   In the past 12 months, how many times were you hospitalized overnight because of your asthma? 0 0 0

## 2020-04-30 NOTE — TELEPHONE ENCOUNTER
"Albuterol PROAIR HFA/PROVENTIL FHA/VENTOLIN  (90base) mcg in haler needed soon. Pharmacy:  [facility] [address] in [facility]. Please call her when completed so she knows to go and get it.  Melba Dos Santos RN  [facility]    Reason for Disposition    [1] Request for URGENT new prescription or refill of \"essential\" medication (i.e., likelihood of harm to patient if not taken) AND [2] triager unable to fill per unit policy    Additional Information    Negative: Drug overdose and nurse unable to answer question    Negative: Caller requesting information not related to medicine    Negative: Caller requesting a prescription for Strep throat and has a positive culture result    Negative: Rash while taking a medication or within 3 days of stopping it    Negative: Immunization reaction suspected    Negative: [1] Asthma and [2] having symptoms of asthma (cough, wheezing, etc)    Negative: MORE THAN A DOUBLE DOSE of a prescription or over-the-counter (OTC) drug    Negative: [1] DOUBLE DOSE (an extra dose or lesser amount) of over-the-counter (OTC) drug AND [2] any symptoms (e.g., dizziness, nausea, pain, sleepiness)    Negative: [1] DOUBLE DOSE (an extra dose or lesser amount) of prescription drug AND [2] any symptoms (e.g., dizziness, nausea, pain, sleepiness)    Negative: Took another person's prescription drug    Negative: [1] DOUBLE DOSE (an extra dose or lesser amount) of prescription drug AND [2] NO symptoms (Exception: a double dose of antibiotics)    Negative: Diabetes drug error or overdose (e.g., insulin or extra dose)    Protocols used: MEDICATION QUESTION CALL-A-AH      "

## 2020-05-26 DIAGNOSIS — L29.9 ITCHY SKIN: ICD-10-CM

## 2020-05-26 RX ORDER — TRIAMCINOLONE ACETONIDE 1 MG/G
CREAM TOPICAL
Qty: 15 G | Refills: 0 | Status: SHIPPED | OUTPATIENT
Start: 2020-05-26 | End: 2020-08-12

## 2020-05-26 NOTE — TELEPHONE ENCOUNTER
Prescription approved per [facility] Refill Protocol.  Kaleigh Douglass RN

## 2020-05-26 NOTE — TELEPHONE ENCOUNTER
Patient is asking to get this medication refilled she states what she had left she lost the tub somewhere when she was on vacation. She is needing this again for the same symptoms that is was given for before

## 2020-06-23 NOTE — NURSING NOTE
"Chief Complaint   Patient presents with     URI       Initial /87 (BP Location: Left arm)  Pulse 106  Temp 102.1  F (38.9  C) (Oral)  Resp 14  Wt 253 lb (114.8 kg)  SpO2 95%  BMI 43.43 kg/m2 Estimated body mass index is 43.43 kg/(m^2) as calculated from the following:    Height as of 10/9/17: 5' 4\" (1.626 m).    Weight as of this encounter: 253 lb (114.8 kg).  Medication Reconciliation: complete     Taylor Mancera CMA      " oral

## 2020-07-09 ENCOUNTER — TRANSFERRED RECORDS (OUTPATIENT)
Dept: HEALTH INFORMATION MANAGEMENT | Facility: CLINIC | Age: 65
End: 2020-07-09

## 2020-07-13 ENCOUNTER — VIRTUAL VISIT (OUTPATIENT)
Dept: URGENT CARE | Facility: CLINIC | Age: 65
End: 2020-07-13
Payer: COMMERCIAL

## 2020-07-13 ENCOUNTER — NURSE TRIAGE (OUTPATIENT)
Dept: NURSING | Facility: CLINIC | Age: 65
End: 2020-07-13

## 2020-07-13 DIAGNOSIS — J45.31 MILD PERSISTENT ASTHMA WITH ACUTE EXACERBATION: ICD-10-CM

## 2020-07-13 DIAGNOSIS — Z20.822 EXPOSURE TO COVID-19 VIRUS: Primary | ICD-10-CM

## 2020-07-13 PROCEDURE — 99214 OFFICE O/P EST MOD 30 MIN: CPT | Mod: 95 | Performed by: NURSE PRACTITIONER

## 2020-07-13 NOTE — PATIENT INSTRUCTIONS
Deaanabel Alfred, If your asthma worsens please make sure you are seen and if emergent go to the ER. Take Care.      Patient Education     Coronavirus Disease 2019 (COVID-19): Caring for Yourself or Others  If you or a household member have symptoms of COVID-19, follow the guidelines below. This will help you manage symptoms and keep the virus from spreading.  If you have symptoms of COVID-19    Stay home and contact your care team. They will tell you what to do.    Don t panic. Keep in mind that other illnesses can cause similar symptoms.    Stay away from work, school, and public places.    Limit physical contact with others in your home. Limit visitors. No kissing.    Clean surfaces you touch with disinfectant.    If you need to cough or sneeze, do it into a tissue. Then, throw the tissue into the trash. If you don't have tissues, cough or sneeze into the bend of your elbow    Don t share food or personal items with people in your household. This includes items like eating and drinking utensils, towels, and bedding.    Wear a cloth face mask around other people. It should cover your nose and mouth. You may need to make your own mask using a bandana, T-shirt, or other cloth. See the CDC s instructions on how to make a mask.    If you need to go to a hospital or clinic, call ahead to let them know. Expect the care team to wear masks, gowns, gloves, and eye protection. You may be put in a separate room.    Follow all instructions from your care team.  If you ve been diagnosed with COVID-19    Stay home and away from others, including other people in your home. (This is called self-isolation.) Don t leave home unless you need to get medical care. Don't go to work, school, or public places. Don't use buses, taxis, or other public transportation.    Follow all instructions from your care team.    If you need to go to a hospital or clinic, call ahead to let them know. Expect the care team to wear masks, gowns, gloves, and  eye protection. You may be put in a separate room.    Wear a face mask over your nose and mouth. This is to protect others from your germs. If you can t wear a mask, your caregivers should wear one. You may need to make your own mask using a bandana, T-shirt, or other cloth. See the CDC s instructions on how to make a mask.    Have no contact with pets and other animals.    Don t share food or personal items with people in your household. This includes items like eating and drinking utensils, towels, and bedding.    If you need to cough or sneeze, do it into a tissue. Then, throw the tissue into the trash. If you don't have tissues, cough or sneeze into the bend of your elbow.    Wash your hands often.  Self-care at home  At this time, there is no medicine approved to prevent or treat COVID-19. Experts are testing different medicines, trying to find one that works.  So far, the most proven treatment is to support your body while it fights the virus.    Get plenty of rest.    Drink extra fluids (6 to 8 glasses of liquids each day), unless a doctor has told you not to. Ask your care team which fluids are best for you. Avoid fluids that contain caffeine or alcohol.    Take over-the-counter (OTC) medicine to help reduce pain and fever. Your care team will tell you which OTC medicine to use.  If you ve been in the hospital for COVID-19, follow your care team s instructions. This may include changing positions to help your breathing (such as lying on your belly).  If a test showed that you have COVID-19, you may be asked to donate plasma after you ve recovered. (This is called COVID-19 convalescent plasma donation.) The plasma may contain antibodies to help fight the virus in others. Scientists are testing whether this might be a treatment in the future. For more information, talk to your care team.  Caring for a sick person    Follow all instructions from the care team.    Wash your hands often.    Wear protective  clothing as advised.    Make sure the sick person wears a mask. If they can't wear a mask, don't stay in the same room with them. If you must be in the same room, wear a face mask. Make sure the mask covers both the nose and mouth.    Keep track of the sick person s symptoms.    Clean surfaces often with disinfectant. This includes phones, kitchen counters, fridge door handles, bathroom surfaces, and others.    Don t let anyone share household items with the sick person. This includes eating and drinking tools, towels, sheets, and blankets.    Clean fabrics and laundry well.    Keep other people and pets away from the sick person.  When you can stop self-isolation  When you are sick with COVID-19, you should stay away from other people. This is called self-isolation. The rules for ending self-isolation depend on your health in general.  If you are normally healthy  You can stop self-isolation when all 3 of these are true:    You ve had no fever--and no medicine that reduces fever--for 3 full days (72 hours). And     Your symptoms are better, such as cough or trouble breathing. And     At least 10 days have passed since your symptoms first started.  Talk with your care team before you leave home. They may tell you it s okay to leave, or they may give you different advice.  If you have a weak immune system  If you re being treated for cancer, have an immune disorder (such as HIV), or have had a transplant (organ or bone marrow), follow your care team s instructions. You may be able to end self-isolation when all 3 of these are true:    You ve had no fever--and no medicine that reduces fever--for 3 full days (72 hours). And     Your symptoms are better, such as cough or trouble breathing. And     You ve had 2 tests for COVID-19. The tests happened at least 24 hours apart, and both show that you don t have the virus. (If no tests are available, your care team may tell you to follow the rules for normally healthy people  above.)  When to call your care team  Call your care team right away if a sick person has any of these:    Trouble breathing    Pain or pressure in the chest  If a sick person has any of these, call 911:    Trouble breathing that gets worse    Pain or pressure in the chest that gets worse    Blue tint to lips or face    Fast or irregular heartbeat    Confusion or trouble waking    Fainting or loss of consciousness    Coughing up blood  Going home from the hospital  If you have COVID-19 and were recently in the hospital:    Follow the instructions above for self-care and isolation.    Follow the hospital care team s instructions.    Ask questions if anything is unclear to you. Write down answers so you remember them.  Last modified date: 5/8/2020  This information has been modified by your health care provider with permission from the publisher.      [id number] [facility], [address] [place] [address], [place] [address]. All rights reserved. This information is not intended as a substitute for professional medical care. Always follow your healthcare professional's instructions. This information has been modified by your health care provider with permission from the publisher.

## 2020-07-13 NOTE — PROGRESS NOTES
"Teri Bain is a 64 year old female who is being evaluated via a billable telephone visit.      The patient has been notified of following:     \"This telephone visit will be conducted via a call between you and your physician/provider. We have found that certain health care needs can be provided without the need for a physical exam.  This service lets us provide the care you need with a short phone conversation.  If a prescription is necessary we can send it directly to your pharmacy.  If lab work is needed we can place an order for that and you can then stop by our lab to have the test done at a later time.    Telephone visits are billed at different rates depending on your insurance coverage. During this emergency period, for some insurers they may be billed the same as an in-person visit.  Please reach out to your insurance provider with any questions.    If during the course of the call the physician/provider feels a telephone visit is not appropriate, you will not be charged for this service.\"    Patient has given verbal consent for Telephone visit? Yes  What phone number would you like to be contacted at? [phone]        Subjective     Teri Bain is a 64 year old female who presents via phone visit today for the following health issues:    HPI  Asthma is acting up and heart races only with albuterol inhaler which she is using only about twice daily.            Review of Systems Positive for increased need for inhaler. No fever, URI, sore throat, chest pain, sob, GI or  complaints.          Objective   Reported vitals:  There were no vitals taken for this visit.     PSYCH: Alert and oriented times 3; coherent speech, normal   rate and volume, able to articulate logical thoughts, able   to abstract reason, no tangential thoughts, no hallucinations   or delusions    RESP: No cough, no audible wheezing, able to talk in full sentences  Remainder of exam unable to be completed due to telephone " visits          Assessment/Plan:   Diagnosis Comments   1. Exposure to COVID-19 virus  Symptomatic COVID-19 Virus (Coronavirus) by PCR    2. Mild persistent asthma with acute exacerbation  Symptomatic COVID-19 Virus (Coronavirus) by PCR      Dear Aubrie, If your asthma worsens please make sure you are seen and if emergent go to the ER. Take Care.      Patient Education     Coronavirus Disease 2019 (COVID-19): Caring for Yourself or Others  If you or a household member have symptoms of COVID-19, follow the guidelines below. This will help you manage symptoms and keep the virus from spreading.  If you have symptoms of COVID-19    Stay home and contact your care team. They will tell you what to do.    Don t panic. Keep in mind that other illnesses can cause similar symptoms.    Stay away from work, school, and public places.    Limit physical contact with others in your home. Limit visitors. No kissing.    Clean surfaces you touch with disinfectant.    If you need to cough or sneeze, do it into a tissue. Then, throw the tissue into the trash. If you don't have tissues, cough or sneeze into the bend of your elbow    Don t share food or personal items with people in your household. This includes items like eating and drinking utensils, towels, and bedding.    Wear a cloth face mask around other people. It should cover your nose and mouth. You may need to make your own mask using a bandana, T-shirt, or other cloth. See the CDC s instructions on how to make a mask.    If you need to go to a hospital or clinic, call ahead to let them know. Expect the care team to wear masks, gowns, gloves, and eye protection. You may be put in a separate room.    Follow all instructions from your care team.  If you ve been diagnosed with COVID-19    Stay home and away from others, including other people in your home. (This is called self-isolation.) Don t leave home unless you need to get medical care. Don't go to work, school, or public  places. Don't use buses, taxis, or other public transportation.    Follow all instructions from your care team.    If you need to go to a hospital or clinic, call ahead to let them know. Expect the care team to wear masks, gowns, gloves, and eye protection. You may be put in a separate room.    Wear a face mask over your nose and mouth. This is to protect others from your germs. If you can t wear a mask, your caregivers should wear one. You may need to make your own mask using a bandana, T-shirt, or other cloth. See the CDC s instructions on how to make a mask.    Have no contact with pets and other animals.    Don t share food or personal items with people in your household. This includes items like eating and drinking utensils, towels, and bedding.    If you need to cough or sneeze, do it into a tissue. Then, throw the tissue into the trash. If you don't have tissues, cough or sneeze into the bend of your elbow.    Wash your hands often.  Self-care at home  At this time, there is no medicine approved to prevent or treat COVID-19. Experts are testing different medicines, trying to find one that works.  So far, the most proven treatment is to support your body while it fights the virus.    Get plenty of rest.    Drink extra fluids (6 to 8 glasses of liquids each day), unless a doctor has told you not to. Ask your care team which fluids are best for you. Avoid fluids that contain caffeine or alcohol.    Take over-the-counter (OTC) medicine to help reduce pain and fever. Your care team will tell you which OTC medicine to use.  If you ve been in the hospital for COVID-19, follow your care team s instructions. This may include changing positions to help your breathing (such as lying on your belly).  If a test showed that you have COVID-19, you may be asked to donate plasma after you ve recovered. (This is called COVID-19 convalescent plasma donation.) The plasma may contain antibodies to help fight the virus in others.  Scientists are testing whether this might be a treatment in the future. For more information, talk to your care team.  Caring for a sick person    Follow all instructions from the care team.    Wash your hands often.    Wear protective clothing as advised.    Make sure the sick person wears a mask. If they can't wear a mask, don't stay in the same room with them. If you must be in the same room, wear a face mask. Make sure the mask covers both the nose and mouth.    Keep track of the sick person s symptoms.    Clean surfaces often with disinfectant. This includes phones, kitchen counters, fridge door handles, bathroom surfaces, and others.    Don t let anyone share household items with the sick person. This includes eating and drinking tools, towels, sheets, and blankets.    Clean fabrics and laundry well.    Keep other people and pets away from the sick person.  When you can stop self-isolation  When you are sick with COVID-19, you should stay away from other people. This is called self-isolation. The rules for ending self-isolation depend on your health in general.  If you are normally healthy  You can stop self-isolation when all 3 of these are true:    You ve had no fever--and no medicine that reduces fever--for 3 full days (72 hours). And     Your symptoms are better, such as cough or trouble breathing. And     At least 10 days have passed since your symptoms first started.  Talk with your care team before you leave home. They may tell you it s okay to leave, or they may give you different advice.  If you have a weak immune system  If you re being treated for cancer, have an immune disorder (such as HIV), or have had a transplant (organ or bone marrow), follow your care team s instructions. You may be able to end self-isolation when all 3 of these are true:    You ve had no fever--and no medicine that reduces fever--for 3 full days (72 hours). And     Your symptoms are better, such as cough or trouble  breathing. And     You ve had 2 tests for COVID-19. The tests happened at least 24 hours apart, and both show that you don t have the virus. (If no tests are available, your care team may tell you to follow the rules for normally healthy people above.)  When to call your care team  Call your care team right away if a sick person has any of these:    Trouble breathing    Pain or pressure in the chest  If a sick person has any of these, call 911:    Trouble breathing that gets worse    Pain or pressure in the chest that gets worse    Blue tint to lips or face    Fast or irregular heartbeat    Confusion or trouble waking    Fainting or loss of consciousness    Coughing up blood  Going home from the hospital  If you have COVID-19 and were recently in the hospital:    Follow the instructions above for self-care and isolation.    Follow the hospital care team s instructions.    Ask questions if anything is unclear to you. Write down answers so you remember them.  Last modified date: 5/8/2020  This information has been modified by your health care provider with permission from the publisher.      [id number] [facility], [address] [place] [address], [place] [address]. All rights reserved. This information is not intended as a substitute for professional medical care. Always follow your healthcare professional's instructions. This information has been modified by your health care provider with permission from the publisher.               Phone call duration:  7.33 minutes    PETE Colvin

## 2020-07-13 NOTE — TELEPHONE ENCOUNTER
She has asthma and has been using her rescue inhaler. She was exposed to her  one week ago who tested positive for covid19. I connected with scheduling for an urgent care telephone appointment.  Melba Dos Santos RN  [facility]      Reason for Disposition    HIGH RISK patient (e.g., age > 64 years, diabetes, heart or lung disease, weak immune system)    Additional Information    Negative: SEVERE difficulty breathing (e.g., struggling for each breath, speaks in single words)    Negative: Difficult to awaken or acting confused (e.g., disoriented, slurred speech)    Negative: Bluish (or gray) lips or face now    Negative: Shock suspected (e.g., cold/pale/clammy skin, too weak to stand, low BP, rapid pulse)    Negative: Sounds like a life-threatening emergency to the triager    Negative: [1] COVID-19 exposure AND [2] no symptoms    Negative: COVID-19 and Breastfeeding, questions about    Negative: [1] Adult with possible COVID-19 symptoms AND [2] triager concerned about severity of symptoms or other causes    Negative: SEVERE or constant chest pain or pressure (Exception: mild central chest pain, present only when coughing)    Negative: MODERATE difficulty breathing (e.g., speaks in phrases, SOB even at rest, pulse 100-120)    Negative: Patient sounds very sick or weak to the triager    Negative: MILD difficulty breathing (e.g., minimal/no SOB at rest, SOB with walking, pulse <100)    Negative: Chest pain or pressure    Negative: Fever > 103 F (39.4 C)    Negative: [1] Fever > 101 F (38.3 C) AND [2] age > 60    Negative: [1] Fever > 100.0 F (37.8 C) AND [2] bedridden (e.g., nursing home patient, CVA, chronic illness, recovering from surgery)    Protocols used: CORONAVIRUS (COVID-19) DIAGNOSED OR [id number] 5.16.20

## 2020-07-18 DIAGNOSIS — Z20.822 EXPOSURE TO COVID-19 VIRUS: ICD-10-CM

## 2020-07-18 DIAGNOSIS — J45.31 MILD PERSISTENT ASTHMA WITH ACUTE EXACERBATION: ICD-10-CM

## 2020-07-18 PROCEDURE — U0003 INFECTIOUS AGENT DETECTION BY NUCLEIC ACID (DNA OR RNA); SEVERE ACUTE RESPIRATORY SYNDROME CORONAVIRUS 2 (SARS-COV-2) (CORONAVIRUS DISEASE [COVID-19]), AMPLIFIED PROBE TECHNIQUE, MAKING USE OF HIGH THROUGHPUT TECHNOLOGIES AS DESCRIBED BY CMS-2020-01-R: HCPCS | Performed by: NURSE PRACTITIONER

## 2020-07-18 NOTE — LETTER
July 21, 2020        Teri Bain  1932 [address]  [facility] [id number]    This letter provides a written record that you were tested for COVID-19 on 7/18/20.       Your result was negative. This means that we didn t find the virus that causes COVID-19 in your sample. A test may show negative when you do actually have the virus. This can happen when the virus is in the early stages of infection, before you feel illness symptoms.    If you have symptoms   Stay home and away from others (self-isolate) until you meet ALL of the guidelines below:    You ve had no fever--and no medicine that reduces fever--for 3 full days (72 hours). And      Your other symptoms have gotten better. For example, your cough or breathing has improved. And     At least 10 days have passed since your symptoms started.    During this time:    Stay home. Don t go to work, school or anywhere else.     Stay in your own room, including for meals. Use your own bathroom if you can.    Stay away from others in your home. No hugging, kissing or shaking hands. No visitors.    Clean  high touch  surfaces often (doorknobs, counters, handles, etc.). Use a household cleaning spray or wipes. You can find a full list on the EPA website at www.epa.gov/pesticide-registration/list-n-disinfectants-use-against-sars-cov-2.    Cover your mouth and nose with a mask, tissue or washcloth to avoid spreading germs.    Wash your hands and face often with soap and water.    Going back to work  Check with your employer for any guidelines to follow for going back to work.    Employers: This document serves as formal notice that your employee tested negative for COVID-19, as of the testing date shown above.

## 2020-07-19 ENCOUNTER — NURSE TRIAGE (OUTPATIENT)
Dept: NURSING | Facility: CLINIC | Age: 65
End: 2020-07-19

## 2020-07-19 DIAGNOSIS — J45.31 MILD PERSISTENT ASTHMA WITH EXACERBATION: ICD-10-CM

## 2020-07-19 DIAGNOSIS — J45.901 ASTHMA EXACERBATION: Primary | ICD-10-CM

## 2020-07-19 RX ORDER — PREDNISONE 20 MG/1
40 TABLET ORAL DAILY
Qty: 10 TABLET | Refills: 0 | Status: SHIPPED | OUTPATIENT
Start: 2020-07-19 | End: 2020-07-27

## 2020-07-19 NOTE — TELEPHONE ENCOUNTER
"\"I feel like I have cotton in my throat\".  Constance had a virtual visit on 20 and a COVID test was ordered for her, results pending.  She isn't aware of any known exposure to COVID.    Teri is afebrile, dry cough, using albuterol inhaler.  She also has asmanex inhaler, but it's  and she is not using.    She reports that her heart is racing as she gets panicky when it's harder to breathe.  She is speaking to me in full sentences, doesn't appear SOB on phone, however she said when she woke up at 3:30 am she was wheezing, which has resolved.  She last used her inhaler two hours ago.    Paged on call provider for [facility] to speak to me at [facility].  Dr. Serrano is on call, page sent @ 7:07 am via [employer].       gave orders for a 5 day steroid burst with Prednisone 40 mg daily and advised to reorder Asmanex inhaler.  If not getting better, go to ER.    Called Teri with the above plan of care from Dr. Serrano, she appears to understand directives and agrees with plan.      Additional Information    Negative: Severe difficulty breathing (e.g., struggling for each breath, speak in single words, pulse > 120)    Negative: Bluish (or gray) lips or face now    Negative: Difficult to awaken or acting confused (e.g., disoriented, slurred speech)    Negative: Passed out (i.e., lost consciousness, collapsed and was not responding)    Negative: Wheezing started suddenly after medicine, an allergic food, or bee sting    Negative: Sounds like a life-threatening emergency to the triager    Negative: [1] SEVERE asthma attack (e.g., very SOB at rest, speaks in single words, loud wheezes) AND [2] not resolved after 2 nebulizer or inhaler treatments    Negative: Peak flow rate less than 50% of baseline level (RED Zone)    Negative: [1] Severe wheezing or coughing AND [2] doesn't have neb or inhaler available    Negative: Chest pain    Negative: [1] Hospitalized before with asthma AND [2] feels the same now    " Negative: [1] MODERATE asthma attack (e.g., SOB at rest, speaks in phrases, audible wheezes) AND [2] not resolved after 2 nebulizer or inhaler treatments given 20 minutes apart    Negative: [1] Peak flow rate 50-80% of baseline level (YELLOW zone) AND [2] not resolved after 2 nebulizer or inhaler treatments given 20 minutes apart    Negative: Asthma medicine (nebulizer or inhaler) is needed more frequently than q 4 hours to keep you comfortable    Negative: [1] Fever > 101 F (38.3 C) AND [2] age > 60    Negative: Fever > 103 F (39.4 C)    Negative: Patient sounds very sick or weak to the triager    [1] Continuous (nonstop) coughing AND [2] keeps from working or sleeping AND [3] not improved after 2 nebulizer or inhaler treatments given 20 minutes apart    Protocols used: ASTHMA ATTACK-A-AH    Renetta Villagran, RN  [facility]

## 2020-07-20 DIAGNOSIS — J45.30 MILD PERSISTENT ASTHMA, UNSPECIFIED WHETHER COMPLICATED: Primary | ICD-10-CM

## 2020-07-20 LAB
SARS-COV-2 RNA SPEC QL NAA+PROBE: NOT DETECTED
SPECIMEN SOURCE: NORMAL

## 2020-07-20 RX ORDER — FLUTICASONE PROPIONATE 220 UG/1
1 AEROSOL, METERED RESPIRATORY (INHALATION) 2 TIMES DAILY
Qty: 3 INHALER | Refills: 3 | Status: CANCELLED | OUTPATIENT
Start: 2020-07-20

## 2020-07-20 NOTE — TELEPHONE ENCOUNTER
Asthma next not covered by insurance. Flovent HFA or Arnuity are covered, please pick the one you'd like to change it to.    thanks

## 2020-07-27 ENCOUNTER — NURSE TRIAGE (OUTPATIENT)
Dept: NURSING | Facility: CLINIC | Age: 65
End: 2020-07-27

## 2020-07-27 ENCOUNTER — OFFICE VISIT (OUTPATIENT)
Dept: FAMILY MEDICINE | Facility: CLINIC | Age: 65
End: 2020-07-27
Payer: COMMERCIAL

## 2020-07-27 VITALS
RESPIRATION RATE: 18 BRPM | OXYGEN SATURATION: 98 % | TEMPERATURE: 97 F | HEART RATE: 84 BPM | DIASTOLIC BLOOD PRESSURE: 70 MMHG | SYSTOLIC BLOOD PRESSURE: 134 MMHG | WEIGHT: 263 LBS | BODY MASS INDEX: 43.82 KG/M2 | HEIGHT: 65 IN

## 2020-07-27 DIAGNOSIS — F41.9 ANXIETY: Primary | ICD-10-CM

## 2020-07-27 DIAGNOSIS — J45.31 MILD PERSISTENT ASTHMA WITH EXACERBATION: ICD-10-CM

## 2020-07-27 PROCEDURE — 99214 OFFICE O/P EST MOD 30 MIN: CPT | Performed by: FAMILY MEDICINE

## 2020-07-27 RX ORDER — HYDROXYZINE HYDROCHLORIDE 10 MG/1
10-20 TABLET, FILM COATED ORAL 3 TIMES DAILY PRN
Qty: 30 TABLET | Refills: 0 | Status: SHIPPED | OUTPATIENT
Start: 2020-07-27 | End: 2020-09-02

## 2020-07-27 ASSESSMENT — ANXIETY QUESTIONNAIRES
6. BECOMING EASILY ANNOYED OR IRRITABLE: NOT AT ALL
5. BEING SO RESTLESS THAT IT IS HARD TO SIT STILL: NOT AT ALL
GAD7 TOTAL SCORE: 3
1. FEELING NERVOUS, ANXIOUS, OR ON EDGE: SEVERAL DAYS
7. FEELING AFRAID AS IF SOMETHING AWFUL MIGHT HAPPEN: NOT AT ALL
2. NOT BEING ABLE TO STOP OR CONTROL WORRYING: NOT AT ALL
3. WORRYING TOO MUCH ABOUT DIFFERENT THINGS: NOT AT ALL

## 2020-07-27 ASSESSMENT — PATIENT HEALTH QUESTIONNAIRE - PHQ9
SUM OF ALL RESPONSES TO PHQ QUESTIONS 1-9: 3
5. POOR APPETITE OR OVEREATING: MORE THAN HALF THE DAYS

## 2020-07-27 ASSESSMENT — MIFFLIN-ST. JEOR: SCORE: 1730.9

## 2020-07-27 NOTE — PROGRESS NOTES
Subjective     Teri Bain is a 64 year old female who presents to clinic today for the following health issues:    HPI       Asthma Follow-Up  She has a longstanding history of mild persistent asthma.   Her controller steroid inhaler was recently changed to Arnuity (fluticasone) and she continues to take that daily as well as albuterol prn with no problems or noted side effects.  She has been struggling with an asthma exacerbation and I gave her a prednisone burst last week (40 mg QD x 5 days).  She was tested for COVID-19 on 7/18 and that was negative.  Since completing the prednisone she feels her symptoms are improved but not completely resolved.  Symptoms include chest heaviness, some shortness of breath, some cough, and feeling like mucus is stuck in her throat.  No fever.  These symptoms improve with albuterol which she's been using Q6 hours.  The chest heaviness is not exertional.  Was ACT completed today?    Yes    ACT Total Scores 7/27/2020   ACT TOTAL SCORE -   ASTHMA ER VISITS -   ASTHMA HOSPITALIZATIONS -   ACT TOTAL SCORE (Goal Greater than or Equal to 20) 17   In the past 12 months, how many times did you visit the emergency room for your asthma without being admitted to the hospital? 0   In the past 12 months, how many times were you hospitalized overnight because of your asthma? 0         How many days per week do you miss taking your asthma controller medication?  0    Please describe any recent triggers for your asthma: None    Have you had any Emergency Room Visits, Urgent Care Visits, or Hospital Admissions since your last office visit?  No      How many servings of fruits and vegetables do you eat daily?  0-1    On average, how many sweetened beverages do you drink each day (Examples: soda, juice, sweet tea, etc.  Do NOT count diet or artificially sweetened beverages)?   0    How many days per week do you exercise enough to make your heart beat faster? 3 or less    How many minutes a day do  "you exercise enough to make your heart beat faster? 9 or less    How many days per week do you miss taking your medication? 0    PROBLEMS TO ADD ON...  Anxiety  She has been getting anxiety attacks.  She worries that she won't be able to control her asthma and then becomes overwhelmed by sense of panic.  She feels scared and feels like her heart is racing.  However, when she checks her pulse it is usually in the 60's.  No dizziness or loss of consciousness.    PHQ 10/19/2016 7/27/2020   PHQ-9 Total Score 0 3   Q9: Thoughts of better off dead/self-harm past 2 weeks Not at all Not at all     [facility] Follow-up to PHQ 10/19/2016 7/27/2020   PHQ-9 9. Suicide Ideation past 2 weeks Not at all Not at all     LAURA-7 SCORE 10/19/2016 7/27/2020   Total Score 0 3          BP Readings from Last 3 Encounters:   07/27/20 134/70   02/27/20 130/74   02/19/20 110/62    Wt Readings from Last 3 Encounters:   07/27/20 119.3 kg (263 lb)   02/27/20 119.7 kg (264 lb)   02/19/20 119.3 kg (263 lb)             Reviewed and updated as needed this visit by Provider  Meds  Problems         Review of Systems   Constitutional, HEENT, cardiovascular, pulmonary and psych systems are negative, except as otherwise noted.      Objective    /70 (BP Location: Left arm, Patient Position: Sitting, Cuff Size: Adult Large)   Pulse 84   Temp 97  F (36.1  C) (Temporal)   Resp 18   Ht 1.638 m (5' 4.5\")   Wt 119.3 kg (263 lb)   SpO2 98%   Breastfeeding No   BMI 44.45 kg/m    Body mass index is 44.45 kg/m .  Physical Exam   GEN:  no apparent distress  EYES: sclerae and conjunctivae clear with no discharge  LUNGS:  normal respiratory effort, and lungs clear to auscultation bilaterally - no rales, rhonchi or wheezes  CV: regular rate and rhythm, normal S1 S2, no S3 or S4 and no murmur, click or rub  PSYCH:  Appearance/Behavior: patient is appropriately and casually dressed and tearful at times while discussing her anxiety.  Speech:  normal  rate, rhythm, " and tone.  Mood/Affect: Anxious/congruent.  Insight: good     Diagnostic Test Results:  Labs reviewed in Epic        Assessment & Plan     1. Anxiety  Asthma symptoms during COVID-19 pandemic are triggering anxiety.  She'll try hydroxyzine 10-20 mg TID prn.    - hydrOXYzine (ATARAX) 10 MG tablet; Take 1-2 tablets (10-20 mg) by mouth 3 times daily as needed for anxiety  Dispense: 30 tablet; Refill: 0    2. Mild persistent asthma with exacerbation  Improving following steroid burst.  I advised her to increase her inhaled steroid dose to 1 puff twice daily for the next week or so.      I also instructed patient to notify clinic if she has any exertional chest heaviness that does not respond to albuterol, or heart palpitations associated with dizziness or rapid heart rate.        Patient Instructions   Take the Arnuity inhaler one puff twice daily for the next 1-2 weeks.      No follow-ups on file.    Norma Serrano MD  [facility]

## 2020-07-27 NOTE — TELEPHONE ENCOUNTER
7/19/2020 was  Prednisone rx given by PCP.    I huddled with Dr. Serrano.  If asthma seems controlled,  OK to do telephone visit at noon.  If asthma not controlled, OK to do F2F appt at noon today.    Asthma comes and goes.  Prednisone helped.  Off prednisone since last Wed.  Cough is not completely gone.    When the cough starts, her throat feels like it is closing. Fear and anxiety of not getting air.    PT feels may be dietary based. Ate cheese yesterday.  Not wheezing.  Heat can trigger it.  Has 2 inhalers. One is new.    Took clinic appt with PCP for noon today.  JACLYN Branham

## 2020-07-27 NOTE — TELEPHONE ENCOUNTER
Patient states she had an asthma attack last Sunday and was prescribed Prednisone and today is having anxiety.  Using inhalers help but today she woke up around 2:15 am with coughing and then started getting anxiety.  As soon as Teri gets symptoms her anxiety kicks in.  Please phone Teri.      COVID 19 Nurse Triage Plan/Patient Instructions    Please be aware that novel coronavirus (COVID-19) may be circulating in the community. If you develop symptoms such as fever, cough, or SOB or if you have concerns about the presence of another infection including coronavirus (COVID-19), please contact your health care provider or visit [email or web address].     Disposition/Instructions    Virtual Visit with provider recommended. Reference Visit Selection Guide.    Thank you for taking steps to prevent the spread of this virus.  o Limit your contact with others.  o Wear a simple mask to cover your cough.  o Wash your hands well and often.    Resources    [facility]: About COVID-19: [email or web address]    CDC: What to Do If You're Sick: www.cdc.gov/coronavirus/2019-ncov/about/steps-when-sick.html    CDC: Ending Home Isolation: www.cdc.gov/coronavirus/2019-ncov/hcp/disposition-in-home-patients.html     CDC: Caring for Someone: www.cdc.gov/coronavirus/2019-ncov/if-you-are-sick/care-for-someone.html     [facility]: Interim Guidance for Hospital Discharge to Home: www.health.[facility].[place]/diseases/coronavirus/hcp/hospdischarge.pdf    [facility] clinical trials (COVID-19 research studies): clinicalaffairs.[facility]/n-clinical-trials     Below are the COVID-19 hotlines at the [place] Department of Health ([facility]). Interpreters are available.   o For health questions: Call 387-089-8504 or 1-692.527.8729 (7 a.m. to 7 p.m.)  o For questions about schools and childcare: Call 930-386-7852 or 1-559.927.8438 (7 a.m. to 7 p.m.)                     Additional Information    Negative: Severe difficulty breathing (e.g.,  struggling for each breath, speaks in single words)    Negative: Bluish (or gray) lips or face now    Negative: Difficult to awaken or acting confused (e.g., disoriented, slurred speech)    Negative: Hysterical or combative behavior    Negative: Sounds like a life-threatening emergency to the triager    Negative: [1] Difficulty breathing AND [2] persists > 10 minutes AND [3] not relieved by reassurance provided by triager    Negative: [1] Lightheadedness or dizziness AND [2] persists > 10 minutes AND [3] not relieved by reassurance provided by triager    Negative: [1] Alcohol or drug abuse, known or suspected AND [2] feeling very shaky (i.e., visible tremors of hands)    Negative: Patient sounds very sick or weak to the triager    Symptoms interfere with work or school    Protocols used: ANXIETY AND PANIC ATTACK-A-AH

## 2020-07-28 ASSESSMENT — ANXIETY QUESTIONNAIRES: GAD7 TOTAL SCORE: 3

## 2020-07-28 ASSESSMENT — ASTHMA QUESTIONNAIRES: ACT_TOTALSCORE: 17

## 2020-08-06 ENCOUNTER — NURSE TRIAGE (OUTPATIENT)
Dept: NURSING | Facility: CLINIC | Age: 65
End: 2020-08-06

## 2020-08-07 DIAGNOSIS — J45.31 MILD PERSISTENT ASTHMA WITH ACUTE EXACERBATION: ICD-10-CM

## 2020-08-07 DIAGNOSIS — I10 HYPERTENSION GOAL BP (BLOOD PRESSURE) < 140/90: ICD-10-CM

## 2020-08-10 DIAGNOSIS — E78.5 HYPERLIPIDEMIA LDL GOAL <100: ICD-10-CM

## 2020-08-10 DIAGNOSIS — I10 HYPERTENSION GOAL BP (BLOOD PRESSURE) < 140/90: ICD-10-CM

## 2020-08-10 DIAGNOSIS — I87.2 CHRONIC VENOUS INSUFFICIENCY: ICD-10-CM

## 2020-08-10 DIAGNOSIS — L29.9 ITCHY SKIN: ICD-10-CM

## 2020-08-11 DIAGNOSIS — J45.30 MILD PERSISTENT ASTHMA, UNSPECIFIED WHETHER COMPLICATED: ICD-10-CM

## 2020-08-11 RX ORDER — ALBUTEROL SULFATE 90 UG/1
1-2 AEROSOL, METERED RESPIRATORY (INHALATION) EVERY 6 HOURS
Qty: 1 INHALER | Refills: 3 | Status: SHIPPED | OUTPATIENT
Start: 2020-08-11 | End: 2021-11-06

## 2020-08-11 RX ORDER — AMLODIPINE BESYLATE 5 MG/1
5 TABLET ORAL DAILY
Qty: 90 TABLET | Refills: 0 | Status: SHIPPED | OUTPATIENT
Start: 2020-08-11 | End: 2020-10-18

## 2020-08-11 NOTE — TELEPHONE ENCOUNTER
Routing refill request to provider for review/approval because:  --Labs out of range:  Scr.  --Last ACT <20.  --Has orders for 8 inhalers since 12/6/19.    --Last Office or Virtual visit: 7/27/2020 for asthma.        Creatinine   Date Value Ref Range Status   10/14/2019 1.42 (H) 0.52 - 1.04 mg/dL Final   05/06/2019 1.19 (H) 0.52 - 1.04 mg/dL Final   10/11/2018 1.61 (H) 0.52 - 1.04 mg/dL Final   05/07/2018 1.31 (H) 0.52 - 1.04 mg/dL Final   10/09/2017 1.34 (H) 0.52 - 1.04 mg/dL Final         ACT Total Scores 1/7/2020 2/19/2020 7/27/2020   ACT TOTAL SCORE - - -   ASTHMA ER VISITS - - -   ASTHMA HOSPITALIZATIONS - - -   ACT TOTAL SCORE (Goal Greater than or Equal to 20) 13 21 17   In the past 12 months, how many times did you visit the emergency room for your asthma without being admitted to the hospital? 0 0 0   In the past 12 months, how many times were you hospitalized overnight because of your asthma? 0 0 0

## 2020-08-12 RX ORDER — TRIAMCINOLONE ACETONIDE 1 MG/G
CREAM TOPICAL
Qty: 15 G | Refills: 0 | Status: SHIPPED | OUTPATIENT
Start: 2020-08-12 | End: 2022-02-16

## 2020-08-12 RX ORDER — PRAVASTATIN SODIUM 80 MG/1
80 TABLET ORAL EVERY EVENING
Qty: 90 TABLET | Refills: 3 | Status: SHIPPED | OUTPATIENT
Start: 2020-08-12 | End: 2021-03-24

## 2020-08-12 RX ORDER — TRIAMTERENE AND HYDROCHLOROTHIAZIDE 75; 50 MG/1; MG/1
1 TABLET ORAL DAILY
Qty: 90 TABLET | Refills: 3 | Status: SHIPPED | OUTPATIENT
Start: 2020-08-12 | End: 2021-03-24

## 2020-08-14 NOTE — TELEPHONE ENCOUNTER
ACT Total Scores 1/7/2020 2/19/2020 7/27/2020   ACT TOTAL SCORE - - -   ASTHMA ER VISITS - - -   ASTHMA HOSPITALIZATIONS - - -   ACT TOTAL SCORE (Goal Greater than or Equal to 20) 13 21 17   In the past 12 months, how many times did you visit the emergency room for your asthma without being admitted to the hospital? 0 0 0   In the past 12 months, how many times were you hospitalized overnight because of your asthma? 0 0 0     Pt just seen by Dr Serrano on 7/27  Refilled fluticasone as plan was for pt to stay on this    Chitra Mart, RN, BSN

## 2020-10-14 DIAGNOSIS — I10 HYPERTENSION GOAL BP (BLOOD PRESSURE) < 140/90: ICD-10-CM

## 2020-10-15 DIAGNOSIS — F41.9 ANXIETY: ICD-10-CM

## 2020-10-15 RX ORDER — HYDROXYZINE HYDROCHLORIDE 10 MG/1
TABLET, FILM COATED ORAL
Qty: 30 TABLET | Refills: 0 | Status: SHIPPED | OUTPATIENT
Start: 2020-10-15 | End: 2020-11-20

## 2020-10-15 NOTE — TELEPHONE ENCOUNTER
Prescription approved per [facility] Refill Protocol.  CIARRA Lucero, BSN, RN

## 2020-10-15 NOTE — TELEPHONE ENCOUNTER
Reason for Call:  Medication or medication refill:    Do you use a [facility]?  Name of the pharmacy and phone number for the current request:  [id number] - [place], [place] - [address] AT [facility] [address]     Name of the medication requested: hydrOXYzine (ATARAX) 10 MG tablet    Other request: Patient states that she requested for a refill from her pharmacy on Sunday and they have not heard from the clinic. Writer advised patient that we do not have a request on file.     Patient's been out of rx since Sunday and is hoping to get this refilled asap. Follow-up appt scheduled with PCP on 10/26. Please advise, thank you!    Can we leave a detailed message on this number? YES    Phone number patient can be reached at: Cell number on file:    Telephone Information:   Mobile [phone]       Best Time: anytime    Call taken on 10/15/2020 at 8:12 AM by Naila Metzger

## 2020-10-18 RX ORDER — AMLODIPINE BESYLATE 5 MG/1
5 TABLET ORAL DAILY
Qty: 90 TABLET | Refills: 0 | Status: SHIPPED | OUTPATIENT
Start: 2020-10-18 | End: 2020-10-26

## 2020-10-18 NOTE — TELEPHONE ENCOUNTER
Medication is being filled for 1 time refill only due to:  Patient needs to be seen because need labs and annual exam.   Kaleigh Douglass RN

## 2020-10-25 ASSESSMENT — ENCOUNTER SYMPTOMS
EYE PAIN: 0
BREAST MASS: 0
COUGH: 1
ABDOMINAL PAIN: 1
NERVOUS/ANXIOUS: 1
HEMATURIA: 0
FEVER: 0
CONSTIPATION: 0
FREQUENCY: 0
HEMATOCHEZIA: 0
CHILLS: 0
HEADACHES: 1
DIZZINESS: 0
DIARRHEA: 0

## 2020-10-25 ASSESSMENT — ACTIVITIES OF DAILY LIVING (ADL)
CURRENT_FUNCTION: MEDICATION ADMINISTRATION REQUIRES ASSISTANCE
CURRENT_FUNCTION: NO ASSISTANCE NEEDED

## 2020-10-25 NOTE — PROGRESS NOTES
"SUBJECTIVE:   Teri Bain is a 65 year old female who presents for Preventive Visit.    {  Are you in the first 12 months of your Medicare coverage?  No    Healthy Habits:     In general, how would you rate your overall health?  Good    Frequency of exercise:  2-3 days/week    Duration of exercise:  Less than 15 minutes    Do you usually eat at least 4 servings of fruit and vegetables a day, include whole grains    & fiber and avoid regularly eating high fat or \"junk\" foods?  No    Ability to successfully perform activities of daily living:  Medication administration requires assistance and no assistance needed    Home Safety:  No safety concerns identified    Hearing Impairment:  No hearing concerns    In the past 6 months, have you been bothered by leaking of urine?  No    In general, how would you rate your overall mental or emotional health?  Good      PHQ-2 Total Score: 0    Do you feel safe in your environment? Yes    Have you ever done Advance Care Planning? (For example, a Health Directive, POLST, or a discussion with a medical provider or your loved ones about your wishes): No, advance care planning information given to patient to review.  Patient plans to discuss their wishes with loved ones or provider.        Fall risk  Fallen 2 or more times in the past year?: No  Any fall with injury in the past year?: No    Cognitive Screening   1) Repeat 3 items (Leader, Season, Table)    2) Clock draw: NORMAL  3) 3 item recall: Recalls 3 objects  Results: NORMAL clock, 1-2 items recalled: COGNITIVE IMPAIRMENT LESS LIKELY    Mini-CogTM Copyright SUGEY Ndiaye. Licensed by the author for use in [facility]; reprinted with permission ([email or web address]). All rights reserved.        Reviewed and updated as needed this visit by clinical staff  Tobacco  Allergies  Meds  Problems  Med Hx  Surg Hx  Fam Hx  Soc Hx          Reviewed and updated as needed this visit by Provider   Allergies  Meds  Problems      "       Social History     Tobacco Use     Smoking status: Former Smoker     Packs/day: 0.00     Years: 30.00     Pack years: 0.00     Types: Cigarettes     Quit date: 1996     Years since quittin.8     Smokeless tobacco: Never Used   Substance Use Topics     Alcohol use: Not Currently     Comment: wine 1-2 times per year     If you drink alcohol do you typically have >3 drinks per day or >7 drinks per week? No    Alcohol Use 10/26/2020   Prescreen: >3 drinks/day or >7 drinks/week? -   Prescreen: >3 drinks/day or >7 drinks/week? No   No flowsheet data found.        Hyperlipidemia Follow-Up    Are you regularly taking any medication or supplement to lower your cholesterol?   Yes- Pravastatin    Are you having muscle aches or other side effects that you think could be caused by your cholesterol lowering medication?  No    Hypertension Follow-up    Do you check your blood pressure regularly outside of the clinic? No     Are you following a low salt diet? Yes    Are your blood pressures ever more than 140 on the top number (systolic) OR more   than 90 on the bottom number (diastolic), for example 140/90? No    Chronic Kidney Disease Follow-up  Do you take any over the counter pain medicine?: Yes  What over the counter medicine are you taking for your pain?:  tylenol -500-600mg     mg How often do you take this medicine?:  only when in pain       Current providers sharing in care for this patient include:   Patient Care Team:  Norma Serrano MD as PCP - General  Norma Serrano MD as Assigned PCP    The following health maintenance items are reviewed in Epic and correct as of today:  Health Maintenance   Topic Date Due     ZOSTER IMMUNIZATION (1 of 2) 2004     BMP  2020     ASTHMA ACTION PLAN  2020     LIPID  10/14/2020     MICROALBUMIN  10/14/2020     ASTHMA CONTROL TEST  2021     MAMMO SCREENING  2021     MEDICARE ANNUAL WELLNESS VISIT  10/26/2021     FALL RISK ASSESSMENT   "10/26/2021     DTAP/TDAP/TD IMMUNIZATION (2 - Td) 10/03/2022     COLORECTAL CANCER SCREENING  01/05/2025     ADVANCE CARE PLANNING  10/26/2025     DEXA  Completed     HEPATITIS C SCREENING  Completed     MIGRAINE ACTION PLAN  Completed     PHQ-2  Completed     INFLUENZA VACCINE  Completed     Pneumococcal Vaccine: 65+ Years  Completed     Pneumococcal Vaccine: Pediatrics (0 to 5 Years) and At-Risk Patients (6 to 64 Years)  Aged Out     IPV IMMUNIZATION  Aged Out     MENINGITIS IMMUNIZATION  Aged Out     HEPATITIS B IMMUNIZATION  Aged Out     BP Readings from Last 3 Encounters:   10/26/20 136/80   07/27/20 134/70   02/27/20 130/74    Wt Readings from Last 3 Encounters:   10/26/20 121.1 kg (267 lb)   07/27/20 119.3 kg (263 lb)   02/27/20 119.7 kg (264 lb)              Review of Systems   Constitutional: Negative for chills and fever.   HENT: Negative for congestion and ear pain.    Eyes: Negative for pain.   Respiratory: Positive for cough.    Cardiovascular: Positive for chest pain and palpitations.   Gastrointestinal: Positive for abdominal pain. Negative for constipation, diarrhea and hematochezia.   Breasts:  Negative for breast mass.   Genitourinary: Negative for frequency, genital sores, hematuria, pelvic pain and vaginal discharge.   Neurological: Positive for headaches. Negative for dizziness.   Psychiatric/Behavioral: The patient is nervous/anxious.      Multiple other symptoms/concerns:  Longstanding nocturnal cough    Chest pain only when she uses inhalers  CP lasts few minutes  Nonexertional.    LLQ pain  Lasts a few minutes several times a week  Not related to bowel or bladder function.    Headaches  Not her migraines  Lasts a few minutes        OBJECTIVE:   /80 (BP Location: Left arm, Patient Position: Sitting, Cuff Size: Adult Large)   Pulse 62   Temp 98.3  F (36.8  C) (Temporal)   Resp 16   Ht 1.638 m (5' 4.5\")   Wt 121.1 kg (267 lb)   SpO2 98%   BMI 45.12 kg/m   Estimated body mass index " "is 45.12 kg/m  as calculated from the following:    Height as of this encounter: 1.638 m (5' 4.5\").    Weight as of this encounter: 121.1 kg (267 lb).  Physical Exam  GENERAL APPEARANCE: healthy, alert and no distress  EYES: Eyes grossly normal to inspection, conjunctivae and sclerae normal  HENT: ear canals and TM's normal  NECK: no adenopathy, no asymmetry, masses, or scars and thyroid normal to palpation  RESP: lungs clear to auscultation - no rales, rhonchi or wheezes  BREAST: normal without masses, tenderness or nipple discharge and no palpable axillary masses or adenopathy  CV: regular rate and rhythm, normal S1 S2, no S3 or S4, no murmur, click or rub, no peripheral edema   ABDOMEN: soft, nontender, no hepatosplenomegaly, no masses   MS: no musculoskeletal defects are noted and gait is age appropriate without ataxia  SKIN: no suspicious lesions or rashes  NEURO: Normal strength and tone, sensory exam grossly normal, mentation intact and speech normal  PSYCH: mentation appears normal and affect normal/bright     Diagnostic Test Results:  Labs reviewed in Epic    ASSESSMENT / PLAN:     1. Encounter for Medicare annual wellness exam  She had mamm[facility]- report is in Care Everywhere: 7/28/2020    2. Hypertension goal BP (blood pressure) < 140/90  stable/well controlled - Continue current medication regimen.   - Basic metabolic panel  - amLODIPine (NORVASC) 5 MG tablet; Take 1 tablet (5 mg) by mouth daily  Dispense: 90 tablet; Refill: 3    3. Stage 3a chronic kidney disease  Continue monitoring.  She avoids NSAIDs  - Basic metabolic panel  - Albumin Random Urine Quantitative with Creat Ratio    4. Pure hypercholesterolemia  Continue statin.  Awaiting lipid results to determine if dose change is indicated.     - Lipid panel reflex to direct LDL Fasting    5. Mild persistent asthma, unspecified whether complicated  Improved control.    - fluticasone (ARNUITY ELLIPTA) 200 MCG/ACT inhaler; Inhale 1 " "puff into the lungs daily  Dispense: 90 each; Refill: 3    6. Gastroesophageal reflux disease, unspecified whether esophagitis present  She uses famotidine prn.  - famotidine (PEPCID) 20 MG tablet; Take 1 tablet (20 mg) by mouth daily as needed (heartburn)  Dispense: 30 tablet; Refill: 11    7. Chest pain, unspecified type  8. Palpitations  9. LLQ abdominal pain  10. Nonintractable episodic headache, unspecified headache type  Unremarkable exam and no alarm symptoms.  Discussed that time today did not permit me to address these issues and I advised her to return so we can have more dedicated time to discuss these concerns.     Patient has been advised of split billing requirements and indicates understanding: Yes     COUNSELING:  Reviewed preventive health counseling, as reflected in patient instructions    Estimated body mass index is 45.12 kg/m  as calculated from the following:    Height as of this encounter: 1.638 m (5' 4.5\").    Weight as of this encounter: 121.1 kg (267 lb).  Weight management plan: Discussed healthy diet and exercise guidelines      She reports that she quit smoking about 23 years ago. Her smoking use included cigarettes. She smoked 0.00 packs per day for 30.00 years. She has never used smokeless tobacco.    Patient Instructions     Schedule a follow-up clinic visit with me to further discuss the chest pain, abdominal pain and headaches.     recommend that patients 50 and over get the Shingrix vaccine at a pharmacy.  The pharmacist will let you know beforehand if there is a copay and can administer the vaccine.  The Shingrix vaccination is a 2-shot series.  The second dose is given 6 months after the first.  (It cannot be given sooner than 2 months after the first dose - at the earliest.)  You should be aware that patients are reporting feeling a bit under the weather after the injection, including fatigue, malaise, and low-grade fever that may last a couple days.  Rarely patients can get a " mild, shingles-like rash.   But these symptoms are much better than the Shingles disease.          Appropriate preventive services were discussed with this patient, including applicable screening as appropriate for cardiovascular disease, diabetes, osteopenia/osteoporosis, and glaucoma.  As appropriate for age/gender, discussed screening for colorectal cancer, prostate cancer, breast cancer, and cervical cancer. Checklist reviewing preventive services available has been given to the patient.    Reviewed patients plan of care and provided an AVS. The Basic Care Plan (routine screening as documented in Health Maintenance) for Teri meets the Care Plan requirement. This Care Plan has been established and reviewed with the Patient.    Counseling Resources:  ATP IV Guidelines  Pooled Cohorts Equation Calculator  Breast Cancer Risk Calculator  Breast Cancer: Medication to Reduce Risk  FRAX Risk Assessment  ICSI Preventive Guidelines  Dietary Guidelines for Americans, 2010  USDA's MyPlate  ASA Prophylaxis  Lung CA Screening    Norma Serrano MD  [facility]    Identified Health Risks:

## 2020-10-25 NOTE — PATIENT INSTRUCTIONS
Schedule a follow-up clinic visit with me to further discuss the chest pain, abdominal pain and headaches.     recommend that patients 50 and over get the Shingrix vaccine at a pharmacy.  The pharmacist will let you know beforehand if there is a copay and can administer the vaccine.  The Shingrix vaccination is a 2-shot series.  The second dose is given 6 months after the first.  (It cannot be given sooner than 2 months after the first dose - at the earliest.)  You should be aware that patients are reporting feeling a bit under the weather after the injection, including fatigue, malaise, and low-grade fever that may last a couple days.  Rarely patients can get a mild, shingles-like rash.   But these symptoms are much better than the Shingles disease.       Patient Education   Personalized Prevention Plan  You are due for the preventive services outlined below.  Your care team is available to assist you in scheduling these services.  If you have already completed any of these items, please share that information with your care team to update in your medical record.  Health Maintenance Due   Topic Date Due     Zoster (Shingles) Vaccine (1 of 2) 09/20/2004     Basic Metabolic Panel  04/14/2020     Asthma Action Plan - yearly  05/06/2020     Flu Vaccine (1) 09/01/2020     Cholesterol Lab  10/14/2020     Kidney Microalbumin Urine Test  10/14/2020     FALL RISK ASSESSMENT  10/14/2020

## 2020-10-26 ENCOUNTER — OFFICE VISIT (OUTPATIENT)
Dept: FAMILY MEDICINE | Facility: CLINIC | Age: 65
End: 2020-10-26
Payer: COMMERCIAL

## 2020-10-26 VITALS
HEART RATE: 62 BPM | RESPIRATION RATE: 16 BRPM | SYSTOLIC BLOOD PRESSURE: 136 MMHG | DIASTOLIC BLOOD PRESSURE: 80 MMHG | OXYGEN SATURATION: 98 % | TEMPERATURE: 98.3 F | HEIGHT: 65 IN | BODY MASS INDEX: 44.48 KG/M2 | WEIGHT: 267 LBS

## 2020-10-26 DIAGNOSIS — J45.30 MILD PERSISTENT ASTHMA, UNSPECIFIED WHETHER COMPLICATED: ICD-10-CM

## 2020-10-26 DIAGNOSIS — R51.9 NONINTRACTABLE EPISODIC HEADACHE, UNSPECIFIED HEADACHE TYPE: ICD-10-CM

## 2020-10-26 DIAGNOSIS — R07.9 CHEST PAIN, UNSPECIFIED TYPE: ICD-10-CM

## 2020-10-26 DIAGNOSIS — K21.9 GASTROESOPHAGEAL REFLUX DISEASE, UNSPECIFIED WHETHER ESOPHAGITIS PRESENT: ICD-10-CM

## 2020-10-26 DIAGNOSIS — E78.00 PURE HYPERCHOLESTEROLEMIA: ICD-10-CM

## 2020-10-26 DIAGNOSIS — E78.5 HYPERLIPIDEMIA LDL GOAL <100: ICD-10-CM

## 2020-10-26 DIAGNOSIS — R00.2 PALPITATIONS: ICD-10-CM

## 2020-10-26 DIAGNOSIS — R10.32 LLQ ABDOMINAL PAIN: ICD-10-CM

## 2020-10-26 DIAGNOSIS — N18.31 STAGE 3A CHRONIC KIDNEY DISEASE (H): ICD-10-CM

## 2020-10-26 DIAGNOSIS — I10 HYPERTENSION GOAL BP (BLOOD PRESSURE) < 140/90: ICD-10-CM

## 2020-10-26 DIAGNOSIS — Z00.00 ENCOUNTER FOR MEDICARE ANNUAL WELLNESS EXAM: Primary | ICD-10-CM

## 2020-10-26 LAB
ANION GAP SERPL CALCULATED.3IONS-SCNC: 4 MMOL/L (ref 3–14)
BUN SERPL-MCNC: 23 MG/DL (ref 7–30)
CALCIUM SERPL-MCNC: 9.6 MG/DL (ref 8.5–10.1)
CHLORIDE SERPL-SCNC: 106 MMOL/L (ref 94–109)
CHOLEST SERPL-MCNC: 166 MG/DL
CO2 SERPL-SCNC: 30 MMOL/L (ref 20–32)
CREAT SERPL-MCNC: 1.43 MG/DL (ref 0.52–1.04)
CREAT UR-MCNC: 110 MG/DL
GFR SERPL CREATININE-BSD FRML MDRD: 38 ML/MIN/{1.73_M2}
GLUCOSE SERPL-MCNC: 85 MG/DL (ref 70–99)
HDLC SERPL-MCNC: 75 MG/DL
LDLC SERPL CALC-MCNC: 75 MG/DL
MICROALBUMIN UR-MCNC: 9 MG/L
MICROALBUMIN/CREAT UR: 7.91 MG/G CR (ref 0–25)
NONHDLC SERPL-MCNC: 91 MG/DL
POTASSIUM SERPL-SCNC: 4.3 MMOL/L (ref 3.4–5.3)
SODIUM SERPL-SCNC: 140 MMOL/L (ref 133–144)
TRIGL SERPL-MCNC: 79 MG/DL

## 2020-10-26 PROCEDURE — 80061 LIPID PANEL: CPT | Performed by: FAMILY MEDICINE

## 2020-10-26 PROCEDURE — G0438 PPPS, INITIAL VISIT: HCPCS | Performed by: FAMILY MEDICINE

## 2020-10-26 PROCEDURE — 80048 BASIC METABOLIC PNL TOTAL CA: CPT | Performed by: FAMILY MEDICINE

## 2020-10-26 PROCEDURE — 82043 UR ALBUMIN QUANTITATIVE: CPT | Performed by: FAMILY MEDICINE

## 2020-10-26 PROCEDURE — 36415 COLL VENOUS BLD VENIPUNCTURE: CPT | Performed by: FAMILY MEDICINE

## 2020-10-26 PROCEDURE — 99214 OFFICE O/P EST MOD 30 MIN: CPT | Mod: 25 | Performed by: FAMILY MEDICINE

## 2020-10-26 RX ORDER — FAMOTIDINE 20 MG/1
20 TABLET, FILM COATED ORAL DAILY PRN
Qty: 30 TABLET | Refills: 11 | Status: SHIPPED | OUTPATIENT
Start: 2020-10-26 | End: 2021-11-08

## 2020-10-26 RX ORDER — AMLODIPINE BESYLATE 5 MG/1
5 TABLET ORAL DAILY
Qty: 90 TABLET | Refills: 3 | Status: SHIPPED | OUTPATIENT
Start: 2020-10-26 | End: 2020-11-28

## 2020-10-26 ASSESSMENT — ENCOUNTER SYMPTOMS
BREAST MASS: 0
DIZZINESS: 0
HEADACHES: 1
CHILLS: 0
ABDOMINAL PAIN: 1
FEVER: 0
CONSTIPATION: 0
HEMATURIA: 0
HEMATOCHEZIA: 0
PALPITATIONS: 1
FREQUENCY: 0
COUGH: 1
NERVOUS/ANXIOUS: 1
DIARRHEA: 0
EYE PAIN: 0

## 2020-10-26 ASSESSMENT — MIFFLIN-ST. JEOR: SCORE: 1744.04

## 2020-10-26 ASSESSMENT — ACTIVITIES OF DAILY LIVING (ADL)
CURRENT_FUNCTION: NO ASSISTANCE NEEDED
CURRENT_FUNCTION: MEDICATION ADMINISTRATION REQUIRES ASSISTANCE

## 2020-10-26 NOTE — LETTER
My Asthma Action Plan    Name: Teri Bain   YOB: 1955  Date: 10/26/2020   My doctor: Norma Serrano MD   My clinic: [facility]        My Control Medicine: Fluticasone furoate (Arnuity Ellipta) -  200 mcg 1 puff daily  My Rescue Medicine: Albuterol (Proair/Ventolin/Proventil HFA) 2-4 puffs EVERY 4 HOURS as needed. Use a spacer if recommended by your provider.   My Asthma Severity:   Mild Persistent  Know your asthma triggers:   None            GREEN ZONE   Good Control    I feel good    No cough or wheeze    Can work, sleep and play without asthma symptoms       Take your asthma control medicine every day.     1. If exercise triggers your asthma, take your rescue medication    15 minutes before exercise or sports, and    During exercise if you have asthma symptoms  2. Spacer to use with inhaler: If you have a spacer, make sure to use it with your inhaler             YELLOW ZONE Getting Worse  I have ANY of these:    I do not feel good    Cough or wheeze    Chest feels tight    Wake up at night   1. Keep taking your Green Zone medications  2. Start taking your rescue medicine:    every 20 minutes for up to 1 hour. Then every 4 hours for 24-48 hours.  3. If you stay in the Yellow Zone for more than 12-24 hours, contact your doctor.  4. If you do not return to the Green Zone in 12-24 hours or you get worse, start taking your oral steroid medicine if prescribed by your provider.           RED ZONE Medical Alert - Get Help  I have ANY of these:    I feel awful    Medicine is not helping    Breathing getting harder    Trouble walking or talking    Nose opens wide to breathe       1. Take your rescue medicine NOW  2. If your provider has prescribed an oral steroid medicine, start taking it NOW  3. Call your doctor NOW  4. If you are still in the Red Zone after 20 minutes and you have not reached your doctor:    Take your rescue medicine again and    Call 911 or go to the  emergency room right away    See your regular doctor within 2 weeks of an Emergency Room or Urgent Care visit for follow-up treatment.          Annual Reminders:  Meet with Asthma Educator,  Flu Shot in the Fall, consider Pneumonia Vaccination for patients with asthma (aged 19 and older).    Pharmacy:    [id number][id number] - [place], [place] - [address] AT [facility] OF  [address]  [place] - A MAIL ORDER [facility] MAIL DELIV[facility] - [facility] [address]    Electronically signed by Norma Serrano MD   Date: 10/26/20                      Asthma Triggers  How To Control Things That Make Your Asthma Worse    Triggers are things that make your asthma worse.  Look at the list below to help you find your triggers and what you can do about them.  You can help prevent asthma flare-ups by staying away from your triggers.      Trigger                                                          What you can do   Cigarette Smoke  Tobacco smoke can make asthma worse. Do not allow smoking in your home, car or around you.  Be sure no one smokes at a child s day care or school.  If you smoke, ask your health care provider for ways to help you quit.  Ask family members to quit too.  Ask your health care provider for a referral to Quit Plan to help you quit smoking, or call 2-429-793-PLAN.     Colds, Flu, Bronchitis  These are common triggers of asthma. Wash your hands often.  Don t touch your eyes, nose or mouth.  Get a flu shot every year.     Dust Mites  These are tiny bugs that live in cloth or carpet. They are too small to see. Wash sheets and blankets in hot water every week.   Encase pillows and mattress in dust mite proof covers.  Avoid having carpet if you can. If you have carpet, vacuum weekly.   Use a dust mask and HEPA vacuum.   Pollen and Outdoor Mold  Some people are allergic to trees, grass, or weed pollen, or molds. Try to keep your windows closed.  Limit time out doors  when pollen count is high.   Ask you health care provider about taking medicine during allergy season.     Animal Dander  Some people are allergic to skin flakes, urine or saliva from pets with fur or feathers. Keep pets with fur or feathers out of your home.    If you can t keep the pet outdoors, then keep the pet out of your bedroom.  Keep the bedroom door closed.  Keep pets off cloth furniture and away from stuffed toys.     Mice, Rats, and Cockroaches   Some people are allergic to the waste from these pests.   Cover food and garbage.  Clean up spills and food crumbs.  Store grease in the refrigerator.   Keep food out of the bedroom.   Indoor Mold  This can be a trigger if your home has high moisture. Fix leaking faucets, pipes, or other sources of water.   Clean moldy surfaces.  Dehumidify basement if it is damp and smelly.   Smoke, Strong Odors, and Sprays  These can reduce air quality. Stay away from strong odors and sprays, such as perfume, powder, hair spray, paints, smoke incense, paint, cleaning products, candles and new carpet.   Exercise or Sports  Some people with asthma have this trigger. Be active!  Ask your doctor about taking medicine before sports or exercise to prevent symptoms.    Warm up for 5-10 minutes before and after sports or exercise.     Other Triggers of Asthma  Cold air:  Cover your nose and mouth with a scarf.  Sometimes laughing or crying can be a trigger.  Some medicines and food can trigger asthma.

## 2020-10-26 NOTE — LETTER
My Asthma Action Plan    Name: Teri Bain   YOB: 1955  Date: 10/26/2020   My doctor: Norma Serrano MD   My clinic: [facility]        My Rescue Medicine:   Albuterol inhaler (Proair/Ventolin/Proventil HFA)  2-4 puffs EVERY 4 HOURS as needed. Use a spacer if recommended by your provider.   My Asthma Severity:   Intermittent / Exercise Induced    None          GREEN ZONE   Good Control    I feel good    No cough or wheeze    Can work, sleep and play without asthma symptoms       Take your asthma control medicine every day.     1. If exercise triggers your asthma, take your rescue medication    15 minutes before exercise or sports, and    During exercise if you have asthma symptoms  2. Spacer to use with inhaler: If you have a spacer, make sure to use it with your inhaler             YELLOW ZONE Getting Worse  I have ANY of these:    I do not feel good    Cough or wheeze    Chest feels tight    Wake up at night   1. Keep taking your Green Zone medications  2. Start taking your rescue medicine:    every 20 minutes for up to 1 hour. Then every 4 hours for 24-48 hours.  3. If you stay in the Yellow Zone for more than 12-24 hours, contact your doctor.  4. If you do not return to the Green Zone in 12-24 hours or you get worse, start taking your oral steroid medicine if prescribed by your provider.           RED ZONE Medical Alert - Get Help  I have ANY of these:    I feel awful    Medicine is not helping    Breathing getting harder    Trouble walking or talking    Nose opens wide to breathe       1. Take your rescue medicine NOW  2. If your provider has prescribed an oral steroid medicine, start taking it NOW  3. Call your doctor NOW  4. If you are still in the Red Zone after 20 minutes and you have not reached your doctor:    Take your rescue medicine again and    Call 911 or go to the emergency room right away    See your regular doctor within 2 weeks of an Emergency  Room or Urgent Care visit for follow-up treatment.          Annual Reminders:  Meet with Asthma Educator,  Flu Shot in the Fall, consider Pneumonia Vaccination for patients with asthma (aged 19 and older).    Pharmacy:    [id number] [place] - [address] AT [facility] [address] [facility]  [email or web address] - A MAIL ORDER [facility] MAIL [facility]IVERY - [facility] [address]    Electronically signed by Norma Serrano MD   Date: 10/26/20                    Asthma Triggers  How To Control Things That Make Your Asthma Worse    Triggers are things that make your asthma worse.  Look at the list below to help you find your triggers and   what you can do about them. You can help prevent asthma flare-ups by staying away from your triggers.      Trigger                                                          What you can do   Cigarette Smoke  Tobacco smoke can make asthma worse. Do not allow smoking in your home, car or around you.  Be sure no one smokes at a child s day care or school.  If you smoke, ask your health care provider for ways to help you quit.  Ask family members to quit too.  Ask your health care provider for a referral to Quit Plan to help you quit smoking, or call 0-583-601-PLAN.     Colds, Flu, Bronchitis  These are common triggers of asthma. Wash your hands often.  Don t touch your eyes, nose or mouth.  Get a flu shot every year.     Dust Mites  These are tiny bugs that live in cloth or carpet. They are too small to see. Wash sheets and blankets in hot water every week.   Encase pillows and mattress in dust mite proof covers.  Avoid having carpet if you can. If you have carpet, vacuum weekly.   Use a dust mask and HEPA vacuum.   Pollen and Outdoor Mold  Some people are allergic to trees, grass, or weed pollen, or molds. Try to keep your windows closed.  Limit time out doors when pollen count is high.   Ask you health care provider about taking medicine  during allergy season.     Animal Dander  Some people are allergic to skin flakes, urine or saliva from pets with fur or feathers. Keep pets with fur or feathers out of your home.    If you can t keep the pet outdoors, then keep the pet out of your bedroom.  Keep the bedroom door closed.  Keep pets off cloth furniture and away from stuffed toys.     Mice, Rats, and Cockroaches  Some people are allergic to the waste from these pests.   Cover food and garbage.  Clean up spills and food crumbs.  Store grease in the refrigerator.   Keep food out of the bedroom.   Indoor Mold  This can be a trigger if your home has high moisture. Fix leaking faucets, pipes, or other sources of water.   Clean moldy surfaces.  Dehumidify basement if it is damp and smelly.   Smoke, Strong Odors, and Sprays  These can reduce air quality. Stay away from strong odors and sprays, such as perfume, powder, hair spray, paints, smoke incense, paint, cleaning products, candles and new carpet.   Exercise or Sports  Some people with asthma have this trigger. Be active!  Ask your doctor about taking medicine before sports or exercise to prevent symptoms.    Warm up for 5-10 minutes before and after sports or exercise.     Other Triggers of Asthma  Cold air:  Cover your nose and mouth with a scarf.  Sometimes laughing or crying can be a trigger.  Some medicines and food can trigger asthma.

## 2020-10-26 NOTE — Clinical Note
Please abstract the following data from this visit with this patient into the appropriate field in Epic:    Tests that can be patient reported without a hard copy:    Other Tests found in the patient's chart through Chart Review/Care Everywhere:    Mammogram done by [facility] this date: 7/28/2020     Note to [employer]: If this section is blank, no results were found via Chart Review/Care Everywhere.

## 2020-10-27 ASSESSMENT — ASTHMA QUESTIONNAIRES: ACT_TOTALSCORE: 17

## 2020-10-27 NOTE — RESULT ENCOUNTER NOTE
García Williamson,  Your kidney function (creatinine and eGFR) is stable.  Your urine albumin level (another test of kidney function) is normal.  And your lipid panel (cholesterol) results are fantastic!     I hope to see you again shortly to address your other concerns.  Norma Serrano MD

## 2020-11-24 DIAGNOSIS — I10 HYPERTENSION GOAL BP (BLOOD PRESSURE) < 140/90: ICD-10-CM

## 2020-11-28 RX ORDER — AMLODIPINE BESYLATE 5 MG/1
TABLET ORAL
Qty: 90 TABLET | Refills: 3 | Status: SHIPPED | OUTPATIENT
Start: 2020-11-28 | End: 2021-03-23

## 2020-12-15 ENCOUNTER — VIRTUAL VISIT (OUTPATIENT)
Dept: FAMILY MEDICINE | Facility: CLINIC | Age: 65
End: 2020-12-15
Payer: COMMERCIAL

## 2020-12-15 ENCOUNTER — NURSE TRIAGE (OUTPATIENT)
Dept: FAMILY MEDICINE | Facility: CLINIC | Age: 65
End: 2020-12-15

## 2020-12-15 DIAGNOSIS — J01.01 ACUTE RECURRENT MAXILLARY SINUSITIS: Primary | ICD-10-CM

## 2020-12-15 DIAGNOSIS — J30.1 CHRONIC SEASONAL ALLERGIC RHINITIS DUE TO POLLEN: ICD-10-CM

## 2020-12-15 PROCEDURE — 99213 OFFICE O/P EST LOW 20 MIN: CPT | Mod: 95 | Performed by: FAMILY MEDICINE

## 2020-12-15 RX ORDER — FLUTICASONE PROPIONATE 50 MCG
1-2 SPRAY, SUSPENSION (ML) NASAL DAILY
Qty: 16 G | Refills: 11 | Status: SHIPPED | OUTPATIENT
Start: 2020-12-15 | End: 2021-11-08

## 2020-12-15 NOTE — TELEPHONE ENCOUNTER
General Call:     Who is calling:  Pt    Reason for Call:  Pt states did have burning throat on Sat but  Now just has congestion which started yesterday.  Pt would like a medication to keep ahead Pt uses [facility]    What are your questions or concerns:  na    Date of last appointment with provider: na    Okay to leave a detailed message:Yes at Home number on file [phone] (home)

## 2020-12-15 NOTE — PROGRESS NOTES
"Teri Bain is a 65 year old female who is being evaluated via a billable telephone visit.      The patient has been notified of following:     \"This telephone visit will be conducted via a call between you and your physician/provider. We have found that certain health care needs can be provided without the need for a physical exam.  This service lets us provide the care you need with a short phone conversation.  If a prescription is necessary we can send it directly to your pharmacy.  If lab work is needed we can place an order for that and you can then stop by our lab to have the test done at a later time.    Telephone visits are billed at different rates depending on your insurance coverage. During this emergency period, for some insurers they may be billed the same as an in-person visit.  Please reach out to your insurance provider with any questions.    If during the course of the call the physician/provider feels a telephone visit is not appropriate, you will not be charged for this service.\"    Patient has given verbal consent for Telephone visit?  Yes    What phone number would you like to be contacted at? [phone] (I)     How would you like to obtain your AVS? MyChart    Subjective     Teri Bain is a 65 year old female who presents via phone visit today for the following health issues:    HPI      SUBJECTIVE: Teri Bain is a 65 year old female patient complaining of runny nose, congestion, sinus pain and pressure that started 3 days ago.  She says she gets every year in December. Last year she reports symptoms for 5 weeks (last year she had fatigue, achiness). She's a [employer] and a state  so symptoms really impact her, and she has asthma. She denies any wheezing. She uses salt drops which helps a little.    Concern for COVID-19  About how many days ago did these symptoms start? Saturday   Is this your first visit for this illness? Yes  In the 14 days before your symptoms " started, have you had close contact with someone with COVID-19 (Coronavirus)? No  Do you have a fever or chills? No  Are you having new or worsening difficulty breathing? No  Do you have new or worsening cough? Yes, I am coughing up mucus. just started today, cough is intermittent   Have you had any new or unexplained body aches? No    Have you experienced any of the following NEW symptoms?    Headache: No    Sore throat: No    Loss of taste or smell: No    Chest pain: No    Diarrhea: No    Rash: No  What treatments have you tried? Salt water, and tylenol prn, elderberry with zinc, airborne elderberry   Who do you live with?    Are you, or a household member, a healthcare worker or a ? No  Do you live in a nursing home, group home, or shelter? No  Do you have a way to get food/medications if quarantined? Yes, I have a friend or family member who can help me.             Allergies   Allergen Reactions     Benazepril Rash     Claritin [Loratadine]      Codeine Nausea     Lisinopril Rash     Losartan      MILDRED - avoid ACEi/ARBs     Tetracycline Nausea     Penicillins Rash      Past Medical History:   Diagnosis Date     Allergic rhinitis      Chronic kidney disease (CKD), stage III (moderate)      Chronic venous insufficiency      Classic migraines     fiorinal and darvocet prn     Diabetes mellitus, type 2 (H) 1998    age 42     Dysplasia of cervix     treated with hysterectomy     GERD (gastroesophageal reflux disease)      History of tobacco use     quit 1/97     Hyperlipidemia LDL goal <100      Hypertension goal BP (blood pressure) < 140/90      Mild persistent asthma      Osteoarthritis     LT ankle & L>R knee, hands     Trochanteric bursitis      Vitamin D deficiency 2/15/2010      Past Surgical History:   Procedure Laterality Date     C DEXA, BONE DENSITY, AXIAL SKEL  11/04    [facility]     C LIGATE FALLOPIAN TUBE  1977     COLONOSCOPY  11/04    [facility] [place] [facility]     COLONOSCOPY  1/5/15    [place] [place] [facility]      HC DILATION/CURETTAGE DIAG/THER NON OB  5/89    D & C     HC REMOVAL GALLBLADDER  1977     HYSTERECTOMY, PAP NO LONGER INDICATED  1/90    Hysterectomy, Vaginal, ovaries intact     SURGICAL HISTORY OF -   1987    cystoscopy      Review of Systems   Constitutional, HEENT, cardiovascular, pulmonary, GI, , musculoskeletal, neuro, skin, endocrine and psych systems are negative, except as otherwise noted.       Objective          Vitals:  No vitals were obtained today due to virtual visit.    healthy, alert and no distress  PSYCH: Alert and oriented times 3; coherent speech, normal   rate and volume, able to articulate logical thoughts, able   to abstract reason, no tangential thoughts, no hallucinations   or delusions  Her affect is normal  RESP: No cough, no audible wheezing, able to talk in full sentences  Remainder of exam unable to be completed due to telephone visits          Assessment/Plan:    Assessment & Plan     Acute recurrent maxillary sinusitis  Possibly due to  Chronic seasonal allergic rhinitis due to pollen  See AVS, which I reviewed with her  - fluticasone (FLONASE) 50 MCG/ACT nasal spray; Spray 1-2 sprays into both nostrils daily        Patient Instructions   I don't usually prescribe antibiotics for sinus infections. They are usually viral infection, NOT a bacterial infection and an antibiotic would not help. However, there are a lot of things to try to make your symptoms feel better!    I recommend:  1. Take pseudoephedrine 60 mg (2 tabs) every 4 hours. Ask the pharmacist for the over the counter real  Pseudoephedrine.  2. Tylenol and ibuprofen as needed for headache.    Ibuprofen: You can take 800 mg (4 tabs) every 8 hours or 600 mg (3 tabs) every 6 hours. Take with food or water to protect your stomach.   3. Afrin spray for 3 days.  4. Neti pot twice daily (see instructions below).  5. Continue Flonase. I will send a refill to your pharmacy.     If your symptoms have not improved or worsened in 7  days, (Monday or Tuesday 12/21/20 or 12/22/2020), please contact the clinic and we could consider antibiotics, you used doxycyline last time.    Sinus Rinse/Neti Pot Instructions  1. Time Required: 3 to 5 minutes twice daily  2. Fill the neti pot with water. The water should be lukewarm (not too hot, not too cold) and can be poured into the pot directly from the tap (approximately 1/2 cup of water). Distilled water is recommended if the purity/safety of the tap water in your region is questionable.   3. Add 1/4 to 1/2 teaspoon of pickling salt, sea salt or table salt (without added iodine) to the water. Stir with spoon to dissolve thoroughly. You may also use prepackaged packets.  4. Lean your head forward over the basin, bending your neck down slightly with your eyes looking downwards.   5. Gently place the spout of the neti pot inside your right nostril, forming a seal to avoid any outer leakage.   6. Open your mouth slightly. Breathe continuously through your open mouth during this sinus cleansing procedure. This allows a necessary air passageway so that the water will not drain from behind your nose into your mouth.   7. Tilt your head sideways, so that your right nostril is directly above your left nostril. Tip the neti pot, allowing the water solution to pour into your right nostril. Within a few seconds the water will naturally drain from your left nostril into the sink.   8. After the net pot is empty, remove the spout from your right nostril, and exhale through both nostrils. Gently blow your nose into a tissue.   9. Repeat steps 1 through 7 for your left nostril.  Tips:   1. Thoroughly clean your Neti Pot after each use. Periodically place it in your  for a thorough sanitizing.Same as a toothbrush, do not share your neti pot with anyone else. Everyone in the household should have their own neti pot.   2. Try using only half the amount of recommended salt the first few times you use your neti pot  until you become more accustomed to the process.   3. Applying a thin layer of petroleum jelly on the inside of both nostrils before the treatment helps sooth sensitive skin.   4. You may notice improved breathing, smell and taste.   5. If you experience any discomfort please discontinue using your neti pot and consult your primary care doctor.         Return in about 1 week (around 12/22/2020) for follow up if not improving.    Kaylyn Dutta MD  [facility]    Phone call duration:  14 minutes

## 2020-12-15 NOTE — PATIENT INSTRUCTIONS
I don't usually prescribe antibiotics for sinus infections. They are usually viral infection, NOT a bacterial infection and an antibiotic would not help. However, there are a lot of things to try to make your symptoms feel better!    I recommend:  1. Take pseudoephedrine 60 mg (2 tabs) every 4 hours. Ask the pharmacist for the over the counter real  Pseudoephedrine.  2. Tylenol and ibuprofen as needed for headache.    Ibuprofen: You can take 800 mg (4 tabs) every 8 hours or 600 mg (3 tabs) every 6 hours. Take with food or water to protect your stomach.   3. Afrin spray for 3 days.  4. Neti pot twice daily (see instructions below).  5. Continue Flonase. I will send a refill to your pharmacy.     If your symptoms have not improved or worsened in 7 days, (Monday or Tuesday 12/21/20 or 12/22/2020), please contact the clinic and we could consider antibiotics, you used doxycyline last time.    Sinus Rinse/Neti Pot Instructions  1. Time Required: 3 to 5 minutes twice daily  2. Fill the neti pot with water. The water should be lukewarm (not too hot, not too cold) and can be poured into the pot directly from the tap (approximately 1/2 cup of water). Distilled water is recommended if the purity/safety of the tap water in your region is questionable.   3. Add 1/4 to 1/2 teaspoon of pickling salt, sea salt or table salt (without added iodine) to the water. Stir with spoon to dissolve thoroughly. You may also use prepackaged packets.  4. Lean your head forward over the basin, bending your neck down slightly with your eyes looking downwards.   5. Gently place the spout of the neti pot inside your right nostril, forming a seal to avoid any outer leakage.   6. Open your mouth slightly. Breathe continuously through your open mouth during this sinus cleansing procedure. This allows a necessary air passageway so that the water will not drain from behind your nose into your mouth.   7. Tilt your head sideways, so that your right  nostril is directly above your left nostril. Tip the neti pot, allowing the water solution to pour into your right nostril. Within a few seconds the water will naturally drain from your left nostril into the sink.   8. After the net pot is empty, remove the spout from your right nostril, and exhale through both nostrils. Gently blow your nose into a tissue.   9. Repeat steps 1 through 7 for your left nostril.  Tips:   1. Thoroughly clean your Neti Pot after each use. Periodically place it in your  for a thorough sanitizing.Same as a toothbrush, do not share your neti pot with anyone else. Everyone in the household should have their own neti pot.   2. Try using only half the amount of recommended salt the first few times you use your neti pot until you become more accustomed to the process.   3. Applying a thin layer of petroleum jelly on the inside of both nostrils before the treatment helps sooth sensitive skin.   4. You may notice improved breathing, smell and taste.   5. If you experience any discomfort please discontinue using your neti pot and consult your primary care doctor.

## 2020-12-15 NOTE — TELEPHONE ENCOUNTER
"  Additional Information    Negative: Sounds like a life-threatening emergency to the triager    Negative: Difficulty breathing, and not from stuffy nose (e.g., not relieved by cleaning out the nose)    Negative: SEVERE headache and has fever    Negative: Patient sounds very sick or weak to the triager    Negative: SEVERE sinus pain    Negative: Severe headache    Negative: Redness or swelling on the cheek, forehead, or around the eye    Negative: Fever > 103 F (39.4 C)    Negative: Fever > 101 F (38.3 C) and over 60 years of age    Negative: Fever > 100.0 F (37.8 C) and has diabetes mellitus or a weak immune system (e.g., HIV positive, cancer chemotherapy, organ transplant, splenectomy, chronic steroids)    Negative: Fever > 100.0 F (37.8 C) and bedridden (e.g., nursing home patient, stroke, chronic illness, recovering from surgery)    Negative: Fever present > 3 days (72 hours)    Negative: Fever returns after gone for over 24 hours and symptoms worse or not improved    Negative: Sinus pain (not just congestion) and fever    Negative: Earache    Negative: Sinus congestion (pressure, fullness) present > 10 days    Negative: Nasal discharge present > 10 days    Negative: Using nasal washes and pain medicine > 24 hours and sinus pain (lower forehead, cheekbone, or eye) persists    Negative: Lots of coughing    Patient wants to be seen    Answer Assessment - Initial Assessment Questions  1. LOCATION: \"Where does it hurt?\"       Sinuses   2. ONSET: \"When did the sinus pain start?\"  (e.g., hours, days)       12/14/20  3. SEVERITY: \"How bad is the pain?\"   (Scale 1-10; mild, moderate or severe)    - MILD (1-3): doesn't interfere with normal activities     - MODERATE (4-7): interferes with normal activities (e.g., work or school) or awakens from sleep    - SEVERE (8-10): excruciating pain and patient unable to do any normal activities         Mild to moderate  4. RECURRENT SYMPTOM: \"Have you ever had sinus problems " "before?\" If so, ask: \"When was the last time?\" and \"What happened that time?\"       Yes, some time ago.  5. NASAL CONGESTION: \"Is the nose blocked?\" If so, ask, \"Can you open it or must you breathe through the mouth?\"      Yes, nasal congestion switches from one side to the other, especially while sleeping.  6. NASAL DISCHARGE: \"Do you have discharge from your nose?\" If so ask, \"What color?\"      Yes, it is clear  7. FEVER: \"Do you have a fever?\" If so, ask: \"What is it, how was it measured, and when did it start?\"       No  8. OTHER SYMPTOMS: \"Do you have any other symptoms?\" (e.g., sore throat, cough, earache, difficulty breathing)      Had very sore throat on 12/12/20 but this resolved.  Experiencing post nasal drip today  9. PREGNANCY: \"Is there any chance you are pregnant?\" \"When was your last menstrual period?\"      N/A    Protocols used: SINUS PAIN AND CONGESTION-A-ALYSSA BowersN, RN    "

## 2021-01-04 ENCOUNTER — NURSE TRIAGE (OUTPATIENT)
Dept: FAMILY MEDICINE | Facility: CLINIC | Age: 66
End: 2021-01-04

## 2021-01-04 DIAGNOSIS — J01.90 ACUTE SINUSITIS WITH SYMPTOMS > 10 DAYS: ICD-10-CM

## 2021-01-04 RX ORDER — DOXYCYCLINE 100 MG/1
100 CAPSULE ORAL 2 TIMES DAILY
Qty: 20 CAPSULE | Refills: 0 | Status: SHIPPED | OUTPATIENT
Start: 2021-01-04 | End: 2021-01-14

## 2021-01-04 NOTE — TELEPHONE ENCOUNTER
Reason for call:  Symptom   Symptom or request: symptoms of sinus began 12/13/20.  Patient had a virtural visit 12/15/2020.  Patient still congested.  Please advise      Duration (how long have symptoms been present): 12/13/2020  Have you been treated for this before? Yes    Additional comments: please return patient's call    Phone number to reach patient:  Home number on file [phone] (home)    Best Time:  any    Can we leave a detailed message on this number?  YES    Travel screening: Not Applicable     Mounika Bonilla

## 2021-01-04 NOTE — TELEPHONE ENCOUNTER
"Dr. Serrano-Patient asks you to please consider RX for sinusitis:    1. Virtual visit with Dr. Dutta (not available today) on 12/15/20 and symptoms have not improved:  \"If your symptoms have not improved or worsened in 7 days, (Monday or Tuesday 12/21/20 or 12/22/2020), please contact the clinic and we could consider antibiotics, you used doxycyline last time.\"  2. Patient concerned about post-nasal drip settling in her chest   A. History of asthma      Additional Information    Negative: Sounds like a life-threatening emergency to the triager    Negative: Difficulty breathing, and not from stuffy nose (e.g., not relieved by cleaning out the nose)    Negative: SEVERE headache and has fever    Negative: Patient sounds very sick or weak to the triager    Negative: SEVERE sinus pain    Negative: Severe headache    Negative: Redness or swelling on the cheek, forehead, or around the eye    Negative: Fever > 103 F (39.4 C)    Negative: Fever > 101 F (38.3 C) and over 60 years of age    Negative: Fever > 100.0 F (37.8 C) and has diabetes mellitus or a weak immune system (e.g., HIV positive, cancer chemotherapy, organ transplant, splenectomy, chronic steroids)    Negative: Fever > 100.0 F (37.8 C) and bedridden (e.g., nursing home patient, stroke, chronic illness, recovering from surgery)    Negative: Fever present > 3 days (72 hours)    Negative: Fever returns after gone for over 24 hours and symptoms worse or not improved    Negative: Sinus pain (not just congestion) and fever    Negative: Earache    Sinus congestion (pressure, fullness) present > 10 days    Answer Assessment - Initial Assessment Questions  1. LOCATION: \"Where does it hurt?\"       No pain  2. ONSET: \"When did the sinus pain start?\"  (e.g., hours, days)       12/12/20  3. SEVERITY: \"How bad is the pain?\"   (Scale 1-10; mild, moderate or severe)    - MILD (1-3): doesn't interfere with normal activities     - MODERATE (4-7): interferes with normal " "activities (e.g., work or school) or awakens from sleep    - SEVERE (8-10): excruciating pain and patient unable to do any normal activities         No pain  4. RECURRENT SYMPTOM: \"Have you ever had sinus problems before?\" If so, ask: \"When was the last time?\" and \"What happened that time?\"       Recurrent-usually happens yearly in December   5. NASAL CONGESTION: \"Is the nose blocked?\" If so, ask, \"Can you open it or must you breathe through the mouth?\"      Nose is blocked, particularly when sitting or laying down.  Uses neti pot and Flonase, which provide temporary relief  6. NASAL DISCHARGE: \"Do you have discharge from your nose?\" If so ask, \"What color?\"      Yes, clear but last week was dark after using a Zycam nasal rinse.  Mucous has thinned out a bit.  Concerned about this illness settling in chest.  7. FEVER: \"Do you have a fever?\" If so, ask: \"What is it, how was it measured, and when did it start?\"       No  8. OTHER SYMPTOMS: \"Do you have any other symptoms?\" (e.g., sore throat, cough, earache, difficulty breathing)      Denied: sore throat, cough, earache, difficulty breathing.  9. PREGNANCY: \"Is there any chance you are pregnant?\" \"When was your last menstrual period?\"      N/A    Protocols used: SINUS PAIN AND CONGESTION-ALYSSA RobledoN, RN  [facility]    "

## 2021-01-04 NOTE — TELEPHONE ENCOUNTER
Writer called patient and reviewed message per Dr. Serrano.    Patient verbalized understanding and in agreement with plan.  MARCUS Velazco, RN  [facility]

## 2021-01-08 ENCOUNTER — VIRTUAL VISIT (OUTPATIENT)
Dept: FAMILY MEDICINE | Facility: CLINIC | Age: 66
End: 2021-01-08
Payer: COMMERCIAL

## 2021-01-08 ENCOUNTER — NURSE TRIAGE (OUTPATIENT)
Dept: NURSING | Facility: CLINIC | Age: 66
End: 2021-01-08

## 2021-01-08 DIAGNOSIS — R09.81 NASAL CONGESTION: Primary | ICD-10-CM

## 2021-01-08 PROCEDURE — 99442 PR PHYSICIAN TELEPHONE EVALUATION 11-20 MIN: CPT | Performed by: FAMILY MEDICINE

## 2021-01-08 ASSESSMENT — ENCOUNTER SYMPTOMS
PALPITATIONS: 0
BREAST MASS: 0
HEMATOCHEZIA: 0
DIZZINESS: 0
FEVER: 0
WEAKNESS: 0
MYALGIAS: 0
ABDOMINAL PAIN: 0
DYSURIA: 0
SHORTNESS OF BREATH: 0
HEARTBURN: 0
EYE PAIN: 0
COUGH: 0
CHILLS: 0
HEADACHES: 0
NERVOUS/ANXIOUS: 0
DIARRHEA: 0
JOINT SWELLING: 0
FREQUENCY: 0
ARTHRALGIAS: 0
SORE THROAT: 0
NAUSEA: 0
HEMATURIA: 0
PARESTHESIAS: 0
CONSTIPATION: 0

## 2021-01-08 NOTE — TELEPHONE ENCOUNTER
Caller is requesting covid vaccination due to no improvement from antibiotic therapy for sinus infection. Caller denies any fever but states she does not feel well. Triage guidelines recommend to see pcp within 24 hours. Caller verbalized and understands directives. Patient transferred to scheduling for a virtual visit with provider regarding follow up antibotic therapy and request for covid vaccination.   COVID 19 Nurse Triage Plan/Patient Instructions    Please be aware that novel coronavirus (COVID-19) may be circulating in the community. If you develop symptoms such as fever, cough, or SOB or if you have concerns about the presence of another infection including coronavirus (COVID-19), please contact your health care provider or visit [email or web address].     Disposition/Instructions    Virtual Visit with provider recommended. Reference Visit Selection Guide.    Thank you for taking steps to prevent the spread of this virus.  o Limit your contact with others.  o Wear a simple mask to cover your cough.  o Wash your hands well and often.    Resources    [facility]: About COVID-19: [email or web address]    CDC: What to Do If You're Sick: www.cdc.gov/coronavirus/2019-ncov/about/steps-when-sick.html    CDC: Ending Home Isolation: www.cdc.gov/coronavirus/2019-ncov/hcp/disposition-in-home-patients.html     CDC: Caring for Someone: www.cdc.gov/coronavirus/2019-ncov/if-you-are-sick/care-for-someone.html     [facility]: Interim Guidance for Hospital Discharge to Home: www.health.[facility].[place]/diseases/coronavirus/hcp/hospdischarge.pdf    [facility] clinical trials (COVID-19 research studies): clinicalaffairs.[facility]/umn-clinical-trials     Below are the COVID-19 hotlines at the [place] Department of Health ([facility]). Interpreters are available.   o For health questions: Call 647-931-5035 or [phone] (7 a.m. to 7 p.m.)  o For questions about schools and childcare: Call 448-074-3590 or [phone] (0  a.m. to 7 p.m.)                     Additional Information    Negative: Severe difficulty breathing (e.g., struggling for each breath, speaks in single words)    Negative: Sounds like a life-threatening emergency to the triager    [1] Sinus infection AND [2] taking an antibiotic    Negative: Severe difficulty breathing (e.g., struggling for each breath, speaks in single words)    Negative: Sounds like a life-threatening emergency to the triager    Negative: [1] Difficulty breathing AND [2] not from stuffy nose (e.g., not relieved by cleaning out the nose)    Negative: [1] SEVERE headache AND [2] fever    Negative: [1] Taking antibiotic > 24 hours AND [2] fever > 103 F (39.4 C)    Negative: [1] Redness or swelling on the cheek, forehead or around the eye AND [2] fever    Negative: Patient sounds very sick or weak to the triager    Negative: [1] SEVERE sinus pain AND [2] not improved 2 hours after pain medicine    Negative: [1] Redness or swelling on the cheek, forehead or around the eye AND [2] new since starting antibiotics    Negative: [1] Taking antibiotic > 48 hours (2 days) AND [2] fever persists    [1] Taking antibiotic > 72 hours (3 days) AND [2] sinus pain not improved    Protocols used: INFECTION ON ANTIBIOTIC FOLLOW-UP CALL-A-, SINUS INFECTION ON ANTIBIOTIC FOLLOW-UP CALL-A-

## 2021-01-08 NOTE — PROGRESS NOTES
"Teri is a 65 year old who is being evaluated via a billable telephone visit.      What phone number would you like to be contacted at? [phone]  How would you like to obtain your AVS? Yvette  {PROVIDER CHARTING PREFERENCE:285220}    Subjective     Teri is a 65 year old who presents to clinic today for the following health issues     HPI       Acute Illness  Acute illness concerns: Followup sinusitis- dx 12/15  Onset/Duration: ***  Symptoms:  Fever: {.:067695::\"no\"}  Chills/Sweats: {.:067770::\"no\"}  Headache (location?): {.:211383::\"no\"}  Sinus Pressure: {.:891366::\"no\"}  Conjunctivitis:  {.:556728::\"no\"}  Ear Pain: {.:587908::\"no\"}  Rhinorrhea: {.:542479::\"no\"}  Congestion: {.:178633::\"no\"}  Sore Throat: {.:494617::\"no\"}  Cough: {.:071651::\"no\"}  Wheeze: {.:200407::\"no\"}  Decreased Appetite: {.:394432::\"no\"}  Nausea: {.:678405::\"no\"}  Vomiting: {.:478210::\"no\"}  Diarrhea: {.:085352::\"no\"}  Dysuria/Freq.: {.:141816::\"no\"}  Dysuria or Hematuria: {.:156354::\"no\"}  Fatigue/Achiness: {.:927655::\"no\"}  Sick/Strep Exposure: {.:874536::\"no\"}  Therapies tried and outcome: Flonase w/o relief    ***  {additonal problems for provider to add (Optional):671947}    Review of Systems   {ROS COMP (Optional):243121}      Objective           Vitals:  No vitals were obtained today due to virtual visit.    Physical Exam   {GENERAL APPEARANCE:50::\"healthy\",\"alert\",\"no distress\"}  PSYCH: Alert and oriented times 3; coherent speech, normal   rate and volume, able to articulate logical thoughts, able   to abstract reason, no tangential thoughts, no hallucinations   or delusions  Her affect is { :2852899::\"normal\"}  RESP: No cough, no audible wheezing, able to talk in full sentences  Remainder of exam unable to be completed due to telephone visits    {Diagnostic Test Results (Optional):969611}    {AMBULATORY ATTESTATION (Optional):735004}        Phone call duration: *** minutes  "

## 2021-01-08 NOTE — PATIENT INSTRUCTIONS
Norris Williamson,    Thank you for allowing [facility] to manage your care.    Please complete your antibiotic course.    I ordered a COVID-19 test, they will be calling you.    I encourage over the counter nasal saline rinse kit (Neilmed) daily for nasal congestion.     If you have any questions or concerns, please feel free to call us at [phone].    Sincerely,    Dr. Booth    Did you know?      You can schedule a video visit for follow-up appointments as well as future appointments for certain conditions.  Please see the below link.     [email or web address]    If you have not already done so,  I encourage you to sign up for [employer] ([email or web address]).  This will allow you to review your results, securely communicate with a provider, and schedule virtual visits as well.

## 2021-01-09 ENCOUNTER — NURSE TRIAGE (OUTPATIENT)
Dept: NURSING | Facility: CLINIC | Age: 66
End: 2021-01-09

## 2021-01-09 DIAGNOSIS — R09.81 NASAL CONGESTION: ICD-10-CM

## 2021-01-09 PROCEDURE — U0003 INFECTIOUS AGENT DETECTION BY NUCLEIC ACID (DNA OR RNA); SEVERE ACUTE RESPIRATORY SYNDROME CORONAVIRUS 2 (SARS-COV-2) (CORONAVIRUS DISEASE [COVID-19]), AMPLIFIED PROBE TECHNIQUE, MAKING USE OF HIGH THROUGHPUT TECHNOLOGIES AS DESCRIBED BY CMS-2020-01-R: HCPCS | Performed by: FAMILY MEDICINE

## 2021-01-09 PROCEDURE — U0005 INFEC AGEN DETEC AMPLI PROBE: HCPCS | Performed by: FAMILY MEDICINE

## 2021-01-09 NOTE — TELEPHONE ENCOUNTER
Teri is calling and has questions on nasal netti pot and nasal congestion.  Teri states that she is going for a covid test today.  FNA advised to continue with Netti Pot and Doxycycline and Teri agreed.    COVID 19 Nurse Triage Plan/Patient Instructions    Please be aware that novel coronavirus (COVID-19) may be circulating in the community. If you develop symptoms such as fever, cough, or SOB or if you have concerns about the presence of another infection including coronavirus (COVID-19), please contact your health care provider or visit [email or web address].     Disposition/Instructions    Home care recommended. Follow home care protocol based instructions.    Thank you for taking steps to prevent the spread of this virus.  o Limit your contact with others.  o Wear a simple mask to cover your cough.  o Wash your hands well and often.    Resources    [facility]: About COVID-19: [email or web address]    CDC: What to Do If You're Sick: www.cdc.gov/coronavirus/2019-ncov/about/steps-when-sick.html    CDC: Ending Home Isolation: www.cdc.gov/coronavirus/2019-ncov/hcp/disposition-in-home-patients.html     CDC: Caring for Someone: www.cdc.gov/coronavirus/2019-ncov/if-you-are-sick/care-for-someone.html     [facility]: Interim Guidance for Hospital Discharge to Home: www.health.[facility].mn.[place]/diseases/coronavirus/hcp/hospdischarge.pdf    [facility] clinical trials (COVID-19 research studies): clinicalaffairs.[facility]-clinical-trials     Below are the COVID-19 hotlines at the [facility] ([facility]). Interpreters are available.   o For health questions: Call 163-017-7890 or [phone] (7 a.m. to 7 p.m.)  o For questions about schools and childcare: Call 103-644-0278 or [phone] (7 a.m. to 7 p.m.)                     Additional Information    Negative: RN needs further essential information from caller in order to complete triage    Negative: Requesting regular office appointment     Negative: [1] Caller requesting NON-URGENT health information AND [2] PCP's office is the best resource    Health Information question, no triage required and triager able to answer question    Protocols used: INFORMATION ONLY CALL-A-AH

## 2021-01-10 ENCOUNTER — TELEPHONE (OUTPATIENT)
Dept: PHARMACY | Facility: CLINIC | Age: 66
End: 2021-01-10

## 2021-01-10 LAB
SARS-COV-2 RNA RESP QL NAA+PROBE: ABNORMAL
SPECIMEN SOURCE: ABNORMAL

## 2021-01-10 NOTE — TELEPHONE ENCOUNTER
"Coronavirus (COVID-19) Notification    Caller Name (Patient, parent, daughter/son, grandparent, etc)  Patient    Reason for call  Notify of Positive Coronavirus (COVID-19) lab results, assess symptoms,  review  [facility] recommendations    Lab Result    Lab test:  2019-nCoV rRt-PCR or SARS-CoV-2 PCR    Oropharyngeal AND/OR nasopharyngeal swabs is POSITIVE for 2019-nCoV RNA/SARS-COV-2 PCR (COVID-19 virus)    RN Recommendations/Instructions per [facility] Coronavirus COVID-19 recommendations    Brief introduction script  Introduce self then review script:  \"I am calling on behalf of [employer].  We were notified that your Coronavirus test (COVID-19) for was POSITIVE for the virus.  I have some information to relay to you but first I wanted to mention that the [place] Dept of Health will be contacting you shortly [it's possible MD already called Patient] to talk to you more about how you are feeling and other people you have had contact with who might now also have the virus.  Also,  [facility] is Partnering with the [facility] for Covid-19 research, you may be contacted directly by research staff.\"    Assessment (Inquire about Patient's current symptoms)   Assessment   Current Symptoms at time of phone call: (if no symptoms, document No symptoms] Nasal stuffiness   Symptoms onset (if applicable) 3 weeks ago     If at time of call, Patients symptoms hare worsened, the Patient should contact 911 or have someone drive them to Emergency Dept promptly:      If Patient calling 911, inform 911 personal that you have tested positive for the Coronavirus (COVID-19).  Place mask on and await 911 to arrive.    If Emergency Dept, If possible, please have another adult drive you to the Emergency Dept but you need to wear mask when in contact with other people.      Monoclonal Antibody Administration    You may be eligible to receive a new treatment with a monoclonal antibody for preventing " "hospitalization in patients at high risk for complications from COVID-19.   This medication is still experimental and available on a limited basis; it is given through an IV and must be given at an infusion center. Please note that not all people who are eligible will receive the medication since it is in limited supply.     Are you interested in being considered for this medication?  No.   Does the patient fit the criteria: Patient declined    If patient qualifies based on above criteria:  \"We will contact you if you are selected to receive the medication in the next 1-2 days.   This is time sensitive and if you are not selected in the next 1-2 days, you will not receive the medication.  If you do not receive a call to schedule, you have not been selected.\"    Review information with Patient    Your result was positive. This means you have COVID-19 (coronavirus).  We have sent you a letter that reviews the information that I'll be reviewing with you now.    How can I protect others?    If you have symptoms: stay home and away from others (self-isolate) until:    You've had no fever--and no medicine that reduces fever--for 1 full day (24 hours). And       Your other symptoms have gotten better. For example, your cough or breathing has improved. And     At least 10 days have passed since your symptoms started. (If you've been told by a doctor that you have a weak immune system, wait 20 days.)     If you don't have symptoms: Stay home and away from others (self-isolate) until at least 10 days have passed since your first positive COVID-19 test. (Date test collected)    During this time:    Stay in your own room, including for meals. Use your own bathroom if you can.    Stay away from others in your home. No hugging, kissing or shaking hands. No visitors.     Don't go to work, school or anywhere else.     Clean  high touch  surfaces often (doorknobs, counters, handles, etc.). Use a household cleaning spray or wipes. " You'll find a full list on the EPA website at www.epa.gov/pesticide-registration/list-n-disinfectants-use-against-sars-cov-2.     Cover your mouth and nose with a mask, tissue or other face covering to avoid spreading germs.    Wash your hands and face often with soap and water.    Caregivers in these groups are at risk for severe illness due to COVID-19:  o People 65 years and older  o People who live in a nursing home or long-term care facility  o People with chronic disease (lung, heart, cancer, diabetes, kidney, liver, immunologic)  o People who have a weakened immune system, including those who:  - Are in cancer treatment  - Take medicine that weakens the immune system, such as corticosteroids  - Had a bone marrow or organ transplant  - Have an immune deficiency  - Have poorly controlled HIV or AIDS  - Are obese (body mass index of 40 or higher)  - Smoke regularly    Caregivers should wear gloves while washing dishes, handling laundry and cleaning bedrooms and bathrooms.    Wash and dry laundry with special caution. Don't shake dirty laundry, and use the warmest water setting you can.    If you have a weakened immune system, ask your doctor about other actions you should take.    For more tips, go to www.cdc.gov/coronavirus/2019-ncov/downloads/10Things.pdf.    You should not go back to work until you meet the guidelines above for ending your home isolation. You don't need to be retested for COVID-19 before going back to work--studies show that you won't spread the virus if it's been at least 10 days since your symptoms started (or 20 days, if you have a weak immune system).    Employers: This document serves as formal notice of your employee's medical guidelines for going back to work. They must meet the above guidelines before going back to work in person.    How can I take care of myself?    1. Get lots of rest. Drink extra fluids (unless a doctor has told you not to).    2. Take Tylenol (acetaminophen) for  fever or pain. If you have liver or kidney problems, ask your family doctor if it's okay to take Tylenol.     Take either:     650 mg (two 325 mg pills) every 4 to 6 hours, or     1,000 mg (two 500 mg pills) every 8 hours as needed.     Note: Don't take more than 3,000 mg in one day. Acetaminophen is found in many medicines (both prescribed and over-the-counter medicines). Read all labels to be sure you don't take too much.    For children, check the Tylenol bottle for the right dose (based on their age or weight).    3. If you have other health problems (like cancer, heart failure, an organ transplant or severe kidney disease): Call your specialty clinic if you don't feel better in the next 2 days.    4. Know when to call 911: Emergency warning signs include:    Trouble breathing or shortness of breath    Pain or pressure in the chest that doesn't go away    Feeling confused like you haven't felt before, or not being able to wake up    Bluish-colored lips or face    5. Sign up for [employer]. We know it's scary to hear that you have COVID-19. We want to track your symptoms to make sure you're okay over the next 2 weeks. Please look for an email from [employer]--this is a free, online program that we'll use to keep in touch. To sign up, follow the link in the email. Learn more at [email or web address].    Where can I get more information?    [facility]: [email or web address]    Coronavirus Basics: www.health.[facility].[place]/diseases/coronavirus/basics.html    What to Do If You're Sick: www.cdc.gov/coronavirus/2019-ncov/about/steps-when-sick.html    Ending Home Isolation: www.cdc.gov/coronavirus/2019-ncov/hcp/disposition-in-home-patients.html     Caring for Someone with COVID-19: www.cdc.gov/coronavirus/2019-ncov/if-you-are-sick/care-for-someone.html     [facility] clinical trials (COVID-19 research studies): clinicalaffairs.[facility]-clinical-trials     A Positive COVID-19  letter will be sent via [employer] or the mail. (Exception, no letters sent to Presurgerical/Preprocedure Patients)    Lacie Monroe LPN

## 2021-01-25 ENCOUNTER — NURSE TRIAGE (OUTPATIENT)
Dept: FAMILY MEDICINE | Facility: CLINIC | Age: 66
End: 2021-01-25

## 2021-01-25 NOTE — TELEPHONE ENCOUNTER
Reason for Call:  Other call back    Detailed comments: Patient states she still has a stuffy nose after testing positive for COVID and quarantining. States she tested positive about 2-3 weeks ago but the stuffy nose is persisting. Please assist / advise. Thanks!    Phone Number Patient can be reached at: Cell number on file:    Telephone Information:   Mobile [phone]     Best Time: Any    Can we leave a detailed message on this number? YES    Call taken on 1/25/2021 at 8:55 AM by Zofia Correa

## 2021-01-25 NOTE — TELEPHONE ENCOUNTER
Patient also asked questions about COVID-19 vaccine and writer discussed with patient currently [facility] [employer] [facility] does not have information on COVID-19 vaccine distribution to patients.  Please check back with us regularly for updated information.    Reason for Disposition    [1] COVID-19 infection suspected by caller or triager AND [2] mild symptoms (cough, fever, or others) AND [3] no complications or SOB    Additional Information    Negative: SEVERE difficulty breathing (e.g., struggling for each breath, speaks in single words)    Negative: Difficult to awaken or acting confused (e.g., disoriented, slurred speech)    Negative: Bluish (or gray) lips or face now    Negative: Shock suspected (e.g., cold/pale/clammy skin, too weak to stand, low BP, rapid pulse)    Negative: Sounds like a life-threatening emergency to the triager    Negative: [1] COVID-19 exposure AND [2] no symptoms    Negative: [1] Lives with someone known to have influenza (flu test positive) AND [2] flu-like symptoms (e.g., cough, runny nose, sore throat, SOB; with or without fever)    Negative: [1] Adult with possible COVID-19 symptoms AND [2] triager concerned about severity of symptoms or other causes    Negative: COVID-19 vaccine reaction suspected (e.g., fever, headache, muscle aches) occurring during days 1-3 after getting vaccine    Negative: COVID-19 vaccine, questions about    Negative: COVID-19 and breastfeeding, questions about    Negative: SEVERE or constant chest pain or pressure (Exception: mild central chest pain, present only when coughing)    Negative: MODERATE difficulty breathing (e.g., speaks in phrases, SOB even at rest, pulse 100-120)    Negative: [1] Headache AND [2] stiff neck (can't touch chin to chest)    Negative: MILD difficulty breathing (e.g., minimal/no SOB at rest, SOB with walking, pulse <100)    Negative: Chest pain or pressure    Negative: Patient sounds very sick or weak to the triager    Negative: Fever > 103 F  "(39.4 C)    Negative: [1] Fever > 101 F (38.3 C) AND [2] age > 60    Negative: [1] Fever > 100.0 F (37.8 C) AND [2] bedridden (e.g., nursing home patient, CVA, chronic illness, recovering from surgery)    Negative: [1] HIGH RISK patient (e.g., age > 64 years, diabetes, heart or lung disease, weak immune system) AND [2] new or worsening symptoms    Negative: [1] HIGH RISK patient AND [2] influenza is widespread in the community AND [3] ONE OR MORE respiratory symptoms: cough, sore throat, runny or stuffy nose    Negative: [1] HIGH RISK patient AND [2] influenza exposure within the last 7 days AND [3] ONE OR MORE respiratory symptoms: cough, sore throat, runny or stuffy nose    Negative: Fever present > 3 days (72 hours)    Negative: [1] Fever returns after gone for over 24 hours AND [2] symptoms worse or not improved    Negative: [1] Continuous (nonstop) coughing interferes with work or school AND [2] no improvement using cough treatment per protocol    Answer Assessment - Initial Assessment Questions  1. COVID-19 DIAGNOSIS: \"Who made your Coronavirus (COVID-19) diagnosis?\" \"Was it confirmed by a positive lab test?\" If not diagnosed by a HCP, ask \"Are there lots of cases (community spread) where you live?\" (See public health department website, if unsure)      Lab test through [facility] on 1/9/21.  Notified of positive result on 1/10/21.  2. COVID-19 EXPOSURE: \"Was there any known exposure to COVID before the symptoms began?\" CDC Definition of close contact: within 6 feet (2 meters) for a total of 15 minutes or more over a 24-hour period.       Unsure   3. ONSET: \"When did the COVID-19 symptoms start?\"       A month ago  4. WORST SYMPTOM: \"What is your worst symptom?\" (e.g., cough, fever, shortness of breath, muscle aches)      Nasal congestion  5. COUGH: \"Do you have a cough?\" If so, ask: \"How bad is the cough?\"        No  6. FEVER: \"Do you have a fever?\" If so, ask: \"What is your temperature, how was it " "measured, and when did it start?\"      No  7. RESPIRATORY STATUS: \"Describe your breathing?\" (e.g., shortness of breath, wheezing, unable to speak)       Normal  8. BETTER-SAME-WORSE: \"Are you getting better, staying the same or getting worse compared to yesterday?\"  If getting worse, ask, \"In what way?\"      Not getting better-nasal congestion.  No worse but no improvement.  9. HIGH RISK DISEASE: \"Do you have any chronic medical problems?\" (e.g., asthma, heart or lung disease, weak immune system, obesity, etc.)      Asthma  11. OTHER SYMPTOMS: \"Do you have any other symptoms?\"  (e.g., chills, fatigue, headache, loss of smell or taste, muscle pain, sore throat; new loss of smell or taste especially support the diagnosis of COVID-19)        No.  Therapies tried: completed Doxycyline course (10 day treatment), neti pot, Joby Med sinus rinse, saline nasal spray and Flonase.  -Used saline nasal spray for a few days  Discussed with patient best to not use saline nasal spray beyond 3 days as this can cause rebound congestion.    Protocols used: CORONAVIRUS (COVID-19) DIAGNOSED OR [id number]-B-LZ 1.3    LAYSSA VelazcoN, RN  [facility]    "

## 2021-01-27 ENCOUNTER — VIRTUAL VISIT (OUTPATIENT)
Dept: FAMILY MEDICINE | Facility: CLINIC | Age: 66
End: 2021-01-27
Payer: COMMERCIAL

## 2021-01-27 DIAGNOSIS — J01.90 ACUTE SINUSITIS WITH SYMPTOMS > 10 DAYS: Primary | ICD-10-CM

## 2021-01-27 PROCEDURE — 99442 PR PHYSICIAN TELEPHONE EVALUATION 11-20 MIN: CPT | Performed by: FAMILY MEDICINE

## 2021-01-27 RX ORDER — CEFUROXIME AXETIL 500 MG/1
500 TABLET ORAL 2 TIMES DAILY
Qty: 20 TABLET | Refills: 0 | Status: SHIPPED | OUTPATIENT
Start: 2021-01-27 | End: 2021-01-29

## 2021-01-27 NOTE — PROGRESS NOTES
Teri is a 65 year old who is being evaluated via a billable telephone visit.      What phone number would you like to be contacted at?  [phone]    How would you like to obtain your AVS? MyChart     Assessment & Plan     Acute sinusitis with symptoms > 10 days  She does have a PCN allergy which she reports was a localized rash about 20 years ago after taking penicillin from a dentist.  She has never been prescribed a cephalosporin to her knowledge (and none documented in [facility] EHR).  I'd like to try ceftin and discussed her increased risk for allergy to cephalosporins.  She will monitor closely for any itching, rash and if she develops any allergic symptoms she will stop the medication and notify me.  I instructed her to call 911 or go to ER for any lip, tongue, or mouth swelling.  Patient instructed to contact clinic if symptoms persist, worsen, or do not resolve as anticipated.  I also recommended she use the flonase immediately after the NealMed when she has some clearing of her nostrils.    - cefuroxime (CEFTIN) 500 MG tablet  Dispense: 20 tablet; Refill: 0      See Patient Instructions    No follow-ups on file.    Norma Serrano MD  [facility]     Teri is a 65 year old who presents to clinic today for the following health issues     HPI       Acute Illness  Acute illness concerns: 4 to 5 weeks   Symptoms:  Fever: no  Chills/Sweats: no  Headache (location?): no  Sinus Pressure: no  Conjunctivitis:  no  Ear Pain: no  Rhinorrhea: no  Congestion: YES - left nostril  Sore Throat: no  Cough: YES  Wheeze: no  Decreased Appetite: no  Nausea: no  Vomiting: no  Diarrhea: no  Dysuria/Freq.: no  Dysuria or Hematuria: no  Fatigue/Achiness: no  Sick/Strep Exposure: no  Therapies tried and outcome: None      Severe nasal congestion, usually left-sided, associated with pressure and left-sided headache.  Completed doxycycline in December for this sinusitis.  She did have GI  side effects but finished a 10-day course.  At the end of that she felt somewhat better but symptoms persisted and never continued to improve.  She has tried NealMed and nettipot which helps temporarily.  She is also using flonase.        Review of Systems   Constitutional, HEENT, cardiovascular, pulmonary, gi and gu systems are negative, except as otherwise noted.      Objective           Vitals:  No vitals were obtained today due to virtual visit.    Physical Exam   GEN:  no apparent distress  PSYCH: Alert and engaged; coherent speech with normal rate and volume; able to articulate logical thoughts, no tangential thoughts, no apparent hallucinations or delusions; affect is normal  RESP: No cough, no audible wheezing, able to talk in full sentences  Remainder of exam unable to be completed due to telephone visits          Phone call duration: 17 minutes  9:07 AM   9:24 AM

## 2021-01-29 ENCOUNTER — MYC MEDICAL ADVICE (OUTPATIENT)
Dept: FAMILY MEDICINE | Facility: CLINIC | Age: 66
End: 2021-01-29

## 2021-01-30 ENCOUNTER — TELEPHONE (OUTPATIENT)
Dept: FAMILY MEDICINE | Facility: CLINIC | Age: 66
End: 2021-01-30

## 2021-01-30 NOTE — TELEPHONE ENCOUNTER
Message to Prescriber:  Patient has reported allergy to cephalosporns.do you still want to dispense?

## 2021-02-01 NOTE — TELEPHONE ENCOUNTER
See 1/29/21 MyChart encounter.    Dr. Serrano directed patient to stop medication.    ALYSSA VelazcoN, RN  [facility]

## 2021-02-01 NOTE — TELEPHONE ENCOUNTER
Writer does not have [employer] instructions to share with patient.    Writer responded via [employer] asking patient to contact [employer] help line for assistance in uploading pictures.    ALYSSA VelazcoN, RN  [facility]

## 2021-02-02 NOTE — TELEPHONE ENCOUNTER
Dr. Serrano- Please review attached picture.    Thank you!  ALYSSA VelazcoN, RN  [facility]

## 2021-03-23 DIAGNOSIS — I10 HYPERTENSION GOAL BP (BLOOD PRESSURE) < 140/90: ICD-10-CM

## 2021-03-23 DIAGNOSIS — I87.2 CHRONIC VENOUS INSUFFICIENCY: ICD-10-CM

## 2021-03-23 DIAGNOSIS — E78.5 HYPERLIPIDEMIA LDL GOAL <100: ICD-10-CM

## 2021-03-24 RX ORDER — TRIAMTERENE AND HYDROCHLOROTHIAZIDE 75; 50 MG/1; MG/1
1 TABLET ORAL DAILY
Qty: 90 TABLET | Refills: 1 | Status: SHIPPED | OUTPATIENT
Start: 2021-03-24 | End: 2021-09-07

## 2021-03-24 RX ORDER — AMLODIPINE BESYLATE 5 MG/1
TABLET ORAL
Qty: 90 TABLET | Refills: 1 | Status: SHIPPED | OUTPATIENT
Start: 2021-03-24 | End: 2021-10-04

## 2021-03-24 RX ORDER — PRAVASTATIN SODIUM 80 MG/1
80 TABLET ORAL EVERY EVENING
Qty: 90 TABLET | Refills: 1 | Status: SHIPPED | OUTPATIENT
Start: 2021-03-24 | End: 2021-08-29

## 2021-05-06 ENCOUNTER — TELEPHONE (OUTPATIENT)
Dept: FAMILY MEDICINE | Facility: CLINIC | Age: 66
End: 2021-05-06

## 2021-05-06 NOTE — TELEPHONE ENCOUNTER
"Writer spoke with patient and relayed that patient was given #30 of hydrOXYzine (ATARAX) 10 MG tablet    Patient was told by pharmacy that it was for 5 days worth.    Patient informed that it would be for 5 days  If patient was taking the medication 1-2 tabs three times daily. Patient taking medication \"as needed\" so the Rx lasts much longer than 5 days.    Patient verbalized understanding.  Thanks! Effie Mckeon RN    "
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
## 2021-06-08 NOTE — PATIENT INSTRUCTIONS
Schedule a lab-only appointment at any [facility] so we can monitor your kidney panel (basic metabolic panel).

## 2021-06-09 ENCOUNTER — VIRTUAL VISIT (OUTPATIENT)
Dept: FAMILY MEDICINE | Facility: CLINIC | Age: 66
End: 2021-06-09
Payer: COMMERCIAL

## 2021-06-09 DIAGNOSIS — I10 HYPERTENSION GOAL BP (BLOOD PRESSURE) < 140/90: Primary | ICD-10-CM

## 2021-06-09 DIAGNOSIS — J45.30 MILD PERSISTENT ASTHMA WITHOUT COMPLICATION: ICD-10-CM

## 2021-06-09 DIAGNOSIS — M79.672 LEFT FOOT PAIN: ICD-10-CM

## 2021-06-09 DIAGNOSIS — E66.01 MORBID OBESITY (H): ICD-10-CM

## 2021-06-09 DIAGNOSIS — N18.31 STAGE 3A CHRONIC KIDNEY DISEASE (H): ICD-10-CM

## 2021-06-09 PROCEDURE — 99442 PR PHYSICIAN TELEPHONE EVALUATION 11-20 MIN: CPT | Performed by: FAMILY MEDICINE

## 2021-06-09 RX ORDER — PREDNISONE 20 MG/1
40 TABLET ORAL DAILY
Qty: 10 TABLET | Refills: 0 | Status: SHIPPED | OUTPATIENT
Start: 2021-06-09 | End: 2022-11-28

## 2021-06-10 ASSESSMENT — ASTHMA QUESTIONNAIRES: ACT_TOTALSCORE: 25

## 2021-06-15 DIAGNOSIS — N18.31 STAGE 3A CHRONIC KIDNEY DISEASE (H): ICD-10-CM

## 2021-06-15 DIAGNOSIS — I10 HYPERTENSION GOAL BP (BLOOD PRESSURE) < 140/90: ICD-10-CM

## 2021-06-15 LAB
ANION GAP SERPL CALCULATED.3IONS-SCNC: 2 MMOL/L (ref 3–14)
BUN SERPL-MCNC: 18 MG/DL (ref 7–30)
CALCIUM SERPL-MCNC: 9.6 MG/DL (ref 8.5–10.1)
CHLORIDE SERPL-SCNC: 105 MMOL/L (ref 94–109)
CO2 SERPL-SCNC: 32 MMOL/L (ref 20–32)
CREAT SERPL-MCNC: 1.43 MG/DL (ref 0.52–1.04)
GFR SERPL CREATININE-BSD FRML MDRD: 38 ML/MIN/{1.73_M2}
GLUCOSE SERPL-MCNC: 100 MG/DL (ref 70–99)
POTASSIUM SERPL-SCNC: 4 MMOL/L (ref 3.4–5.3)
SODIUM SERPL-SCNC: 139 MMOL/L (ref 133–144)

## 2021-06-15 PROCEDURE — 80048 BASIC METABOLIC PNL TOTAL CA: CPT | Performed by: FAMILY MEDICINE

## 2021-06-15 PROCEDURE — 36415 COLL VENOUS BLD VENIPUNCTURE: CPT | Performed by: FAMILY MEDICINE

## 2021-06-15 NOTE — RESULT ENCOUNTER NOTE
García Williamson,  Your test results fall within the expected range(s) or remain unchanged from previous results.  Please continue with your current treatment plan.     Norma Serrano MD

## 2021-06-18 ENCOUNTER — ANCILLARY PROCEDURE (OUTPATIENT)
Dept: GENERAL RADIOLOGY | Facility: CLINIC | Age: 66
End: 2021-06-18
Attending: PEDIATRICS
Payer: COMMERCIAL

## 2021-06-18 ENCOUNTER — OFFICE VISIT (OUTPATIENT)
Dept: ORTHOPEDICS | Facility: CLINIC | Age: 66
End: 2021-06-18
Payer: COMMERCIAL

## 2021-06-18 VITALS
BODY MASS INDEX: 45.48 KG/M2 | WEIGHT: 273 LBS | HEIGHT: 65 IN | HEART RATE: 72 BPM | DIASTOLIC BLOOD PRESSURE: 83 MMHG | SYSTOLIC BLOOD PRESSURE: 135 MMHG

## 2021-06-18 DIAGNOSIS — M20.12 HALLUX VALGUS OF LEFT FOOT: Primary | ICD-10-CM

## 2021-06-18 DIAGNOSIS — M19.079 ARTHRITIS OF MIDFOOT: ICD-10-CM

## 2021-06-18 DIAGNOSIS — M79.672 LEFT FOOT PAIN: ICD-10-CM

## 2021-06-18 PROCEDURE — 99203 OFFICE O/P NEW LOW 30 MIN: CPT | Performed by: PEDIATRICS

## 2021-06-18 PROCEDURE — 73630 X-RAY EXAM OF FOOT: CPT | Mod: LT | Performed by: RADIOLOGY

## 2021-06-18 ASSESSMENT — MIFFLIN-ST. JEOR: SCORE: 1771.26

## 2021-06-18 NOTE — RESULT ENCOUNTER NOTE
These results were discussed during office visit.    Amy Roman MD, CAQ  Primary Care Sports Medicine  [facility]

## 2021-06-18 NOTE — PROGRESS NOTES
ASSESSMENT & PLAN    Teri was seen today for pain.    Diagnoses and all orders for this visit:    Hallux valgus of left foot  -     [facility] REFERR[facility]; Future    Left foot pain  -     Orthopedic & Spine  Referral  -     XR Foot LT G/E 3 vw; Future  -     [facility] REFERRAL; Future    Arthritis of midfoot  -     [facility] REFERRAL; Future      This issue is acute on chronic and Unchanged.    Plan:  - Today's Plan of Care:  Rehab: Physical Therapy: [facility] - [phone]  Consider inserts (Custom v. Superfeet)  Continue with relative rest and activity modification, Ice, Compression, and Elevation.  Can apply ice 10-15 minutes 3-4 times per day as needed.    -We also discussed other future treatment options:  Can consider walking boot immobilization - severe pain  Podiatry Referral    Follow Up: as needed    Concerning signs and symptoms were reviewed.  The patiet expressed understanding of this management plan and all questions were answered at this time.    Thanks for the opportunity to participate in the care of this patient, I will keep you updated on their progress.    CC: Norma Roman MD [facility]  Sports Medicine Physician  [facility]    -----  Chief Complaint   Patient presents with     Left Foot - Pain       SUBJECTIVE  Teri Bain is a/an 65 year old female who is seen in consultation at the request of Norma Serrano M.D. for evaluation of left foot pain.     The patient is seen by themselves.  The patient is Right handed    Onset: Several years(s) ago, worsening over the last week. Reports insidious onset without acute precipitating event. Saw PCP 6/9/21.   Location of Pain: left dorsal midfoot   Worsened by: hot weather, prolonged standing   Better with: nothing   Treatments tried: Tylenol, ibuprofen, rest, activity avoidance   Associated symptoms: swelling and numbness     Orthopedic/Surgical history: YES -  "chronic foot pain   - Reviewed Podiatry note 7/19/2018 for left foot pain, discussed supportive care, walking boot    Social History/Occupation: Working part time at [employer]     No family history pertinent to patient's problem today.    REVIEW OF SYSTEMS:  Review of Systems  Skin: no bruising, yes swelling  Musculoskeletal: as above  Neurologic: some numbness, paresthesias  Remainder of review of systems is negative including constitutional, CV, pulmonary, GI, except as noted in HPI or medical history.    OBJECTIVE:  /83   Pulse 72   Ht 1.638 m (5' 4.5\")   Wt 123.8 kg (273 lb)   BMI 46.14 kg/m     General: healthy, alert and in no distress  HEENT: no scleral icterus or conjunctival erythema  Skin: no suspicious lesions or rash. No jaundice.  CV: distal perfusion intact  Resp: normal respiratory effort without conversational dyspnea   Psych: normal mood and affect  Gait: normal steady gait with appropriate coordination and balance  Neuro: Normal light sensory exam of lower extremity    Bilateral Foot and Ankle Exam:    Inspection:       edema noted mild throughout foot    Foot inspection:       pes planus       hallux valgus    Tender:       None currently - points to midfoot    Non-Tender:       remainder of ankle and foot bilateral    ROM:        Full active and passive ROM, ankle dorsiflexion, plantarflexion, inversion, eversion, great toe dorsiflexion, remainder of toes, midfoot and subtalar bilateral    Strength:       ankle dorsiflexion 5/5 bilateral       plantarflexion 5/5 bilateral       inversion 5/5 bilateral       eversion 5/5 bilateral    Special Tests:       neg (-) anterior drawer left       neg (-) talar tilt left    Gait:       normal    Neurovascular:       2+ peripheral pulses bilaterally and brisk capillary refill       sensation grossly intact      RADIOLOGY:  I independently ordered, visualized and reviewed these images with the patient  3 XR views of left foot reviewed: no acute " bony abnormality, hallux valgus, midfoot arthritis  - will follow official read      Review of the result(s) of each unique test - XR

## 2021-06-18 NOTE — LETTER
6/18/2021         RE: Teri Bain  [address]  [place] [id number]        Dear Colleague,    Thank you for referring your patient, Teri Bain, to the [facility]. Please see a copy of my visit note below.    ASSESSMENT & PLAN    Teri was seen today for pain.    Diagnoses and all orders for this visit:    Hallux valgus of left foot  -     [facility] REFERRAL; Future    Left foot pain  -     Orthopedic & Spine  Referral  -     XR Foot LT G/E 3 vw; Future  -     [facility] REFERRAL; Future    Arthritis of midfoot  -     [facility] REFERRAL; Future      This issue is acute on chronic and Unchanged.    Plan:  - Today's Plan of Care:  Rehab: Physical Therapy: [facility] - [phone]  Consider inserts (Custom v. Superfeet)  Continue with relative rest and activity modification, Ice, Compression, and Elevation.  Can apply ice 10-15 minutes 3-4 times per day as needed.    -We also discussed other future treatment options:  Can consider walking boot immobilization - severe pain  Podiatry Referral    Follow Up: as needed    Concerning signs and symptoms were reviewed.  The patiet expressed understanding of this management plan and all questions were answered at this time.    Thanks for the opportunity to participate in the care of this patient, I will keep you updated on their progress.    CC: Norma Roman MD [facility]  Sports Medicine Physician  [facility]    -----  Chief Complaint   Patient presents with     Left Foot - Pain       SUBJECTIVE  Teri Bain is a/an 65 year old female who is seen in consultation at the request of Norma Serrano M.D. for evaluation of left foot pain.     The patient is seen by themselves.  The patient is Right handed    Onset: Several years(s) ago, worsening over the last week. Reports insidious onset without acute precipitating event. Saw  "PCP 6/9/21.   Location of Pain: left dorsal midfoot   Worsened by: hot weather, prolonged standing   Better with: nothing   Treatments tried: Tylenol, ibuprofen, rest, activity avoidance   Associated symptoms: swelling and numbness     Orthopedic/Surgical history: YES - chronic foot pain   - Reviewed Podiatry note 7/19/2018 for left foot pain, discussed supportive care, walking boot    Social History/Occupation: Working part time at [employer]     No family history pertinent to patient's problem today.    REVIEW OF SYSTEMS:  Review of Systems  Skin: no bruising, yes swelling  Musculoskeletal: as above  Neurologic: some numbness, paresthesias  Remainder of review of systems is negative including constitutional, CV, pulmonary, GI, except as noted in HPI or medical history.    OBJECTIVE:  /83   Pulse 72   Ht 1.638 m (5' 4.5\")   Wt 123.8 kg (273 lb)   BMI 46.14 kg/m     General: healthy, alert and in no distress  HEENT: no scleral icterus or conjunctival erythema  Skin: no suspicious lesions or rash. No jaundice.  CV: distal perfusion intact  Resp: normal respiratory effort without conversational dyspnea   Psych: normal mood and affect  Gait: normal steady gait with appropriate coordination and balance  Neuro: Normal light sensory exam of lower extremity    Bilateral Foot and Ankle Exam:    Inspection:       edema noted mild throughout foot    Foot inspection:       pes planus       hallux valgus    Tender:       None currently - points to midfoot    Non-Tender:       remainder of ankle and foot bilateral    ROM:        Full active and passive ROM, ankle dorsiflexion, plantarflexion, inversion, eversion, great toe dorsiflexion, remainder of toes, midfoot and subtalar bilateral    Strength:       ankle dorsiflexion 5/5 bilateral       plantarflexion 5/5 bilateral       inversion 5/5 bilateral       eversion 5/5 bilateral    Special Tests:       neg (-) anterior drawer left       neg (-) talar tilt " left    Gait:       normal    Neurovascular:       2+ peripheral pulses bilaterally and brisk capillary refill       sensation grossly intact      RADIOLOGY:  I independently ordered, visualized and reviewed these images with the patient  3 XR views of left foot reviewed: no acute bony abnormality, hallux valgus, midfoot arthritis  - will follow official read      Review of the result(s) of each unique test - XR             Again, thank you for allowing me to participate in the care of your patient.        Sincerely,        Amy Roman MD

## 2021-06-18 NOTE — PATIENT INSTRUCTIONS
Plan:  - Today's Plan of Care:  Rehab: Physical Therapy: [facility] - [phone]  Consider inserts (Custom v. Superfeet)    -We also discussed other future treatment options:  Can consider walking boot immobilization - severe pain  Podiatry Referral    Follow Up: as needed    If you have any further questions for your physician or physician s care team you can call [phone] and use option 3 to leave a voice message. Calls received during business hours will be returned same day.

## 2021-07-12 DIAGNOSIS — F41.9 ANXIETY: ICD-10-CM

## 2021-07-14 DIAGNOSIS — F41.9 ANXIETY: ICD-10-CM

## 2021-07-14 RX ORDER — HYDROXYZINE HYDROCHLORIDE 10 MG/1
TABLET, FILM COATED ORAL
Qty: 30 TABLET | Refills: 7 | Status: SHIPPED | OUTPATIENT
Start: 2021-07-14 | End: 2022-11-30

## 2021-07-14 NOTE — TELEPHONE ENCOUNTER
--Last visit:  6/9/2021     ---Prescription approved per [facility].       Sarahy Gregory RN BSN     [facility]

## 2021-07-18 RX ORDER — HYDROXYZINE HYDROCHLORIDE 10 MG/1
TABLET, FILM COATED ORAL
Qty: 30 TABLET | Refills: 2 | OUTPATIENT
Start: 2021-07-18

## 2021-07-29 ENCOUNTER — MYC MEDICAL ADVICE (OUTPATIENT)
Dept: FAMILY MEDICINE | Facility: CLINIC | Age: 66
End: 2021-07-29

## 2021-07-30 ENCOUNTER — ANCILLARY PROCEDURE (OUTPATIENT)
Dept: GENERAL RADIOLOGY | Facility: CLINIC | Age: 66
End: 2021-07-30
Attending: PHYSICIAN ASSISTANT
Payer: COMMERCIAL

## 2021-07-30 ENCOUNTER — OFFICE VISIT (OUTPATIENT)
Dept: URGENT CARE | Facility: URGENT CARE | Age: 66
End: 2021-07-30
Payer: COMMERCIAL

## 2021-07-30 VITALS
TEMPERATURE: 98 F | SYSTOLIC BLOOD PRESSURE: 135 MMHG | OXYGEN SATURATION: 99 % | RESPIRATION RATE: 17 BRPM | HEART RATE: 84 BPM | DIASTOLIC BLOOD PRESSURE: 85 MMHG

## 2021-07-30 DIAGNOSIS — M25.531 RIGHT WRIST PAIN: Primary | ICD-10-CM

## 2021-07-30 DIAGNOSIS — N18.31 STAGE 3A CHRONIC KIDNEY DISEASE (H): ICD-10-CM

## 2021-07-30 PROCEDURE — 99214 OFFICE O/P EST MOD 30 MIN: CPT | Performed by: PHYSICIAN ASSISTANT

## 2021-07-30 PROCEDURE — 73110 X-RAY EXAM OF WRIST: CPT | Mod: RT | Performed by: RADIOLOGY

## 2021-07-30 ASSESSMENT — ENCOUNTER SYMPTOMS
MYALGIAS: 0
ARTHRALGIAS: 1
WEAKNESS: 0
SORE THROAT: 0
SHORTNESS OF BREATH: 0
RESPIRATORY NEGATIVE: 1
FEVER: 0
PALPITATIONS: 0
LIGHT-HEADEDNESS: 0
NECK PAIN: 0
JOINT SWELLING: 0
EYES NEGATIVE: 1
WOUND: 0
CARDIOVASCULAR NEGATIVE: 1
BACK PAIN: 0
NAUSEA: 0
ALLERGIC/IMMUNOLOGIC NEGATIVE: 1
DIARRHEA: 0
RHINORRHEA: 0
VOMITING: 0
ENDOCRINE NEGATIVE: 1
DIZZINESS: 0
CHILLS: 0
COUGH: 0
NECK STIFFNESS: 0
HEADACHES: 0

## 2021-07-30 NOTE — PROGRESS NOTES
Chief Complaint:    Chief Complaint   Patient presents with     Musculoskeletal Problem         Medical Decision Making:    Vital signs reviewed by Horacio Hoang PA-C  /85   Pulse 84   Temp 98  F (36.7  C)   Resp 17   SpO2 99%     Differential Diagnosis:  MS Injury Pain: sprain, fracture, tendonitis, muscle strain, contusion, dislocation, gout, septic arthritis, osteoarthritis and rheumatoid arthritis      ASSESSMENT:     1. Right wrist pain    2. Stage 3a chronic kidney disease           PLAN:     XR of the R wrist was negative for any acute fracture per my read.  Ice the affected area.  Tylenol for discomfort.  NSAIDS contraindicated with CKD.  Continue using wrist brace PRN  Patient instructed to follow up with PCP in 1 week if symptoms are not improving.  Sooner if symptoms worsen.  Worrisome symptoms discussed with instructions to go to the ED.  Patient verbalized understanding and agreed with this plan.    Labs:     No results found for any visits on 07/30/21.    Current Meds:    Current Outpatient Medications:      albuterol (2.5 MG/3ML) 0.083% neb solution, Take 1 vial (2.5 mg) by nebulization every 6 hours as needed for shortness of breath / dyspnea or wheezing, Disp: 30 vial, Rfl: 1     albuterol (PROAIR HFA/PROVENTIL HFA/VENTOLIN HFA) 108 (90 Base) MCG/ACT inhaler, Inhale 1-2 puffs into the lungs every 6 hours, Disp: 1 Inhaler, Rfl: 3     amLODIPine (NORVASC) 5 MG tablet, TAKE 1 TABLET EVERY DAY, Disp: 90 tablet, Rfl: 1     aspirin 81 MG tablet, Take 1 tablet by mouth daily., Disp: , Rfl:      cetirizine (ZYRTEC) 10 MG tablet, Take 1 tablet (10 mg) by mouth every evening, Disp: 30 tablet, Rfl: 0     Chlorpheniramine-Acetaminophen (CORICIDIN HBP COLD/FLU PO), , Disp: , Rfl:      Cyanocobalamin (VITAMIN B 12 PO), Take 1 tablet by mouth daily, Disp: , Rfl:      famotidine (PEPCID) 20 MG tablet, Take 1 tablet (20 mg) by mouth daily as needed (heartburn), Disp: 30 tablet, Rfl: 11     fluticasone  (ARNUITY ELLIPTA) 200 MCG/ACT inhaler, Inhale 1 puff into the lungs daily, Disp: 90 each, Rfl: 3     fluticasone (FLONASE) 50 MCG/ACT nasal spray, Spray 1-2 sprays into both nostrils daily, Disp: 16 g, Rfl: 11     hydrOXYzine (ATARAX) 10 MG tablet, TAKE 1 TO 2 TABLETS(10 TO 20 MG) BY MOUTH THREE TIMES DAILY AS NEEDED FOR ANXIETY, Disp: 30 tablet, Rfl: 7     MULTI-VITAMIN OR TABS, , Disp: , Rfl: 0     pravastatin (PRAVACHOL) 80 MG tablet, Take 1 tablet (80 mg) by mouth every evening, Disp: 90 tablet, Rfl: 1     predniSONE (DELTASONE) 20 MG tablet, Take 2 tablets (40 mg) by mouth daily, Disp: 10 tablet, Rfl: 0     triamcinolone (KENALOG) 0.1 % external cream, Apply sparingly to affected area three times daily for up to 14 days., Disp: 15 g, Rfl: 0     triamterene-HCTZ (MAXZIDE) 75-50 MG tablet, Take 1 tablet by mouth daily, Disp: 90 tablet, Rfl: 1     VITAMIN C 500 MG OR TABS, 1-2 tablets daily, Disp: 60, Rfl: 0    Allergies:  Allergies   Allergen Reactions     Benazepril Rash     Claritin [Loratadine]      Codeine Nausea     Lisinopril Rash     Losartan      MILDRED - avoid ACEi/ARBs     Tetracycline Nausea     Ceftin [Cefuroxime] Itching and Rash     Penicillins Rash       SUBJECTIVE    HPI: Teri Bain is an 65 year old female who presents for evaluation and treatment of R wrist pain.  Symptoms started yesterday.  She is unaware of any acute injury.  The pain is worse with movement.  She has been wearing a wrist brace and this helps.  She has not had any numbness, tingling or weakness in the R wrist or hand.      ROS:      Review of Systems   Constitutional: Negative for chills and fever.   HENT: Negative for congestion, ear pain, rhinorrhea and sore throat.    Eyes: Negative.    Respiratory: Negative.  Negative for cough and shortness of breath.    Cardiovascular: Negative.  Negative for chest pain and palpitations.   Gastrointestinal: Negative for diarrhea, nausea and vomiting.   Endocrine: Negative.     Genitourinary: Negative.    Musculoskeletal: Positive for arthralgias. Negative for back pain, joint swelling, myalgias, neck pain and neck stiffness.   Skin: Negative.  Negative for rash and wound.   Allergic/Immunologic: Negative.  Negative for immunocompromised state.   Neurological: Negative for dizziness, weakness, light-headedness and headaches.        Family History   Family History   Problem Relation Age of Onset     C.A.D. Mother      Diabetes Mother      Hypertension Mother      Arthritis Mother      Cerebrovascular Disease Mother         multiple     Obesity Mother      Diabetes Father      Hypertension Father      Asthma Father      Obesity Father      Diabetes Maternal Grandfather      Depression Daughter      Asthma Daughter         Both daughters & 5 grand children     Deep Vein Thrombosis (DVT) Daughter         x 1     Asthma Daughter         5 grand kids     Asthma Brother      Obesity Brother      Coronary Artery Disease Brother         Passed 2020     Pacemaker Brother      Lung Cancer Brother      Asthma Grandchild      Asthma Grandchild      Asthma Grandchild      Asthma Grandchild      Asthma Grandchild        Social History  Social History     Socioeconomic History     Marital status:      Spouse name: Fred     Number of children: 2     Years of education: 13     Highest education level: Not on file   Occupational History     Occupation: Customer Service     Employer: RETIRED     Comment: [employer]   Tobacco Use     Smoking status: Former Smoker     Packs/day: 0.00     Years: 30.00     Pack years: 0.00     Types: Cigarettes     Quit date: 1996     Years since quittin.5     Smokeless tobacco: Never Used   Substance and Sexual Activity     Alcohol use: Not Currently     Comment: wine 1-2 times per year     Drug use: No     Sexual activity: Yes     Partners: Male     Birth control/protection: None   Other Topics Concern      Service Not Asked     Blood  Transfusions Not Asked     Caffeine Concern Not Asked     Occupational Exposure Not Asked     Hobby Hazards Not Asked     Sleep Concern Not Asked     Stress Concern Not Asked     Weight Concern Not Asked     Special Diet Not Asked     Back Care Not Asked     Exercise Not Asked     Bike Helmet Not Asked     Seat Belt Not Asked     Self-Exams Not Asked     Parent/sibling w/ CABG, MI or angioplasty before 65F 55M? No   Social History Narrative     Not on file     Social Determinants of Health     Financial Resource Strain:      Difficulty of Paying Living Expenses:    Food Insecurity:      Worried About Running Out of Food in the Last Year:      Ran Out of Food in the Last Year:    Transportation Needs:      Lack of Transportation (Medical):      Lack of Transportation (Non-Medical):    Physical Activity:      Days of Exercise per Week:      Minutes of Exercise per Session:    Stress:      Feeling of Stress :    Social Connections:      Frequency of Communication with Friends and Family:      Frequency of Social Gatherings with Friends and Family:      Attends Gnosticist Services:      Active Member of Clubs or Organizations:      Attends Club or Organization Meetings:      Marital Status:    Intimate Partner Violence:      Fear of Current or Ex-Partner:      Emotionally Abused:      Physically Abused:      Sexually Abused:         Surgical History:  Past Surgical History:   Procedure Laterality Date     C DEXA, BONE DENSITY, AXIAL SKEL  11/04    [facility]     C LIGATE FALLOPIAN TUBE  1977     COLONOSCOPY  11/04    [facility]     COLONOSCOPY  1/5/15    [facility]     [facility] DILATION/CURETTAGE DIAG/THER NON OB  5/89    [facility]     HC REMOVAL GALLBLADDER  1977     HYSTERECTOMY, PAP NO LONGER INDICATED  1/90    Hysterectomy, Vaginal, ovaries intact     SURGICAL HISTORY OF -   1987    cystoscopy        Problem List:  Patient Active Problem List   Diagnosis     Classical migraine     Trochanteric bursitis     Allergic rhinitis      Osteoarthritis     Chronic venous insufficiency     Chronic kidney disease (CKD), stage III (moderate)     Mild persistent asthma     Vitamin D deficiency     Hypertension goal BP (blood pressure) < 140/90     Pure hypercholesterolemia     Health Care Home     GERD (gastroesophageal reflux disease)     Advanced directives, counseling/discussion     Acute renal failure (H)     BMI > 40 (H)     Osteoarthritis of both sternoclavicular joints           OBJECTIVE:     Vital signs noted and reviewed by Horacio Hoang PA-C  /85   Pulse 84   Temp 98  F (36.7  C)   Resp 17   SpO2 99%      PEFR:    Physical Exam  Vitals and nursing note reviewed.   Constitutional:       General: She is not in acute distress.     Appearance: She is well-developed. She is not ill-appearing, toxic-appearing or diaphoretic.   HENT:      Head: Normocephalic and atraumatic.      Right Ear: Tympanic membrane and external ear normal. No drainage, swelling or tenderness. Tympanic membrane is not perforated, erythematous, retracted or bulging.      Left Ear: Tympanic membrane and external ear normal. No drainage, swelling or tenderness. Tympanic membrane is not perforated, erythematous, retracted or bulging.      Nose: No mucosal edema, congestion or rhinorrhea.      Right Sinus: No maxillary sinus tenderness or frontal sinus tenderness.      Left Sinus: No maxillary sinus tenderness or frontal sinus tenderness.      Mouth/Throat:      Pharynx: No pharyngeal swelling, oropharyngeal exudate, posterior oropharyngeal erythema or uvula swelling.      Tonsils: No tonsillar abscesses.   Eyes:      Pupils: Pupils are equal, round, and reactive to light.   Neck:      Trachea: Trachea normal.   Cardiovascular:      Rate and Rhythm: Normal rate and regular rhythm.      Heart sounds: Normal heart sounds, S1 normal and S2 normal. No murmur heard.   No friction rub. No gallop.    Pulmonary:      Effort: Pulmonary effort is normal. No respiratory  distress.      Breath sounds: Normal breath sounds. No decreased breath sounds, wheezing, rhonchi or rales.   Abdominal:      General: Bowel sounds are normal. There is no distension.      Palpations: Abdomen is soft. Abdomen is not rigid. There is no mass.      Tenderness: There is no abdominal tenderness. There is no guarding or rebound.   Musculoskeletal:      Right wrist: Tenderness present. No swelling, deformity, effusion, bony tenderness, snuff box tenderness or crepitus. Normal range of motion. Normal pulse.        Arms:       Cervical back: Full passive range of motion without pain, normal range of motion and neck supple.   Lymphadenopathy:      Cervical: No cervical adenopathy.   Skin:     General: Skin is warm and dry.   Neurological:      Mental Status: She is alert and oriented to person, place, and time.      Cranial Nerves: No cranial nerve deficit.      Deep Tendon Reflexes: Reflexes are normal and symmetric.   Psychiatric:         Behavior: Behavior normal. Behavior is cooperative.         Thought Content: Thought content normal.         Judgment: Judgment normal.             Horacio Hoang PA-C  7/30/2021, 3:49 PM

## 2021-08-23 ENCOUNTER — ANCILLARY PROCEDURE (OUTPATIENT)
Dept: GENERAL RADIOLOGY | Facility: CLINIC | Age: 66
End: 2021-08-23
Attending: PHYSICIAN ASSISTANT
Payer: COMMERCIAL

## 2021-08-23 ENCOUNTER — OFFICE VISIT (OUTPATIENT)
Dept: URGENT CARE | Facility: URGENT CARE | Age: 66
End: 2021-08-23
Payer: COMMERCIAL

## 2021-08-23 ENCOUNTER — NURSE TRIAGE (OUTPATIENT)
Dept: FAMILY MEDICINE | Facility: CLINIC | Age: 66
End: 2021-08-23

## 2021-08-23 VITALS
HEART RATE: 58 BPM | SYSTOLIC BLOOD PRESSURE: 120 MMHG | DIASTOLIC BLOOD PRESSURE: 74 MMHG | BODY MASS INDEX: 45.32 KG/M2 | RESPIRATION RATE: 15 BRPM | TEMPERATURE: 99 F | OXYGEN SATURATION: 100 % | HEIGHT: 65 IN | WEIGHT: 272 LBS

## 2021-08-23 DIAGNOSIS — M25.461 EFFUSION OF RIGHT KNEE: Primary | ICD-10-CM

## 2021-08-23 DIAGNOSIS — M17.11 PRIMARY OSTEOARTHRITIS OF RIGHT KNEE: ICD-10-CM

## 2021-08-23 DIAGNOSIS — M25.461 EFFUSION OF RIGHT KNEE: ICD-10-CM

## 2021-08-23 PROCEDURE — 99214 OFFICE O/P EST MOD 30 MIN: CPT | Performed by: PHYSICIAN ASSISTANT

## 2021-08-23 PROCEDURE — 73562 X-RAY EXAM OF KNEE 3: CPT | Mod: RT | Performed by: RADIOLOGY

## 2021-08-23 RX ORDER — HYDROCODONE BITARTRATE AND ACETAMINOPHEN 5; 325 MG/1; MG/1
1 TABLET ORAL EVERY 6 HOURS PRN
Qty: 10 TABLET | Refills: 0 | Status: SHIPPED | OUTPATIENT
Start: 2021-08-23 | End: 2021-08-26

## 2021-08-23 ASSESSMENT — ENCOUNTER SYMPTOMS
ARTHRALGIAS: 1
JOINT SWELLING: 1

## 2021-08-23 ASSESSMENT — MIFFLIN-ST. JEOR: SCORE: 1766.72

## 2021-08-23 NOTE — PATIENT INSTRUCTIONS
Patient Education     Fluid on the Knee    Fluid on the knee is also known as knee effusion. The knee joint normally has less than 1 ounce of fluid. Injury or inflammation of the knee joint causes extra fluid to collect there. When this happens, the knee joint looks swollen and is often painful. It may be hard to fully bend the knee.  The most common cause of fluid on the knee is osteoarthritis due to wear and tear on the joint cartilage. Other causes include injury to the cartilage, inflammatory arthritis such as gout or rheumatoid arthritis, and infection of the joint.  If the cause of the fluid is not certain, you may need a needle aspiration. This procedure removes a sample of joint fluid from the knee for testing. Removing excess fluid may also relieve swelling and pain.  Home care    Limit your activities. Stay off the injured leg as much as possible until pain improves.    Keep your leg elevated to reduce pain and swelling. When sleeping, place a pillow under the injured leg. When sitting, support the injured leg so it is above heart level. This is very important during the first 48 hours.    Apply an ice pack over the injured area for 15 to 20 minutes every 3 to 6 hours. You should do this for the first 24 to 48 hours. You can make an ice pack by filling a plastic bag that seals at the top with ice cubes and then wrapping it with a thin towel. Continue to use ice packs for relief of pain and swelling as needed. As the ice melts, be careful not to get your wrap, splint, or cast wet. After 48 hours, apply heat (warm shower or warm bath) for 15 to 20 minutes several times a day, or alternate ice and heat. If you have to wear a hook-and-loop knee brace, you can open it to apply the ice pack, or heat, directly to the knee. Never put ice directly on the skin. Always wrap the ice in a towel or other type of cloth.    You may use over-the-counter pain medicine to control pain, unless another pain medicine was  prescribed. If you have chronic liver or kidney disease or have ever had a stomach ulcer or gastrointestinal bleeding, talk with your healthcare provider before using these medicines.    If crutches or a walker have been recommended, don't put weight on the injured leg until you can do so without pain. Check with your healthcare provider before returning to sports or full work duties.    If you have a hook-and-loop knee brace, you can remove it to bathe and sleep, unless told otherwise.  Follow-up care  Follow up with your healthcare provider as advised.  If you are overweight, talk to your healthcare provider about a weight loss program. The excess weight puts extra strain on your knees.  When to seek medical advice  Call your healthcare provider right away if any of these occur:    Increasing pain, redness, or swelling of the knee    Fever of 100.4 F (38 C) or above lasting for 24 to 48 hours, or as advised    Shaking chills  [employer] last reviewed this educational content on 5/1/2018 2000-2021 [employer]. All rights reserved. This information is not intended as a substitute for professional medical care. Always follow your healthcare professional's instructions.           Patient Education     Osteoarthritis  Osteoarthritis happens when the cartilage in a joint becomes damaged and worn. This may be from age, wear and tear, overuse of the joint, obesity, or other problems. Osteoarthritis can affect any joint. But it's most common in hands, knees, spine, hips, and feet. Symptoms include joint stiffness, and pain. It's also called degenerative joint disease.   Home care    When a joint is more sore than usual, rest it for a day or two.    Heat can help relieve stiffness. Take a hot bath or apply a heating pad for up to 30 minutes at a time. If symptoms are worse in the morning, using heat just after awakening can help relax the muscle and soothe the joints.     Ice helps relieve pain. It's often  used after activity. Use a cold pack wrapped in a thin cloth on the joint for 10 to 15 minutes at a time.     Alternating hot and cold can also help relieve pain. Try this for 20 minutes at a time, several times per day.    Exercise helps prevent the muscles and ligaments around the joint from becoming weak. It also helps maintain function in the joint. Be as active as you can. Talk to your healthcare provider about what activity program is best for you.    Excess weight puts a lot of extra strain on weight-bearing joints of the lower back, hips, knees, feet and ankles. If you are overweight, talk to your healthcare provider about a safe and effective weight loss program.    Use anti-inflammatory medicines as prescribed for pain. This includes acetaminophen or NSAIDs such as ibuprofen or naproxen. Don't take NSAIDs if your healthcare provider has told you that you can't take NSAIDS because of other health problems If needed, topical or injected medicines may be recommended. Talk with your healthcare provider if these options are not enough to manage your pain. Follow the directions on all over-the-counter medicines.    Talk with your healthcare provider about devices that might help improve your function and reduce pain.    Talk with you healthcare provider about physical therapy to help strengthen your joints and the surrounding muscles.    Follow-up care  Follow up with your healthcare provider, or as advised.   When to seek medical advice  Call your healthcare provider right away if any of these occur:    Redness or swelling of a painful joint    Discharge or pus from a painful joint    Fever of 100.4 F (38 C) or higher, or as directed by your healthcare provider    Worsening joint pain    Decreased ability to move the joint or bear weight on the joint  [employer] last reviewed this educational content on 8/1/2019 2000-2021 [employer]. All rights reserved. This information is not intended as a  substitute for professional medical care. Always follow your healthcare professional's instructions.

## 2021-08-23 NOTE — TELEPHONE ENCOUNTER
"Was sitting down on the toilet and right knee popped. Has been painful, swollen and hard to stand on ever since. She will go to  today to be assessed.    Melissa Doherty RN    Reason for Disposition    [1] SEVERE pain (e.g., excruciating, unable to walk) AND [2] not improved after 2 hours of pain medicine    Additional Information    Negative: Sounds like a life-threatening emergency to the triager    Negative: Followed a knee injury    Negative: Leg pain is main symptom    Negative: Knee swelling is main symptom    Negative: [1] Swollen joint AND [2] fever    Negative: [1] Red area or streak AND [2] fever    Negative: Patient sounds very sick or weak to the triager    Answer Assessment - Initial Assessment Questions  1. LOCATION and RADIATION: \"Where is the pain located?\"       Right knee  2. QUALITY: \"What does the pain feel like?\"  (e.g., sharp, dull, aching, burning)      Aching, sharp pain  3. SEVERITY: \"How bad is the pain?\" \"What does it keep you from doing?\"   (Scale 1-10; or mild, moderate, severe)    -  MILD (1-3): doesn't interfere with normal activities     -  MODERATE (4-7): interferes with normal activities (e.g., work or school) or awakens from sleep, limping     -  SEVERE (8-10): excruciating pain, unable to do any normal activities, unable to walk     4. ONSET: \"When did the pain start?\" \"Does it come and go, or is it there all the time?\"      moderate  5. RECURRENT: \"Have you had this pain before?\" If so, ask: \"When, and what happened then?\"      Was sitting down and the right knee popped and has been painful and swollen ever since  6. SETTING: \"Has there been any recent work, exercise or other activity that involved that part of the body?\"       no  7. AGGRAVATING FACTORS: \"What makes the knee pain worse?\" (e.g., walking, climbing stairs, running)      standing  8. ASSOCIATED SYMPTOMS: \"Is there any swelling or redness of the knee?\"      swelling  9. OTHER SYMPTOMS: \"Do you have any other symptoms?\" " "(e.g., chest pain, difficulty breathing, fever, calf pain)      No fever, no SOB no chest pains  10. PREGNANCY: \"Is there any chance you are pregnant?\" \"When was your last menstrual period?\"        no    Protocols used: KNEE PAIN-A-AH    "

## 2021-08-23 NOTE — TELEPHONE ENCOUNTER
Reason for call:  Patient reporting a symptom    Symptom or request: symptoms    Duration (how long have symptoms been present): Saturday evening    Have you been treated for this before? No    Additional comments: went to sit down and he knee popped swollen can apply some weight can hardly bend it. Is asking if she needs to come in to be seen by PCP or can she just refer her to a othopedic    Phone Number patient can be reached at:  Home number on file [phone] (home)    Best Time:      Can we leave a detailed message on this number:  YES    Call taken on 8/23/2021 at 7:14 AM by Mirela Rodriguez

## 2021-08-23 NOTE — PROGRESS NOTES
SUBJECTIVE:   Teri Bain is a 65 year old female presenting with a chief complaint of   Chief Complaint   Patient presents with     Urgent Care     Musculoskeletal Problem     right knee pain but Saturday heard a pop.        She is an established patient of [facility].  Patient presents with complaints of right knee pain and swelling.  States she went to sit down and heard a pop.  No calf pain.  She has been ambulating with a cane.      Treatment:  advil and aleve (patient has stage 3 kidney dz)    PMHx:  Reviewed  Social Hx reviewed  Allergies reviewed        Review of Systems   Musculoskeletal: Positive for arthralgias and joint swelling.   All other systems reviewed and are negative.      Past Medical History:   Diagnosis Date     Allergic rhinitis      Chronic kidney disease (CKD), stage III (moderate)      Chronic venous insufficiency      Classic migraines     fiorinal and darvocet prn     Diabetes mellitus, type 2 (H) 1998    age 42     Dysplasia of cervix     treated with hysterectomy     GERD (gastroesophageal reflux disease)      History of tobacco use     quit 1/97     Hyperlipidemia LDL goal <100      Hypertension goal BP (blood pressure) < 140/90      Mild persistent asthma      Osteoarthritis     LT ankle & L>R knee, hands     Trochanteric bursitis      Vitamin D deficiency 2/15/2010     Family History   Problem Relation Age of Onset     C.A.D. Mother      Diabetes Mother      Hypertension Mother      Arthritis Mother      Cerebrovascular Disease Mother         multiple     Obesity Mother      Diabetes Father      Hypertension Father      Asthma Father      Obesity Father      Diabetes Maternal Grandfather      Depression Daughter      Asthma Daughter         Both daughters & 5 grand children     Deep Vein Thrombosis (DVT) Daughter         x 1     Asthma Daughter         5 grand kids     Asthma Brother      Obesity Brother      Coronary Artery Disease Brother         Passed 12/16/2020     Pacemaker  Brother      Lung Cancer Brother      Asthma Grandchild      Asthma Grandchild      Asthma Grandchild      Asthma Grandchild      Asthma Grandchild      Current Outpatient Medications   Medication Sig Dispense Refill     albuterol (PROAIR HFA/PROVENTIL HFA/VENTOLIN HFA) 108 (90 Base) MCG/ACT inhaler Inhale 1-2 puffs into the lungs every 6 hours 1 Inhaler 3     amLODIPine (NORVASC) 5 MG tablet TAKE 1 TABLET EVERY DAY 90 tablet 1     aspirin 81 MG tablet Take 1 tablet by mouth daily.       cetirizine (ZYRTEC) 10 MG tablet Take 1 tablet (10 mg) by mouth every evening 30 tablet 0     Cyanocobalamin (VITAMIN B 12 PO) Take 1 tablet by mouth daily       famotidine (PEPCID) 20 MG tablet Take 1 tablet (20 mg) by mouth daily as needed (heartburn) 30 tablet 11     fluticasone (ARNUITY ELLIPTA) 200 MCG/ACT inhaler Inhale 1 puff into the lungs daily 90 each 3     fluticasone (FLONASE) 50 MCG/ACT nasal spray Spray 1-2 sprays into both nostrils daily 16 g 11     HYDROcodone-acetaminophen (NORCO) 5-325 MG tablet Take 1 tablet by mouth every 6 hours as needed for severe pain 10 tablet 0     hydrOXYzine (ATARAX) 10 MG tablet TAKE 1 TO 2 TABLETS(10 TO 20 MG) BY MOUTH THREE TIMES DAILY AS NEEDED FOR ANXIETY 30 tablet 7     MULTI-VITAMIN OR TABS   0     pravastatin (PRAVACHOL) 80 MG tablet Take 1 tablet (80 mg) by mouth every evening 90 tablet 1     triamterene-HCTZ (MAXZIDE) 75-50 MG tablet Take 1 tablet by mouth daily 90 tablet 1     VITAMIN C 500 MG OR TABS 1-2 tablets daily 60 0     albuterol (2.5 MG/3ML) 0.083% neb solution Take 1 vial (2.5 mg) by nebulization every 6 hours as needed for shortness of breath / dyspnea or wheezing 30 vial 1     Chlorpheniramine-Acetaminophen (CORICIDIN HBP COLD/FLU PO)        predniSONE (DELTASONE) 20 MG tablet Take 2 tablets (40 mg) by mouth daily 10 tablet 0     triamcinolone (KENALOG) 0.1 % external cream Apply sparingly to affected area three times daily for up to 14 days. 15 g 0     Social  "History     Tobacco Use     Smoking status: Former Smoker     Packs/day: 0.00     Years: 30.00     Pack years: 0.00     Types: Cigarettes     Quit date: 1996     Years since quittin.6     Smokeless tobacco: Never Used   Substance Use Topics     Alcohol use: Not Currently     Comment: wine 1-2 times per year       OBJECTIVE  /74   Pulse 58   Temp 99  F (37.2  C) (Oral)   Resp 15   Ht 1.638 m (5' 4.5\")   Wt 123.4 kg (272 lb)   SpO2 100%   BMI 45.97 kg/m      Physical Exam  Vitals and nursing note reviewed.   Constitutional:       Appearance: Normal appearance. She is obese.   Eyes:      Extraocular Movements: Extraocular movements intact.      Conjunctiva/sclera: Conjunctivae normal.   Cardiovascular:      Rate and Rhythm: Normal rate.   Musculoskeletal:      Comments: Patient has difficulty getting onto table.  Right knee with effusion.  Limited ROM - to 90 degrees only.  Medial joint line pain.  Right hip ROM intact.  No posterior knee pain or calf tenderness.   Neurological:      General: No focal deficit present.      Mental Status: She is alert.   Psychiatric:         Mood and Affect: Mood normal.         Labs:  No results found for this or any previous visit (from the past 24 hour(s)).    X-Ray was done, my findings are: Near bone-on-bone at medial compartment  Xrays personally viewed by myself.      ASSESSMENT:      ICD-10-CM    1. Effusion of right knee  M25.461 Orthopedic  Referral     XR Knee Right 3 Views     HYDROcodone-acetaminophen (NORCO) 5-325 MG tablet   2. Primary osteoarthritis of right knee  M17.11         Medical Decision Making:    Differential Diagnosis:  MS Injury Pain: sprain, osteoarthritis and internal derrangement    Serious Comorbid Conditions:  Adult:  as above    PLAN:    Rx for norco.  Ortho referral.  Recommended using a cane and ice for the knee.  Patient education.      Followup:    If not improving or if condition worsens, follow up with your Primary " Care Provider, In 2  day(s) follow up with  Ortho    Patient Instructions     Patient Education     Fluid on the Knee    Fluid on the knee is also known as knee effusion. The knee joint normally has less than 1 ounce of fluid. Injury or inflammation of the knee joint causes extra fluid to collect there. When this happens, the knee joint looks swollen and is often painful. It may be hard to fully bend the knee.  The most common cause of fluid on the knee is osteoarthritis due to wear and tear on the joint cartilage. Other causes include injury to the cartilage, inflammatory arthritis such as gout or rheumatoid arthritis, and infection of the joint.  If the cause of the fluid is not certain, you may need a needle aspiration. This procedure removes a sample of joint fluid from the knee for testing. Removing excess fluid may also relieve swelling and pain.  Home care    Limit your activities. Stay off the injured leg as much as possible until pain improves.    Keep your leg elevated to reduce pain and swelling. When sleeping, place a pillow under the injured leg. When sitting, support the injured leg so it is above heart level. This is very important during the first 48 hours.    Apply an ice pack over the injured area for 15 to 20 minutes every 3 to 6 hours. You should do this for the first 24 to 48 hours. You can make an ice pack by filling a plastic bag that seals at the top with ice cubes and then wrapping it with a thin towel. Continue to use ice packs for relief of pain and swelling as needed. As the ice melts, be careful not to get your wrap, splint, or cast wet. After 48 hours, apply heat (warm shower or warm bath) for 15 to 20 minutes several times a day, or alternate ice and heat. If you have to wear a hook-and-loop knee brace, you can open it to apply the ice pack, or heat, directly to the knee. Never put ice directly on the skin. Always wrap the ice in a towel or other type of cloth.    You may  use over-the-counter pain medicine to control pain, unless another pain medicine was prescribed. If you have chronic liver or kidney disease or have ever had a stomach ulcer or gastrointestinal bleeding, talk with your healthcare provider before using these medicines.    If crutches or a walker have been recommended, don't put weight on the injured leg until you can do so without pain. Check with your healthcare provider before returning to sports or full work duties.    If you have a hook-and-loop knee brace, you can remove it to bathe and sleep, unless told otherwise.  Follow-up care  Follow up with your healthcare provider as advised.  If you are overweight, talk to your healthcare provider about a weight loss program. The excess weight puts extra strain on your knees.  When to seek medical advice  Call your healthcare provider right away if any of these occur:    Increasing pain, redness, or swelling of the knee    Fever of 100.4 F (38 C) or above lasting for 24 to 48 hours, or as advised    Shaking chills  [employer] last reviewed this educational content on 5/1/2018 2000-2021 [employer]. All rights reserved. This information is not intended as a substitute for professional medical care. Always follow your healthcare professional's instructions.           Patient Education     Osteoarthritis  Osteoarthritis happens when the cartilage in a joint becomes damaged and worn. This may be from age, wear and tear, overuse of the joint, obesity, or other problems. Osteoarthritis can affect any joint. But it's most common in hands, knees, spine, hips, and feet. Symptoms include joint stiffness, and pain. It's also called degenerative joint disease.   Home care    When a joint is more sore than usual, rest it for a day or two.    Heat can help relieve stiffness. Take a hot bath or apply a heating pad for up to 30 minutes at a time. If symptoms are worse in the morning, using heat just after awakening can  help relax the muscle and soothe the joints.     Ice helps relieve pain. It's often used after activity. Use a cold pack wrapped in a thin cloth on the joint for 10 to 15 minutes at a time.     Alternating hot and cold can also help relieve pain. Try this for 20 minutes at a time, several times per day.    Exercise helps prevent the muscles and ligaments around the joint from becoming weak. It also helps maintain function in the joint. Be as active as you can. Talk to your healthcare provider about what activity program is best for you.    Excess weight puts a lot of extra strain on weight-bearing joints of the lower back, hips, knees, feet and ankles. If you are overweight, talk to your healthcare provider about a safe and effective weight loss program.    Use anti-inflammatory medicines as prescribed for pain. This includes acetaminophen or NSAIDs such as ibuprofen or naproxen. Don't take NSAIDs if your healthcare provider has told you that you can't take NSAIDS because of other health problems If needed, topical or injected medicines may be recommended. Talk with your healthcare provider if these options are not enough to manage your pain. Follow the directions on all over-the-counter medicines.    Talk with your healthcare provider about devices that might help improve your function and reduce pain.    Talk with you healthcare provider about physical therapy to help strengthen your joints and the surrounding muscles.    Follow-up care  Follow up with your healthcare provider, or as advised.   When to seek medical advice  Call your healthcare provider right away if any of these occur:    Redness or swelling of a painful joint    Discharge or pus from a painful joint    Fever of 100.4 F (38 C) or higher, or as directed by your healthcare provider    Worsening joint pain    Decreased ability to move the joint or bear weight on the joint  [employer] last reviewed this educational content on 8/1/2019 2000-2021 [employer]  [employer]. All rights reserved. This information is not intended as a substitute for professional medical care. Always follow your healthcare professional's instructions.

## 2021-08-25 DIAGNOSIS — E78.5 HYPERLIPIDEMIA LDL GOAL <100: ICD-10-CM

## 2021-08-29 RX ORDER — PRAVASTATIN SODIUM 80 MG/1
TABLET ORAL
Qty: 90 TABLET | Refills: 0 | Status: SHIPPED | OUTPATIENT
Start: 2021-08-29 | End: 2021-10-04

## 2021-08-29 NOTE — TELEPHONE ENCOUNTER
Routing refill request to provider for review/approval because:  1 refill approved due in for visit and labs 10/2021 for visit and labs    Please call and schedule.    Thank you

## 2021-09-01 ENCOUNTER — OFFICE VISIT (OUTPATIENT)
Dept: ORTHOPEDICS | Facility: CLINIC | Age: 66
End: 2021-09-01
Payer: COMMERCIAL

## 2021-09-01 VITALS
WEIGHT: 272 LBS | BODY MASS INDEX: 45.32 KG/M2 | HEIGHT: 65 IN | SYSTOLIC BLOOD PRESSURE: 120 MMHG | DIASTOLIC BLOOD PRESSURE: 74 MMHG

## 2021-09-01 DIAGNOSIS — M17.11 PRIMARY OSTEOARTHRITIS OF RIGHT KNEE: Primary | ICD-10-CM

## 2021-09-01 DIAGNOSIS — M25.461 EFFUSION OF RIGHT KNEE: ICD-10-CM

## 2021-09-01 DIAGNOSIS — M79.605 BILATERAL LOWER EXTREMITY PAIN: ICD-10-CM

## 2021-09-01 DIAGNOSIS — M79.604 BILATERAL LOWER EXTREMITY PAIN: ICD-10-CM

## 2021-09-01 PROCEDURE — 99213 OFFICE O/P EST LOW 20 MIN: CPT | Performed by: PEDIATRICS

## 2021-09-01 ASSESSMENT — MIFFLIN-ST. JEOR: SCORE: 1766.72

## 2021-09-01 NOTE — PATIENT INSTRUCTIONS
Plan physical therapy next, with the knee arthritis, as well as with the hip and leg pain.  Monitor course ~4-6 weeks with PT, but will leave follow up open ended.  May use compression/wrap for comfort for the knee.  Ok to remain active as able.  We discussed future consideration may be injection, if interested.  Contact clinic if any questions/concerns.    If you have any further questions for your physician or physician s care team you can call [phone] and use option 3 to leave a voice message. Calls received during business hours will be returned same day.

## 2021-09-01 NOTE — PROGRESS NOTES
ASSESSMENT & PLAN    Teri was seen today for pain.    Diagnoses and all orders for this visit:    Primary osteoarthritis of right knee  -     [facility] Referral; Future    Effusion of right knee  -     Orthopedic  Referral  -     [facility] Ref[facility]; Future    Bilateral lower extremity pain  -     [facility] Referral; Future      This issue is chronic and Improving.    Discussed nature of degenerative arthrosis of the knee. Discussed symptom treatment with over-the-counter medications, ice or heat, topical treatments, and rest if needed. Discussed use of compression or bracing for comfort. Discussed potential benefits of rehabilitation, to maintain or improve function at the knee. Discussed benefits of exercise and weight loss (if applicable) to reduce pressure at the knee. Discussed injection therapy. Also briefly discussed future consideration of referral to orthopedic surgery for further evaluation and discussion of additional treatment options.    She has noted some LE discomfort from hips distally, has seen chiropractor for that in past. May do chiropractic care if desired. We also discussed PT for the knee and for the hip/LE pain. She agreed, PT order placed.  Declined injection for now, not interested. Certainly fine to monitor, with symptom management and activity modification.    See Patient Instructions  Patient Instructions   Plan physical therapy next, with the knee arthritis, as well as with the hip and leg pain.  Monitor course ~4-6 weeks with PT, but will leave follow up open ended.  May use compression/wrap for comfort for the knee.  Ok to remain active as able.  We discussed future consideration may be injection, if interested.  Contact clinic if any questions/concerns.    If you have any further questions for your physician or physician s care team you can call [phone] and use option 3 to leave a voice message. Calls received during business hours will be returned same  "day.        Tian Hawthorne DO  [facility]      CC: Gaby Coon      -----  Chief Complaint   Patient presents with     Right Knee - Pain       SUBJECTIVE  Teri Bain is a/an 65 year old female who is seen in consultation at the request of  Gaby Coon PA-C for evaluation of right knee pain.  Pain began about 1 week ago when she went to sit down her knee locked on her.  She had swelling following this and she was seen at .  Does feel some of the swelling has resolved.  Has had knee issues for many years.  Has undergone 1 injection many years ago, does not believe it was helpful.  .     The patient is seen by themselves.      Onset: 1 week(s) ago. Pain increased with squatting; previous knee issues   Location of Pain: right knee  Worsened by: squatting  Better with: rest, aleve  Treatments tried: ibuprofen and Aleve  Associated symptoms: swelling    Orthopedic/Surgical history: ongoing   Social History/Occupation: retired     No family history pertinent to patient's problem today.     **  When pain present, is more superior. Overall improving. No pain at rest.  Some limping.  Has done some chiropractic care in past for hip and LE issues.          REVIEW OF SYSTEMS:  Review of Systems   All other systems reviewed and are negative.        OBJECTIVE:  /74   Ht 1.638 m (5' 4.5\")   Wt 123.4 kg (272 lb)   BMI 45.97 kg/m     General:  alert and in no distress  HEENT: no scleral icterus or conjunctival erythema  Skin: no suspicious lesions or rash. No jaundice.  CV: distal perfusion intact   Resp: normal respiratory effort without conversational dyspnea   Psych: normal mood and affect  Gait: mild antalgic, ambulates independently in room  Neuro: Normal light sensory exam of lwer extremity     Right Knee exam    Inspection:   + effusion   No ecchymosis    ROM:      Flexion > 90 degrees       Extension grossly full degrees    Patellar Motion:     "  Crepitus noted in the patellofemoral joint    Tender:      medial joint line mild    Special Tests:      neg (-) anterior drawer       neg (-) posterior drawer       neg (-) varus       neg (-) valgus       no pain with forced extension        RADIOLOGY:  I independently visualized and reviewed these images with the patient  Right knee DJD.    Results for orders placed or performed in visit on 08/23/21   XR Knee Right 3 Views    Narrative    KNEE RIGHT THREE VIEWS   8/23/2021 2:55 PM     HISTORY: Right knee pain.    COMPARISON: 2/27/2020 x-ray.      Impression    IMPRESSION: Moderate to advanced tricompartmental degenerative changes  fairly similar to the prior study. There is a 1.1 cm calcification in  the suprapatellar region that is new since the  comparison study  suspicious for loose body. Probable small joint effusion.    SUSIE SHANKAR MD         SYSTEM ID:  [id number]         Review of prior external note(s) from - referring  Review of the result(s) of each unique test - x-ray  Independent interpretation of a test performed by another physician/other qualified health care professional (not separately reported) - x-ray  23 minutes spent on the date of the encounter doing chart review, history and exam, documentation and further activities per the note

## 2021-09-01 NOTE — LETTER
9/1/2021         RE: Teri Bain  [address] [id number]  [place] [address]        Dear Colleague,    Thank you for referring your patient, Teri Bain, to the [facility]. Please see a copy of my visit note below.    ASSESSMENT & PLAN    Teri was seen today for pain.    Diagnoses and all orders for this visit:    Primary osteoarthritis of right knee  -     [facility] and Hand Referral; Future    Effusion of right knee  -     Orthopedic  Referral  -     [facility] Referral; Future    Bilateral lower extremity pain  -     [facility] Referral; Future      This issue is chronic and Improving.    Discussed nature of degenerative arthrosis of the knee. Discussed symptom treatment with over-the-counter medications, ice or heat, topical treatments, and rest if needed. Discussed use of compression or bracing for comfort. Discussed potential benefits of rehabilitation, to maintain or improve function at the knee. Discussed benefits of exercise and weight loss (if applicable) to reduce pressure at the knee. Discussed injection therapy. Also briefly discussed future consideration of referral to orthopedic surgery for further evaluation and discussion of additional treatment options.    She has noted some LE discomfort from hips distally, has seen chiropractor for that in past. May do chiropractic care if desired. We also discussed PT for the knee and for the hip/LE pain. She agreed, PT order placed.  Declined injection for now, not interested. Certainly fine to monitor, with symptom management and activity modification.    See Patient Instructions  Patient Instructions   Plan physical therapy next, with the knee arthritis, as well as with the hip and leg pain.  Monitor course ~4-6 weeks with PT, but will leave follow up open ended.  May use compression/wrap for comfort for the knee.  Ok to remain active as able.  We discussed future consideration may be injection, if  "interested.  Contact clinic if any questions/concerns.    If you have any further questions for your physician or physician s care team you can call [phone] and use option 3 to leave a voice message. Calls received during business hours will be returned same day.        Tian Hawthorne DO  [facility]      CC: Gaby Coon      -----  Chief Complaint   Patient presents with     Right Knee - Pain       SUBJECTIVE  Teri Bain is a/an 65 year old female who is seen in consultation at the request of  Gaby Coon PA-C for evaluation of right knee pain.  Pain began about 1 week ago when she went to sit down her knee locked on her.  She had swelling following this and she was seen at .  Does feel some of the swelling has resolved.  Has had knee issues for many years.  Has undergone 1 injection many years ago, does not believe it was helpful.  .     The patient is seen by themselves.      Onset: 1 week(s) ago. Pain increased with squatting; previous knee issues   Location of Pain: right knee  Worsened by: squatting  Better with: rest, aleve  Treatments tried: ibuprofen and Aleve  Associated symptoms: swelling    Orthopedic/Surgical history: ongoing   Social History/Occupation: retired     No family history pertinent to patient's problem today.     **  When pain present, is more superior. Overall improving. No pain at rest.  Some limping.  Has done some chiropractic care in past for hip and LE issues.          REVIEW OF SYSTEMS:  Review of Systems   All other systems reviewed and are negative.        OBJECTIVE:  /74   Ht 1.638 m (5' 4.5\")   Wt 123.4 kg (272 lb)   BMI 45.97 kg/m     General:  alert and in no distress  HEENT: no scleral icterus or conjunctival erythema  Skin: no suspicious lesions or rash. No jaundice.  CV: distal perfusion intact   Resp: normal respiratory effort without conversational dyspnea   Psych: normal mood and " affect  Gait: mild antalgic, ambulates independently in room  Neuro: Normal light sensory exam of lwer extremity     Right Knee exam    Inspection:   + effusion   No ecchymosis    ROM:      Flexion > 90 degrees       Extension grossly full degrees    Patellar Motion:      Crepitus noted in the patellofemoral joint    Tender:      medial joint line mild    Special Tests:      neg (-) anterior drawer       neg (-) posterior drawer       neg (-) varus       neg (-) valgus       no pain with forced extension        RADIOLOGY:  I independently visualized and reviewed these images with the patient  Right knee DJD.    Results for orders placed or performed in visit on 08/23/21   XR Knee Right 3 Views    Narrative    KNEE RIGHT THREE VIEWS   8/23/2021 2:55 PM     HISTORY: Right knee pain.    COMPARISON: 2/27/2020 x-ray.      Impression    IMPRESSION: Moderate to advanced tricompartmental degenerative changes  fairly similar to the prior study. There is a 1.1 cm calcification in  the suprapatellar region that is new since the  comparison study  suspicious for loose body. Probable small joint effusion.    SUSIE SHANKAR MD         SYSTEM ID:  [id number]         Review of prior external note(s) from - referring  Review of the result(s) of each unique test - x-ray  Independent interpretation of a test performed by another physician/other qualified health care professional (not separately reported) - x-ray  23 minutes spent on the date of the encounter doing chart review, history and exam, documentation and further activities per the note             Again, thank you for allowing me to participate in the care of your patient.        Sincerely,        Tian Hawthorne DO

## 2021-09-03 DIAGNOSIS — I87.2 CHRONIC VENOUS INSUFFICIENCY: ICD-10-CM

## 2021-09-03 DIAGNOSIS — I10 HYPERTENSION GOAL BP (BLOOD PRESSURE) < 140/90: ICD-10-CM

## 2021-09-05 ENCOUNTER — HEALTH MAINTENANCE LETTER (OUTPATIENT)
Age: 66
End: 2021-09-05

## 2021-09-07 RX ORDER — TRIAMTERENE AND HYDROCHLOROTHIAZIDE 75; 50 MG/1; MG/1
1 TABLET ORAL DAILY
Qty: 90 TABLET | Refills: 0 | Status: SHIPPED | OUTPATIENT
Start: 2021-09-07 | End: 2021-10-04

## 2021-09-07 NOTE — TELEPHONE ENCOUNTER
[facility] Provider-Please review and sign if agree.    Creatinine   Date Value Ref Range Status   06/15/2021 1.43 (H) 0.52 - 1.04 mg/dL Final     Thank you!  ALYSSA VelazcoN, RN  [facility]     What Type Of Note Output Would You Prefer (Optional)?: Bullet Format How Severe Is Your Acne?: severe Is This A New Presentation, Or A Follow-Up?: Acne

## 2021-10-04 DIAGNOSIS — E78.5 HYPERLIPIDEMIA LDL GOAL <100: ICD-10-CM

## 2021-10-04 DIAGNOSIS — I87.2 CHRONIC VENOUS INSUFFICIENCY: ICD-10-CM

## 2021-10-04 DIAGNOSIS — I10 HYPERTENSION GOAL BP (BLOOD PRESSURE) < 140/90: ICD-10-CM

## 2021-10-04 RX ORDER — AMLODIPINE BESYLATE 5 MG/1
TABLET ORAL
Qty: 90 TABLET | Refills: 0 | Status: SHIPPED | OUTPATIENT
Start: 2021-10-04 | End: 2021-11-29

## 2021-10-04 RX ORDER — TRIAMTERENE AND HYDROCHLOROTHIAZIDE 75; 50 MG/1; MG/1
1 TABLET ORAL DAILY
Qty: 90 TABLET | Refills: 0 | Status: SHIPPED | OUTPATIENT
Start: 2021-10-04 | End: 2021-11-05

## 2021-10-04 RX ORDER — PRAVASTATIN SODIUM 80 MG/1
TABLET ORAL
Qty: 90 TABLET | Refills: 0 | Status: SHIPPED | OUTPATIENT
Start: 2021-10-04 | End: 2021-11-08

## 2021-10-04 NOTE — TELEPHONE ENCOUNTER
Mail order pharmacy is out of these 3 meds t'd up, requesting a one time refill be sent to [facility] so she does not run out.     Sofi Ramsay RN

## 2021-11-01 ASSESSMENT — ENCOUNTER SYMPTOMS
HEADACHES: 1
NERVOUS/ANXIOUS: 0
ABDOMINAL PAIN: 1
HEMATOCHEZIA: 0
EYE PAIN: 0
PALPITATIONS: 0
COUGH: 0
CONSTIPATION: 0
WEAKNESS: 0
MYALGIAS: 1
FEVER: 0
HEARTBURN: 1
ARTHRALGIAS: 1
SHORTNESS OF BREATH: 0
SORE THROAT: 0
FREQUENCY: 1
CHILLS: 0
DIZZINESS: 0
JOINT SWELLING: 1
NAUSEA: 0
DYSURIA: 0
DIARRHEA: 0
HEMATURIA: 0
BREAST MASS: 0
PARESTHESIAS: 0

## 2021-11-01 ASSESSMENT — ACTIVITIES OF DAILY LIVING (ADL): CURRENT_FUNCTION: NO ASSISTANCE NEEDED

## 2021-11-03 DIAGNOSIS — I10 HYPERTENSION GOAL BP (BLOOD PRESSURE) < 140/90: ICD-10-CM

## 2021-11-03 DIAGNOSIS — I87.2 CHRONIC VENOUS INSUFFICIENCY: ICD-10-CM

## 2021-11-05 RX ORDER — TRIAMTERENE AND HYDROCHLOROTHIAZIDE 75; 50 MG/1; MG/1
1 TABLET ORAL DAILY
Qty: 90 TABLET | Refills: 0 | Status: SHIPPED | OUTPATIENT
Start: 2021-11-05 | End: 2022-01-28

## 2021-11-05 NOTE — TELEPHONE ENCOUNTER
Routing refill request to provider for review/approval because:  Labs out of range:  Creatinine    Creatinine   Date Value Ref Range Status   06/15/2021 1.43 (H) 0.52 - 1.04 mg/dL Final     Has appt with PCP on Monday 11/8    ALYSSA CarnesN RN  [facility]

## 2021-11-06 PROBLEM — Z87.448 HISTORY OF ACUTE RENAL FAILURE: Status: ACTIVE | Noted: 2017-01-06

## 2021-11-06 ASSESSMENT — ENCOUNTER SYMPTOMS
NAUSEA: 0
JOINT SWELLING: 1
ABDOMINAL PAIN: 1
CHILLS: 0
COUGH: 0
DIZZINESS: 0
SHORTNESS OF BREATH: 0
SORE THROAT: 0
DIARRHEA: 0
CONSTIPATION: 0
BREAST MASS: 0
HEMATOCHEZIA: 0
FREQUENCY: 1
HEADACHES: 1
ARTHRALGIAS: 1
MYALGIAS: 1
NERVOUS/ANXIOUS: 0
HEMATURIA: 0
PALPITATIONS: 0
HEARTBURN: 1
EYE PAIN: 0
WEAKNESS: 0
DYSURIA: 0
FEVER: 0
PARESTHESIAS: 0

## 2021-11-06 ASSESSMENT — ACTIVITIES OF DAILY LIVING (ADL): CURRENT_FUNCTION: NO ASSISTANCE NEEDED

## 2021-11-06 NOTE — PROGRESS NOTES
"SUBJECTIVE:   Teri Bain is a 66 year old female who presents for Preventive Visit.      Patient has been advised of split billing requirements and indicates understanding: Yes   Are you in the first 12 months of your Medicare coverage?  No    Healthy Habits:     In general, how would you rate your overall health?  Good    Frequency of exercise:  None    Do you usually eat at least 4 servings of fruit and vegetables a day, include whole grains    & fiber and avoid regularly eating high fat or \"junk\" foods?  No    Taking medications regularly:  Yes    Medication side effects:  Muscle aches    Ability to successfully perform activities of daily living:  No assistance needed    Home Safety:  No safety concerns identified    Hearing Impairment:  No hearing concerns    In the past 6 months, have you been bothered by leaking of urine?  No    In general, how would you rate your overall mental or emotional health?  Good      PHQ-2 Total Score: 0    Additional concerns today:  Yes    Do you feel safe in your environment? Yes    Asthma Follow-Up  She has a longstanding history of mild persistent asthma.   She uses Arnuity Ellipta for daily asthma controller.   Was ACT completed today?    Yes    ACT Total Scores 11/8/2021   ACT TOTAL SCORE -   ASTHMA ER VISITS -   ASTHMA HOSPITALIZATIONS -   ACT TOTAL SCORE (Goal Greater than or Equal to 20) 24   In the past 12 months, how many times did you visit the emergency room for your asthma without being admitted to the hospital? 0   In the past 12 months, how many times were you hospitalized overnight because of your asthma? 0     Pharmacy gave patient albuterol Sulfate Brand: tastes off  Arnuity Ellipta inhaler makes patient's chest hurt for a few minutes when she inhales it      How many days per week do you miss taking your asthma controller medication?  2    Please describe any recent triggers for your asthma: None    Have you had any Emergency Room Visits, Urgent Care " Visits, or Hospital Admissions since your last office visit?  No     GERD Follow-Up  Uses pepcid prn  Has been using this 3-4 times per month.  Do you avoid heartburn food triggers (coffee, tea, carbonated drinks, alcohol, fatty or spicy foods, tomato sauces, chocolate)?: Yes - notices red sauces, Chinese foods, and chili triggers heartburn symptoms      Have you ever done Advance Care Planning? (For example, a Health Directive, POLST, or a discussion with a medical provider or your loved ones about your wishes): No, advance care planning information given to patient to review.  Advanced care planning was discussed at today's visit. May have one at home. Will give patient information to take home     ADDITIONAL CONCERNS:    Headaches x 20-30 minutes since COVID illness in 2021.  Daily upon awakening.  Frontal or crown or occipital.  Qualitatively different from her migraines.      Thinning hair    Changing texture of skin on face and arms.    Itchy ears      Fall risk  Fallen 2 or more times in the past year?: No  Any fall with injury in the past year?: No    Cognitive Screening   1) Repeat 3 items (Leader, Season, Table)    2) Clock draw: NORMAL  3) 3 item recall: Recalls 3 objects  Results: 3 items recalled: COGNITIVE IMPAIRMENT LESS LIKELY    Mini-CogTM Copyright S Senthil. Licensed by the author for use in [facility]; reprinted with permission ([email or web address]). All rights reserved.      Reviewed and updated as needed this visit by clinical staff  Tobacco  Allergies  Meds  Problems            Reviewed and updated as needed this visit by Provider   Allergies  Meds  Problems           Social History     Tobacco Use     Smoking status: Former Smoker     Packs/day: 0.00     Years: 30.00     Pack years: 0.00     Types: Cigarettes     Quit date: 1996     Years since quittin.8     Smokeless tobacco: Never Used   Substance Use Topics     Alcohol use: Not Currently     Comment: wine 1-2  times per year         Alcohol Use 11/1/2021   Prescreen: >3 drinks/day or >7 drinks/week? No   Prescreen: >3 drinks/day or >7 drinks/week? -       Current providers sharing in care for this patient include:   Patient Care Team:  Norma Serrano MD as PCP - General  Norma Serrano MD as Assigned PCP  Tian Hawthorne DO as Assigned Musculoskeletal Provider    The following health maintenance items are reviewed in Epic and correct as of today:  Health Maintenance Due   Topic Date Due     COVID-19 Vaccine (3 - Moderna risk 4-dose series) 03/30/2021     FALL RISK ASSESSMENT  10/26/2021     ASTHMA ACTION PLAN  10/26/2021     BP Readings from Last 3 Encounters:   11/08/21 128/73   09/01/21 120/74   08/23/21 120/74    Wt Readings from Last 3 Encounters:   11/08/21 123.4 kg (272 lb)   09/01/21 123.4 kg (272 lb)   08/23/21 123.4 kg (272 lb)           Breast CA Risk Assessment (FHS-7) 11/1/2021   Do you have a family history of breast, colon, or ovarian cancer? No / Unknown   Mammogram Screening: Recommended mammography every 1-2 years with patient discussion and risk factor consideration  Pertinent mammograms are reviewed under the imaging tab.      Review of Systems   Constitutional: Negative for chills and fever.   HENT: Positive for congestion. Negative for ear pain, hearing loss and sore throat.    Eyes: Positive for visual disturbance. Negative for pain.   Respiratory: Negative for cough and shortness of breath.    Cardiovascular: Positive for chest pain and peripheral edema. Negative for palpitations.   Gastrointestinal: Positive for abdominal pain and heartburn. Negative for constipation, diarrhea, hematochezia and nausea.   Breasts:  Negative for tenderness, breast mass and discharge.   Genitourinary: Positive for frequency. Negative for dysuria, genital sores, hematuria, pelvic pain, urgency, vaginal bleeding and vaginal discharge.   Musculoskeletal: Positive for arthralgias, joint swelling and  "myalgias.   Skin: Negative for rash.   Neurological: Positive for headaches. Negative for dizziness, weakness and paresthesias.   Psychiatric/Behavioral: Positive for mood changes. The patient is not nervous/anxious.          OBJECTIVE:   /73   Pulse 73   Temp 98.2  F (36.8  C) (Oral)   Resp 18   Ht 1.632 m (5' 4.25\")   Wt 123.4 kg (272 lb)   SpO2 97%   BMI 46.33 kg/m   Estimated body mass index is 46.33 kg/m  as calculated from the following:    Height as of this encounter: 1.632 m (5' 4.25\").    Weight as of this encounter: 123.4 kg (272 lb).  Physical Exam  GENERAL APPEARANCE: healthy, alert and no distress  EYES: Eyes grossly normal to inspection, conjunctivae and sclerae normal  HENT: ear canals and TM's normal, nasal tone to voice  NECK: no adenopathy, no asymmetry, masses, or scars and thyroid normal to palpation  RESP: lungs clear to auscultation - no rales, rhonchi or wheezes  CV: regular rate and rhythm, normal S1 S2, no S3 or S4, no murmur, click or rub, no peripheral edema   ABDOMEN: soft, nontender, no hepatosplenomegaly, no masses   MS: no musculoskeletal defects are noted and gait is age appropriate without ataxia  SKIN: no suspicious lesions or rashes  NEURO: Normal strength and tone, sensory exam grossly normal, mentation intact and speech normal  PSYCH: mentation appears normal and affect normal/bright       ASSESSMENT / PLAN:     Encounter for Medicare annual wellness exam      High priority for 2019-nCoV vaccine  - COVID-19,PF,MODERNA (18+ Yrs BOOSTER .25mL)    Pure hypercholesterolemia  Continue statin.  Awaiting lipid results to determine if dose change is indicated.     - Lipid panel reflex to direct LDL Fasting    Mild persistent asthma without complication  stable/well controlled - Continue current medication regimen.   - fluticasone (ARNUITY ELLIPTA) 200 MCG/ACT inhaler  Dispense: 90 each; Refill: 3  - albuterol (PROAIR HFA/PROVENTIL HFA/VENTOLIN HFA) 108 (90 Base) MCG/ACT " "inhaler  Dispense: 18 g; Refill: 3  - Asthma Action Plan (AAP)    Stage 3a chronic kidney disease (H)  - Albumin Random Urine Quantitative with Creat Ratio  - Basic metabolic panel  (Ca, Cl, CO2, Creat, Gluc, K, Na, BUN)    Chronic seasonal allergic rhinitis due to pollen  I recommended she use cetirizine daily in AM, Benadryl at HS, and flonase.    - fluticasone (FLONASE) 50 MCG/ACT nasal spray  Dispense: 16 g; Refill: 11    Gastroesophageal reflux disease, unspecified whether esophagitis present  stable/well controlled - Continue current medication regimen.   - famotidine (PEPCID) 20 MG tablet  Dispense: 30 tablet; Refill: 11    Thinning hair  - TSH with free T4 reflex    Daily headache  Likely related to congestion.  See above plan for addressing that.  Other possible etiologies include RAMÓN.  She will notify me if headaches persist and I would consider brain imaging and sleep clinic referral.       COUNSELING:  Reviewed preventive health counseling, as reflected in patient instructions    Estimated body mass index is 46.33 kg/m  as calculated from the following:    Height as of this encounter: 1.632 m (5' 4.25\").    Weight as of this encounter: 123.4 kg (272 lb).    Weight management plan: Discussed healthy diet and exercise guidelines    She reports that she quit smoking about 24 years ago. Her smoking use included cigarettes. She smoked 0.00 packs per day for 30.00 years. She has never used smokeless tobacco.      Appropriate preventive services were discussed with this patient, including applicable screening as appropriate for cardiovascular disease, diabetes, osteopenia/osteoporosis, and glaucoma.  As appropriate for age/gender, discussed screening for colorectal cancer, prostate cancer, breast cancer, and cervical cancer. Checklist reviewing preventive services available has been given to the patient.    Reviewed patients plan of care and provided an AVS. The Basic Care Plan (routine screening as documented " in Health Maintenance) for Teri meets the Care Plan requirement. This Care Plan has been established and reviewed with the Patient.    Counseling Resources:  ATP IV Guidelines  Pooled Cohorts Equation Calculator  Breast Cancer Risk Calculator  Breast Cancer: Medication to Reduce Risk  FRAX Risk Assessment  ICSI Preventive Guidelines  Dietary Guidelines for Americans, 2010  USDA's MyPlate  ASA Prophylaxis  Lung CA Screening    Norma Serrano MD  [facility]    Identified Health Risks:    She is at risk for lack of exercise and has been provided with information to increase physical activity for the benefit of her well-being.  The patient was counseled and encouraged to consider modifying their diet and eating habits. She was provided with information on recommended healthy diet options.

## 2021-11-06 NOTE — PATIENT INSTRUCTIONS
"1) Get the cetirizine and take it every morning.  2) Take 50 mg benadryl (diphenhydramine) at bedtime.  3) Use flonase every day.    If you continue to have morning headaches with the above allergy regimen let me know.    4) Discontinue daily aspirin.    I recommend all patients have an annual preventive visit with me.  This is commonly referred to as an annual \"physical.\"  For patients on medicare it is referred to as a \"Medicare Annual Wellness Visit.\"  The purpose of this visit is to review preventive health care (preventing diseases you do not have) including potential immunizations and health screenings you may be due for.  For my patients who have chronic conditions that require medication or monitoring I do often combine your chronic condition monitoring visit with the preventive visit for your convenience.  However, these are billed separately and if you'd prefer to do them separately (schedule two separate visits) please let me know.     I kindly request that you check your MyChart prior to all appointments with me and complete any assigned questionnaires ahead of time.  (Or you can come a bit early to your appointment and complete questionnaires on one of our tablets.)  This frees up more of our designated visit time to address your health issues. If you have not already done so, I encourage you to sign up for [employer] ([email or web address]).  This will allow you to review your results, securely communicate with your provider care team, and schedule virtual visits as well.    FYI:  I do telehealth (video) visits exclusively on Wednesdays.  I still do in-person visits at [facility] ([phone]) on Mondays, Tuesdays and Thursdays.  You can schedule a video visit for many conditions.  Please follow this link:  [email or web address]        Patient Education   Personalized Prevention Plan  You are due for the preventive services outlined " below.  Your care team is available to assist you in scheduling these services.  If you have already completed any of these items, please share that information with your care team to update in your medical record.  Health Maintenance Due   Topic Date Due     COVID-19 Vaccine (3 - Moderna risk 4-dose series) 03/30/2021     FALL RISK ASSESSMENT  10/26/2021     Asthma Action Plan - yearly  10/26/2021       Exercise for a Healthier Heart  You may wonder how you can improve the health of your heart. If you re thinking about exercise, you re on the right track. You don t need to become an athlete. But you do need a certain amount of brisk exercise to help strengthen your heart. If you have been diagnosed with a heart condition, your healthcare provider may advise exercise to help stabilize your condition. To help make exercise a habit, choose safe, fun activities.      Exercise with a friend. When activity is fun, you're more likely to stick with it.   Before you start  Check with your healthcare provider before starting an exercise program. This is especially important if you have not been active for a while. It's also important if you have a long-term (chronic) health problem such as heart disease, diabetes, or obesity. Or if you are at high risk for having these problems.   Why exercise?  Exercising regularly offers many healthy rewards. It can help you do all of the following:     Improve your blood cholesterol level to help prevent further heart trouble    Lower your blood pressure to help prevent a stroke or heart attack    Control diabetes, or reduce your risk of getting this disease    Improve your heart and lung function    Reach and stay at a healthy weight    Make your muscles stronger so you can stay active    Prevent falls and fractures by slowing the loss of bone mass (osteoporosis)    Manage stress better    Reduce your blood pressure    Improve your sense of self and your body image  Exercise  tips      Ease into your routine. Set small goals. Then build on them. If you are not sure what your activity level should be, talk with your healthcare provider first before starting an exercise routine.    Exercise on most days. Aim for a total of 150 minutes (2 hours and 30 minutes) or more of moderate-intensity aerobic activity each week. Or 75 minutes (1 hour and 15 minutes) or more of vigorous-intensity aerobic activity each week. Or try for a combination of both. Moderate activity means that you breathe heavier and your heart rate increases but you can still talk. Think about doing 40 minutes of moderate exercise, 3 to 4 times a week. For best results, activity should last for about 40 minutes to lower blood pressure and cholesterol. It's OK to work up to the 40-minute period over time. Examples of moderate-intensity activity are walking 1 mile in 15 minutes. Or doing 30 to 45 minutes of yard work.    Step up your daily activity level.  Along with your exercise program, try being more active the whole day. Walk instead of drive. Or park further away so that you take more steps each day. Do more household tasks or yard work. You may not be able to meet the advised mount of physical activity. But doing some moderate- or vigorous-intensity aerobic activity can help reduce your risk for heart disease. Your healthcare provider can help you figure out what is best for you.    Choose 1 or more activities you enjoy.  Walking is one of the easiest things you can do. You can also try swimming, riding a bike, dancing, or taking an exercise class.    When to call your healthcare provider  Call your healthcare provider if you have any of these:     Chest pain or feel dizzy or lightheaded    Burning, tightness, pressure, or heaviness in your chest, neck, shoulders, back, or arms    Abnormal shortness of breath    More joint or muscle pain    A very fast or irregular heartbeat (palpitations)  Leonidas last reviewed this  educational content on 7/1/2019 2000-2021 [employer]. All rights reserved. This information is not intended as a substitute for professional medical care. Always follow your healthcare professional's instructions.          Understanding USDA MyPlate  The USDA has guidelines to help you make healthy food choices. These are called MyPlate. MyPlate shows the food groups that make up healthy meals using the image of a place setting. Before you eat, think about the healthiest choices for what to put on your plate or in your cup or bowl. To learn more about building a healthy plate, visit www.choosemyplate.gov.    The food groups    Fruits. Any fruit or 100% fruit juice counts as part of the Fruit Group. Fruits may be fresh, canned, frozen, or dried, and may be whole, cut-up, or pureed. Make 1/2 of your plate fruits and vegetables.    Vegetables. Any vegetable or 100% vegetable juice counts as a member of the Vegetable Group. Vegetables may be fresh, frozen, canned, or dried. They can be served raw or cooked and may be whole, cut-up, or mashed. Make 1/2 of your plate fruits and vegetables.    Grains. All foods made from grains are part of the Grains Group. These include wheat, rice, oats, cornmeal, and barley. Grains are often used to make foods such as bread, pasta, oatmeal, cereal, tortillas, and grits. Grains should be no more than 1/4 of your plate. At least half of your grains should be whole grains.    Protein. This group includes meat, poultry, seafood, beans and peas, eggs, processed soy products (such as tofu), nuts (including nut butters), and seeds. Make protein choices no more than 1/4 of your plate. Meat and poultry choices should be lean or low fat.    Dairy. The Dairy Group includes all fluid milk products and foods made from milk that contain calcium, such as yogurt and cheese. (Foods that have little calcium, such as cream, butter, and cream cheese, are not part of this group.) Most  dairy choices should be low-fat or fat-free.    Oils. Oils aren't a food group, but they do contain essential nutrients. However it's important to watch your intake of oils. These are fats that are liquid at room temperature. They include canola, corn, olive, soybean, vegetable, and sunflower oil. Foods that are mainly oil include mayonnaise, certain salad dressings, and soft margarines. You likely already get your daily oil allowance from the foods you eat.  Things to limit  Eating healthy also means limiting these things in your diet:       Salt (sodium). Many processed foods have a lot of sodium. To keep sodium intake down, eat fresh vegetables, meats, poultry, and seafood when possible. Purchase low-sodium, reduced-sodium, or no-salt-added food products at the store. And don't add salt to your meals at home. Instead, season them with herbs and spices such as dill, oregano, cumin, and paprika. Or try adding flavor with lemon or lime zest and juice.    Saturated fat. Saturated fats are most often found in animal products such as beef, pork, and chicken. They are often solid at room temperature, such as butter. To reduce your saturated fat intake, choose leaner cuts of meat and poultry. And try healthier cooking methods such as grilling, broiling, roasting, or baking. For a simple lower-fat swap, use plain nonfat yogurt instead of mayonnaise when making potato salad or macaroni salad.    Added sugars. These are sugars added to foods. They are in foods such as ice cream, candy, soda, fruit drinks, sports drinks, energy drinks, cookies, pastries, jams, and syrups. Cut down on added sugars by sharing sweet treats with a family member or friend. You can also choose fruit for dessert, and drink water or other unsweetened beverages.     [employer] last reviewed this educational content on 6/1/2020 2000-2021 [employer]. All rights reserved. This information is not intended as a substitute for professional  medical care. Always follow your healthcare professional's instructions.

## 2021-11-08 ENCOUNTER — OFFICE VISIT (OUTPATIENT)
Dept: FAMILY MEDICINE | Facility: CLINIC | Age: 66
End: 2021-11-08
Payer: COMMERCIAL

## 2021-11-08 VITALS
HEIGHT: 64 IN | HEART RATE: 73 BPM | TEMPERATURE: 98.2 F | RESPIRATION RATE: 18 BRPM | DIASTOLIC BLOOD PRESSURE: 73 MMHG | BODY MASS INDEX: 46.44 KG/M2 | SYSTOLIC BLOOD PRESSURE: 128 MMHG | OXYGEN SATURATION: 97 % | WEIGHT: 272 LBS

## 2021-11-08 DIAGNOSIS — Z23 HIGH PRIORITY FOR 2019-NCOV VACCINE: ICD-10-CM

## 2021-11-08 DIAGNOSIS — Z00.00 ENCOUNTER FOR MEDICARE ANNUAL WELLNESS EXAM: Primary | ICD-10-CM

## 2021-11-08 DIAGNOSIS — R51.9 DAILY HEADACHE: ICD-10-CM

## 2021-11-08 DIAGNOSIS — K21.9 GASTROESOPHAGEAL REFLUX DISEASE, UNSPECIFIED WHETHER ESOPHAGITIS PRESENT: ICD-10-CM

## 2021-11-08 DIAGNOSIS — J30.1 CHRONIC SEASONAL ALLERGIC RHINITIS DUE TO POLLEN: ICD-10-CM

## 2021-11-08 DIAGNOSIS — E78.5 HYPERLIPIDEMIA LDL GOAL <100: ICD-10-CM

## 2021-11-08 DIAGNOSIS — E78.00 PURE HYPERCHOLESTEROLEMIA: ICD-10-CM

## 2021-11-08 DIAGNOSIS — L65.9 THINNING HAIR: ICD-10-CM

## 2021-11-08 DIAGNOSIS — J45.30 MILD PERSISTENT ASTHMA WITHOUT COMPLICATION: ICD-10-CM

## 2021-11-08 DIAGNOSIS — N18.31 STAGE 3A CHRONIC KIDNEY DISEASE (H): ICD-10-CM

## 2021-11-08 LAB
ANION GAP SERPL CALCULATED.3IONS-SCNC: 7 MMOL/L (ref 3–14)
BUN SERPL-MCNC: 22 MG/DL (ref 7–30)
CALCIUM SERPL-MCNC: 9.7 MG/DL (ref 8.5–10.1)
CHLORIDE BLD-SCNC: 105 MMOL/L (ref 94–109)
CHOLEST SERPL-MCNC: 161 MG/DL
CO2 SERPL-SCNC: 27 MMOL/L (ref 20–32)
CREAT SERPL-MCNC: 1.34 MG/DL (ref 0.52–1.04)
CREAT UR-MCNC: 124 MG/DL
FASTING STATUS PATIENT QL REPORTED: YES
GFR SERPL CREATININE-BSD FRML MDRD: 41 ML/MIN/1.73M2
GLUCOSE BLD-MCNC: 103 MG/DL (ref 70–99)
HDLC SERPL-MCNC: 72 MG/DL
LDLC SERPL CALC-MCNC: 74 MG/DL
MICROALBUMIN UR-MCNC: 6 MG/L
MICROALBUMIN/CREAT UR: 4.84 MG/G CR (ref 0–25)
NONHDLC SERPL-MCNC: 89 MG/DL
POTASSIUM BLD-SCNC: 4.1 MMOL/L (ref 3.4–5.3)
SODIUM SERPL-SCNC: 139 MMOL/L (ref 133–144)
TRIGL SERPL-MCNC: 77 MG/DL
TSH SERPL DL<=0.005 MIU/L-ACNC: 0.87 MU/L (ref 0.4–4)

## 2021-11-08 PROCEDURE — 99397 PER PM REEVAL EST PAT 65+ YR: CPT | Mod: 25 | Performed by: FAMILY MEDICINE

## 2021-11-08 PROCEDURE — 80061 LIPID PANEL: CPT | Performed by: FAMILY MEDICINE

## 2021-11-08 PROCEDURE — 0064A COVID-19,PF,MODERNA (18+ YRS BOOSTER .25ML): CPT | Performed by: FAMILY MEDICINE

## 2021-11-08 PROCEDURE — 80048 BASIC METABOLIC PNL TOTAL CA: CPT | Performed by: FAMILY MEDICINE

## 2021-11-08 PROCEDURE — 99214 OFFICE O/P EST MOD 30 MIN: CPT | Mod: 25 | Performed by: FAMILY MEDICINE

## 2021-11-08 PROCEDURE — 82043 UR ALBUMIN QUANTITATIVE: CPT | Performed by: FAMILY MEDICINE

## 2021-11-08 PROCEDURE — 84443 ASSAY THYROID STIM HORMONE: CPT | Performed by: FAMILY MEDICINE

## 2021-11-08 PROCEDURE — 36415 COLL VENOUS BLD VENIPUNCTURE: CPT | Performed by: FAMILY MEDICINE

## 2021-11-08 PROCEDURE — 91306 COVID-19,PF,MODERNA (18+ YRS BOOSTER .25ML): CPT | Performed by: FAMILY MEDICINE

## 2021-11-08 RX ORDER — FAMOTIDINE 20 MG/1
20 TABLET, FILM COATED ORAL DAILY PRN
Qty: 30 TABLET | Refills: 11 | Status: SHIPPED | OUTPATIENT
Start: 2021-11-08 | End: 2023-06-15

## 2021-11-08 RX ORDER — PRAVASTATIN SODIUM 80 MG/1
TABLET ORAL
Qty: 90 TABLET | Refills: 3 | Status: SHIPPED | OUTPATIENT
Start: 2021-11-08 | End: 2022-01-10

## 2021-11-08 RX ORDER — ALBUTEROL SULFATE 90 UG/1
1-2 AEROSOL, METERED RESPIRATORY (INHALATION) EVERY 6 HOURS
Qty: 18 G | Refills: 3 | Status: SHIPPED | OUTPATIENT
Start: 2021-11-08 | End: 2021-11-11

## 2021-11-08 RX ORDER — FLUTICASONE PROPIONATE 50 MCG
1-2 SPRAY, SUSPENSION (ML) NASAL DAILY PRN
Qty: 16 G | Refills: 11 | Status: SHIPPED | OUTPATIENT
Start: 2021-11-08 | End: 2022-09-26

## 2021-11-08 SDOH — ECONOMIC STABILITY: INCOME INSECURITY: HOW HARD IS IT FOR YOU TO PAY FOR THE VERY BASICS LIKE FOOD, HOUSING, MEDICAL CARE, AND HEATING?: NOT HARD AT ALL

## 2021-11-08 SDOH — ECONOMIC STABILITY: FOOD INSECURITY: WITHIN THE PAST 12 MONTHS, THE FOOD YOU BOUGHT JUST DIDN'T LAST AND YOU DIDN'T HAVE MONEY TO GET MORE.: NEVER TRUE

## 2021-11-08 SDOH — HEALTH STABILITY: PHYSICAL HEALTH: ON AVERAGE, HOW MANY MINUTES DO YOU ENGAGE IN EXERCISE AT THIS LEVEL?: 0 MIN

## 2021-11-08 SDOH — ECONOMIC STABILITY: TRANSPORTATION INSECURITY
IN THE PAST 12 MONTHS, HAS LACK OF TRANSPORTATION KEPT YOU FROM MEETINGS, WORK, OR FROM GETTING THINGS NEEDED FOR DAILY LIVING?: NO

## 2021-11-08 SDOH — ECONOMIC STABILITY: INCOME INSECURITY: IN THE LAST 12 MONTHS, WAS THERE A TIME WHEN YOU WERE NOT ABLE TO PAY THE MORTGAGE OR RENT ON TIME?: NO

## 2021-11-08 SDOH — ECONOMIC STABILITY: TRANSPORTATION INSECURITY
IN THE PAST 12 MONTHS, HAS THE LACK OF TRANSPORTATION KEPT YOU FROM MEDICAL APPOINTMENTS OR FROM GETTING MEDICATIONS?: NO

## 2021-11-08 SDOH — ECONOMIC STABILITY: FOOD INSECURITY: WITHIN THE PAST 12 MONTHS, YOU WORRIED THAT YOUR FOOD WOULD RUN OUT BEFORE YOU GOT MONEY TO BUY MORE.: NEVER TRUE

## 2021-11-08 SDOH — HEALTH STABILITY: PHYSICAL HEALTH: ON AVERAGE, HOW MANY DAYS PER WEEK DO YOU ENGAGE IN MODERATE TO STRENUOUS EXERCISE (LIKE A BRISK WALK)?: 0 DAYS

## 2021-11-08 ASSESSMENT — SOCIAL DETERMINANTS OF HEALTH (SDOH)
IN A TYPICAL WEEK, HOW MANY TIMES DO YOU TALK ON THE PHONE WITH FAMILY, FRIENDS, OR NEIGHBORS?: MORE THAN THREE TIMES A WEEK
HOW OFTEN DO YOU GET TOGETHER WITH FRIENDS OR RELATIVES?: TWICE A WEEK
HOW OFTEN DO YOU ATTEND CHURCH OR RELIGIOUS SERVICES?: MORE THAN 4 TIMES PER YEAR
DO YOU BELONG TO ANY CLUBS OR ORGANIZATIONS SUCH AS CHURCH GROUPS UNIONS, FRATERNAL OR ATHLETIC GROUPS, OR SCHOOL GROUPS?: YES

## 2021-11-08 ASSESSMENT — MIFFLIN-ST. JEOR: SCORE: 1757.75

## 2021-11-08 ASSESSMENT — LIFESTYLE VARIABLES
HOW OFTEN DO YOU HAVE SIX OR MORE DRINKS ON ONE OCCASION: NEVER
HOW OFTEN DO YOU HAVE A DRINK CONTAINING ALCOHOL: MONTHLY OR LESS
HOW MANY STANDARD DRINKS CONTAINING ALCOHOL DO YOU HAVE ON A TYPICAL DAY: PATIENT DECLINED

## 2021-11-08 NOTE — LETTER
My Asthma Action Plan    Name: Teri Bain   YOB: 1955  Date: 11/8/2021   My doctor: Norma Serrano MD   My clinic: [facility]        My Control Medicine: Fluticasone furoate (Arnuity Ellipta) -  200 mcg 1 puff twice daily  My Rescue Medicine: Albuterol (Proair/Ventolin/Proventil HFA) 2-4 puffs EVERY 4 HOURS as needed. Use a spacer if recommended by your provider.  My Oral Steroid Medicine: prednisone 40 mg daily x 5 days My Asthma Severity:   Moderate Persistent  Know your asthma triggers: None            GREEN ZONE   Good Control    I feel good    No cough or wheeze    Can work, sleep and play without asthma symptoms       Take your asthma control medicine every day.     1. If exercise triggers your asthma, take your rescue medication    15 minutes before exercise or sports, and    During exercise if you have asthma symptoms  2. Spacer to use with inhaler: If you have a spacer, make sure to use it with your inhaler             YELLOW ZONE Getting Worse  I have ANY of these:    I do not feel good    Cough or wheeze    Chest feels tight    Wake up at night   1. Keep taking your Green Zone medications  2. Start taking your rescue medicine:    every 20 minutes for up to 1 hour. Then every 4 hours for 24-48 hours.  3. If you stay in the Yellow Zone for more than 12-24 hours, contact your doctor.  4. If you do not return to the Green Zone in 12-24 hours or you get worse, start taking your oral steroid medicine if prescribed by your provider.           RED ZONE Medical Alert - Get Help  I have ANY of these:    I feel awful    Medicine is not helping    Breathing getting harder    Trouble walking or talking    Nose opens wide to breathe       1. Take your rescue medicine NOW  2. If your provider has prescribed an oral steroid medicine, start taking it NOW  3. Call your doctor NOW  4. If you are still in the Red Zone after 20 minutes and you have not reached your  doctor:    Take your rescue medicine again and    Call 911 or go to the emergency room right away    See your regular doctor within 2 weeks of an Emergency Room or Urgent Care visit for follow-up treatment.          Annual Reminders:  Meet with Asthma Educator,  Flu Shot in the Fall, consider Pneumonia Vaccination for patients with asthma (aged 19 and older).    Pharmacy[facility]    [id number] - [place], [place] - [address] AT [facility] OF  [address] [facility]  [facility]  [facility] - [address], [address]    Electronically signed by Norma Serrano MD   Date: 11/08/21                      Asthma Triggers  How To Control Things That Make Your Asthma Worse    Triggers are things that make your asthma worse.  Look at the list below to help you find your triggers and what you can do about them.  You can help prevent asthma flare-ups by staying away from your triggers.      Trigger                                                          What you can do   Cigarette Smoke  Tobacco smoke can make asthma worse. Do not allow smoking in your home, car or around you.  Be sure no one smokes at a child s day care or school.  If you smoke, ask your health care provider for ways to help you quit.  Ask family members to quit too.  Ask your health care provider for a referral to Quit Plan to help you quit smoking, or call 5-148-440-PLAN.     Colds, Flu, Bronchitis  These are common triggers of asthma. Wash your hands often.  Don t touch your eyes, nose or mouth.  Get a flu shot every year.     Dust Mites  These are tiny bugs that live in cloth or carpet. They are too small to see. Wash sheets and blankets in hot water every week.   Encase pillows and mattress in dust mite proof covers.  Avoid having carpet if you can. If you have carpet, vacuum weekly.   Use a dust mask and HEPA vacuum.   Pollen and Outdoor Mold  Some people are allergic to trees, grass, or weed  pollen, or molds. Try to keep your windows closed.  Limit time out doors when pollen count is high.   Ask you health care provider about taking medicine during allergy season.     Animal Dander  Some people are allergic to skin flakes, urine or saliva from pets with fur or feathers. Keep pets with fur or feathers out of your home.    If you can t keep the pet outdoors, then keep the pet out of your bedroom.  Keep the bedroom door closed.  Keep pets off cloth furniture and away from stuffed toys.     Mice, Rats, and Cockroaches   Some people are allergic to the waste from these pests.   Cover food and garbage.  Clean up spills and food crumbs.  Store grease in the refrigerator.   Keep food out of the bedroom.   Indoor Mold  This can be a trigger if your home has high moisture. Fix leaking faucets, pipes, or other sources of water.   Clean moldy surfaces.  Dehumidify basement if it is damp and smelly.   Smoke, Strong Odors, and Sprays  These can reduce air quality. Stay away from strong odors and sprays, such as perfume, powder, hair spray, paints, smoke incense, paint, cleaning products, candles and new carpet.   Exercise or Sports  Some people with asthma have this trigger. Be active!  Ask your doctor about taking medicine before sports or exercise to prevent symptoms.    Warm up for 5-10 minutes before and after sports or exercise.     Other Triggers of Asthma  Cold air:  Cover your nose and mouth with a scarf.  Sometimes laughing or crying can be a trigger.  Some medicines and food can trigger asthma.

## 2021-11-08 NOTE — RESULT ENCOUNTER NOTE
García Williamson,  Your test results fall within the expected range(s) or remain unchanged from previous results.  This includes  TSH (thyroid function test), lipid panel (cholesterol) results, urine albumin (protein) level, and basic metabolic panel results (blood salts, blood sugar, and kidney function).    Please continue with your current treatment plan.    You can try the biotin for your skin and hair.      Norma Serrano MD

## 2021-11-09 ASSESSMENT — ASTHMA QUESTIONNAIRES: ACT_TOTALSCORE: 24

## 2021-11-11 RX ORDER — FAMOTIDINE 20 MG/1
20 TABLET, FILM COATED ORAL DAILY PRN
Qty: 30 TABLET | Refills: 11 | OUTPATIENT
Start: 2021-11-11

## 2021-11-11 RX ORDER — FLUTICASONE PROPIONATE 50 MCG
1-2 SPRAY, SUSPENSION (ML) NASAL DAILY PRN
Qty: 16 G | Refills: 11 | OUTPATIENT
Start: 2021-11-11

## 2021-11-11 RX ORDER — ALBUTEROL SULFATE 90 UG/1
1-2 AEROSOL, METERED RESPIRATORY (INHALATION) EVERY 6 HOURS
Qty: 18 G | Refills: 3 | Status: SHIPPED | OUTPATIENT
Start: 2021-11-11 | End: 2022-07-31

## 2021-11-11 NOTE — TELEPHONE ENCOUNTER
Prescription approved per [employer] Refill Protocol.  Em Cabrera RN  [facility]

## 2021-11-11 NOTE — TELEPHONE ENCOUNTER
Fluticasone and pepcid just refilled by PCP on 11/8/21 so denied to pharmacy as duplicate    Chitra Mart, RN, BSN  [facility]

## 2021-11-23 ENCOUNTER — THERAPY VISIT (OUTPATIENT)
Dept: PHYSICAL THERAPY | Facility: CLINIC | Age: 66
End: 2021-11-23
Attending: PEDIATRICS
Payer: COMMERCIAL

## 2021-11-23 DIAGNOSIS — M79.605 BILATERAL LOWER EXTREMITY PAIN: ICD-10-CM

## 2021-11-23 DIAGNOSIS — M25.461 EFFUSION OF RIGHT KNEE: ICD-10-CM

## 2021-11-23 DIAGNOSIS — M17.11 PRIMARY OSTEOARTHRITIS OF RIGHT KNEE: ICD-10-CM

## 2021-11-23 DIAGNOSIS — M20.12 HALLUX VALGUS OF LEFT FOOT: ICD-10-CM

## 2021-11-23 DIAGNOSIS — M79.604 BILATERAL LOWER EXTREMITY PAIN: ICD-10-CM

## 2021-11-23 DIAGNOSIS — M79.672 LEFT FOOT PAIN: ICD-10-CM

## 2021-11-23 DIAGNOSIS — M19.079 ARTHRITIS OF MIDFOOT: ICD-10-CM

## 2021-11-23 DIAGNOSIS — M17.11 OSTEOARTHRITIS OF RIGHT KNEE, UNSPECIFIED OSTEOARTHRITIS TYPE: ICD-10-CM

## 2021-11-23 PROCEDURE — 97110 THERAPEUTIC EXERCISES: CPT | Mod: GP | Performed by: PHYSICAL THERAPIST

## 2021-11-23 PROCEDURE — 97161 PT EVAL LOW COMPLEX 20 MIN: CPT | Mod: GP | Performed by: PHYSICAL THERAPIST

## 2021-11-23 NOTE — PROGRESS NOTES
"Physical Therapy Initial Evaluation  Subjective:    Patient Health History  Teri Bain being seen for R knee, R hip and L foot pain.  L knee starting to strain due to favoring R knee.     Problem began: 9/1/2021 (MD visit).   Problem occurred: Insidious onset pain \"a long time ago\" potentially due to sports over lifetime (gymnastics, track, softball), bad sprain to L ankle when was a teenager.  Pain is progressively worsening overtime.  9/2021 was sitting down and felt shift in R knee and had to push down on patella to be able to get up and knee swelled up after.   Pain is reported as 10/10 on pain scale.  General health as reported by patient is good.  Pertinent medical history includes: migraines/headaches, diabetes, asthma, overweight, osteoarthritis, kidney disease and high blood pressure.   Red flags:  Chest pain.  Medical allergies: latex. Other medical allergies details: 8, see epic.    Other surgery history details: see epic, reports no surgeries related to knees, hips or feet.    Current medications:  High blood pressure medication, anti-inflammatory and pain medication (diuretic, tylenol).    Current occupation is retired, involved with [employer], [employer].   Primary job tasks include:  Lifting/carrying, repetitive tasks, prolonged standing and prolonged sitting.                  Therapist Generated HPI Evaluation         Type of problem:  Right knee (R>L knee).    This is a chronic condition.      Patient reports pain:  Anterior.  Pain is described as aching (soreness) and is intermittent.  Pain radiates to:  Thigh (anterior thigh). Pain is worse during the day.  Since onset symptoms are gradually worsening.  Associated symptoms:  Loss of motion/stiffness, edema and buckling/giving out. Symptoms are exacerbated by ascending stairs, descending stairs, bending/squatting, kneeling and walking (navigates stairs one step at a time, with railing, walking intermittent pain especially if carries something, cold " air)  and relieved by analgesics (roll on cream/lotion).  Special tests included:  X-ray (Knee xray mod to advanced tricompartmental degenerative changes).    Barriers include:  Stairs (just moved to condo on 2nd floor with no elevator).    Therapist Generated HPI Evaluation         Type of problem:  Left foot.    This is a chronic condition.      Site of Pain: L>R midfoot and plantar fascia region pain.  Pain is described as aching (soreness) and is intermittent.  Pain radiates to:  No radiation. Pain is worse during the day (intermittent).  Since onset symptoms are gradually worsening.  Associated symptoms:  Loss of motion/stiffness (intermittent T/N in feet (diabetes?)). Symptoms are exacerbated by walking (improper footwear (no longer wears heels), walking 30 min)                                Objective:  Standing Alignment:                Ankle/Foot:  Hallux valgus R and hallux valgus L    Gait:  Patient reports previously tried cane but didn't work, educate using cane in L hand to un-weight R knee and normalize walking pattern.  Gait Type:  Antalgic   Assistive Devices:  None      Flexibility/Screens:       Lower Extremity:  Decreased left lower extremity flexibility:Hamstrings; Gastroc and Soleus    Decreased right lower extremity flexibility:  Quadriceps; Hamstrings; Gastroc and Soleus                                                      Knee Evaluation:  ROM:  Strength wnl knee: MMT hip ABD 4/5 B, challenged, but able to perform bridging.  AROM      Extension:  Left: 0    Right:  0  Flexion: Left: 130    Right: 112  PROM    Hyperextension: Left: 3   Right:   Extension: Left:   Right:  0        Strength:     Extension:  Left: 5/5   Pain:      Right: 5/5   Strong/pain free  Pain:  Flexion:  Left: 5/5   Pain:      Right: 5/5   Pain:    Quad Set Left: Good    Pain:   Quad Set Right:  Good    Pain: +  Ligament Testing:  Normal                Special Tests: Special test for knee: palpable grinding/shift with  special testing (suspect due to OA) but non specific (+) testing.    Left knee negative for the following special tests:  Meninscal and Patellar compression  Right knee positive for the following tests:  Patellar Compression  Right knee negative for the following special tests:  Meniscal        Functional Testing:          Quad:    Single Leg Squat:  Left:      Unable    Right:      Unable   Bilateral Leg Squat:  Sit to stand from chair good control, crepitus       Proprioception:   Stork Balance Test:  Left:  2-3 sec hip drop  Right:  4-5 sec hip drop  % of Uninvolved:           General     ROS    Assessment/Plan:    Patient is a 66 year old female with right side knee, R hip and left side ankle complaints.  Will continue objective assessment on subsequent visits.   Patient has the following significant findings with corresponding treatment plan.                Diagnosis 1:  R knee OA    Pain -  hot/cold therapy, manual therapy, self management, education and home program  Decreased ROM/flexibility - manual therapy, therapeutic exercise, therapeutic activity and home program  Decreased strength - therapeutic exercise, therapeutic activities and home program  Decreased proprioception - neuro re-education, gait training, therapeutic activities and home program  Impaired gait - gait training, assistive devices and home program  Decreased function - therapeutic activities and home program  Diagnosis 2:  L foot pain, arthritis midfoot, hallux valgus     Pain -  hot/cold therapy, manual therapy, self management, education and home program  Decreased ROM/flexibility - manual therapy, therapeutic exercise, therapeutic activity and home program  Decreased strength - therapeutic exercise, therapeutic activities and home program  Decreased proprioception - neuro re-education, gait training, therapeutic activities and home program  Impaired gait - gait training, and home program  Decreased function - therapeutic activities and  home program    Therapy Evaluation Codes:   Cumulative Therapy Evaluation is: Low complexity.    Previous and current functional limitations:  (See Goal Flow Sheet for this information)    Short term and Long term goals: (See Goal Flow Sheet for this information)     Communication ability:  Patient appears to be able to clearly communicate and understand verbal and written communication and follow directions correctly.  Treatment Explanation - The following has been discussed with the patient:   RX ordered/plan of care  Anticipated outcomes  Possible risks and side effects  This patient would benefit from PT intervention to resume normal activities.   Rehab potential is good.    Frequency:  1 X week, once daily  Duration:  for 8 weeks  Discharge Plan:  Achieve all LTG.  Independent in home treatment program.  Reach maximal therapeutic benefit.    Please refer to the daily flowsheet for treatment today, total treatment time and time spent performing 1:1 timed codes.

## 2021-11-24 PROBLEM — M79.605 BILATERAL LOWER EXTREMITY PAIN: Status: ACTIVE | Noted: 2021-11-24

## 2021-11-24 PROBLEM — M79.604 BILATERAL LOWER EXTREMITY PAIN: Status: ACTIVE | Noted: 2021-11-24

## 2021-11-24 PROBLEM — M17.11 OSTEOARTHRITIS OF RIGHT KNEE, UNSPECIFIED OSTEOARTHRITIS TYPE: Status: ACTIVE | Noted: 2021-11-24

## 2021-11-24 PROBLEM — M79.672 LEFT FOOT PAIN: Status: ACTIVE | Noted: 2021-11-24

## 2021-11-24 ASSESSMENT — ACTIVITIES OF DAILY LIVING (ADL)
AS_A_RESULT_OF_YOUR_KNEE_INJURY,_HOW_WOULD_YOU_RATE_YOUR_CURRENT_LEVEL_OF_DAILY_ACTIVITY?: ABNORMAL
KNEEL ON THE FRONT OF YOUR KNEE: ACTIVITY IS FAIRLY DIFFICULT
HOW_WOULD_YOU_RATE_THE_OVERALL_FUNCTION_OF_YOUR_KNEE_DURING_YOUR_USUAL_DAILY_ACTIVITIES?: ABNORMAL
LIMPING: THE SYMPTOM AFFECTS MY ACTIVITY MODERATELY
WALK: ACTIVITY IS FAIRLY DIFFICULT
SWELLING: THE SYMPTOM AFFECTS MY ACTIVITY MODERATELY
GO UP STAIRS: ACTIVITY IS VERY DIFFICULT
KNEE_ACTIVITY_OF_DAILY_LIVING_SUM: 19
GO DOWN STAIRS: ACTIVITY IS VERY DIFFICULT
STIFFNESS: NOT ANSWERED
WEAKNESS: NOT ANSWERED
STAND: ACTIVITY IS FAIRLY DIFFICULT
GIVING WAY, BUCKLING OR SHIFTING OF KNEE: THE SYMPTOM AFFECTS MY ACTIVITY MODERATELY
HOW_WOULD_YOU_RATE_THE_CURRENT_FUNCTION_OF_YOUR_KNEE_DURING_YOUR_USUAL_DAILY_ACTIVITIES_ON_A_SCALE_FROM_0_TO_100_WITH_100_BEING_YOUR_LEVEL_OF_KNEE_FUNCTION_PRIOR_TO_YOUR_INJURY_AND_0_BEING_THE_INABILITY_TO_PERFORM_ANY_OF_YOUR_USUAL_DAILY_ACTIVITIES?: 89
SQUAT: ACTIVITY IS VERY DIFFICULT
PAIN: NOT ANSWERED
RISE FROM A CHAIR: ACTIVITY IS FAIRLY DIFFICULT
SIT WITH YOUR KNEE BENT: ACTIVITY IS FAIRLY DIFFICULT

## 2021-11-24 NOTE — PROGRESS NOTES
[facility]    OUTPATIENT Physical Therapy ORTHOPEDIC EVALUATION  PLAN OF TREATMENT FOR OUTPATIENT REHABILITATION  (COMPLETE FOR INITIAL CLAIMS ONLY)  Patient's Last Name, First Name, M.I.  YOB: 1955  Teri Bain    Provider s Name:  [facility]   Medical Record No.  [id number]   Start of Care Date:  11/23/21   Onset Date:   09/01/21 (MD visit)   Type:     _X__PT   ___OT Medical Diagnosis:    Encounter Diagnoses   Name Primary?     Left foot pain      Hallux valgus of left foot      Arthritis of midfoot      Effusion of right knee      Primary osteoarthritis of right knee      Bilateral lower extremity pain      Osteoarthritis of right knee, unspecified osteoarthritis type         Treatment Diagnosis:  R knee OA, R hip pain (B LE pain)        Goals:     11/23/21 0500   Body Part   Goals listed below are for R knee, L foot   Goal #1   Goal #1 stairs   Previous Functional Level No restrictions   Performance Level progressive decline   Current Functional Level Ascend/descend stairs;one step at a time;with a railing   Performance Level up to 10/10 pain if tries reciprocal pattern   STG Target Performance Ascend stairs;in a normal reciprocal pattern;with a railing   Rationale to enter/leave the house safely;for safe community ambulaton   Due Date 12/14/21   LTG Target Performance Descend stairs;in a normal reciprocal pattern;with railing   Rationale to enter/leave the house safely;for safe community access to buildings  (to get in/out of condo)   Due Date 01/04/22       Therapy Frequency:  1x/week   Predicted Duration of Therapy Intervention:  8 weeks    Vanessa Larsen, PT                 I CERTIFY THE NEED FOR THESE SERVICES FURNISHED UNDER        THIS PLAN OF TREATMENT AND WHILE UNDER MY CARE     (Physician attestation of this document indicates review and  certification of the therapy plan).                       Certification Date From:  11/23/21   Certification Date To:  02/20/22    Referring Provider:  Tian Hawthorne    Initial Assessment        See Epic Evaluation SOC Date: 11/23/21

## 2021-11-25 DIAGNOSIS — I10 HYPERTENSION GOAL BP (BLOOD PRESSURE) < 140/90: ICD-10-CM

## 2021-11-29 RX ORDER — AMLODIPINE BESYLATE 5 MG/1
TABLET ORAL
Qty: 90 TABLET | Refills: 3 | Status: SHIPPED | OUTPATIENT
Start: 2021-11-29 | End: 2022-08-31

## 2021-11-29 NOTE — TELEPHONE ENCOUNTER
Routing refill request to provider for review/approval because:  Labs out of range:  Creatinine    Creatinine   Date Value Ref Range Status   11/08/2021 1.34 (H) 0.52 - 1.04 mg/dL Final   06/15/2021 1.43 (H) 0.52 - 1.04 mg/dL Final       ALYSSA CarnesN RN  [facility]

## 2021-12-07 ENCOUNTER — NURSE TRIAGE (OUTPATIENT)
Dept: NURSING | Facility: CLINIC | Age: 66
End: 2021-12-07
Payer: COMMERCIAL

## 2021-12-07 ENCOUNTER — VIRTUAL VISIT (OUTPATIENT)
Dept: FAMILY MEDICINE | Facility: CLINIC | Age: 66
End: 2021-12-07
Payer: COMMERCIAL

## 2021-12-07 DIAGNOSIS — R09.82 PND (POST-NASAL DRIP): Primary | ICD-10-CM

## 2021-12-07 PROCEDURE — 99441 PR PHYSICIAN TELEPHONE EVALUATION 5-10 MIN: CPT | Performed by: NURSE PRACTITIONER

## 2021-12-07 NOTE — TELEPHONE ENCOUNTER
Just got back from [place] on Sat night.  Yesterday started getting congested and not feeling well. Denies, fever, SOB or pain. Patient doesn't think she was around anyone with covid but would like to get tested.    Sent to scheduling to set up a virtual visit.    COVID 19 Nurse Triage Plan/Patient Instructions    Please be aware that novel coronavirus (COVID-19) may be circulating in the community. If you develop symptoms such as fever, cough, or SOB or if you have concerns about the presence of another infection including coronavirus (COVID-19), please contact your health care provider or visit [email or web address].     Disposition/Instructions    Virtual Visit with provider recommended. Reference Visit Selection Guide.    Thank you for taking steps to prevent the spread of this virus.  o Limit your contact with others.  o Wear a simple mask to cover your cough.  o Wash your hands well and often.    Resources    [facility]: About COVID-19: [email or web address]    CDC: What to Do If You're Sick: www.cdc.gov/coronavirus/2019-ncov/about/steps-when-sick.html    CDC: Ending Home Isolation: www.cdc.gov/coronavirus/2019-ncov/hcp/disposition-in-home-patients.html     CDC: Caring for Someone: www.cdc.gov/coronavirus/2019-ncov/if-you-are-sick/care-for-someone.html     [facility]: Interim Guidance for Hospital Discharge to Home: www.health.[facility].[place]/diseases/coronavirus/hcp/hospdischarge.pdf    [facility] clinical trials (COVID-19 research studies): [email or web address].[email or web address]-clinical-trials     Below are the COVID-19 hotlines at the [facility] of Health ([facility]). Interpreters are available.   o For health questions: Call 726-945-6205 or 1-787.200.3085 (7 a.m. to 7 p.m.)  o For questions about schools and childcare: Call 349-405-7430 or 1-997.893.8824 (7 a.m. to 7 p.m.)             Tita Garrett RN   12/07/21 5:05 AM  [facility] Nurse Advisor        Reason for  Disposition    Cold with no complications    Additional Information    Negative: Severe difficulty breathing (e.g., struggling for each breath, speaks in single words)    Negative: Sounds like a life-threatening emergency to the triager    Negative: Runny nose is caused by pollen or other allergies    Negative: Cough is main symptom    Negative: Severe sore throat    Negative: Fever > 104 F (40 C)    Negative: [1] Difficulty breathing AND [2] not severe AND [3] not from stuffy nose (e.g., not relieved by cleaning out the nose)    Negative: Patient sounds very sick or weak to the triager    Negative: [1] Fever > 101 F (38.3 C) AND [2] age > 60    Negative: [1] Fever > 100.0 F (37.8 C) AND [2] bedridden (e.g., nursing home patient, CVA, chronic illness, recovering from surgery)    Negative: [1] Fever > 100.0 F (37.8 C) AND [2] diabetes mellitus or weak immune system (e.g., HIV positive, cancer chemo, splenectomy, organ transplant, chronic steroids)    Negative: Fever present > 3 days (72 hours)    Negative: [1] Fever returns after gone for over 24 hours AND [2] symptoms worse or not improved    Negative: [1] Sinus pain (not just congestion) AND [2] fever    Negative: Earache    Negative: [1] SEVERE sore throat AND [2] present > 24 hours    Negative: [1] Sinus congestion (pressure, fullness) AND [2] present > 10 days    Negative: [1] Nasal discharge AND [2] present > 10 days    Negative: [1] Using nasal washes and pain medicine > 24 hours AND [2] sinus pain (lower forehead, cheekbone, or eye) persists    Negative: Sores with yellow scabs around the nasal opening    Protocols used: COMMON COLD-A-

## 2021-12-07 NOTE — PROGRESS NOTES
Teri is a 66 year old who is being evaluated via a billable telephone visit.      What phone number would you like to be contacted at? [phone]  How would you like to obtain your AVS? MyChart    Assessment & Plan     PND (post-nasal drip)  Recommend humidity and mucinex  I think this is primarily related to a viral illness given the various associated symptoms.  Went over the treatment of viral illnesses, which is primarily supportive.    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    - Symptomatic COVID-19 Virus (Coronavirus) by PCR Nasopharyngeal; Future    No follow-ups on file.    Amy Amos, SUZY CNP  [facility]   Teri is a 66 year old who presents for the following health issues     HPI     Acute Illness  Onset/Duration: x2 days   Symptoms:  Fever: no  Chills/Sweats: no  Headache (location?): YES- goes away   Sinus Pressure: no  Conjunctivitis:  no  Ear Pain: no  Rhinorrhea: no  Congestion: YES  Sore Throat: YES  Cough: YES-dry cough   Wheeze: no  Decreased Appetite: no  Nausea: no  Vomiting: no  Diarrhea: no  Dysuria/Freq.: no  Dysuria or Hematuria: no  Fatigue/Achiness: no  Sick/Strep Exposure: no  Therapies tried and outcome: tylenol       Woke up yesterday with post nasal drip  Sore throat  clearing her throat  Lots of congestion  No cough  No fevers or chills  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      Objective           Vitals:  No vitals were obtained today due to virtual visit.    Physical Exam   healthy, alert and no distress  PSYCH: Alert and oriented times 3; coherent speech, normal   rate and volume, able to articulate logical thoughts, able   to abstract reason, no tangential thoughts, no hallucinations    or delusions  Her affect is normal  RESP: No cough, no audible wheezing, able to talk in full sentences  Remainder of exam unable to be completed due to telephone visits            Phone call duration: 7 minutes

## 2021-12-08 ENCOUNTER — LAB (OUTPATIENT)
Dept: URGENT CARE | Facility: URGENT CARE | Age: 66
End: 2021-12-08
Attending: NURSE PRACTITIONER
Payer: COMMERCIAL

## 2021-12-08 DIAGNOSIS — R09.82 PND (POST-NASAL DRIP): ICD-10-CM

## 2021-12-08 PROCEDURE — U0005 INFEC AGEN DETEC AMPLI PROBE: HCPCS

## 2021-12-08 PROCEDURE — U0003 INFECTIOUS AGENT DETECTION BY NUCLEIC ACID (DNA OR RNA); SEVERE ACUTE RESPIRATORY SYNDROME CORONAVIRUS 2 (SARS-COV-2) (CORONAVIRUS DISEASE [COVID-19]), AMPLIFIED PROBE TECHNIQUE, MAKING USE OF HIGH THROUGHPUT TECHNOLOGIES AS DESCRIBED BY CMS-2020-01-R: HCPCS

## 2021-12-09 LAB — SARS-COV-2 RNA RESP QL NAA+PROBE: NEGATIVE

## 2022-01-08 DIAGNOSIS — E78.5 HYPERLIPIDEMIA LDL GOAL <100: ICD-10-CM

## 2022-01-10 RX ORDER — PRAVASTATIN SODIUM 80 MG/1
TABLET ORAL
Qty: 90 TABLET | Refills: 2 | Status: SHIPPED | OUTPATIENT
Start: 2022-01-10 | End: 2022-07-19

## 2022-01-10 NOTE — TELEPHONE ENCOUNTER
Statins Protocol Passed 01/08/2022 09:41 PM   Protocol Details  LDL on file in past 12 months    No abnormal creatine kinase in past 12 months    Recent (12 mo) or future (30 days) visit within the authorizing provider's specialty    Medication is active on med list    Patient is age 18 or older    No active pregnancy on record    No positive pregnancy test in past 12 months

## 2022-01-11 ENCOUNTER — THERAPY VISIT (OUTPATIENT)
Dept: PHYSICAL THERAPY | Facility: CLINIC | Age: 67
End: 2022-01-11
Payer: COMMERCIAL

## 2022-01-11 DIAGNOSIS — M79.604 BILATERAL LOWER EXTREMITY PAIN: ICD-10-CM

## 2022-01-11 DIAGNOSIS — M79.672 LEFT FOOT PAIN: ICD-10-CM

## 2022-01-11 DIAGNOSIS — M79.605 BILATERAL LOWER EXTREMITY PAIN: ICD-10-CM

## 2022-01-11 DIAGNOSIS — M17.11 OSTEOARTHRITIS OF RIGHT KNEE, UNSPECIFIED OSTEOARTHRITIS TYPE: ICD-10-CM

## 2022-01-11 PROCEDURE — 97112 NEUROMUSCULAR REEDUCATION: CPT | Mod: GP | Performed by: PHYSICAL THERAPIST

## 2022-01-11 PROCEDURE — 97110 THERAPEUTIC EXERCISES: CPT | Mod: GP | Performed by: PHYSICAL THERAPIST

## 2022-01-11 ASSESSMENT — ACTIVITIES OF DAILY LIVING (ADL)
SIT WITH YOUR KNEE BENT: ACTIVITY IS MINIMALLY DIFFICULT
LIMPING: THE SYMPTOM AFFECTS MY ACTIVITY MODERATELY
HOW_WOULD_YOU_RATE_THE_OVERALL_FUNCTION_OF_YOUR_KNEE_DURING_YOUR_USUAL_DAILY_ACTIVITIES?: ABNORMAL
KNEEL ON THE FRONT OF YOUR KNEE: ACTIVITY IS FAIRLY DIFFICULT
SWELLING: THE SYMPTOM AFFECTS MY ACTIVITY MODERATELY
GO UP STAIRS: ACTIVITY IS VERY DIFFICULT
WALK: ACTIVITY IS FAIRLY DIFFICULT
RISE FROM A CHAIR: ACTIVITY IS FAIRLY DIFFICULT
KNEE_ACTIVITY_OF_DAILY_LIVING_SCORE: 41.43
GIVING WAY, BUCKLING OR SHIFTING OF KNEE: THE SYMPTOM AFFECTS MY ACTIVITY SLIGHTLY
PAIN: THE SYMPTOM AFFECTS MY ACTIVITY MODERATELY
WEAKNESS: THE SYMPTOM AFFECTS MY ACTIVITY MODERATELY
SQUAT: ACTIVITY IS VERY DIFFICULT
AS_A_RESULT_OF_YOUR_KNEE_INJURY,_HOW_WOULD_YOU_RATE_YOUR_CURRENT_LEVEL_OF_DAILY_ACTIVITY?: ABNORMAL
GO DOWN STAIRS: ACTIVITY IS VERY DIFFICULT
KNEE_ACTIVITY_OF_DAILY_LIVING_SUM: 29
STAND: ACTIVITY IS FAIRLY DIFFICULT
HOW_WOULD_YOU_RATE_THE_CURRENT_FUNCTION_OF_YOUR_KNEE_DURING_YOUR_USUAL_DAILY_ACTIVITIES_ON_A_SCALE_FROM_0_TO_100_WITH_100_BEING_YOUR_LEVEL_OF_KNEE_FUNCTION_PRIOR_TO_YOUR_INJURY_AND_0_BEING_THE_INABILITY_TO_PERFORM_ANY_OF_YOUR_USUAL_DAILY_ACTIVITIES?: 80
STIFFNESS: THE SYMPTOM AFFECTS MY ACTIVITY SLIGHTLY
RAW_SCORE: 29

## 2022-01-11 NOTE — LETTER
[facility]  [address]  [place] [address]  [phone]    2022    Re: Teri Bain   :   1955  MRN:  [id number]   REFERRING PHYSICIAN:   Tian TONY [facility]    Date of Initial Evaluation:  2021  Visits:  Rxs Used: 2  Reason for Referral:     Osteoarthritis of right knee, unspecified osteoarthritis type  Left foot pain  Bilateral lower extremity pain    EVALUATION SUMMARY    Subjective:  HPI  Physical Exam       Knee Activity of Daily Living Score: 41.43              Assessment/Plan:    PROGRESS  REPORT    Progress reporting period is from 21 to 22, 2 visits.       SUBJECTIVE  Subjective changes noted by patient:  7 week lapse in therapy as was in [place] and the cold weather does not help so is still in pain R knee and since is compensating now L knee is feeling worse than the R knee.  Has done HEP sometimes here and there.  Hoping can get back into the gym.  Now L>R anterior and lateral knee intermittent pain.  L foot is not bothering anymore as is always wearing good shoes or boots.  Got sick and lost 10 pounds and things felt better in knees, however now feels put weight back on.  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  Noticing wears good footware has less pain to the knees.  Navigates stairs with L leg up and R leg straight to the side with reciprocal pattern.    Current Pain level: 6/10.     Initial Pain level: 10/10.   Changes in function:  Yes (See Goal flowsheet attached for changes in current functional level)  Adverse reaction to treatment or activity: None    OBJECTIVE  Changes noted in objective findings:     AROM L knee 3-0-130, R 0-120.    MMT knee ext 5/5 B, knee flex 5/5 B, hip ABD 4/5 B, tight L>R gastroc.    Sit to stand good technique.    Teri Bain   :   1955    Step down no pain 2 inch on L and 4 inch on  R.    Decreased pain descending step after repeated knee extension with patient overpressure- trial for HEP.    Knee Outcome Survey Activities of Daily Living Scale 41%.     ASSESSMENT/PLAN  Updated problem list and treatment plan: Diagnosis 1:  R knee OA (B LE pain)    Pain -  manual therapy, self management, education and home program  Decreased ROM/flexibility - manual therapy, therapeutic exercise, therapeutic activity and home program  Decreased strength - therapeutic exercise, therapeutic activities and home program  Decreased proprioception - neuro re-education, gait training, therapeutic activities and home program  Decreased function - therapeutic activities and home program  Diagnosis 2:  L foot pain, arthritis midfoot, hallux valgus     Patient states no pain in L foot so wants to focus on the knee.  STG/LTGs have been met or progress has been made towards goals:  Yes (See Goal flow sheet completed today.)  Assessment of Progress: 7 week lapse in therapy so no major changes.  Re-start treatment.  Self Management Plans:  Patient has been instructed in a home treatment program.  Patient  has been instructed in self management of symptoms.  I have re-evaluated this patient and find that the nature, scope, duration and intensity of the therapy is appropriate for the medical condition of the patient.  Teri continues to require the following intervention to meet STG and LTG's:  PT    Recommendations:  This patient would benefit from continued therapy.     Frequency:  1 X week, once daily  Duration:  for 6 weeks    Please refer to the daily flowsheet for treatment today, total treatment time and time spent performing 1:1 timed codes.      Thank you for your referral.    INQUIRIES  Therapist: Vanessa Larsen DPT,Cert MDT,[address] [address]  Phone: [phone]  Fax: [phone]

## 2022-01-11 NOTE — PROGRESS NOTES
Subjective:  HPI  Physical Exam       Knee Activity of Daily Living Score: 41.43            Objective:  System    Physical Exam    General     ROS    Assessment/Plan:    PROGRESS  REPORT    Progress reporting period is from 11/23/21 to 1/11/22, 2 visits.       SUBJECTIVE  Subjective changes noted by patient:  7 week lapse in therapy as was in [place] and the cold weather does not help so is still in pain R knee and since is compensating now L knee is feeling worse than the R knee.  Has done HEP sometimes here and there.  Hoping can get back into the gym.  Now L>R anterior and lateral knee intermittent pain.  L foot is not bothering anymore as is always wearing good shoes or boots.  Got sick and lost 10 pounds and things felt better in knees, however now feels put weight back on.  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  Noticing wears good footware has less pain to the knees.  Navigates stairs with L leg up and R leg straight to the side with reciprocal pattern.    Current Pain level: 6/10.     Initial Pain level: 10/10.   Changes in function:  Yes (See Goal flowsheet attached for changes in current functional level)  Adverse reaction to treatment or activity: None    OBJECTIVE  Changes noted in objective findings:     AROM L knee 3-0-130, R 0-120.    MMT knee ext 5/5 B, knee flex 5/5 B, hip ABD 4/5 B, tight L>R gastroc.    Sit to stand good technique.    Step down no pain 2 inch on L and 4 inch on R.    Decreased pain descending step after repeated knee extension with patient overpressure- trial for HEP.    Knee Outcome Survey Activities of Daily Living Scale 41%.     ASSESSMENT/PLAN  Updated problem list and treatment plan: Diagnosis 1:  R knee OA (B LE pain)    Pain -  manual therapy, self management, education and home program  Decreased ROM/flexibility - manual therapy, therapeutic exercise, therapeutic activity and home program  Decreased strength - therapeutic exercise, therapeutic activities and home  program  Decreased proprioception - neuro re-education, gait training, therapeutic activities and home program  Decreased function - therapeutic activities and home program  Diagnosis 2:  L foot pain, arthritis midfoot, hallux valgus     Patient states no pain in L foot so wants to focus on the knee.  STG/LTGs have been met or progress has been made towards goals:  Yes (See Goal flow sheet completed today.)  Assessment of Progress: 7 week lapse in therapy so no major changes.  Re-start treatment.  Self Management Plans:  Patient has been instructed in a home treatment program.  Patient  has been instructed in self management of symptoms.  I have re-evaluated this patient and find that the nature, scope, duration and intensity of the therapy is appropriate for the medical condition of the patient.  Teri continues to require the following intervention to meet STG and LTG's:  PT    Recommendations:  This patient would benefit from continued therapy.     Frequency:  1 X week, once daily  Duration:  for 6 weeks        Please refer to the daily flowsheet for treatment today, total treatment time and time spent performing 1:1 timed codes.

## 2022-01-18 ENCOUNTER — THERAPY VISIT (OUTPATIENT)
Dept: PHYSICAL THERAPY | Facility: CLINIC | Age: 67
End: 2022-01-18
Payer: COMMERCIAL

## 2022-01-18 DIAGNOSIS — M79.604 BILATERAL LOWER EXTREMITY PAIN: ICD-10-CM

## 2022-01-18 DIAGNOSIS — M79.672 LEFT FOOT PAIN: ICD-10-CM

## 2022-01-18 DIAGNOSIS — M79.605 BILATERAL LOWER EXTREMITY PAIN: ICD-10-CM

## 2022-01-18 DIAGNOSIS — M17.11 OSTEOARTHRITIS OF RIGHT KNEE, UNSPECIFIED OSTEOARTHRITIS TYPE: ICD-10-CM

## 2022-01-18 PROCEDURE — 97112 NEUROMUSCULAR REEDUCATION: CPT | Mod: GP | Performed by: PHYSICAL THERAPIST

## 2022-01-18 PROCEDURE — 97110 THERAPEUTIC EXERCISES: CPT | Mod: GP | Performed by: PHYSICAL THERAPIST

## 2022-01-25 ENCOUNTER — THERAPY VISIT (OUTPATIENT)
Dept: PHYSICAL THERAPY | Facility: CLINIC | Age: 67
End: 2022-01-25
Payer: COMMERCIAL

## 2022-01-25 DIAGNOSIS — M17.11 OSTEOARTHRITIS OF RIGHT KNEE, UNSPECIFIED OSTEOARTHRITIS TYPE: ICD-10-CM

## 2022-01-25 DIAGNOSIS — M79.604 BILATERAL LOWER EXTREMITY PAIN: ICD-10-CM

## 2022-01-25 DIAGNOSIS — M79.605 BILATERAL LOWER EXTREMITY PAIN: ICD-10-CM

## 2022-01-25 DIAGNOSIS — M79.672 LEFT FOOT PAIN: ICD-10-CM

## 2022-01-25 PROCEDURE — 97110 THERAPEUTIC EXERCISES: CPT | Mod: GP | Performed by: PHYSICAL THERAPIST

## 2022-01-25 PROCEDURE — 97112 NEUROMUSCULAR REEDUCATION: CPT | Mod: GP | Performed by: PHYSICAL THERAPIST

## 2022-01-27 DIAGNOSIS — I87.2 CHRONIC VENOUS INSUFFICIENCY: ICD-10-CM

## 2022-01-27 DIAGNOSIS — I10 HYPERTENSION GOAL BP (BLOOD PRESSURE) < 140/90: ICD-10-CM

## 2022-01-28 RX ORDER — TRIAMTERENE AND HYDROCHLOROTHIAZIDE 75; 50 MG/1; MG/1
1 TABLET ORAL DAILY
Qty: 90 TABLET | Refills: 3 | Status: SHIPPED | OUTPATIENT
Start: 2022-01-28 | End: 2023-01-04

## 2022-01-28 NOTE — TELEPHONE ENCOUNTER
Routing refill request to provider for review/approval because:  Labs out of range:  Creatinine    Creatinine   Date Value Ref Range Status   11/08/2021 1.34 (H) 0.52 - 1.04 mg/dL Final   06/15/2021 1.43 (H) 0.52 - 1.04 mg/dL Final     Pharmacy is requesting a one year supply.  Last appointment 11/8/21.    Ritika Mckeon RN  [facility]

## 2022-02-01 ENCOUNTER — THERAPY VISIT (OUTPATIENT)
Dept: PHYSICAL THERAPY | Facility: CLINIC | Age: 67
End: 2022-02-01
Payer: COMMERCIAL

## 2022-02-01 DIAGNOSIS — M79.672 LEFT FOOT PAIN: ICD-10-CM

## 2022-02-01 DIAGNOSIS — M79.604 BILATERAL LOWER EXTREMITY PAIN: ICD-10-CM

## 2022-02-01 DIAGNOSIS — M79.605 BILATERAL LOWER EXTREMITY PAIN: ICD-10-CM

## 2022-02-01 DIAGNOSIS — M17.11 OSTEOARTHRITIS OF RIGHT KNEE, UNSPECIFIED OSTEOARTHRITIS TYPE: ICD-10-CM

## 2022-02-01 PROCEDURE — 97112 NEUROMUSCULAR REEDUCATION: CPT | Mod: GP | Performed by: PHYSICAL THERAPIST

## 2022-02-01 PROCEDURE — 97110 THERAPEUTIC EXERCISES: CPT | Mod: GP | Performed by: PHYSICAL THERAPIST

## 2022-02-16 ENCOUNTER — OFFICE VISIT (OUTPATIENT)
Dept: OPTOMETRY | Facility: CLINIC | Age: 67
End: 2022-02-16
Payer: COMMERCIAL

## 2022-02-16 ENCOUNTER — MYC REFILL (OUTPATIENT)
Dept: FAMILY MEDICINE | Facility: CLINIC | Age: 67
End: 2022-02-16

## 2022-02-16 DIAGNOSIS — H52.4 PRESBYOPIA: ICD-10-CM

## 2022-02-16 DIAGNOSIS — H52.221 REGULAR ASTIGMATISM OF RIGHT EYE: ICD-10-CM

## 2022-02-16 DIAGNOSIS — L29.9 ITCHY SKIN: ICD-10-CM

## 2022-02-16 DIAGNOSIS — H04.123 DRY EYE SYNDROME OF BOTH EYES: ICD-10-CM

## 2022-02-16 DIAGNOSIS — H25.813 COMBINED FORMS OF AGE-RELATED CATARACT OF BOTH EYES: Primary | ICD-10-CM

## 2022-02-16 DIAGNOSIS — H52.03 HYPEROPIA, BILATERAL: ICD-10-CM

## 2022-02-16 PROCEDURE — 92004 COMPRE OPH EXAM NEW PT 1/>: CPT | Performed by: OPTOMETRIST

## 2022-02-16 ASSESSMENT — KERATOMETRY
OS_K2POWER_DIOPTERS: 45.25
OS_AXISANGLE2_DEGREES: 179
OD_AXISANGLE2_DEGREES: 017
OS_AXISANGLE_DEGREES: 089
OD_K2POWER_DIOPTERS: 45.75
OS_K1POWER_DIOPTERS: 44.00
OD_K1POWER_DIOPTERS: 43.25
OD_AXISANGLE_DEGREES: 107

## 2022-02-16 ASSESSMENT — SLIT LAMP EXAM - LIDS
COMMENTS: DERMATOCHALASIS
COMMENTS: DERMATOCHALASIS

## 2022-02-16 ASSESSMENT — TEAR MENISCUS
OD_TEAR_MENISCUS: INCREASED
OS_TEAR_MENISCUS: INCREASED

## 2022-02-16 ASSESSMENT — EXTERNAL EXAM - LEFT EYE: OS_EXAM: NORMAL

## 2022-02-16 ASSESSMENT — REFRACTION_WEARINGRX
OS_CYLINDER: SPHERE
OS_ADD: +2.75
OD_CYLINDER: +2.50
OS_SPHERE: +2.00
OD_SPHERE: PLANO
OD_ADD: +2.75
SPECS_TYPE: BIFOCAL
OD_AXIS: 125

## 2022-02-16 ASSESSMENT — TONOMETRY
OS_IOP_MMHG: 17
IOP_METHOD: TONOPEN
OD_IOP_MMHG: 17

## 2022-02-16 ASSESSMENT — VISUAL ACUITY
OD_SC+: -2
METHOD: SNELLEN - LINEAR
OS_CC: 20/25
CORRECTION_TYPE: GLASSES
OD_CC: 20/30
OS_SC: 20/60
OS_CC+: -2
OS_CC: 20/20
OD_SC: 20/60
OD_CC: 20/20

## 2022-02-16 ASSESSMENT — REFRACTION_MANIFEST
OD_ADD: +2.75
OS_SPHERE: +2.00
OD_SPHERE: PLANO
OD_CYLINDER: +2.75
OD_AXIS: 125
OS_ADD: +2.75

## 2022-02-16 ASSESSMENT — CUP TO DISC RATIO
OD_RATIO: 0.2
OS_RATIO: 0.2

## 2022-02-16 ASSESSMENT — CONF VISUAL FIELD
OD_NORMAL: 1
OS_NORMAL: 1

## 2022-02-16 ASSESSMENT — EXTERNAL EXAM - RIGHT EYE: OD_EXAM: NORMAL

## 2022-02-16 ASSESSMENT — PUNCTA - ASSESSMENT
OS_PUNCTA: NORMAL
OD_PUNCTA: NORMAL

## 2022-02-16 NOTE — PROGRESS NOTES
Chief Complaint   Patient presents with     Diabetic Eye Exam     Per patient    Accompanied by   Chief Complaint(s) and History of Present Illness(es)     Diabetic Eye Exam     Vision: is stable    Diabetes Type: Type 2 and controlled with diet    Duration: 25 years    Blood Sugars: is controlled                 No recent A1C reading.  11/21-glucose 103 per [facility]     Last Eye Exam: 2 years [facility]   Dilated Previously: Yes    What are you currently using to see?  glasses    Distance Vision Acuity: Noticed gradual change in both eyes    Near Vision Acuity: Satisfied with vision while reading  with glasses    Eye Comfort: watery  Do you use eye drops? : Yes: refresh- occasionally-  Occupation or Hobbies: retired    Cathie Welch Optometric Assistant, A.B.O[employer]C.     Medical, surgical and family histories reviewed and updated 2/16/2022.       OBJECTIVE: See Ophthalmology exam    ASSESSMENT:    ICD-10-CM    1. Combined forms of age-related cataract of both eyes  H25.813    2. Dry eye syndrome of both eyes  H04.123 EYE EXAM (SIMPLE-NONBILLABLE)   3. Presbyopia  H52.4 EYE EXAM (SIMPLE-NONBILLABLE)   4. Hyperopia, bilateral  H52.03 EYE EXAM (SIMPLE-NONBILLABLE)   5. Regular astigmatism of right eye  H52.221 EYE EXAM (SIMPLE-NONBILLABLE)      PLAN:    Teri Bain aware  eye exam results will be sent to Norma Serrano  Patient Instructions   You have the start of mild cataracts.  You may notice some blurred vision or glare with night driving.  It is important that you wear good sunglasses to protect your eyes from the ultraviolet light from the sun.  I recommend that you return in 1 year for an eye exam unless there are any sudden changes in your vision.       Refresh tears or other artificial tear 1 drop 2-4x daily.    Heat to the eyes daily for 10-15 minutes nightly with warm washcloth or reusable gel masks from the pharmacy or  [employer] heat masks can be purchased at [employer].    Ocusoft lid scrubs at night.      Eyeglass prescription given.  No change in eyeglass prescription.    Return in 1 year for a complete eye exam or sooner if needed.    Chinedu Jung, OD  .       Addendum     Addended 8/8/2022-  I received a note from the patient's PCP that the patient does not clinically have diabetes and she would like to have that diagnosis removed.    Chinedu Jung, OD

## 2022-02-16 NOTE — LETTER
2/16/2022         RE: Teri Bain  [address]  [place] [address]        Dear Colleague,    Thank you for referring your patient, Teri Bain, to the [facility]. Please see a copy of my visit note below.    Chief Complaint   Patient presents with     Diabetic Eye Exam     Per patient    Accompanied by   Chief Complaint(s) and History of Present Illness(es)     Diabetic Eye Exam     Vision: is stable    Diabetes Type: Type 2 and controlled with diet    Duration: 25 years    Blood Sugars: is controlled                 No recent A1C reading.  11/21-glucose 103 per [facility]     Last Eye Exam: 2 years [facility]   Dilated Previously: Yes    What are you currently using to see?  glasses    Distance Vision Acuity: Noticed gradual change in both eyes    Near Vision Acuity: Satisfied with vision while reading  with glasses    Eye Comfort: watery  Do you use eye drops? : Yes: refresh- occasionally-  Occupation or Hobbies: retired    Cathie Welch Optometric Assistant, A.B.O.C.     Medical, surgical and family histories reviewed and updated 2/16/2022.       OBJECTIVE: See Ophthalmology exam    ASSESSMENT:    ICD-10-CM    1. Type 2 diabetes mellitus without complication, without long-term current use of insulin (H)  E11.9 EYE EXAM (SIMPLE-NONBILLABLE)    negative diabetic retinopathy   2. Combined forms of age-related cataract of both eyes  H25.813 EYE EXAM (SIMPLE-NONBILLABLE)   3. Dry eye syndrome of both eyes  H04.123 EYE EXAM (SIMPLE-NONBILLABLE)   4. Presbyopia  H52.4 EYE EXAM (SIMPLE-NONBILLABLE)   5. Hyperopia, bilateral  H52.03 EYE EXAM (SIMPLE-NONBILLABLE)   6. Regular astigmatism of right eye  H52.221 EYE EXAM (SIMPLE-NONBILLABLE)      PLAN:    Teri Bain aware  eye exam results will be sent to Norma Serrano  Patient Instructions   There are not any signs of the diabetes affecting the eyes today.  It is important that you get your eyes dilated once  yearly and keep good control of your diabetes.    You have the start of mild cataracts.  You may notice some blurred vision or glare with night driving.  It is important that you wear good sunglasses to protect your eyes from the ultraviolet light from the sun.  I recommend that you return in 1 year for an eye exam unless there are any sudden changes in your vision.       Refresh tears or other artificial tear 1 drop 2-4x daily.    Heat to the eyes daily for 10-15 minutes nightly with warm washcloth or reusable gel masks from the pharmacy or  [employer] heat masks can be purchased at [employer].    Ocusoft lid scrubs at night.     Eyeglass prescription given.  No change in eyeglass prescription.    Return in 1 year for a complete eye exam or sooner if needed.    Chinedu Jung, OD                 Again, thank you for allowing me to participate in the care of your patient.        Sincerely,        Chinedu Jung, OD

## 2022-02-16 NOTE — PATIENT INSTRUCTIONS
You have the start of mild cataracts.  You may notice some blurred vision or glare with night driving.  It is important that you wear good sunglasses to protect your eyes from the ultraviolet light from the sun.  I recommend that you return in 1 year for an eye exam unless there are any sudden changes in your vision.       Refresh tears or other artificial tear 1 drop 2-4x daily.    Heat to the eyes daily for 10-15 minutes nightly with warm washcloth or reusable gel masks from the pharmacy or  [employer] heat masks can be purchased at Amazon.    Ocusoft lid scrubs at night.     Eyeglass prescription given.  No change in eyeglass prescription.    Return in 1 year for a complete eye exam or sooner if needed.    Chinedu Jung, OD    The affects of the dilating drops last for 4- 6 hours.  You will be more sensitive to light and vision will be blurry up close.  Do not drive if you do not feel comfortable.  Mydriatic sunglasses were given if needed.    There is a combination of three treatments which can greatly improve symptoms of dry eyes.     Artificial tears  Heat (eyes closed)  Eyelid and eyelash cleansing (eyes closed)     Use one drop of artificial tears both eyes 4 x daily.  Once in the morning, lunch, dinner and bedtime. Continue to use the drops regardless if your eyes are comfortable or not.  Artificial tears work best as a preventative and not as well after your eyes are starting to bother you.  It may take 4- 6 weeks of using the drops before you notice improvement.  If after that time you are still having problems schedule an appointment for an evaluation and discussion of different treatments such as Restasis or Xiidra.  Dry eyes are a chronic condition and you may have more symptoms at certain times of the year.    Excess tearing can be due to the right tears not working properly or a blockage in the tear drainage system.  You can try using artificial tears 1 drop both eyes 4 x day.  If the excess tearing is  bothersome after 4-6 weeks of treatment then we can send you for further testing.  This would entail a referral to our oculoplastic specialist Dr. Jose Brantley at the [facility]-[phone].    Recommended brands are:    Systane Complete  Systane Ultra  Systane Balance  Refresh Advanced Optive  Refresh Relieva  Blink    Recommended brands for contact lens wearers are:    Systane contacts  Refresh contacts  Blink contacts    If you are using drops more than 4 x day or have sensitivities to preservatives I recommend non preserved artificial tears.  These come in 1 use vials.  They can be used every 1-2 hours.  Do not reuse the vials.    Recommended brands are:    Refresh Optive Jhonny-3  Systane- preservative free  Refresh-  preservative free  Blink- preservative free    Gels or ointment can be used at night.    Recommended brands are:    Systane Gel  Refresh Gel  Blink Gel  Genteal Gel    Systane night time (ointment)  Refresh Celluvisc  Refresh PM (ointment)      Visine, Clear Eyes or Murine (drops that get the red out) can irritate the eyes and cause a rebound effect where the eyes become more red and you end up using more drops.  Avoid drops containing tetrahydrozoline, naphazoline, phenylephrine, oxymetazoline.      OTC Lumify is a newer product that gives immediate redness relief without the rebound effect.  Use as needed to take the redness out.    Artificial tears may be used with other drops (such as allergy, glaucoma, antibiotics) around the same time.  Be sure to wait 5 minutes in between drops.    Heat to the eyelids can also improve your symptoms of dry eyes.  Osmel heat masks can be purchased at [employer] to be used nightly for 10-15 minutes.  Other options are gel masks that can be put in the microwave and purchased at most pharmacies.      Tea Tree Oil eyelid cleansers recommended are Ocusoft Oust foam cleanser to cleanse eyelids/lashes at night and in the am. Other options are Blephadex or  Cliradex eyelid wipes.  KEEP EYES CLOSED when using these products.  These can be purchased on amazon.com   A good product for make up remover with tea tree oil is WeLApieronEyDeckerton.  This can be found at www.Win[employer] or [employer].    Other good eyelid cleansers have hypochlorous which removes excess bacteria and is safe around the eyes. Products are Avenova, Ocusoft Hypochlor or Heyedrate. Spray solution onto cotton pad, close eyes and gently apply to eyelids and eyelashes using side to side motion.  You can also KEEP EYES CLOSED spray and rub into eyelashes.  You do not need to rinse it off. Use morning and evening. These products can be found on Amazon.  You can check with your local pharmacy and see if they can order if for you if they don't have it.    Other brands of eyelid cleansing wipes are:    Ocusoft wipes  Systane wipes    A great eye make up line is [email or web address]                      .

## 2022-02-17 NOTE — TELEPHONE ENCOUNTER
Dr. Serrano: passes protocol but:    Last Written Prescription Date:  8/12/20  Last Fill Quantity: 15g,  # refills: 0   Last office visit: 11/8/2021 with prescribing provider:  Maggie   Future Office Visit:  none      Em Cabrera RN  [facility]

## 2022-02-18 RX ORDER — TRIAMCINOLONE ACETONIDE 1 MG/G
CREAM TOPICAL
Qty: 15 G | Refills: 0 | Status: SHIPPED | OUTPATIENT
Start: 2022-02-18 | End: 2022-05-11

## 2022-02-18 NOTE — TELEPHONE ENCOUNTER
Pt called while in the pharmacy parking lot.  I told pt this cream had not been sent in yet.  This needed to go to Dr. Serrano.  Pt will wait until Monday.  JACLYN Branham

## 2022-02-18 NOTE — TELEPHONE ENCOUNTER
Patient has sent a message saying she now needs this urgently. Routing to covering providers.    Ritika Mckeon RN  [facility]

## 2022-02-20 ENCOUNTER — HEALTH MAINTENANCE LETTER (OUTPATIENT)
Age: 67
End: 2022-02-20

## 2022-03-03 NOTE — PATIENT INSTRUCTIONS
Primary Care Provider  Erik Macias DO   Referring Provider  No ref. provider found    Patient Complaint  Pneumonia (Post Covid), Cough, and Follow-up (3 week)      SUBJECTIVE    History of Presenting Illness  Elmer Garvin is a 74 y.o. male who presents to [facility] for 3-week follow-up from hospitalization.  Bharathi is here with his wife today.  Patient was had been  admitted to [facility] 12/26 through 1/2/2022 for Covid pneumonia hypoxic respiratory failure at [facility].  Doing well however when he went home he developed shortness of air came into the office and we did a D-dimer and it was 6.7 patient ended up back in the hospital and was admitted January 25 through January 27, 2022.  Patient had pulmonary embolism of left and right lung fields.  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  Cardiology has been consulted with the patient due to the possible right heart strain.  In addition patient had mechanical thrombectomy and pulmonary angiogram on 1/26/2022.  Patient has been on Eliquis 5 mg twice a day at this time.  Patient is currently in physical therapy.  We attempted to get the patient into pulmonary rehab but was told it was 3 months out.  Patient does have oxygen which he does use at home when he gets short of air and his oxygen drops below 90% he states presents to the office today and he is short of air with talking.  Though his saturation is in the 97 to 100%.  Patient continues to be on albuterol Zyrtec Flonase Singulair and DuoNeb's at this time.  States he is also using his incentive spirometer and his Acapella flutter valve at this time.  Patient states his cough has improved greatly.  Whereas before he states he could take a deep breath and it would make him cough now he can take deep breaths without coughing as frequently.  States his cough is nonproductive.    Denies  wheezing,  The Shingrix vaccination is a 2-shot series.  The second dose is given 6 months after the first.  (It cannot be given sooner than 2 months after the first dose - at the earliest.)  You should be aware that patients are reporting feeling a bit under the weather after the injection, including fatigue, malaise, and low-grade fever that may last a couple days.  Rarely patients can get a mild, shingles-like rash.   But these symptoms are much better than the Shingles disease.      Start back on the triamterene/HCTZ and see if the ankle swelling improves.    Schedule Sports Med consult for your shoulder at the end of the month.       headaches, chest pain, weight loss or hemoptysis. Denies fevers, chills and night sweats. Elmer Garvin is able to perform ADLs without difficulties and denies any swollen glands/lymph nodes in the head or neck.    I have personally reviewed the review of systems, past family, social, medical and surgical histories; and agree with their findings.    Review of Systems  Constitutional symptoms:  Denied complaints   Ear, nose, throat: Denied complaints  Cardiovascular:  Denied complaints  Respiratory: Dyspnea with exertion cough nonproductive  Gastrointestinal: Denied complaints  Musculoskeletal: Denied complaints    Family History   Problem Relation Age of Onset   • Cancer Mother    • Heart disease Father    • Cancer Father    • Heart disease Sister    • Sudden death Sister    • Heart disease Brother    • Cancer Paternal Grandfather         Social History     Socioeconomic History   • Marital status:    Tobacco Use   • Smoking status: Former Smoker     Years: 12.00     Types: Pipe   • Smokeless tobacco: Never Used   • Tobacco comment: CAFFEINE USE 3 CUPS COFFEE DAILY   Vaping Use   • Vaping Use: Never used   Substance and Sexual Activity   • Alcohol use: No   • Drug use: Never   • Sexual activity: Defer        Past Medical History:   Diagnosis Date   • Hypertension    • Lyme disease    • Shortness of breath    • SSS (sick sinus syndrome) ([facility])         Immunization History   Administered Date(s) Administered   • OPV 04/09/1996   • Td 04/09/1996   • Yellow Fever 04/09/1996       Allergies   Allergen Reactions   • Latex Other (See Comments)     Sinus congestion          Current Outpatient Medications:   •  albuterol sulfate  (90 Base) MCG/ACT inhaler, Inhale 2 puffs Every 4 (Four) Hours As Needed for Wheezing., Disp: 1 g, Rfl: 6  •  apixaban (ELIQUIS) 5 MG tablet tablet, Take 1 tablet by mouth 2 (Two) Times a Day., Disp: 60 tablet, Rfl: 6  •  cetirizine (zyrTEC) 10 MG tablet, Take 1 tablet by mouth  "Daily., Disp: 90 tablet, Rfl: 3  •  ipratropium-albuterol (DUO-NEB) 0.5-2.5 mg/3 ml nebulizer, Take 3 mL by nebulization 4 (Four) Times a Day As Needed for Wheezing or Shortness of Air., Disp: 360 mL, Rfl: 3  •  latanoprost (XALATAN) 0.005 % ophthalmic solution, , Disp: , Rfl:   •  montelukast (SINGULAIR) 10 MG tablet, Take 1 tablet by mouth Daily for 90 days., Disp: 90 tablet, Rfl: 3  •  pantoprazole (PROTONIX) 40 MG EC tablet, Take 1 tablet by mouth Every Morning for 30 days., Disp: 30 tablet, Rfl: 1  •  Synthroid 100 MCG tablet, Take 1 tablet by mouth every morning *on empty stomach*, Disp: 90 tablet, Rfl: 3  •  fluticasone (FLONASE) 50 MCG/ACT nasal spray, 2 sprays into the nostril(s) as directed by provider Daily for 30 days., Disp: 18.2 mL, Rfl: 0  •  losartan (COZAAR) 100 MG tablet, Take 1 tablet by mouth Daily for 90 days., Disp: 90 tablet, Rfl: 3       OBJECTIVE    Vital Signs   /60 (BP Location: Right arm, Patient Position: Sitting, Cuff Size: Adult)   Pulse 79   Temp 97.3 °F (36.3 °C) (Tympanic)   Resp 18   Ht 200.7 cm (79\")   Wt 94.2 kg (207 lb 11.2 oz)   SpO2 98% Comment: room air  BMI 23.40 kg/m²     Physical Exam  Vital Signs Reviewed   WDWN, Alert, NAD.    HEENT:  PERRL, EOMI.  OP, nares clear  Neck:  Supple, no JVD, no thyromegaly  Chest:  good aeration, clear to auscultation bilaterally, patient gets short of air easily with talking  CV: RRR, no MGR, pulses 2+, equal.  Abd:  Soft, NT, ND, + BS, no HSM  EXT:  no clubbing, no cyanosis, no edema  Neuro:  A&Ox3, CN grossly intact, no focal deficits.  Skin: No rashes or lesions noted    Results Review  I have personally reviewed the hospital records, diagnostic imaging.      ASSESSMENT & PLAN    Patient Active Problem List   Diagnosis   • Angina at rest (HCC)   • Hypertension   • SSS (sick sinus syndrome) ([facility])   • Mild intermittent asthma without complication   • Gastroesophageal reflux disease without esophagitis   • Taylor's esophagus " without dysplasia   • Acquired hypothyroidism   • MRSA (methicillin resistant Staphylococcus aureus)   • Pre-diabetes   • Acute respiratory failure with hypoxia (HCC)   • Arthritis   • Asthma   • Benign prostatic hyperplasia   • Cataract   • Deviated nasal septum   • Family history of prostate cancer in father   • Hypertrophy of both inferior nasal turbinates   • Impaired glucose tolerance   • 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 Acute pulmonary embolism with acute cor pulmonale, unspecified pulmonary embolism type (HCC) (Primary)    2. Shortness of breath    3. Weakness    4. Pneumonia due to COVID-19 virus    Plan:  Will try to get patient into pulmonary rehab will call and get him in sooner.  [facility]  Patient to continue using his symptoms spirometry and Acapella flutter valve.  Continue with his current medications and nebulizers as prescribed.  We will plan to have patient follow-up in 1 month or sooner if needed.  Patient instructed to continue using his oxygen to keep his saturation above 90%.  We will revisit need for diagnostics at his follow-up appointment.    Smoking status: Former quit 40 years ago  Vaccination status: Declines all  Medications personally reviewed    Follow Up  Return in about 4 weeks (around 3/31/2022) for with Dr. Gong.  Encourage patient should he have worsening of symptoms shortness of air he needs to call 911 go to emergency room patient and spouse both verbalized understanding.  Patient was given instructions and counseling regarding  his condition or for health maintenance advice. Please see specific information pulled into the AVS if appropriate.

## 2022-03-10 ENCOUNTER — THERAPY VISIT (OUTPATIENT)
Dept: PHYSICAL THERAPY | Facility: CLINIC | Age: 67
End: 2022-03-10
Payer: COMMERCIAL

## 2022-03-10 DIAGNOSIS — M79.672 LEFT FOOT PAIN: ICD-10-CM

## 2022-03-10 DIAGNOSIS — M17.11 OSTEOARTHRITIS OF RIGHT KNEE, UNSPECIFIED OSTEOARTHRITIS TYPE: ICD-10-CM

## 2022-03-10 DIAGNOSIS — M79.604 BILATERAL LOWER EXTREMITY PAIN: ICD-10-CM

## 2022-03-10 DIAGNOSIS — M79.605 BILATERAL LOWER EXTREMITY PAIN: ICD-10-CM

## 2022-03-10 PROCEDURE — 97110 THERAPEUTIC EXERCISES: CPT | Mod: GP | Performed by: PHYSICAL THERAPIST

## 2022-03-10 PROCEDURE — 97112 NEUROMUSCULAR REEDUCATION: CPT | Mod: GP | Performed by: PHYSICAL THERAPIST

## 2022-03-10 NOTE — PROGRESS NOTES
Subjective:  HPI  Physical Exam                    Objective:  System    Physical Exam    General     ROS    Assessment/Plan:    PROGRESS  REPORT    Progress reporting period is from 2/1/22 to 3/10/22, 6 total visits since 11/23/21.       SUBJECTIVE  Subjective changes noted by patient:  Five week lapse in therapy and reporting continued B knee pain.  L knee pain is now worse than R for unknown reasons.  Started with a  at the gym 3 weeks ago.  Feels R knee is getting better but now L knee is more painful.  Has tried to keep up with some exercises, but not all.    Current Pain level: 8/10 (R knee 7-8/10, L knee 10/10).     Initial Pain level: 10/10.   Changes in function:  Yes (See Goal flowsheet attached for changes in current functional level)  Adverse reaction to treatment or activity: unknown L knee pain    OBJECTIVE  Changes noted in objective findings:    AROM L knee 0-127, R 0-125.    Tight L>R gastroc/soleus complex.    (-) Homans.    MMT hip flexion B 5/5, knee ext B 5/5, knee flex L 4/5, R 5/5, hip ABD 4/5 B, hip ext 5/5 B.    Patellar grind (-) B.    L knee (+) Valgus strain at 30 deg.    SLS L 4 sec, R 11 sec mild hip drop B.    Sit <-> stand able with controlled manner.    Antalgic gait, trained gait with SEC for prn use to unload the knee.   Performance of exercise lessens knee pain.  Long axis distraction lessens L knee pain.    End of treatment no R knee pain, less L knee pain.      ASSESSMENT/PLAN  Updated problem list and treatment plan: Diagnosis 1:  R knee OA    Pain -  hot/cold therapy, manual therapy, self management, education and home program  Decreased ROM/flexibility - manual therapy, therapeutic exercise, therapeutic activity and home program  Decreased strength - therapeutic exercise, therapeutic activities and home program  Decreased proprioception - neuro re-education, gait training, therapeutic activities and home program  Inflammation - cold therapy and self  management/home program  Impaired gait - gait training, assistive devices and home program  Decreased function - therapeutic activities and home program  STG/LTGs have been met or progress has been made towards goals:  Yes (See Goal flow sheet completed today.)  Assessment of Progress: The patient's condition is improving.  Self Management Plans:  Patient has been instructed in a home treatment program.  Patient  has been instructed in self management of symptoms.  I have re-evaluated this patient and find that the nature, scope, duration and intensity of the therapy is appropriate for the medical condition of the patient.  Teri continues to require the following intervention to meet STG and LTG's:  PT    Recommendations:  This patient would benefit from continued therapy.     Frequency:  2 X a month, once daily  Duration:  for 3 months        Please refer to the daily flowsheet for treatment today, total treatment time and time spent performing 1:1 timed codes.

## 2022-03-10 NOTE — PROGRESS NOTES
[facility]    OUTPATIENT Physical Therapy ORTHOPEDIC EVALUATION  PLAN OF TREATMENT FOR OUTPATIENT REHABILITATION  (COMPLETE FOR INITIAL CLAIMS ONLY)  Patient's Last Name, First Name, M.I.  YOB: 1955  Teri Bain    Provider s Name:  [facility]   Medical Record No.  [id number]   Start of Care Date:  11/23/21   Onset Date:   09/01/21 (MD visit)   Type:     _X__PT   ___OT Medical Diagnosis:    Encounter Diagnoses   Name Primary?     Osteoarthritis of right knee, unspecified osteoarthritis type      Left foot pain      Bilateral lower extremity pain         Treatment Diagnosis:  R knee OA (B LE pain)        Goals:     03/10/22 0500   Body Part   Goals listed below are for R knee, L foot   Goal #1   Goal #1 stairs   Previous Functional Level No restrictions   Performance Level progressive decline   Current Functional Level Ascend/descend stairs;with a railing   Performance Level reciprocal pattern up to 6/10 pain (L leg up to step, R leg straight out to the side to next step) (reciprocal but not normal pattern) (straddles step)   STG Target Performance Ascend stairs;in a normal reciprocal pattern;with a railing   Rationale to enter/leave the house safely;for safe community ambulaton   Due Date 03/31/22   If goal not met, Why? extend goal date, 5 week lapse in therapy, sporadic attendance in therapy and performance of HEP, pain limits R and L knee   LTG Target Performance Descend stairs;in a normal reciprocal pattern;with railing   Rationale to enter/leave the house safely;for safe community access to buildings  (to get in/out of condo)   Due Date 05/16/22       Therapy Frequency:  2x/month  Predicted Duration of Therapy Intervention:  3 months    Vanessa Larsen, PT                 I CERTIFY THE NEED FOR THESE SERVICES FURNISHED UNDER        THIS PLAN OF TREATMENT AND  WHILE UNDER MY CARE     (Physician attestation of this document indicates review and certification of the therapy plan).                       Certification Date From:  02/21/22   Certification Date To:  05/16/22    Referring Provider:  Tian Hawthorne    Initial Assessment        See Epic Evaluation SOC Date: 11/23/21

## 2022-03-16 ENCOUNTER — OFFICE VISIT (OUTPATIENT)
Dept: FAMILY MEDICINE | Facility: CLINIC | Age: 67
End: 2022-03-16
Payer: COMMERCIAL

## 2022-03-16 ENCOUNTER — ANCILLARY PROCEDURE (OUTPATIENT)
Dept: ULTRASOUND IMAGING | Facility: CLINIC | Age: 67
End: 2022-03-16
Attending: FAMILY MEDICINE
Payer: COMMERCIAL

## 2022-03-16 ENCOUNTER — NURSE TRIAGE (OUTPATIENT)
Dept: FAMILY MEDICINE | Facility: CLINIC | Age: 67
End: 2022-03-16
Payer: COMMERCIAL

## 2022-03-16 VITALS
SYSTOLIC BLOOD PRESSURE: 122 MMHG | WEIGHT: 277 LBS | BODY MASS INDEX: 47.18 KG/M2 | DIASTOLIC BLOOD PRESSURE: 80 MMHG | RESPIRATION RATE: 20 BRPM | TEMPERATURE: 97.9 F | HEART RATE: 86 BPM

## 2022-03-16 DIAGNOSIS — M79.662 PAIN OF LEFT CALF: ICD-10-CM

## 2022-03-16 DIAGNOSIS — M25.562 ACUTE PAIN OF LEFT KNEE: Primary | ICD-10-CM

## 2022-03-16 DIAGNOSIS — E66.01 MORBID OBESITY (H): ICD-10-CM

## 2022-03-16 DIAGNOSIS — N18.31 STAGE 3A CHRONIC KIDNEY DISEASE (H): ICD-10-CM

## 2022-03-16 DIAGNOSIS — M25.562 ACUTE PAIN OF LEFT KNEE: ICD-10-CM

## 2022-03-16 PROCEDURE — 93971 EXTREMITY STUDY: CPT | Mod: LT | Performed by: RADIOLOGY

## 2022-03-16 PROCEDURE — 99214 OFFICE O/P EST MOD 30 MIN: CPT | Performed by: FAMILY MEDICINE

## 2022-03-16 NOTE — TELEPHONE ENCOUNTER
Writer called patient:  1. Pain and lump is located LLE on side of knee where it meets calf   A. Pain is severe with palpation and ambulation, but once she gets walking pain gets a little better   B. Pain present for 1 week  2. Lump is about size of a quarter and noticed it today  3. No drainage  4. No fever  5. Went to gym one day last week and Physical Therapy on 3/10/22  6. No injury  7. Denied chest pain, difficulty breathing, acute back pain,   8. LLE edema present but resolves at night-both ankles swollen during the day  9. No rash  10. Unable to tell writer if weakness present due to pain with initial ambulation  11. Does not think any numbness/tingling          Reason for Disposition    SEVERE pain (e.g., excruciating, unable to do any normal activities)    Additional Information    Negative: Looks like a broken bone or dislocated joint (e.g., crooked or deformed)    Negative: Sounds like a life-threatening emergency to the triager    Negative: Followed a hip injury    Negative: Followed a knee injury    Negative: Followed an ankle or foot injury    Negative: Back pain radiating (shooting) into leg(s)    Negative: Foot pain is the main symptom    Negative: Ankle pain is the main symptom    Negative: Knee pain is the main symptom    Negative: Leg swelling is the main symptom    Negative: Chest pain    Negative: Difficulty breathing    Negative: Entire foot is cool or blue in comparison to other side    Negative: Unable to walk    Negative: Fever and red area (or area very tender to touch)    Negative: Fever and swollen joint    Negative: Thigh or calf pain in only one leg and present > 1 hour    Negative: Thigh, calf, or ankle swelling in only one leg    Negative: Thigh, calf, or ankle swelling in both legs, but one side is definitely more swollen    Negative: History of prior 'blood clot' in leg or lungs (i.e., deep vein thrombosis, pulmonary embolism)    Negative: History of inherited increased risk of  blood clots (e.g., factor 5 Leiden, antithrombin 3, protein C or protein S deficiency, prothrombin mutation)    Negative: Major surgery in the past month    Negative: Hip or leg fracture (broken bone) in past month (or had cast on leg or ankle in past month)    Negative: Illness requiring prolonged bedrest in past month (e.g., immobilization, long hospital stay)    Negative: Long-distance travel in past month (e.g., car, bus, train, plane; with trip lasting 6 or more hours)    Negative: Cancer treatment in the past two months (or has cancer now)    Negative: Patient sounds very sick or weak to the triager    Protocols used: LEG PAIN-A-OH      MARCUS Velazco, RN  [facility]

## 2022-03-16 NOTE — PROGRESS NOTES
Assessment & Plan     Acute pain of left knee  Pain of left calf  unclear etiology with uncertain prognosis, requires further workup    Will schedule ultrasound today to evaluate for DVT.  If DVT present, she will go to the ER.  Referred to Ortho for further evaluation.  Recommended ongoing ice applications which have been helpful, Tylenol as needed for pain, avoiding ibuprofen due to stage III chronic kidney disease, she can also use Aspercreme with lidocaine topically. Has been working with PT for knee pain as well (initially on the right, but since left knee pain present has been doing some work with both  - US Lower Extremity Venous Duplex Left; Future  - Orthopedic  Referral; Future    Morbid obesity (H)  Recently started increasing her exercise and is working with a .  Her weight is likely contributing to knee pain in general as well.    Stage 3a chronic kidney disease (H)  Avoid NSAIDs         Patient Instructions   Schedule your leg ultrasound.   Schedule with sports medicine  You can take tylenol 1000mg three times daily for pain.   Ice your knee.   You may also try aspercream with lidocaine.          Norma Cueto MD   [facility]    Bea Williamson is a 66 year old who presents for the following health issues  accompanied by her alone.    HPI     Concern - pain left lower leg  Onset: last Derek 3/7/22  Description: pain in lower leg feel a  Lump behind left knee  Intensity: severe  Progression of Symptoms:  same and intermittent  Accompanying Signs & Symptoms: no  Previous history of similar problem: no  Precipitating factors:        Worsened by: walking and standing  Alleviating factors:        Improved by: sitting  Therapies tried and outcome: None    Has been starting to work out recently with a .  Last Sunday, she noticed left knee pain medially, below the knee and into the upper calf and some burning sensation as well.  That had been  intermittent and has caused some difficulty especially  pain with ambulation.  She noted this when she went back to see her physical therapist for her right knee pain and her physical therapist has been helping her somewhat with both.  In the last day, she noticed a lump appear in the left calf behind the knee that was painful.  Also notes left leg swelling that is new.  Has experienced bilateral calf pain in the past when she wears heels, but has not been wearing heels recently and this pain is unilateral.     She denies trauma to the knee, clicking, popping, instability.    Denies personal history of blood clot.  Her daughter did have a DVT unclear etiology.  No recent period of immobilization.  Does note unilateral calf swelling on the left since pain began.  Did palpate a lump in the proximal medial calf that was tender.  Denies bruising or redness.     Review of Systems         Objective    /80 (BP Location: Right arm, Patient Position: Sitting, Cuff Size: Adult Large)   Pulse 86   Temp 97.9  F (36.6  C) (Temporal)   Resp 20   Wt 125.6 kg (277 lb)   BMI 47.18 kg/m    Body mass index is 47.18 kg/m .  Physical Exam   GENERAL: healthy, alert and no distress  MS: LLE exam shows normal strength and muscle mass, no erythema, induration, or nodules, ROM of all joints is normal and no evidence of joint instability. There is left lower extremity mild swelling to the knee. No redness. ttp over the proximal calf muscles and medial knee. Slight crepitus of the knee. No deep vein cord palpated on my exam. There is a small slight lump superficially and medial to the knee that pt identifies as a lump she noticed, but this feels more like subcutaneous fatty deposit.

## 2022-03-16 NOTE — PATIENT INSTRUCTIONS
Schedule your leg ultrasound.   Schedule with sports medicine  You can take tylenol 1000mg three times daily for pain.   Ice your knee.   You may also try aspercream with lidocaine.

## 2022-03-16 NOTE — TELEPHONE ENCOUNTER
Reason for call:  Patient reporting a symptom    Symptom or request: symptoms    Duration (how long have symptoms been present): 1 week with pain but just this morning developed lump    Have you been treated for this before? No    Additional comments: patient states she has had leg pain for about a week and this morning she has a lump in the inner lower left part of leg    Phone Number patient can be reached at:  Home number on file [phone] (home)    Best Time:      Can we leave a detailed message on this number:  YES    Call taken on 3/16/2022 at 8:20 AM by Mirela Rodriguez

## 2022-04-18 ENCOUNTER — MYC MEDICAL ADVICE (OUTPATIENT)
Dept: FAMILY MEDICINE | Facility: CLINIC | Age: 67
End: 2022-04-18
Payer: COMMERCIAL

## 2022-04-20 ENCOUNTER — IMMUNIZATION (OUTPATIENT)
Dept: NURSING | Facility: CLINIC | Age: 67
End: 2022-04-20
Payer: COMMERCIAL

## 2022-04-20 PROCEDURE — 0064A COVID-19,PF,MODERNA (18+ YRS BOOSTER .25ML): CPT

## 2022-04-20 PROCEDURE — 91306 COVID-19,PF,MODERNA (18+ YRS BOOSTER .25ML): CPT

## 2022-05-10 DIAGNOSIS — L29.9 ITCHY SKIN: ICD-10-CM

## 2022-05-11 RX ORDER — TRIAMCINOLONE ACETONIDE 1 MG/G
CREAM TOPICAL
Qty: 45 G | Refills: 1 | Status: SHIPPED | OUTPATIENT
Start: 2022-05-11 | End: 2023-12-05

## 2022-05-11 NOTE — TELEPHONE ENCOUNTER
Teri states whenever it gets hot outside she had issue with her itchy skin. In 2 days she is leaving to go some where it is hotter than here. Please sign off on script if ok.    Thank you

## 2022-05-11 NOTE — TELEPHONE ENCOUNTER
Left message to call back and ask to speak with an available triage nurse.    Is pt truly in need of this cream?  Last filled 2/18/22 but is a two-week limited use.    CIARRA Cabrera RN  [facility]     daughter

## 2022-05-17 PROBLEM — M79.672 LEFT FOOT PAIN: Status: RESOLVED | Noted: 2021-11-24 | Resolved: 2022-05-17

## 2022-05-17 PROBLEM — M17.11 OSTEOARTHRITIS OF RIGHT KNEE, UNSPECIFIED OSTEOARTHRITIS TYPE: Status: RESOLVED | Noted: 2021-11-24 | Resolved: 2022-05-17

## 2022-06-12 ENCOUNTER — HEALTH MAINTENANCE LETTER (OUTPATIENT)
Age: 67
End: 2022-06-12

## 2022-07-17 DIAGNOSIS — E78.5 HYPERLIPIDEMIA LDL GOAL <100: ICD-10-CM

## 2022-07-19 ENCOUNTER — ANCILLARY PROCEDURE (OUTPATIENT)
Dept: ULTRASOUND IMAGING | Facility: CLINIC | Age: 67
End: 2022-07-19
Attending: INTERNAL MEDICINE
Payer: COMMERCIAL

## 2022-07-19 ENCOUNTER — OFFICE VISIT (OUTPATIENT)
Dept: URGENT CARE | Facility: URGENT CARE | Age: 67
End: 2022-07-19
Payer: COMMERCIAL

## 2022-07-19 ENCOUNTER — NURSE TRIAGE (OUTPATIENT)
Dept: NURSING | Facility: CLINIC | Age: 67
End: 2022-07-19

## 2022-07-19 ENCOUNTER — OFFICE VISIT (OUTPATIENT)
Dept: PEDIATRICS | Facility: CLINIC | Age: 67
End: 2022-07-19
Attending: PHYSICIAN ASSISTANT
Payer: COMMERCIAL

## 2022-07-19 VITALS
OXYGEN SATURATION: 96 % | RESPIRATION RATE: 14 BRPM | DIASTOLIC BLOOD PRESSURE: 83 MMHG | TEMPERATURE: 97.8 F | HEART RATE: 67 BPM | SYSTOLIC BLOOD PRESSURE: 132 MMHG

## 2022-07-19 VITALS
WEIGHT: 276 LBS | SYSTOLIC BLOOD PRESSURE: 137 MMHG | HEART RATE: 81 BPM | DIASTOLIC BLOOD PRESSURE: 84 MMHG | OXYGEN SATURATION: 97 % | BODY MASS INDEX: 47.01 KG/M2

## 2022-07-19 DIAGNOSIS — Z78.9 RECENT TRAVEL ON AIRCRAFT: ICD-10-CM

## 2022-07-19 DIAGNOSIS — M79.661 BILATERAL CALF PAIN: Primary | ICD-10-CM

## 2022-07-19 DIAGNOSIS — M79.662 BILATERAL CALF PAIN: Primary | ICD-10-CM

## 2022-07-19 DIAGNOSIS — M17.0 PRIMARY OSTEOARTHRITIS OF BOTH KNEES: ICD-10-CM

## 2022-07-19 DIAGNOSIS — E66.01 MORBID OBESITY (H): ICD-10-CM

## 2022-07-19 DIAGNOSIS — M79.605 PAIN AND SWELLING OF LEFT LOWER EXTREMITY: ICD-10-CM

## 2022-07-19 DIAGNOSIS — M79.89 PAIN AND SWELLING OF LEFT LOWER EXTREMITY: ICD-10-CM

## 2022-07-19 DIAGNOSIS — E66.9 OBESITY, UNSPECIFIED CLASSIFICATION, UNSPECIFIED OBESITY TYPE, UNSPECIFIED WHETHER SERIOUS COMORBIDITY PRESENT: ICD-10-CM

## 2022-07-19 DIAGNOSIS — M79.662 BILATERAL CALF PAIN: ICD-10-CM

## 2022-07-19 DIAGNOSIS — Z78.9 HISTORY OF RECENT AIR TRAVEL: ICD-10-CM

## 2022-07-19 DIAGNOSIS — I87.2 CHRONIC VENOUS INSUFFICIENCY: ICD-10-CM

## 2022-07-19 DIAGNOSIS — M79.661 BILATERAL CALF PAIN: ICD-10-CM

## 2022-07-19 PROCEDURE — 99215 OFFICE O/P EST HI 40 MIN: CPT | Performed by: INTERNAL MEDICINE

## 2022-07-19 PROCEDURE — 93970 EXTREMITY STUDY: CPT | Performed by: RADIOLOGY

## 2022-07-19 PROCEDURE — 99207 PR FIRST ORDER ACUTE REFERRAL: CPT | Performed by: PHYSICIAN ASSISTANT

## 2022-07-19 RX ORDER — PRAVASTATIN SODIUM 80 MG/1
TABLET ORAL
Qty: 100 TABLET | Refills: 0 | Status: SHIPPED | OUTPATIENT
Start: 2022-07-19 | End: 2022-09-27

## 2022-07-19 ASSESSMENT — ENCOUNTER SYMPTOMS
CONSTITUTIONAL NEGATIVE: 1
WOUND: 0
COLOR CHANGE: 0
SHORTNESS OF BREATH: 0
PALPITATIONS: 0
COLOR CHANGE: 0
NAUSEA: 0
NECK PAIN: 0
DIAPHORESIS: 0
SHORTNESS OF BREATH: 0
CHILLS: 0
DIZZINESS: 0
PALPITATIONS: 0
JOINT SWELLING: 1
FATIGUE: 0
NECK STIFFNESS: 0
FEVER: 0
CHILLS: 0
COUGH: 0
MYALGIAS: 1
CARDIOVASCULAR NEGATIVE: 1
BACK PAIN: 0
WHEEZING: 0
ARTHRALGIAS: 1
RESPIRATORY NEGATIVE: 1
COUGH: 0

## 2022-07-19 ASSESSMENT — PAIN SCALES - GENERAL
PAINLEVEL: WORST PAIN (10)
PAINLEVEL: WORST PAIN (10)

## 2022-07-19 NOTE — TELEPHONE ENCOUNTER
Statins Protocol Passed 07/17/2022 10:46 PM   Protocol Details  LDL on file in past 12 months    No abnormal creatine kinase in past 12 months    Recent (12 mo) or future (30 days) visit within the authorizing provider's specialty    Medication is active on med list    Patient is age 18 or older    No active pregnancy on record    No positive pregnancy test in past 12 months

## 2022-07-19 NOTE — PROGRESS NOTES
Subjective   Teri is a 66 year old, presenting for the following health issues:  Urgent Care and Trauma (Started on 7/16 )    HPI   Musculoskeletal problem/pain  Onset/Duration: 3days  Description  Location: L calf, little in the R calf  Joint Swelling: Yes  Redness: no  Pain: YES  Warmth: no  Intensity:  moderate  Progression of Symptoms:  same  Accompanying signs and symptoms:   Fevers: no  Numbness/tingling/weakness: Yes in her foot.    History  Trauma to the area: no, but reports recent airplane travel to [place] 2weeks ago.  She did do a lot of walking in [place] as well.  No recent surgeries.  No chest pain, SOB, palpitations, orthopnea, PND.  Recent illness:  no  Previous similar problem: no  Previous evaluation:  no  Precipitating or alleviating factors:  Aggravating factors include: palpation, walking, overuse  Therapies tried and outcome: rest/inactivity, heat, ice, immobilization, topical ASA with minimal relief  Patient Active Problem List   Diagnosis     Classical migraine     Trochanteric bursitis     Allergic rhinitis     Osteoarthritis     Chronic venous insufficiency     Chronic kidney disease (CKD), stage III (moderate) (H)     Mild persistent asthma     Vitamin D deficiency     Hypertension goal BP (blood pressure) < 140/90     Pure hypercholesterolemia     Health Care Home     GERD (gastroesophageal reflux disease)     Advanced directives, counseling/discussion     History of acute renal failure     BMI > 40 (H)     Osteoarthritis of both sternoclavicular joints     Bilateral lower extremity pain     Combined forms of age-related cataract of both eyes     Current Outpatient Medications   Medication     albuterol (2.5 MG/3ML) 0.083% neb solution     albuterol (PROAIR HFA/PROVENTIL HFA/VENTOLIN HFA) 108 (90 Base) MCG/ACT inhaler     amLODIPine (NORVASC) 5 MG tablet     BLACK ELDERBERRY,BERRY-FLOWER, PO     cetirizine (ZYRTEC) 10 MG tablet     Cyanocobalamin (VITAMIN B 12 PO)     famotidine (PEPCID)  20 MG tablet     fluticasone (ARNUITY ELLIPTA) 200 MCG/ACT inhaler     fluticasone (FLONASE) 50 MCG/ACT nasal spray     hydrOXYzine (ATARAX) 10 MG tablet     Ibuprofen (ADVIL PO)     MULTI-VITAMIN OR TABS     Multiple Vitamins-Minerals (ZINC PO)     pravastatin (PRAVACHOL) 80 MG tablet     predniSONE (DELTASONE) 20 MG tablet     triamcinolone (KENALOG) 0.1 % external cream     triamterene-HCTZ (MAXZIDE) 75-50 MG tablet     VITAMIN C 500 MG OR TABS     VITAMIN D PO     No current facility-administered medications for this visit.        Allergies   Allergen Reactions     Benazepril Rash     Claritin [Loratadine]      Codeine Nausea     Lisinopril Rash     Losartan      MILDRED - avoid ACEi/ARBs     Tetracycline Nausea     Ceftin [Cefuroxime] Itching and Rash     Penicillins Rash       Review of Systems   Constitutional: Negative.  Negative for chills, fatigue and fever.   Respiratory: Negative.  Negative for cough, shortness of breath and wheezing.    Cardiovascular: Negative.  Negative for chest pain, palpitations and peripheral edema.   Genitourinary: Negative.    Musculoskeletal: Positive for arthralgias, gait problem, joint swelling and myalgias. Negative for back pain, neck pain and neck stiffness.   Skin: Negative for color change, pallor, rash and wound.   All other systems reviewed and are negative.           Objective    /84   Pulse 81   Wt 125.2 kg (276 lb)   SpO2 97%   BMI 47.01 kg/m    Body mass index is 47.01 kg/m .  Physical Exam  Vitals and nursing note reviewed.   Constitutional:       General: She is not in acute distress.     Appearance: Normal appearance. She is obese. She is not ill-appearing.   Cardiovascular:      Rate and Rhythm: Normal rate and regular rhythm.      Pulses: Normal pulses.      Heart sounds: Normal heart sounds, S1 normal and S2 normal.      Comments: Trace edema bilaterally  Pulmonary:      Effort: Pulmonary effort is normal.      Breath sounds: Normal breath sounds and  air entry. No decreased breath sounds, wheezing, rhonchi or rales.   Musculoskeletal:      Right lower leg: Swelling and tenderness (calf ) present. No deformity, lacerations or bony tenderness. No edema.      Left lower leg: Swelling and tenderness (calf but no palpable masses) present. No deformity, lacerations or bony tenderness. No edema.      Comments: Negative homans sign bilaterally.   Skin:     General: Skin is warm.      Capillary Refill: Capillary refill takes less than 2 seconds.   Neurological:      General: No focal deficit present.      Mental Status: She is alert.      Sensory: Sensation is intact.      Motor: Motor function is intact.      Gait: Gait abnormal.      Deep Tendon Reflexes: Reflexes are normal and symmetric.   Psychiatric:         Mood and Affect: Mood normal.         Behavior: Behavior normal.         Thought Content: Thought content normal.         Judgment: Judgment normal.          Assessment/Plan:  Bilateral calf pain:  L>R.  Along with recent air travel, obesity.  H&P is concerning for DVT vs muscle strain/sprain vs CHF vs nerve pain.  Recommend further evaluation and management in the ADS to r/o DVT.  Will most likely need further workup with labs and/or imaging.  Discussed case with Dr. Sheth who will accept the patient.  Patient has declined transportation via ambulance and will have family drive her/him.  Understands risks and benefits of ambulance transfer and s/he has declined.  Call 911 if worsening symptoms.  S/he plans to go to [facility].  S/he left in stable condition with AVS in hand.  F/u with PCP after ADS visit.   -     Referral to Acute and Diagnostic Services (Day of diagnostic / First order acute); Future    History of recent air travel    BMI > 40 (H)        GAVINO Marcano  ..

## 2022-07-19 NOTE — PROGRESS NOTES
ASSESSMENT AND PLAN:      ICD-10-CM    1. Bilateral calf pain  M79.661 US Lower Extremity Venous Duplex Bilateral    M79.662 Referral to Acute and Diagnostic Services (Day of diagnostic / First order acute)   2. Chronic venous insufficiency  I87.2 US Lower Extremity Venous Duplex Bilateral   3. Obesity, unspecified classification, unspecified obesity type, unspecified whether serious comorbidity present  E66.9 US Lower Extremity Venous Duplex Bilateral   4. Recent travel on aircraft  Z78.9 US Lower Extremity Venous Duplex Bilateral   5. BMI > 40 (H)  E66.01 US Lower Extremity Venous Duplex Bilateral   6. Primary osteoarthritis of both knees  M17.0 US Lower Extremity Venous Duplex Bilateral     diclofenac (VOLTAREN) 1 % topical gel   7. Pain and swelling of left lower extremity  M79.605 US Lower Extremity Venous Duplex Bilateral    M79.89        Differential Diagnosis: DVT, Baker's cyst, osteoarthritis, venous insufficiency    Serious Comorbid Conditions: Diabetes, hypertension, chronic kidney disease, obesity, asthma    BP Readings from Last 6 Encounters:   07/19/22 (!) 151/91   07/19/22 137/84   03/16/22 122/80   11/08/21 128/73   09/01/21 120/74   08/23/21 120/74     Patient has not yet taken her blood pressure pill today which explains her elevated reading    PLAN:  Preliminary ultrasound results are negative for DVT.    Both feet in swollen ankles were wrapped with Ace bandages by nursing prior to discharge home  All questions and concerns addressed and answered during our visit    Patient Instructions     Ultrasound shows no evidence of blood clot in either leg.  (DVT/deep venous thrombosis)    To help reduce swelling watch salt intake, elevate legs.  Consider compression stockings to help current symptoms and with future travel.    You may take Tylenol for your pain  And try Voltaren gel to your knee or ankle joints with your history of arthritis        Cc your chart your primary        >40  minutes spent on  the date of the encounter doing chart review, history and exam, documentation and further activities per the note    Rebeca Nguyen MD  [facility] URGENT CARE    Subjective     Teri Bain is a 66 year old who presents for Patient presents with:  Deep Vein Thrombosis: Rule out  left calf pain    an established patient of [facility].    Gloria Altman Elinor Provider from [facility] urgent care center referred patient to [facility] with concerns of left calf pain , ankle & foot  swelling for past 3 days.  Further review of chart patient complains of bilateral left greater than right calf pain.  She traveled by airplane to [place] within the past 2 weeks.  Patient walked a lot on vacation.  Referred to acute diagnostic center for evaluation of possible DVT with  lower extremity ultrasound    Further review of chart.    Patient has history of osteoarthritis bilateral knee   Patient does have a history of left knee pain/calf pain and had evaluation March 2022 for similar symptoms.  Ultrasound was negative for DVT          Review of Systems   Constitutional: Negative for chills and diaphoresis.   Eyes: Negative for visual disturbance.   Respiratory: Negative for cough and shortness of breath.    Cardiovascular: Negative for chest pain and palpitations.   Gastrointestinal: Negative for nausea.   Musculoskeletal:        Knee pain - not recently.  Pain with stairs   Skin: Negative for color change and rash.   Neurological: Negative for dizziness and syncope.                 Objective    BP (!) 151/91 (BP Location: Right arm, Patient Position: Sitting, Cuff Size: Adult Regular)   Pulse 71   Temp 97.8  F (36.6  C) (Oral)   Resp 14   SpO2 100%   Physical Exam  Vitals reviewed.   Constitutional:       Appearance: Normal appearance. She is not ill-appearing.   HENT:      Mouth/Throat:      Mouth: Mucous membranes are moist.      Pharynx: Oropharynx is clear.    Cardiovascular:      Rate and Rhythm: Normal rate and regular rhythm.      Pulses: Normal pulses.      Heart sounds: Normal heart sounds.   Pulmonary:      Effort: Pulmonary effort is normal.      Breath sounds: Normal breath sounds.   Abdominal:      Palpations: Abdomen is soft.      Tenderness: There is no abdominal tenderness.   Musculoskeletal:      Comments: Calf tenderness bilateral   No swelling or redness noted.  No pitting edema  Swelling of ankles  & feet bilateral    Neurological:      Mental Status: She is alert.              Preliminary ultrasound negative for DVT   No results found for this or any previous visit (from the past 24 hour(s)).            .  ..

## 2022-07-19 NOTE — TELEPHONE ENCOUNTER
Triage call.    Patient calling to report  She has had pain  in left calf swollen a little bit and now going down to her foot.  The swelling is better in the morning.  The pain is like the pain with a leslie horse and now it is starting to be in her other leg.   It is not hot to the touch and it only hurts when she is up moving.  There is no redness.    Per Protocol see in office today or tomorrow.  Care advice given.  Verbalizes understanding and agrees with plan.  Transferred to scheduling.    Rianna Goldsmith RN   [facility] Nurse Advisor  8:15 AM 7/19/2022    COVID 19 Nurse Triage Plan/Patient Instructions    Please be aware that novel coronavirus (COVID-19) may be circulating in the community. If you develop symptoms such as fever, cough, or SOB or if you have concerns about the presence of another infection including coronavirus (COVID-19), please contact your health care provider or visit [email or web address].     Disposition/Instructions    In-Person Visit with provider recommended. Reference Visit Selection Guide.    Thank you for taking steps to prevent the spread of this virus.  o Limit your contact with others.  o Wear a simple mask to cover your cough.  o Wash your hands well and often.    Resources    [facility]: About COVID-19: [email or web address]    CDC: What to Do If You're Sick: www.cdc.gov/coronavirus/2019-ncov/about/steps-when-sick.html    CDC: Ending Home Isolation: www.cdc.gov/coronavirus/2019-ncov/hcp/disposition-in-home-patients.html     CDC: Caring for Someone: www.cdc.gov/coronavirus/2019-ncov/if-you-are-sick/care-for-someone.html     [facility]: Interim Guidance for Hospital Discharge to Home: www.health.Count includes the [facility].mn.us/diseases/coronavirus/hcp/hospdischarge.pdf    [facility] clinical trials (COVID-19 research studies): [email or web address]/umn-clinical-trials     Below are the COVID-19 hotlines at the [place] [facility] ([facility]).  Interpreters are available.   o For health questions: Call 491-812-1281 or 1-185.161.9094 (7 a.m. to 7 p.m.)  o For questions about schools and childcare: Call 081-181-7314 or 1-498.962.7381 (7 a.m. to 7 p.m.)     Reason for Disposition    Patient wants to be seen    Additional Information    Negative: Looks like a broken bone or dislocated joint (e.g., crooked or deformed)    Negative: Sounds like a life-threatening emergency to the triager    Negative: Followed a hip injury    Negative: Followed a knee injury    Negative: Followed an ankle or foot injury    Negative: Back pain radiating (shooting) into leg(s)    Negative: Foot pain is the main symptom    Negative: Ankle pain is the main symptom    Negative: Knee pain is the main symptom    Negative: Leg swelling is the main symptom    Negative: Chest pain    Negative: Difficulty breathing    Negative: Entire foot is cool or blue in comparison to other side    Negative: Unable to walk    Negative: Fever and red area (or area very tender to touch)    Negative: Fever and swollen joint    Negative: Thigh or calf pain in only one leg and present > 1 hour    Negative: Thigh, calf, or ankle swelling in only one leg    Negative: Thigh, calf, or ankle swelling in both legs, but one side is definitely more swollen    Negative: History of prior 'blood clot' in leg or lungs (i.e., deep vein thrombosis, pulmonary embolism)    Negative: History of inherited increased risk of blood clots (e.g., factor 5 Leiden, antithrombin 3, protein C or protein S deficiency, prothrombin mutation)    Negative: Major surgery in the past month    Negative: Hip or leg fracture (broken bone) in past month (or had cast on leg or ankle in past month)    Negative: Illness requiring prolonged bedrest in past month (e.g., immobilization, long hospital stay)    Negative: Long-distance travel in past month (e.g., car, bus, train, plane; with trip lasting 6 or more hours)    Negative: Cancer treatment in  the past two months (or has cancer now)    Negative: Patient sounds very sick or weak to the triager    Negative: SEVERE pain (e.g., excruciating, unable to do any normal activities)    Negative: Cast on leg or ankle and now has increasing pain    Negative: Red area or streak and large (> 2 in. or 5 cm)    Negative: Painful rash with multiple small blisters grouped together (i.e., dermatomal distribution or 'band' or 'stripe')    Negative: Looks like a boil, infected sore, deep ulcer, or other infected rash (spreading redness, pus)    Negative: Localized rash is very painful (no fever)    Negative: Localized pain, redness or hard lump along vein    Negative: Numbness in a leg or foot (i.e., loss of sensation)    Protocols used: LEG PAIN-A-OH

## 2022-07-19 NOTE — PROGRESS NOTES
Bea Williamson is a 66 year old accompanied by her self, presenting for the following health issues:  Deep Vein Thrombosis (Rule out) and left calf pain    Patient states she first noticed pain in her left calf on Saturday, 7/16/22 around 3 pm. Pain is worse with walking, it goes up to 10/10. Patient states her left ankle started swelling up last night, 7/18/22. Patient states she noticed her foot and toes felt tight. She states it hurts worse when she points her toes. She denies pain at rest. Patient denies chest pain and difficulty breathing.    HPI     Pain History:  When did you first notice your pain? - Acute Pain   Have you seen anyone else for your pain? Yes - patient seen in urgent care.  Where in your body do you have pain? left calf pain

## 2022-07-19 NOTE — PATIENT INSTRUCTIONS
Ultrasound shows no evidence of blood clot in either leg.  (DVT/deep venous thrombosis)    To help reduce swelling watch salt intake, elevate legs.  Consider compression stockings to help current symptoms and with future travel.    You may take Tylenol for your pain  And try Voltaren gel to your knee or ankle joints with your history of arthritis        Cc your chart your primary

## 2022-07-26 DIAGNOSIS — J45.30 MILD PERSISTENT ASTHMA WITHOUT COMPLICATION: ICD-10-CM

## 2022-07-31 RX ORDER — ALBUTEROL SULFATE 90 UG/1
1-2 AEROSOL, METERED RESPIRATORY (INHALATION) EVERY 6 HOURS
Qty: 18 G | Refills: 0 | Status: SHIPPED | OUTPATIENT
Start: 2022-07-31 | End: 2022-09-12

## 2022-08-07 NOTE — PROGRESS NOTES
"  Assessment & Plan     Hypertension goal BP (blood pressure) < 140/90  suboptimal control although BP was controlled at recent urgent care visit.  Continue amlodipine and triamterene/HCTZ for now.  Recheck in 2 months at her medicare annual wellness visit (preventive visit). If we need to intensify her therapy we'll avoid ACEi/ARB's due to her history of MILDRED on these.  Consider spironolactone or SGLT2 Inhibitor.  - BASIC METABOLIC PANEL    Elevated fasting glucose  - Hemoglobin A1c    Stage 3b chronic kidney disease (H)  - Hemoglobin  - BASIC METABOLIC PANEL    Bilateral calf pain  Acquired pes planus of both feet  Pronation of both feet  Her foot mechanics may be contributing to her calf pain.  I recommended she try custom orthotics.  Continue calf stretches - see patient ed material.    - Orthotics and Prosthetics DME Orthotic; Foot Orthotics; Bilateral    Migraine with aura and without status migrainosus, not intractable  Her migraines were previously well controlled and she hasn't used imitrex in years but with recent worsening of her migraines (classic with aura and photo- or phono-sensitivity) she'll resume this.    - SUMAtriptan (IMITREX) 25 MG tablet  Dispense: 12 tablet; Refill: 0    BMI > 40 (H)  She would be a good candidate for GLP-1 agonist.  - Comprehensive Weight Management    Need for vaccination  - Pneumococcal 20 Valent Conjugate (PCV20)        I spent a total of 45 minutes on the day of the visit.   Time spent doing chart review, history and exam, documentation and further activities per the note       BMI:   Estimated body mass index is 47.7 kg/m  as calculated from the following:    Height as of this encounter: 1.632 m (5' 4.24\").    Weight as of this encounter: 127 kg (280 lb).   Weight management plan: see above    See Patient Instructions    No follow-ups on file.    Norma eSrrano MD  [facility]        Bea Williamson is a 66 year old, presenting for the " following health issues:  Recheck Medication      History of Present Illness       Reason for visit:  Update on meds lab work and to talk about my headaches,  pain still in the back of my lower legs,weight.    She eats 0-1 servings of fruits and vegetables daily.She consumes 0 sweetened beverage(s) daily.She exercises with enough effort to increase her heart rate 9 or less minutes per day.  She exercises with enough effort to increase her heart rate 3 or less days per week.   She is taking medications regularly.     Had recent eye exam that was coded as diabetic eye exam.  Chart review shows no blood sugar > 125 or A1c > 6.4  Today she clarifies that she was on metformin for diabetes 20 years ago.  She reminds me that she went off metformin in 2004 and hasn't had diabetes since then.  (Highest A1c on my chart review was 6.2 in 8/2004.)    Itching of hands and feet  No rash  Relief with cold water  Wakes from sleep  Takes cetirizine daily for allergy symptoms     Calf pains bilaterally  Was seen in urgent care last month.  At that time it was unilateral (left) calf pain.  She had duplex ultrasound - no DVT.  Since then it has been involving both calves.    She has been stretching her legs.        Migraine     Since your last clinic visit, how have your headaches changed?  Worsened since COVID infection in Jan 2021    How often are you getting headaches or migraines? 3-4 times/week     Are you able to do normal daily activities when you have a migraine? Yes usually - but sometimes she has to lie down    Are you taking rescue/relief medications? (Select all that apply) ibuprofen (Advil, Motrin) and Tylenol    How helpful is your rescue/relief medication?  I get some relief    Are you taking any medications to prevent migraines? (Select all that apply)  No    In the past 4 weeks, how often have you gone to urgent care or the emergency room because of your headaches?  0      Weight concerns:  Can't lose weight  Normal  "TSH last November.  Wt Readings from Last 5 Encounters:   08/09/22 127 kg (280 lb)   07/19/22 125.2 kg (276 lb)   03/16/22 125.6 kg (277 lb)   11/08/21 123.4 kg (272 lb)   09/01/21 123.4 kg (272 lb)         Review of Systems   as above       Objective    BP (!) 143/88 (BP Location: Right arm, Patient Position: Sitting, Cuff Size: Adult Large)   Pulse 76   Temp 97.5  F (36.4  C) (Temporal)   Ht 1.632 m (5' 4.24\")   Wt 127 kg (280 lb)   SpO2 98%   BMI 47.70 kg/m    Body mass index is 47.7 kg/m .  Physical Exam   GENERAL APPEARANCE: healthy, alert and no distress  MS: marked pes planus with pronation, no calf swelling or erythema  SKIN: no suspicious lesions or rashes on hands  NEURO: Normal strength and tone, sensory exam grossly normal, mentation intact and speech normal  PSYCH: mentation appears normal and affect normal/bright             .  ..  "

## 2022-08-09 ENCOUNTER — OFFICE VISIT (OUTPATIENT)
Dept: FAMILY MEDICINE | Facility: CLINIC | Age: 67
End: 2022-08-09
Payer: COMMERCIAL

## 2022-08-09 VITALS
SYSTOLIC BLOOD PRESSURE: 143 MMHG | DIASTOLIC BLOOD PRESSURE: 88 MMHG | WEIGHT: 280 LBS | HEIGHT: 64 IN | OXYGEN SATURATION: 98 % | HEART RATE: 76 BPM | TEMPERATURE: 97.5 F | BODY MASS INDEX: 47.8 KG/M2

## 2022-08-09 DIAGNOSIS — M79.661 BILATERAL CALF PAIN: ICD-10-CM

## 2022-08-09 DIAGNOSIS — M21.6X1 PRONATION OF BOTH FEET: ICD-10-CM

## 2022-08-09 DIAGNOSIS — Z23 NEED FOR VACCINATION: ICD-10-CM

## 2022-08-09 DIAGNOSIS — M21.41 ACQUIRED PES PLANUS OF BOTH FEET: ICD-10-CM

## 2022-08-09 DIAGNOSIS — M21.6X2 PRONATION OF BOTH FEET: ICD-10-CM

## 2022-08-09 DIAGNOSIS — R73.01 ELEVATED FASTING GLUCOSE: ICD-10-CM

## 2022-08-09 DIAGNOSIS — N18.32 STAGE 3B CHRONIC KIDNEY DISEASE (H): ICD-10-CM

## 2022-08-09 DIAGNOSIS — M79.662 BILATERAL CALF PAIN: ICD-10-CM

## 2022-08-09 DIAGNOSIS — I10 HYPERTENSION GOAL BP (BLOOD PRESSURE) < 140/90: Primary | ICD-10-CM

## 2022-08-09 DIAGNOSIS — E66.01 MORBID OBESITY (H): ICD-10-CM

## 2022-08-09 DIAGNOSIS — G43.109 MIGRAINE WITH AURA AND WITHOUT STATUS MIGRAINOSUS, NOT INTRACTABLE: ICD-10-CM

## 2022-08-09 DIAGNOSIS — M21.42 ACQUIRED PES PLANUS OF BOTH FEET: ICD-10-CM

## 2022-08-09 LAB
HBA1C MFR BLD: 6.1 % (ref 0–5.6)
HGB BLD-MCNC: 14 G/DL (ref 11.7–15.7)

## 2022-08-09 PROCEDURE — G0009 ADMIN PNEUMOCOCCAL VACCINE: HCPCS | Performed by: FAMILY MEDICINE

## 2022-08-09 PROCEDURE — 36415 COLL VENOUS BLD VENIPUNCTURE: CPT | Performed by: FAMILY MEDICINE

## 2022-08-09 PROCEDURE — 85018 HEMOGLOBIN: CPT | Performed by: FAMILY MEDICINE

## 2022-08-09 PROCEDURE — 80048 BASIC METABOLIC PNL TOTAL CA: CPT | Performed by: FAMILY MEDICINE

## 2022-08-09 PROCEDURE — 99215 OFFICE O/P EST HI 40 MIN: CPT | Mod: 25 | Performed by: FAMILY MEDICINE

## 2022-08-09 PROCEDURE — 83036 HEMOGLOBIN GLYCOSYLATED A1C: CPT | Performed by: FAMILY MEDICINE

## 2022-08-09 PROCEDURE — 90677 PCV20 VACCINE IM: CPT | Performed by: FAMILY MEDICINE

## 2022-08-09 RX ORDER — SUMATRIPTAN 25 MG/1
25 TABLET, FILM COATED ORAL
Qty: 12 TABLET | Refills: 0 | Status: SHIPPED | OUTPATIENT
Start: 2022-08-09 | End: 2023-12-05

## 2022-08-09 NOTE — PATIENT INSTRUCTIONS
[place] [facility] LOCATIONS:  [facility] ([place])  Phone: [phone]  Fax: [phone]  Toll-free: [phone]    [facility] ([place])  Phone: [phone]  Fax: [phone]    [facility]  Phone: [phone]  Fax: [phone]    [facility] ([place])  Phone: [phone]  Fax: [phone]    If you have MyChart:  1) I kindly request that you check your MyChart prior to all appointments with me and complete any assigned questionnaires ahead of time.    2) You may receive auto-released results from our system before I have the opportunity to review and comment.  Be assured I will review and comment on all of your results as soon as I can.      If you do not have MyChart:  1) I encourage you to sign up for Mychart ([email or web address]).  This will allow you to review your results, securely communicate with your care team, and schedule virtual visits as well.  2) Please be aware that result letters from the clinic can take up to 2 weeks but urgent results will be called to you.      FYI:  1) I do virtual (video) visits exclusively on Wednesdays.  I still do in-person visits at [facility] ([phone]) on Mondays, Tuesdays and Thursdays.  You can schedule a video visit for many conditions.  Please follow this link:  [email or web address]  2) My schedule has been booking out very far in advance (2 months).  I apologize for the lack of timely access.  If you need to be seen for a chronic condition or preventive (wellness) visit, please be sure to schedule that appointment 2-3 months in advance.  If you have a concern that you feel cannot wait until my next available appointment (such as a hospital follow-up or new symptom of concern) please ask to speak to one of the [facility] nurses who may be able to access a sooner  appointment.    I do ask that all patients who are taking chronic medications for conditions that I am managing schedule an in-person visit with me at least once a year.     To schedule any ordered imaging studies (including mammogram and/or DEXA scan) you can call [facility] Scheduling at [phone].

## 2022-08-10 LAB
ANION GAP SERPL CALCULATED.3IONS-SCNC: 4 MMOL/L (ref 3–14)
BUN SERPL-MCNC: 17 MG/DL (ref 7–30)
CALCIUM SERPL-MCNC: 9.2 MG/DL (ref 8.5–10.1)
CHLORIDE BLD-SCNC: 103 MMOL/L (ref 94–109)
CO2 SERPL-SCNC: 31 MMOL/L (ref 20–32)
CREAT SERPL-MCNC: 1.3 MG/DL (ref 0.52–1.04)
GFR SERPL CREATININE-BSD FRML MDRD: 45 ML/MIN/1.73M2
GLUCOSE BLD-MCNC: 90 MG/DL (ref 70–99)
POTASSIUM BLD-SCNC: 4 MMOL/L (ref 3.4–5.3)
SODIUM SERPL-SCNC: 138 MMOL/L (ref 133–144)

## 2022-08-10 NOTE — RESULT ENCOUNTER NOTE
García Williamson,  Your test results fall within the expected range(s) or remain unchanged from previous results.  Your fasting blood sugar was completely normal and your Hemoglobin A1C (a test assessing overall blood sugar control for the last 3 months) is up a bit but not at all in the diabetes range.      Please continue with your current treatment plan.    Norma Serrano MD

## 2022-08-23 ENCOUNTER — TRANSFERRED RECORDS (OUTPATIENT)
Dept: HEALTH INFORMATION MANAGEMENT | Facility: CLINIC | Age: 67
End: 2022-08-23

## 2022-09-06 ENCOUNTER — MYC MEDICAL ADVICE (OUTPATIENT)
Dept: FAMILY MEDICINE | Facility: CLINIC | Age: 67
End: 2022-09-06

## 2022-09-07 NOTE — TELEPHONE ENCOUNTER
Writer responded via [employer].    ALYSSA VelazcoN, RN  [facility]

## 2022-09-07 NOTE — TELEPHONE ENCOUNTER
Writer responded via [employer].    ALYSSA VelazcoN, RN  [facility]

## 2022-09-09 DIAGNOSIS — J45.30 MILD PERSISTENT ASTHMA WITHOUT COMPLICATION: ICD-10-CM

## 2022-09-12 RX ORDER — ALBUTEROL SULFATE 90 UG/1
AEROSOL, METERED RESPIRATORY (INHALATION)
Qty: 17 G | Refills: 0 | Status: SHIPPED | OUTPATIENT
Start: 2022-09-12 | End: 2023-07-16

## 2022-09-12 NOTE — TELEPHONE ENCOUNTER
ACT sent via [employer].    ---Prescription approved per [facility] Refill Protocol.       Sarahy Gregory RN BSN     [facility]        --Last visit:  8/9/2022 ANITA.    --Future Visit:   Next 5 appointments (look out 90 days)    Nov 28, 2022  7:00 AM  (Arrive by 6:45 AM)  Adult Preventative Visit with Norma Serrano MD  [facility] ([facility] ) [address] [place] [id number] [phone]                    ACT Total Scores 10/26/2020 6/9/2021 11/8/2021   ACT TOTAL SCORE - - -   ASTHMA ER VISITS - - -   ASTHMA HOSPITALIZATIONS - - -   ACT TOTAL SCORE (Goal Greater than or Equal to 20) 17 25 24   In the past 12 months, how many times did you visit the emergency room for your asthma without being admitted to the hospital? 0 0 0   In the past 12 months, how many times were you hospitalized overnight because of your asthma? 0 0 0

## 2022-09-25 DIAGNOSIS — E78.5 HYPERLIPIDEMIA LDL GOAL <100: ICD-10-CM

## 2022-09-26 ENCOUNTER — NURSE TRIAGE (OUTPATIENT)
Dept: FAMILY MEDICINE | Facility: CLINIC | Age: 67
End: 2022-09-26

## 2022-09-26 ENCOUNTER — OFFICE VISIT (OUTPATIENT)
Dept: URGENT CARE | Facility: URGENT CARE | Age: 67
End: 2022-09-26
Payer: COMMERCIAL

## 2022-09-26 VITALS
HEART RATE: 73 BPM | DIASTOLIC BLOOD PRESSURE: 80 MMHG | BODY MASS INDEX: 47.92 KG/M2 | SYSTOLIC BLOOD PRESSURE: 148 MMHG | WEIGHT: 281.3 LBS | OXYGEN SATURATION: 96 % | TEMPERATURE: 98.2 F

## 2022-09-26 DIAGNOSIS — R07.0 THROAT PAIN: ICD-10-CM

## 2022-09-26 DIAGNOSIS — I10 HYPERTENSION GOAL BP (BLOOD PRESSURE) < 140/90: ICD-10-CM

## 2022-09-26 DIAGNOSIS — J01.90 ACUTE SINUSITIS WITH SYMPTOMS > 10 DAYS: Primary | ICD-10-CM

## 2022-09-26 LAB
DEPRECATED S PYO AG THROAT QL EIA: NEGATIVE
GROUP A STREP BY PCR: NOT DETECTED

## 2022-09-26 PROCEDURE — 87651 STREP A DNA AMP PROBE: CPT | Performed by: NURSE PRACTITIONER

## 2022-09-26 PROCEDURE — 99214 OFFICE O/P EST MOD 30 MIN: CPT | Performed by: NURSE PRACTITIONER

## 2022-09-26 RX ORDER — DOXYCYCLINE 100 MG/1
100 CAPSULE ORAL 2 TIMES DAILY
Qty: 20 CAPSULE | Refills: 0 | Status: SHIPPED | OUTPATIENT
Start: 2022-09-26 | End: 2022-10-06

## 2022-09-26 RX ORDER — PREDNISONE 20 MG/1
40 TABLET ORAL DAILY
Qty: 10 TABLET | Refills: 0 | Status: SHIPPED | OUTPATIENT
Start: 2022-09-26 | End: 2022-10-01

## 2022-09-26 NOTE — PATIENT INSTRUCTIONS
Use Netti pot twice a day for the next 3-5 days. Use distilled water and the packets of powder included with the pot.    Take Tylenol or Ibuprofen as needed for fever or pain.    Drink Plenty of water and rest.     Saline nasal spray 2-3 times a day.

## 2022-09-26 NOTE — PROGRESS NOTES
Chief Complaint   Patient presents with     Pharyngitis     Sore throat      URI     Stuffy nose. Rule out sinus infection. Pt was seen un  in [place] on 09/21/2022. Pt was given prednisone and flonase.          ICD-10-CM    1. Acute sinusitis with symptoms > 10 days  J01.90 doxycycline hyclate (VIBRAMYCIN) 100 MG capsule     predniSONE (DELTASONE) 20 MG tablet   2. Throat pain  R07.0 Streptococcus A Rapid Screen w/Reflex to PCR - Clinic Collect     Group A Streptococcus PCR Throat Swab     predniSONE (DELTASONE) 20 MG tablet   3. Hypertension goal BP (blood pressure) < 140/90  I10    Take all medications as prescribed.  Avoid decongestants due to hypertension.  Reviewed patient's blood pressure medications and she is taking them as prescribed.  Encouraged her to make follow-up appointment with her primary care provider if symptoms fail to improve by the end of the antibiotics.      Results for orders placed or performed in visit on 09/26/22 (from the past 24 hour(s))   Streptococcus A Rapid Screen w/Reflex to PCR - Clinic Collect    Specimen: Throat; Swab   Result Value Ref Range    Group A Strep antigen Negative Negative       Subjective     Teri Bain is an 67 year old female who presents to clinic today for Sore throat for 11 days ago, she has also had increasing nasal congestion with very thick mucous and headache.      ROS: 10 point ROS neg other than the symptoms noted above in the HPI.       Objective    BP (!) 154/84 (BP Location: Right arm, Patient Position: Sitting, Cuff Size: Adult Large)   Pulse 73   Temp 98.2  F (36.8  C) (Tympanic)   Wt 127.6 kg (281 lb 4.8 oz)   SpO2 96%   BMI 47.92 kg/m    Nurses notes and VS have been reviewed.    Physical Exam       GENERAL APPEARANCE: alert, mild distress and obese     EYES: PERRL, EOMI, sclera non-icteric     HENT erythema of pharynx, thick yellow drainage in nares, slight tenderness over the maxillary sinuses, bilateral eardrums have fluid levels  present     NECK: no adenopathy or asymmetry, thyroid normal to palpation     RESP: lungs clear to auscultation - no rales, rhonchi or wheezes     CV: regular rates and rhythm, no murmurs, rubs, or gallop     MS: extremities normal- no gross deformities noted; normal muscle tone.     SKIN: no suspicious lesions or rashes     PSYCH: normal thought process; no significant mood disturbance    Patient Instructions   Use Netti pot twice a day for the next 3-5 days. Use distilled water and the packets of powder included with the pot.    Take Tylenol or Ibuprofen as needed for fever or pain.    Drink Plenty of water and rest.     Saline nasal spray 2-3 times a day.      SUZY Pickering, CNP  [facility] Provider    The use of Dragon/[employer] dictation services may have been used to construct the content in this note; any grammatical or spelling errors are non-intentional. Please contact the author of this note directly if you are in need of any clarification.

## 2022-09-26 NOTE — TELEPHONE ENCOUNTER
"Sore throat, post nasal drip, thick mucus, cough, roof of mouth hurts. Was is [place] last week and got a sore throat, postnasal drip, thick nasal mucus . No fever, no SOB or chest pains. Went to  there and they gave her prednisone and Flonase nasal spray. She took the prednisone and felt better then she started feeing the symptoms back again so she increased fluids and started with hot beverages. Got back home and still having symptoms. She said she is not taking any OTC cough/cold meds. She is wanting to see a provider to see what this is/. She will come into  to have this assessed today.    Melissa Doherty RN      Reason for Disposition    Patient wants to be seen    Additional Information    Negative: SEVERE difficulty breathing (e.g., struggling for each breath, speaks in single words)    Negative: Sounds like a life-threatening emergency to the triager    Negative: Throat culture results, call about    Negative: Productive cough is main symptom    Negative: Runny nose is main symptom    Negative: Drooling or spitting out saliva (because can't swallow)    Negative: Unable to open mouth completely    Negative: Drinking very little and has signs of dehydration (e.g., no urine > 12 hours, very dry mouth, very lightheaded)    Negative: Patient sounds very sick or weak to the triager    Negative: Difficulty breathing (per caller) but not severe    Negative: Fever > 103 F (39.4 C)    Negative: Refuses to drink anything for > 12 hours    Answer Assessment - Initial Assessment Questions  1. ONSET: \"When did the throat start hurting?\" (Hours or days ago)       around1 week ago  2. SEVERITY: \"How bad is the sore throat?\" (Scale 1-10; mild, moderate or severe)    - MILD (1-3):  doesn't interfere with eating or normal activities    - MODERATE (4-7): interferes with eating some solids and normal activities    - SEVERE (8-10):  excruciating pain, interferes with most normal activities    - SEVERE DYSPHAGIA: can't swallow " "liquids, drooling      Mild to moderate  3. STREP EXPOSURE: \"Has there been any exposure to strep within the past week?\" If Yes, ask: \"What type of contact occurred?\"       no  4.  VIRAL SYMPTOMS: \"Are there any symptoms of a cold, such as a runny nose, cough, hoarse voice or red eyes?\"       Yes, post nasal drip, cough from the thick mucus, voice issues with singing yesterday, roof of mouth hurts. Dry mouth.  5. FEVER: \"Do you have a fever?\" If Yes, ask: \"What is your temperature, how was it measured, and when did it start?\"      No fever  6. PUS ON THE TONSILS: \"Is there pus on the tonsils in the back of your throat?\"      unknown  7. OTHER SYMPTOMS: \"Do you have any other symptoms?\" (e.g., difficulty breathing, headache, rash)      none  8. PREGNANCY: \"Is there any chance you are pregnant?\" \"When was your last menstrual period?\"      no    Protocols used: SORE THROAT-A-OH      "

## 2022-09-27 RX ORDER — PRAVASTATIN SODIUM 80 MG/1
TABLET ORAL
Qty: 90 TABLET | Refills: 0 | Status: SHIPPED | OUTPATIENT
Start: 2022-09-27 | End: 2022-11-28

## 2022-09-27 NOTE — TELEPHONE ENCOUNTER
Prescription approved per [facility] Refill Protocol.  Pt had visit 8/9/22, lipids are within one year.  CIARRA Cabrera RN  [facility]

## 2022-10-23 ENCOUNTER — HEALTH MAINTENANCE LETTER (OUTPATIENT)
Age: 67
End: 2022-10-23

## 2022-11-20 DIAGNOSIS — I10 HYPERTENSION GOAL BP (BLOOD PRESSURE) < 140/90: ICD-10-CM

## 2022-11-21 ASSESSMENT — ENCOUNTER SYMPTOMS
HEMATURIA: 0
MYALGIAS: 1
DIZZINESS: 0
ARTHRALGIAS: 1
ABDOMINAL PAIN: 1
EYE PAIN: 0
FEVER: 0
FREQUENCY: 0
JOINT SWELLING: 0
NERVOUS/ANXIOUS: 0
CONSTIPATION: 0
BREAST MASS: 0
COUGH: 0
WEAKNESS: 0
CHILLS: 0
PARESTHESIAS: 0
SHORTNESS OF BREATH: 0
HEARTBURN: 1
DYSURIA: 0
PALPITATIONS: 0
HEMATOCHEZIA: 0
NAUSEA: 0
HEADACHES: 1
SORE THROAT: 0
DIARRHEA: 0

## 2022-11-21 ASSESSMENT — ASTHMA QUESTIONNAIRES
QUESTION_1 LAST FOUR WEEKS HOW MUCH OF THE TIME DID YOUR ASTHMA KEEP YOU FROM GETTING AS MUCH DONE AT WORK, SCHOOL OR AT HOME: NONE OF THE TIME
ACT_TOTALSCORE: 20
ACT_TOTALSCORE: 20
QUESTION_4 LAST FOUR WEEKS HOW OFTEN HAVE YOU USED YOUR RESCUE INHALER OR NEBULIZER MEDICATION (SUCH AS ALBUTEROL): TWO OR THREE TIMES PER WEEK
QUESTION_3 LAST FOUR WEEKS HOW OFTEN DID YOUR ASTHMA SYMPTOMS (WHEEZING, COUGHING, SHORTNESS OF BREATH, CHEST TIGHTNESS OR PAIN) WAKE YOU UP AT NIGHT OR EARLIER THAN USUAL IN THE MORNING: ONCE OR TWICE
QUESTION_5 LAST FOUR WEEKS HOW WOULD YOU RATE YOUR ASTHMA CONTROL: WELL CONTROLLED
QUESTION_2 LAST FOUR WEEKS HOW OFTEN HAVE YOU HAD SHORTNESS OF BREATH: ONCE OR TWICE A WEEK

## 2022-11-21 ASSESSMENT — ACTIVITIES OF DAILY LIVING (ADL): CURRENT_FUNCTION: NO ASSISTANCE NEEDED

## 2022-11-22 RX ORDER — AMLODIPINE BESYLATE 5 MG/1
TABLET ORAL
Qty: 30 TABLET | Refills: 0 | Status: SHIPPED | OUTPATIENT
Start: 2022-11-22 | End: 2023-01-05

## 2022-11-22 NOTE — TELEPHONE ENCOUNTER
Routing refill request to provider for review/approval because:  --BP not at goal.      --Last visit:  8/9/2022 Maggie for HTN +8.    --Future Visit:   Next 5 appointments (look out 90 days)    Nov 28, 2022  7:00 AM  (Arrive by 6:45 AM)  Adult Preventative Visit with Norma Serrano MD  [facility] ([facility] ) [address] [place] [id number] [phone]          --Last Written Prescription Date:     Disp Refills Start End RAJENDRA   amLODIPine (NORVASC) 5 MG tablet 90 tablet 0 8/31/2022  No   Sig: TAKE 1 TABLET BY MOUTH  DAILY         BP Readings from Last 6 Encounters:   09/26/22 (!) 148/80   08/09/22 (!) 143/88   07/19/22 132/83   07/19/22 137/84   03/16/22 122/80   11/08/21 128/73

## 2022-11-27 ASSESSMENT — ENCOUNTER SYMPTOMS
CHILLS: 0
SHORTNESS OF BREATH: 0
EYE PAIN: 0
BREAST MASS: 0
DIZZINESS: 0
FREQUENCY: 0
PARESTHESIAS: 0
HEMATOCHEZIA: 0
HEMATURIA: 0
HEADACHES: 1
MYALGIAS: 1
SORE THROAT: 0
NAUSEA: 0
NERVOUS/ANXIOUS: 0
CONSTIPATION: 0
HEARTBURN: 1
FEVER: 0
DIARRHEA: 0
ABDOMINAL PAIN: 1
COUGH: 0
ARTHRALGIAS: 1
DYSURIA: 0
JOINT SWELLING: 0
PALPITATIONS: 0
WEAKNESS: 0

## 2022-11-27 ASSESSMENT — ACTIVITIES OF DAILY LIVING (ADL): CURRENT_FUNCTION: NO ASSISTANCE NEEDED

## 2022-11-28 ENCOUNTER — OFFICE VISIT (OUTPATIENT)
Dept: FAMILY MEDICINE | Facility: CLINIC | Age: 67
End: 2022-11-28
Payer: COMMERCIAL

## 2022-11-28 VITALS
RESPIRATION RATE: 16 BRPM | TEMPERATURE: 98.3 F | DIASTOLIC BLOOD PRESSURE: 86 MMHG | OXYGEN SATURATION: 96 % | BODY MASS INDEX: 46.78 KG/M2 | HEART RATE: 89 BPM | HEIGHT: 64 IN | SYSTOLIC BLOOD PRESSURE: 136 MMHG | WEIGHT: 274 LBS

## 2022-11-28 DIAGNOSIS — J45.30 MILD PERSISTENT ASTHMA WITHOUT COMPLICATION: ICD-10-CM

## 2022-11-28 DIAGNOSIS — N18.32 STAGE 3B CHRONIC KIDNEY DISEASE (H): ICD-10-CM

## 2022-11-28 DIAGNOSIS — I10 HYPERTENSION GOAL BP (BLOOD PRESSURE) < 130/80: ICD-10-CM

## 2022-11-28 DIAGNOSIS — R05.1 ACUTE COUGH: ICD-10-CM

## 2022-11-28 DIAGNOSIS — Z00.00 ENCOUNTER FOR MEDICARE ANNUAL WELLNESS EXAM: Primary | ICD-10-CM

## 2022-11-28 DIAGNOSIS — F41.9 ANXIETY: ICD-10-CM

## 2022-11-28 DIAGNOSIS — E78.00 PURE HYPERCHOLESTEROLEMIA: ICD-10-CM

## 2022-11-28 DIAGNOSIS — Z23 NEED FOR VACCINATION: ICD-10-CM

## 2022-11-28 LAB
CHOLEST SERPL-MCNC: 141 MG/DL
CREAT UR-MCNC: 176 MG/DL
FLUAV RNA SPEC QL NAA+PROBE: NEGATIVE
FLUBV RNA RESP QL NAA+PROBE: NEGATIVE
HDLC SERPL-MCNC: 51 MG/DL
LDLC SERPL CALC-MCNC: 70 MG/DL
MICROALBUMIN UR-MCNC: 46.9 MG/L
MICROALBUMIN/CREAT UR: 26.65 MG/G CR (ref 0–25)
NONHDLC SERPL-MCNC: 90 MG/DL
RSV RNA SPEC NAA+PROBE: NEGATIVE
SARS-COV-2 RNA RESP QL NAA+PROBE: NEGATIVE
TRIGL SERPL-MCNC: 98 MG/DL

## 2022-11-28 PROCEDURE — 36415 COLL VENOUS BLD VENIPUNCTURE: CPT | Performed by: FAMILY MEDICINE

## 2022-11-28 PROCEDURE — 87637 SARSCOV2&INF A&B&RSV AMP PRB: CPT | Performed by: FAMILY MEDICINE

## 2022-11-28 PROCEDURE — G0439 PPPS, SUBSEQ VISIT: HCPCS | Performed by: FAMILY MEDICINE

## 2022-11-28 PROCEDURE — 90471 IMMUNIZATION ADMIN: CPT | Performed by: FAMILY MEDICINE

## 2022-11-28 PROCEDURE — 80061 LIPID PANEL: CPT | Performed by: FAMILY MEDICINE

## 2022-11-28 PROCEDURE — 82043 UR ALBUMIN QUANTITATIVE: CPT | Performed by: FAMILY MEDICINE

## 2022-11-28 PROCEDURE — 99214 OFFICE O/P EST MOD 30 MIN: CPT | Mod: CS | Performed by: FAMILY MEDICINE

## 2022-11-28 PROCEDURE — 90715 TDAP VACCINE 7 YRS/> IM: CPT | Performed by: FAMILY MEDICINE

## 2022-11-28 RX ORDER — PREDNISONE 20 MG/1
40 TABLET ORAL DAILY
Qty: 10 TABLET | Refills: 0 | Status: SHIPPED | OUTPATIENT
Start: 2022-11-28

## 2022-11-28 RX ORDER — PRAVASTATIN SODIUM 80 MG/1
80 TABLET ORAL EVERY EVENING
Qty: 90 TABLET | Refills: 3 | Status: SHIPPED | OUTPATIENT
Start: 2022-11-28 | End: 2022-12-20

## 2022-11-28 ASSESSMENT — ENCOUNTER SYMPTOMS
HEMATOCHEZIA: 0
NAUSEA: 0
PARESTHESIAS: 0
FREQUENCY: 0
SHORTNESS OF BREATH: 0
PALPITATIONS: 0
NERVOUS/ANXIOUS: 0
COUGH: 0
ARTHRALGIAS: 1
DIARRHEA: 0
HEARTBURN: 1
CONSTIPATION: 0
HEADACHES: 1
FEVER: 0
ABDOMINAL PAIN: 1
EYE PAIN: 0
DYSURIA: 0
DIZZINESS: 0
HEMATURIA: 0
JOINT SWELLING: 0
MYALGIAS: 1
CHILLS: 0
BREAST MASS: 0
SORE THROAT: 0
WEAKNESS: 0

## 2022-11-28 ASSESSMENT — ACTIVITIES OF DAILY LIVING (ADL): CURRENT_FUNCTION: NO ASSISTANCE NEEDED

## 2022-11-28 ASSESSMENT — PAIN SCALES - GENERAL: PAINLEVEL: NO PAIN (0)

## 2022-11-28 NOTE — NURSING NOTE
Prior to immunization administration, verified patients identity using patient s name and date of birth. Please see Immunization Activity for additional information.     Screening Questionnaire for Adult Immunization    Are you sick today?   yes   Do you have allergies to medications, food, a vaccine component or latex?   Yes   Have you ever had a serious reaction after receiving a vaccination?   No   Do you have a long-term health problem with heart, lung, kidney, or metabolic disease (e.g., diabetes), asthma, a blood disorder, no spleen, complement component deficiency, a cochlear implant, or a spinal fluid leak?  Are you on long-term aspirin therapy?   Yes   Do you have cancer, leukemia, HIV/AIDS, or any other immune system problem?   No   Do you have a parent, brother, or sister with an immune system problem?   No   In the past 3 months, have you taken medications that affect  your immune system, such as prednisone, other steroids, or anticancer drugs; drugs for the treatment of rheumatoid arthritis, Crohn s disease, or psoriasis; or have you had radiation treatments?   No   Have you had a seizure, or a brain or other nervous system problem?   No   During the past year, have you received a transfusion of blood or blood    products, or been given immune (gamma) globulin or antiviral drug?   No   For women: Are you pregnant or is there a chance you could become       pregnant during the next month?   No   Have you received any vaccinations in the past 4 weeks?   No     Immunization questionnaire was positive for at least one answer.  Notified Tita Hollingsworth.        Per orders of Dr. Serrano, injection of TDap (Adacel) given by Gloria Metzger MA. Patient instructed to remain in clinic for 15 minutes afterwards, and to report any adverse reaction to me immediately.       Screening performed by Gloria Metzger MA on 11/28/2022 at 9:52 AM.

## 2022-11-28 NOTE — RESULT ENCOUNTER NOTE
García Williamson,  Good news - you are negative for influenza, COVID, and RSV.  Keep treating your viral symptoms as we discussed and let me know if you get worse.    Your lipid panel (cholesterol) results are excellent!  I sent authorization to your pharmacy to fill the pravastatin at the same dose for another year.    Your urine albumin (protein) level is just slightly elevated.  Urine albumin is a test for microscopic proteins in the urine - a sign of early kidney disease from hypertension and/or diabetes. Keeping blood pressure and blood sugars controlled helps keep the kidneys healthy.  I also recommend staying hydrated and avoiding frequent use of over-the-counter NSAID medications (ibuprofen, naproxen, advil, motrin, aleve).     Monitor your blood pressure periodically and let me know if you are getting readings above 130/80.  If so I think we should increase your amlodipine dose up to 10 mg daily.     Norma Serrano MD

## 2022-11-28 NOTE — PROGRESS NOTES
"SUBJECTIVE:   Teri is a 67 year old who presents for Preventive Visit.    Patient has been advised of split billing requirements and indicates understanding: Yes  Are you in the first 12 months of your Medicare coverage?  No    Healthy Habits:     In general, how would you rate your overall health?  Good    Frequency of exercise:  None    Do you usually eat at least 4 servings of fruit and vegetables a day, include whole grains    & fiber and avoid regularly eating high fat or \"junk\" foods?  No    Taking medications regularly:  Yes    Medication side effects:  Other    Ability to successfully perform activities of daily living:  No assistance needed    Home Safety:  No safety concerns identified    Hearing Impairment:  No hearing concerns    In the past 6 months, have you been bothered by leaking of urine?  No    In general, how would you rate your overall mental or emotional health?  Good      PHQ-2 Total Score: 0    Additional concerns today:  Yes      Have you ever done Advance Care Planning? (For example, a Health Directive, POLST, or a discussion with a medical provider or your loved ones about your wishes): No, advance care planning information given to patient to review.  Patient plans to discuss their wishes with loved ones or provider.         Fall risk  Fallen 2 or more times in the past year?: No  Any fall with injury in the past year?: No    Cognitive Screening   1) Repeat 3 items (Leader, Season, Table)    2) Clock draw: NORMAL  3) 3 item recall: {Say: \"What were the three words I asked you to remember?\"}Recalls 3 objects  Results: NORMAL clock, 1-2 items recalled: COGNITIVE IMPAIRMENT LESS LIKELY    Mini-CogTM Copyright SUGEY Ndiaye. Licensed by the author for use in [facility]; reprinted with permission (remi@.[facility]). All rights reserved.      Do you have sleep apnea, excessive snoring or daytime drowsiness?: no    Reviewed and updated as needed this visit by clinical staff    Allergies  " Meds              Reviewed and updated as needed this visit by Provider                 Social History     Tobacco Use     Smoking status: Former     Packs/day: 0.00     Years: 30.00     Pack years: 0.00     Types: Cigarettes     Quit date: 1996     Years since quittin.9     Smokeless tobacco: Never   Substance Use Topics     Alcohol use: Not Currently     Comment: wine 1-2 times per year         Alcohol Use 2022   Prescreen: >3 drinks/day or >7 drinks/week? No   Prescreen: >3 drinks/day or >7 drinks/week? -               Current providers sharing in care for this patient include:   Patient Care Team:  Norma Serrano MD as PCP - General  Norma Serrano MD as Assigned PCP  Tian Hawthorne DO as Assigned Musculoskeletal Provider  Chinedu Jung OD as Assigned Surgical Provider    The following health maintenance items are reviewed in Epic and correct as of today:  Health Maintenance   Topic Date Due     MICROALBUMIN  2022     COVID-19 Vaccine (3 - Moderna risk series) 2022     ANNUAL REVIEW OF HM ORDERS  2022     INFLUENZA VACCINE (1) 2022     DTAP/TDAP/TD IMMUNIZATION (3 - Td or Tdap) 10/03/2022     LIPID  2022     MEDICARE ANNUAL WELLNESS VISIT  2022     ASTHMA ACTION PLAN  2022     BMP  2023     ASTHMA CONTROL TEST  2023     HEMOGLOBIN  2023     FALL RISK ASSESSMENT  2023     MAMMO SCREENING  10/29/2024     COLORECTAL CANCER SCREENING  2025     ADVANCE CARE PLANNING  2026     DEXA  2030     PARATHYROID  Completed     PHOSPHORUS  Completed     HEPATITIS C SCREENING  Completed     MIGRAINE ACTION PLAN  Completed     PHQ-2 (once per calendar year)  Completed     Pneumococcal Vaccine: 65+ Years  Completed     URINALYSIS  Completed     ALK PHOS  Completed     ZOSTER IMMUNIZATION  Completed     IPV IMMUNIZATION  Aged Out     MENINGITIS IMMUNIZATION  Aged Out     TSH W/FREE T4 REFLEX  Discontinued  "    {Chronicprobdata (optional):995518}  {Decision Support (Optional):253750}    Breast CA Risk Assessment (FHS-7) 11/1/2021   Do you have a family history of breast, colon, or ovarian cancer? No / Unknown       {If any of the questions to the BCRA (FHS-7) are answered yes, consider ordering referral for genetic counseling (Optional) :235488::\"click delete button to remove this line now\"}  {AMB Mammogram Decision Support (Optional) :254730}  Pertinent mammograms are reviewed under the imaging tab.    Review of Systems   Constitutional: Negative for chills and fever.   HENT: Negative for congestion, ear pain, hearing loss and sore throat.    Eyes: Negative for pain and visual disturbance.   Respiratory: Negative for cough and shortness of breath.    Cardiovascular: Positive for chest pain. Negative for palpitations and peripheral edema.   Gastrointestinal: Positive for abdominal pain and heartburn. Negative for constipation, diarrhea, hematochezia and nausea.   Breasts:  Negative for tenderness, breast mass and discharge.   Genitourinary: Negative for dysuria, frequency, genital sores, hematuria, pelvic pain, urgency, vaginal bleeding and vaginal discharge.   Musculoskeletal: Positive for arthralgias and myalgias. Negative for joint swelling.   Skin: Negative for rash.   Neurological: Positive for headaches. Negative for dizziness, weakness and paresthesias.   Psychiatric/Behavioral: Negative for mood changes. The patient is not nervous/anxious.      {ROS COMP (Optional):885582}    OBJECTIVE:   /86 (BP Location: Right arm, Patient Position: Sitting, Cuff Size: Adult Large)   Pulse 89   Temp 98.3  F (36.8  C) (Tympanic)   Resp 16   Ht 1.632 m (5' 4.24\")   Wt 124.3 kg (274 lb)   SpO2 96%   BMI 46.68 kg/m   Estimated body mass index is 46.68 kg/m  as calculated from the following:    Height as of this encounter: 1.632 m (5' 4.24\").    Weight as of this encounter: 124.3 kg (274 lb).  Physical Exam  {Exam " "(Optional) :441187}    {Diagnostic Test Results (Optional):111681::\"Diagnostic Test Results:\",\"Labs reviewed in Epic\"}    ASSESSMENT / PLAN:   {Diag Picklist:997289}    {Patient advised of split billing (Optional):143501}      COUNSELING:  {Medicare Counselin}        She reports that she quit smoking about 25 years ago. Her smoking use included cigarettes. She has never used smokeless tobacco.      Appropriate preventive services were discussed with this patient, including applicable screening as appropriate for cardiovascular disease, diabetes, osteopenia/osteoporosis, and glaucoma.  As appropriate for age/gender, discussed screening for colorectal cancer, prostate cancer, breast cancer, and cervical cancer. Checklist reviewing preventive services available has been given to the patient.    Reviewed patients plan of care and provided an AVS. The {CarePlan:934975} for Teri meets the Care Plan requirement. This Care Plan has been established and reviewed with the {PATIENT, FAMILY MEMBER, CAREGIVER:663327}.    {Counseling Resources  [place] Preventive Services Task Force  Cholesterol Screening  Health diet/nutrition  Pooled Cohorts Equation Calculator  [employer] MyPlate  ASA Prophylaxis  Lung CA Screening  Osteoporosis prevention/bone health :311889}  {Breast Cancer Risk Calculator  BRCA-Related Cancer Risk Assessment FHS-7 Tool :305473}    Norma Serrano MD  [facility]    Identified Health Risks:    She is at risk for lack of exercise and has been provided with information to increase physical activity for the benefit of her well-being.  The patient was counseled and encouraged to consider modifying their diet and eating habits. She was provided with information on recommended healthy diet options.  "

## 2022-11-28 NOTE — LETTER
My Asthma Action Plan    Name: Teri Bain   YOB: 1955  Date: 11/28/2022   My doctor: Norma Serrano MD   My clinic: [facility]        My Control Medicine: Fluticasone furoate (Arnuity Ellipta) -  200 mcg 1 puff daily  My Rescue Medicine: Albuterol (Proair/Ventolin/Proventil HFA) 2-4 puffs EVERY 4 HOURS as needed. Use a spacer if recommended by your provider.  My Oral Steroid Medicine: Prednisone 40 mg daily x 5 days My Asthma Severity:   Mild Persistent  Know your asthma triggers: upper respiratory infections and strong odors and fumes              GREEN ZONE   Good Control    I feel good    No cough or wheeze    Can work, sleep and play without asthma symptoms       Take your asthma control medicine every day.     1. If exercise triggers your asthma, take your rescue medication    15 minutes before exercise or sports, and    During exercise if you have asthma symptoms  2. Spacer to use with inhaler: If you have a spacer, make sure to use it with your inhaler             YELLOW ZONE Getting Worse  I have ANY of these:    I do not feel good    Cough or wheeze    Chest feels tight    Wake up at night   1. Keep taking your Green Zone medications  2. Start taking your rescue medicine:    every 20 minutes for up to 1 hour. Then every 4 hours for 24-48 hours.  3. If you stay in the Yellow Zone for more than 12-24 hours, contact your doctor.  4. If you do not return to the Green Zone in 12-24 hours or you get worse, start taking your oral steroid medicine if prescribed by your provider.           RED ZONE Medical Alert - Get Help  I have ANY of these:    I feel awful    Medicine is not helping    Breathing getting harder    Trouble walking or talking    Nose opens wide to breathe       1. Take your rescue medicine NOW  2. If your provider has prescribed an oral steroid medicine, start taking it NOW  3. Call your doctor NOW  4. If you are still in the Red Zone after 20  minutes and you have not reached your doctor:    Take your rescue medicine again and    Call 911 or go to the emergency room right away    See your regular doctor within 2 weeks of an Emergency Room or Urgent Care visit for follow-up treatment.          Annual Reminders:  Meet with Asthma Educator,  Flu Shot in the Fall, consider Pneumonia Vaccination for patients with asthma (aged 19 and older).    Pharmacy:    [id number] - [place] - [address] AT [facility] OF  [address] [facility]  [facility] ORDER [facility]  [facility] - [place], [place] - [address] [facility]  [facility] - [place], [place] - [address]    Electronically signed by Norma Serrano MD   Date: 11/28/22                      Asthma Triggers  How To Control Things That Make Your Asthma Worse    Triggers are things that make your asthma worse.  Look at the list below to help you find your triggers and what you can do about them.  You can help prevent asthma flare-ups by staying away from your triggers.      Trigger                                                          What you can do   Cigarette Smoke  Tobacco smoke can make asthma worse. Do not allow smoking in your home, car or around you.  Be sure no one smokes at a child s day care or school.  If you smoke, ask your health care provider for ways to help you quit.  Ask family members to quit too.  Ask your health care provider for a referral to Quit Plan to help you quit smoking, or call 9-840-777-PLAN.     Colds, Flu, Bronchitis  These are common triggers of asthma. Wash your hands often.  Don t touch your eyes, nose or mouth.  Get a flu shot every year.     Dust Mites  These are tiny bugs that live in cloth or carpet. They are too small to see. Wash sheets and blankets in hot water every week.   Encase pillows and mattress in dust mite proof covers.  Avoid having carpet if you can. If you have  carpet, vacuum weekly.   Use a dust mask and HEPA vacuum.   Pollen and Outdoor Mold  Some people are allergic to trees, grass, or weed pollen, or molds. Try to keep your windows closed.  Limit time out doors when pollen count is high.   Ask you health care provider about taking medicine during allergy season.     Animal Dander  Some people are allergic to skin flakes, urine or saliva from pets with fur or feathers. Keep pets with fur or feathers out of your home.    If you can t keep the pet outdoors, then keep the pet out of your bedroom.  Keep the bedroom door closed.  Keep pets off cloth furniture and away from stuffed toys.     Mice, Rats, and Cockroaches   Some people are allergic to the waste from these pests.   Cover food and garbage.  Clean up spills and food crumbs.  Store grease in the refrigerator.   Keep food out of the bedroom.   Indoor Mold  This can be a trigger if your home has high moisture. Fix leaking faucets, pipes, or other sources of water.   Clean moldy surfaces.  Dehumidify basement if it is damp and smelly.   Smoke, Strong Odors, and Sprays  These can reduce air quality. Stay away from strong odors and sprays, such as perfume, powder, hair spray, paints, smoke incense, paint, cleaning products, candles and new carpet.   Exercise or Sports  Some people with asthma have this trigger. Be active!  Ask your doctor about taking medicine before sports or exercise to prevent symptoms.    Warm up for 5-10 minutes before and after sports or exercise.     Other Triggers of Asthma  Cold air:  Cover your nose and mouth with a scarf.  Sometimes laughing or crying can be a trigger.  Some medicines and food can trigger asthma.

## 2022-11-28 NOTE — PATIENT INSTRUCTIONS
Your symptoms and exam today indicate that you have a viral upper respiratory illness.  This includes viral rhinosinusitis and viral bronchitis.  Antibiotics do not help viral illnesses; the best remedies treat the symptoms (see below).  The typical course of a viral illness is that you feel rather miserable for the first few days - with sore throat, runny nose/nasal congestion, cough, and sometimes fever and body aches.  You should start to feel better after about 5-7 days and much better by 10-14 days.  If you develop sudden worsening of symptoms or fever after the first 5-7 days, or if you have persistence of your symptoms beyond 14 days, let us know as you may have developed a secondary bacterial infection.      For symptom relief I suggest tryin. Steam.  Take a long, hot shower.  Or if you don't want to get in the shower just run it with the bathroom door shut for a few minutes and breathe the steam.  2. Drink hot liquids frequently such as tea or hot water with honey and lemon.  3. Acetaminophen (Tylenol) and ibuprofen (Motrin or Advil) as needed for headache, sore throat, body aches, or fever.  4. For loosening phlegm and sputum try guaifenesin which is available in many combination products or alone as plain Robitussin or plain Mucinex.  5.  For cough suppression try dextromethorphan (DM) which is available in combination with guaifenesin (Robitussin DM or Mucinex DM) or as a plain syrup (Delsym). Or try Zarbee's Natural (honey-based) cough remedies.   6. For nasal congestion try:    Nealmed sinus rinses.    Nasal steroid spray such as nasacort or flonase, which are over-the-counter.  7. And most importantly: plenty of rest and sleep         Patient Education   Personalized Prevention Plan  You are due for the preventive services outlined below.  Your care team is available to assist you in scheduling these services.  If you have already completed any of these items, please share that information with  your care team to update in your medical record.  Health Maintenance Due   Topic Date Due     Kidney Microalbumin Urine Test  05/08/2022     COVID-19 Vaccine (3 - Moderna risk series) 05/18/2022     ANNUAL REVIEW OF HM ORDERS  06/09/2022     Flu Vaccine (1) 09/01/2022     Diptheria Tetanus Pertussis (DTAP/TDAP/TD) Vaccine (3 - Td or Tdap) 10/03/2022     Cholesterol Lab  11/08/2022     Annual Wellness Visit  11/08/2022     Asthma Action Plan - yearly  11/08/2022       Exercise for a Healthier Heart  You may wonder how you can improve the health of your heart. If you re thinking about exercise, you re on the right track. You don t need to become an athlete. But you do need a certain amount of brisk exercise to help strengthen your heart. If you have been diagnosed with a heart condition, your healthcare provider may advise exercise to help stabilize your condition. To help make exercise a habit, choose safe, fun activities.      Exercise with a friend. When activity is fun, you're more likely to stick with it.   Before you start  Check with your healthcare provider before starting an exercise program. This is especially important if you have not been active for a while. It's also important if you have a long-term (chronic) health problem such as heart disease, diabetes, or obesity. Or if you are at high risk for having these problems.   Why exercise?  Exercising regularly offers many healthy rewards. It can help you do all of the following:     Improve your blood cholesterol level to help prevent further heart trouble    Lower your blood pressure to help prevent a stroke or heart attack    Control diabetes, or reduce your risk of getting this disease    Improve your heart and lung function    Reach and stay at a healthy weight    Make your muscles stronger so you can stay active    Prevent falls and fractures by slowing the loss of bone mass (osteoporosis)    Manage stress better    Reduce your blood  pressure    Improve your sense of self and your body image  Exercise tips      Ease into your routine. Set small goals. Then build on them. If you are not sure what your activity level should be, talk with your healthcare provider first before starting an exercise routine.    Exercise on most days. Aim for a total of 150 minutes (2 hours and 30 minutes) or more of moderate-intensity aerobic activity each week. Or 75 minutes (1 hour and 15 minutes) or more of vigorous-intensity aerobic activity each week. Or try for a combination of both. Moderate activity means that you breathe heavier and your heart rate increases but you can still talk. Think about doing 40 minutes of moderate exercise, 3 to 4 times a week. For best results, activity should last for about 40 minutes to lower blood pressure and cholesterol. It's OK to work up to the 40-minute period over time. Examples of moderate-intensity activity are walking 1 mile in 15 minutes. Or doing 30 to 45 minutes of yard work.    Step up your daily activity level.  Along with your exercise program, try being more active the whole day. Walk instead of drive. Or park further away so that you take more steps each day. Do more household tasks or yard work. You may not be able to meet the advised mount of physical activity. But doing some moderate- or vigorous-intensity aerobic activity can help reduce your risk for heart disease. Your healthcare provider can help you figure out what is best for you.    Choose 1 or more activities you enjoy.  Walking is one of the easiest things you can do. You can also try swimming, riding a bike, dancing, or taking an exercise class.    When to call your healthcare provider  Call your healthcare provider if you have any of these:     Chest pain or feel dizzy or lightheaded    Burning, tightness, pressure, or heaviness in your chest, neck, shoulders, back, or arms    Abnormal shortness of breath    More joint or muscle pain    A very fast  or irregular heartbeat (palpitations)  [employer] last reviewed this educational content on 7/1/2019 2000-2021 [employer]. All rights reserved. This information is not intended as a substitute for professional medical care. Always follow your healthcare professional's instructions.          Understanding USDA MyPlate  The USDA has guidelines to help you make healthy food choices. These are called MyPlate. MyPlate shows the food groups that make up healthy meals using the image of a place setting. Before you eat, think about the healthiest choices for what to put on your plate or in your cup or bowl. To learn more about building a healthy plate, visit www.choosemyplate.gov.    The food groups    Fruits. Any fruit or 100% fruit juice counts as part of the Fruit Group. Fruits may be fresh, canned, frozen, or dried, and may be whole, cut-up, or pureed. Make 1/2 of your plate fruits and vegetables.    Vegetables. Any vegetable or 100% vegetable juice counts as a member of the Vegetable Group. Vegetables may be fresh, frozen, canned, or dried. They can be served raw or cooked and may be whole, cut-up, or mashed. Make 1/2 of your plate fruits and vegetables.    Grains. All foods made from grains are part of the Grains Group. These include wheat, rice, oats, cornmeal, and barley. Grains are often used to make foods such as bread, pasta, oatmeal, cereal, tortillas, and grits. Grains should be no more than 1/4 of your plate. At least half of your grains should be whole grains.    Protein. This group includes meat, poultry, seafood, beans and peas, eggs, processed soy products (such as tofu), nuts (including nut butters), and seeds. Make protein choices no more than 1/4 of your plate. Meat and poultry choices should be lean or low fat.    Dairy. The Dairy Group includes all fluid milk products and foods made from milk that contain calcium, such as yogurt and cheese. (Foods that have little calcium, such as  cream, butter, and cream cheese, are not part of this group.) Most dairy choices should be low-fat or fat-free.    Oils. Oils aren't a food group, but they do contain essential nutrients. However it's important to watch your intake of oils. These are fats that are liquid at room temperature. They include canola, corn, olive, soybean, vegetable, and sunflower oil. Foods that are mainly oil include mayonnaise, certain salad dressings, and soft margarines. You likely already get your daily oil allowance from the foods you eat.  Things to limit  Eating healthy also means limiting these things in your diet:       Salt (sodium). Many processed foods have a lot of sodium. To keep sodium intake down, eat fresh vegetables, meats, poultry, and seafood when possible. Purchase low-sodium, reduced-sodium, or no-salt-added food products at the store. And don't add salt to your meals at home. Instead, season them with herbs and spices such as dill, oregano, cumin, and paprika. Or try adding flavor with lemon or lime zest and juice.    Saturated fat. Saturated fats are most often found in animal products such as beef, pork, and chicken. They are often solid at room temperature, such as butter. To reduce your saturated fat intake, choose leaner cuts of meat and poultry. And try healthier cooking methods such as grilling, broiling, roasting, or baking. For a simple lower-fat swap, use plain nonfat yogurt instead of mayonnaise when making potato salad or macaroni salad.    Added sugars. These are sugars added to foods. They are in foods such as ice cream, candy, soda, fruit drinks, sports drinks, energy drinks, cookies, pastries, jams, and syrups. Cut down on added sugars by sharing sweet treats with a family member or friend. You can also choose fruit for dessert, and drink water or other unsweetened beverages.     [employer] last reviewed this educational content on 6/1/2020 2000-2021 [employer]. All rights reserved.  This information is not intended as a substitute for professional medical care. Always follow your healthcare professional's instructions.           Patient Education

## 2022-11-29 ENCOUNTER — MYC MEDICAL ADVICE (OUTPATIENT)
Dept: FAMILY MEDICINE | Facility: CLINIC | Age: 67
End: 2022-11-29

## 2022-11-30 RX ORDER — HYDROXYZINE HYDROCHLORIDE 10 MG/1
TABLET, FILM COATED ORAL
Qty: 30 TABLET | Refills: 7 | Status: SHIPPED | OUTPATIENT
Start: 2022-11-30 | End: 2023-12-05

## 2022-11-30 NOTE — TELEPHONE ENCOUNTER
---Prescription approved per [facility] Refill Protocol.       Sarahy Gregory RN BSN     [facility]      --Last visit:  11/28/2022       Requested Prescriptions     hydrOXYzine (ATARAX) 10 MG tablet         Sig: TAKE 1 TO 2 TABLETS(10 TO 20 MG) BY MOUTH THREE TIMES DAILY AS NEEDED FOR ANXIETY    Disp:  30 tablet    Refills:  7    Start: 11/28/2022    Class: E-Prescribe    For: Anxiety    Last ordered: 1 year ago by Norma Serrano MD     Antihistamines Protocol Passed 11/30/2022 11:30 AM   Protocol Details  Recent (12 mo) or future (30 days) visit within the authorizing provider's specialty    Patient is age 3 or older    Medication is active on med list      To be filled at: [id number] - [place] - [address] AT [facility] OF  [address]

## 2022-12-02 NOTE — TELEPHONE ENCOUNTER
Dr Serrano - was patient supposed to start the prednisone now or wait?  Please advise.    2) I did let patient know her hydroxyzine has been sent to the pharmacy.  Regla Noguera RN  [facility]

## 2022-12-04 NOTE — TELEPHONE ENCOUNTER
Oh my goodness no, she should not take it now.  She wanted to have it on hand in case she gets an asthma exacerbation.  She should only take prednisone if she is having an asthma exacerbation that she can not manage with her inhalers.      Norma Serrano MD

## 2022-12-05 NOTE — TELEPHONE ENCOUNTER
Writer responded via [employer].    ALYSSA VelazcoN, RN-BC  [facility]

## 2023-01-01 DIAGNOSIS — I10 HYPERTENSION GOAL BP (BLOOD PRESSURE) < 140/90: ICD-10-CM

## 2023-01-01 DIAGNOSIS — I87.2 CHRONIC VENOUS INSUFFICIENCY: ICD-10-CM

## 2023-01-03 DIAGNOSIS — I10 HYPERTENSION GOAL BP (BLOOD PRESSURE) < 140/90: ICD-10-CM

## 2023-01-04 RX ORDER — TRIAMTERENE AND HYDROCHLOROTHIAZIDE 75; 50 MG/1; MG/1
1 TABLET ORAL DAILY
Qty: 90 TABLET | Refills: 3 | Status: SHIPPED | OUTPATIENT
Start: 2023-01-04 | End: 2024-05-29

## 2023-01-04 NOTE — TELEPHONE ENCOUNTER
Routing refill request to provider for review/approval because:  Labs out of range:  Creatinine  Creatinine   Date Value Ref Range Status   08/09/2022 1.30 (H) 0.52 - 1.04 mg/dL Final   06/15/2021 1.43 (H) 0.52 - 1.04 mg/dL Final     Last Written Prescription Date:  1/28/22  Last Fill Quantity: 90,  # refills: 3   Last office visit: 11/28/2022 with prescribing provider:  Maggie   Future Office Visit:        MARCUS Carnes RN  [facility]

## 2023-01-04 NOTE — TELEPHONE ENCOUNTER
Per chart review and recent note from PCP, kidney function and BP stable. No changes made to antihypertensive regimen. Will refill.

## 2023-01-05 RX ORDER — AMLODIPINE BESYLATE 5 MG/1
TABLET ORAL
Qty: 90 TABLET | Refills: 3 | Status: SHIPPED | OUTPATIENT
Start: 2023-01-05 | End: 2023-11-09

## 2023-01-05 NOTE — TELEPHONE ENCOUNTER
Routing refill request to provider for review/approval because:  --Labs out of range:  SCR.    --Last visit:  11/28/2022 [facility] for Medicare visit +6.    --Future Visit: none.        --Last Written Prescription:     Disp Refills Start End RAJENDRA   amLODIPine (NORVASC) 5 MG tablet 30 tablet 0 11/22/2022  No   Sig: TAKE 1 TABLET BY MOUTH  DAILY       Creatinine   Date Value Ref Range Status   08/09/2022 1.30 (H) 0.52 - 1.04 mg/dL Final   11/08/2021 1.34 (H) 0.52 - 1.04 mg/dL Final   06/15/2021 1.43 (H) 0.52 - 1.04 mg/dL Final   10/26/2020 1.43 (H) 0.52 - 1.04 mg/dL Final   10/14/2019 1.42 (H) 0.52 - 1.04 mg/dL Final   05/06/2019 1.19 (H) 0.52 - 1.04 mg/dL Final   10/11/2018 1.61 (H) 0.52 - 1.04 mg/dL Final

## 2023-01-16 DIAGNOSIS — J45.30 MILD PERSISTENT ASTHMA WITHOUT COMPLICATION: ICD-10-CM

## 2023-01-19 NOTE — TELEPHONE ENCOUNTER
Prescription approved per [facility] Refill Protocol.    Gypsy Gutierrez, BSN RN  [facility]

## 2023-01-30 ENCOUNTER — MYC MEDICAL ADVICE (OUTPATIENT)
Dept: FAMILY MEDICINE | Facility: CLINIC | Age: 68
End: 2023-01-30
Payer: COMMERCIAL

## 2023-01-31 NOTE — TELEPHONE ENCOUNTER
See RN response to patient's mychart message re: ALYSSA SorensonN RN  [facility]

## 2023-02-01 ENCOUNTER — OFFICE VISIT (OUTPATIENT)
Dept: FAMILY MEDICINE | Facility: CLINIC | Age: 68
End: 2023-02-01
Payer: COMMERCIAL

## 2023-02-01 VITALS
OXYGEN SATURATION: 100 % | WEIGHT: 280 LBS | RESPIRATION RATE: 16 BRPM | BODY MASS INDEX: 47.8 KG/M2 | SYSTOLIC BLOOD PRESSURE: 150 MMHG | HEIGHT: 64 IN | DIASTOLIC BLOOD PRESSURE: 87 MMHG | TEMPERATURE: 97.5 F | HEART RATE: 70 BPM

## 2023-02-01 DIAGNOSIS — I10 HYPERTENSION GOAL BP (BLOOD PRESSURE) < 140/90: ICD-10-CM

## 2023-02-01 DIAGNOSIS — E66.01 MORBID OBESITY (H): ICD-10-CM

## 2023-02-01 DIAGNOSIS — L20.9 ATOPIC DERMATITIS, UNSPECIFIED TYPE: ICD-10-CM

## 2023-02-01 DIAGNOSIS — R30.0 DYSURIA: ICD-10-CM

## 2023-02-01 DIAGNOSIS — N18.32 STAGE 3B CHRONIC KIDNEY DISEASE (H): ICD-10-CM

## 2023-02-01 DIAGNOSIS — L73.9 FOLLICULITIS: Primary | ICD-10-CM

## 2023-02-01 LAB
ALBUMIN UR-MCNC: NEGATIVE MG/DL
APPEARANCE UR: CLEAR
BILIRUB UR QL STRIP: NEGATIVE
COLOR UR AUTO: YELLOW
GLUCOSE UR STRIP-MCNC: NEGATIVE MG/DL
HGB UR QL STRIP: NEGATIVE
KETONES UR STRIP-MCNC: NEGATIVE MG/DL
LEUKOCYTE ESTERASE UR QL STRIP: ABNORMAL
NITRATE UR QL: NEGATIVE
PH UR STRIP: 7 [PH] (ref 5–7)
RBC #/AREA URNS AUTO: ABNORMAL /HPF
SP GR UR STRIP: 1.02 (ref 1–1.03)
UROBILINOGEN UR STRIP-ACNC: 0.2 E.U./DL
WBC #/AREA URNS AUTO: ABNORMAL /HPF

## 2023-02-01 PROCEDURE — 99214 OFFICE O/P EST MOD 30 MIN: CPT | Performed by: NURSE PRACTITIONER

## 2023-02-01 PROCEDURE — 81001 URINALYSIS AUTO W/SCOPE: CPT | Performed by: NURSE PRACTITIONER

## 2023-02-01 RX ORDER — SULFAMETHOXAZOLE/TRIMETHOPRIM 800-160 MG
1 TABLET ORAL 2 TIMES DAILY
Qty: 14 TABLET | Refills: 0 | Status: SHIPPED | OUTPATIENT
Start: 2023-02-01 | End: 2023-02-08

## 2023-02-01 RX ORDER — MICONAZOLE NITRATE 20 MG/G
CREAM TOPICAL 2 TIMES DAILY
Qty: 14 G | Refills: 0 | Status: SHIPPED | OUTPATIENT
Start: 2023-02-01 | End: 2023-02-08

## 2023-02-01 ASSESSMENT — PAIN SCALES - GENERAL: PAINLEVEL: NO PAIN (0)

## 2023-02-01 NOTE — TELEPHONE ENCOUNTER
Writer responded via [employer].    ALYSSA VelazcoN, RN-BC  [facility]

## 2023-02-01 NOTE — PROGRESS NOTES
Assessment & Plan     Folliculitis  Dysuria  Recommend warm compression tid; continue topical steroid sparingly; miconazole for itch;   - UA Macro with Reflex to Micro and Culture - lab collect  - sulfamethoxazole-trimethoprim (BACTRIM DS) 800-160 MG tablet  Dispense: 14 tablet; Refill: 0  - miconazole (MICATIN) 2 % external cream  Dispense: 14 g; Refill: 0    Atopic dermatitis, unspecified type  Stable; continue current regimen triamcinolone steroid  cream    Stage 3b chronic kidney disease (H)  DM II controlled; HTN slightly above goal  - follow up with PCP if BP >140/90 for medication adjustement    Morbid obesity (H)   BMI:  47.69 kg/m    - Weight management plan: Discussed healthy diet and exercise guidelines      Type 2 diabetes mellitus without complication, without long-term current use of insulin (H)  A1c 6.1; controlled.continue current regimen;      Hypertension goal BP (blood pressure) < 140/90  Continue current regimen; Monitor home blood pressure 2-3 times per week; notify if above goal <130/80; dietary and exercise changes to improve blood pressure      No follow-ups on file.    SUZY Winston [facility]    Bea Williamson is a 67 year old, presenting for the following health issues:  sore on vaginal      History of Present Illness       Reason for visit:  A bump that opened on the side of my bladder  Symptom onset:  3-4 weeks ago  Symptoms include:  Bleeds when i wipe the side  Symptom intensity:  Mild  Symptom progression:  Staying the same  Had these symptoms before:  No  What makes it worse:  Maybe my pantie liners  What makes it better:  Every now and then i put a creM on it    She eats 0-1 servings of fruits and vegetables daily.She consumes 1 sweetened beverage(s) daily.She exercises with enough effort to increase her heart rate 10 to 19 minutes per day.  She exercises with enough effort to increase her heart rate 3 or less days per week.   She is  "taking medications regularly.             Review of Systems   See hpi      Objective    BP (!) 150/87 (BP Location: Right arm, Patient Position: Sitting, Cuff Size: Adult Large)   Pulse 70   Temp 97.5  F (36.4  C) (Temporal)   Resp 16   Ht 1.632 m (5' 4.25\")   Wt 127 kg (280 lb)   SpO2 100%   BMI 47.69 kg/m    Body mass index is 47.69 kg/m .  Physical Exam   GENERAL: healthy, alert and no distress   (female): normal urethral meatus , vaginal mucosal atrophy and vaginal folliculitis     Results for orders placed or performed in visit on 02/01/23   UA Macro with Reflex to Micro and Culture - lab collect     Status: Abnormal    Specimen: Urine, Clean Catch   Result Value Ref Range    Color Urine Yellow Colorless, Straw, Light Yellow, Yellow    Appearance Urine Clear Clear    Glucose Urine Negative Negative mg/dL    Bilirubin Urine Negative Negative    Ketones Urine Negative Negative mg/dL    Specific Gravity Urine 1.020 1.003 - 1.035    Blood Urine Negative Negative    pH Urine 7.0 5.0 - 7.0    Protein Albumin Urine Negative Negative mg/dL    Urobilinogen Urine 0.2 0.2, 1.0 E.U./dL    Nitrite Urine Negative Negative    Leukocyte Esterase Urine Small (A) Negative   Urine Microscopic     Status: Abnormal   Result Value Ref Range    RBC Urine None Seen 0-2 /HPF /HPF    WBC Urine 5-10 (A) 0-5 /HPF /HPF    Narrative    Urine Culture not indicated                   "

## 2023-03-14 ENCOUNTER — MYC MEDICAL ADVICE (OUTPATIENT)
Dept: FAMILY MEDICINE | Facility: CLINIC | Age: 68
End: 2023-03-14
Payer: COMMERCIAL

## 2023-03-14 NOTE — RESULT ENCOUNTER NOTE
Results discussed directly with patient while patient was present. Any further details documented in the note.   SUZY Winston CNP

## 2023-03-16 ENCOUNTER — NURSE TRIAGE (OUTPATIENT)
Dept: NURSING | Facility: CLINIC | Age: 68
End: 2023-03-16
Payer: COMMERCIAL

## 2023-03-16 NOTE — TELEPHONE ENCOUNTER
Patient was shaving in shower about 1 week ago.  Patient cut herself in the panty liner area.  Since then it has not healed.  Patient states it will bleed sometimes if it is rubbed, it itches also.    Patient denies pain, no fever, isnt able to see if it is red or swollen.  Patient has been keeping it clean and applying neosporin.    Patient is concerned it has not healed.  Would like someone to look at it.    Will route to clinic to follow up.  In meantime told her to continue to keep it clean and applying the neosporin.    Tita Garrett RN   03/16/23 6:07 PM  [facility] Nurse Advisor    Reason for Disposition    [1] Minor cut or scratch AND [2] from self-injury (e.g., cutting, self-harm) AND [3] stable (i.e., not suicidal, not out of control)    Additional Information    Negative: [1] Major bleeding (e.g., actively dripping or spurting) AND [2] can't be stopped    Negative: Amputation    Negative: Shock suspected (e.g., cold/pale/clammy skin, too weak to stand, low BP, rapid pulse)    Negative: [1] Knife wound (or other possibly deep cut) AND [2] to chest, abdomen, back, neck, or head    Negative: [1] Self-injury (e.g., cutting, self-harm) AND [2] suicidal or out-of-control    Negative: Sounds like a life-threatening emergency to the triager    Negative: [1] Animal bite AND [2] broken skin    Negative: [1] Human bite AND [2] broken skin    Negative: Puncture wound    Negative: [1] Bleeding AND [2] won't stop after 10 minutes of direct pressure (using correct technique)    Negative: Skin is split open or gaping (or length > 1/2 inch or 12 mm on the skin, 1/4 inch or 6 mm on the face)    Negative: [1] Deep cut AND [2] can see bone or tendons    Negative: Cut over knuckle (MCP joint)    Negative: Sensation of something in the wound (i.e., retained object in wound)    Negative: [1] Dirt in the wound AND [2] not removed with 15 minutes of scrubbing    Negative: Wound causes numbness (i.e., loss of  sensation)    Negative: Wound causes weakness (i.e., decreased ability to move hand, finger, toe)    Negative: [1] SEVERE pain AND [2] not improved 2 hours after pain medicine    Negative: [1] Looks infected AND [2] large red area (> 2 inches or 5 cm) or streak    Negative: [1] Fever AND [2] spreading red area or streak    Negative: Suspicious history for the injury    Negative: [1] Looks infected (spreading redness, pus) AND [2] no fever    Negative: No prior tetanus shots (or is not fully vaccinated)    Negative: [1] HIV positive or severe immunodeficiency (severely weak immune system) AND [2] DIRTY cut    Negative: [1] Last tetanus shot > 5 years ago AND [2] DIRTY cut    Negative: [1] Last tetanus shot > 10 years ago AND [2] CLEAN cut    Negative: [1] After 14 days AND [2] wound isn't healed    Negative: [1] Diabetes mellitus AND [2] minor cut or scratch on foot    Protocols used: CUTS AND [id number]

## 2023-03-17 ENCOUNTER — OFFICE VISIT (OUTPATIENT)
Dept: URGENT CARE | Facility: URGENT CARE | Age: 68
End: 2023-03-17
Payer: COMMERCIAL

## 2023-03-17 VITALS
SYSTOLIC BLOOD PRESSURE: 159 MMHG | TEMPERATURE: 98.1 F | WEIGHT: 284 LBS | DIASTOLIC BLOOD PRESSURE: 91 MMHG | BODY MASS INDEX: 48.37 KG/M2 | HEART RATE: 80 BPM | OXYGEN SATURATION: 96 %

## 2023-03-17 DIAGNOSIS — L73.9 FOLLICULITIS: ICD-10-CM

## 2023-03-17 DIAGNOSIS — L08.9 LOCAL INFECTION OF SKIN AND SUBCUTANEOUS TISSUE: Primary | ICD-10-CM

## 2023-03-17 PROCEDURE — 99213 OFFICE O/P EST LOW 20 MIN: CPT | Performed by: PHYSICIAN ASSISTANT

## 2023-03-17 RX ORDER — MUPIROCIN 20 MG/G
OINTMENT TOPICAL 2 TIMES DAILY
Qty: 15 G | Refills: 0 | Status: SHIPPED | OUTPATIENT
Start: 2023-03-17 | End: 2023-03-24

## 2023-03-17 RX ORDER — SULFAMETHOXAZOLE/TRIMETHOPRIM 800-160 MG
1 TABLET ORAL 2 TIMES DAILY
Qty: 14 TABLET | Refills: 0 | Status: SHIPPED | OUTPATIENT
Start: 2023-03-17 | End: 2023-03-24

## 2023-03-17 NOTE — PROGRESS NOTES
Chief Complaint   Patient presents with     cut     Noticed cut on panty line a week ago Sunday, pt seeing a little bit of blood  in area, some pain when panties rub against it.               ASSESSMENT:    ICD-10-CM    1. Local infection of skin and subcutaneous tissue  L08.9 sulfamethoxazole-trimethoprim (BACTRIM DS) 800-160 MG tablet     mupirocin (BACTROBAN) 2 % external ointment      2. Folliculitis  L73.9 sulfamethoxazole-trimethoprim (BACTRIM DS) 800-160 MG tablet     mupirocin (BACTROBAN) 2 % external ointment          PLAN: Avoid shaving.  Once healed if she does shave use disposable razor once and then throw it away.  Warm compresses to folliculitis area.  Infected laceration from shaving panty crease line.  Topical Bactroban.  Use gauze or Band-Aid to cover/pad the area.  Otherwise try wearing boys boxers to let it heal.    See today's orders.  Follow-up with primary clinic if not improving.  Advised about symptoms which might herald more serious problems.        Josefina Hutchison PA-C         SUBJECTIVE:   67 year old female who presents for evaluation of #1 left panty line crease infected cut from shaving.  #2 recurrent folliculitis left pubic area                Allergies   Allergen Reactions     Benazepril Rash     Claritin [Loratadine]      Codeine Nausea     Lisinopril Rash     Losartan      MILDRED - avoid ACEi/ARBs     Tetracycline Nausea     Ceftin [Cefuroxime] Itching and Rash     Penicillins Rash       Past Medical History:   Diagnosis Date     Allergic rhinitis      Chronic kidney disease (CKD), stage III (moderate) (H)      Chronic venous insufficiency      Classic migraines     fiorinal and darvocet prn     Diabetes mellitus, type 2 (H) 1998    age 42     Dysplasia of cervix     treated with hysterectomy     GERD (gastroesophageal reflux disease)      History of tobacco use     quit 1/97     Hyperlipidemia LDL goal <100      Hypertension goal BP (blood pressure) < 140/90      Mild persistent  asthma      Osteoarthritis     LT ankle & L>R knee, hands     Trochanteric bursitis      Vitamin D deficiency 2/15/2010       albuterol (2.5 MG/3ML) 0.083% neb solution, Take 1 vial (2.5 mg) by nebulization every 6 hours as needed for shortness of breath / dyspnea or wheezing  albuterol (PROAIR HFA/PROVENTIL HFA/VENTOLIN HFA) 108 (90 Base) MCG/ACT inhaler, INHALE 1 TO 2 INHALATIONS  BY MOUTH INTO THE LUNGS  EVERY 6 HOURS  amLODIPine (NORVASC) 5 MG tablet, TAKE 1 TABLET BY MOUTH DAILY  BLACK ELDERBERRY,BERRY-FLOWER, PO, Elder Berry  cetirizine (ZYRTEC) 10 MG tablet, Take 1 tablet (10 mg) by mouth every evening  Cyanocobalamin (VITAMIN B 12 PO), Take 1 tablet by mouth daily  diclofenac (VOLTAREN) 1 % topical gel, Apply 2 g topically 4 times daily  famotidine (PEPCID) 20 MG tablet, Take 1 tablet (20 mg) by mouth daily as needed (heartburn)  fluticasone (ARNUITY ELLIPTA) 200 MCG/ACT inhaler, Inhale 1 puff into the lungs daily  hydrOXYzine (ATARAX) 10 MG tablet, TAKE 1 TO 2 TABLETS(10 TO 20 MG) BY MOUTH THREE TIMES DAILY AS NEEDED FOR ANXIETY  Ibuprofen (ADVIL PO), ADVIL PRN  MULTI-VITAMIN OR TABS,   Multiple Vitamins-Minerals (ZINC PO), ZINC  pravastatin (PRAVACHOL) 80 MG tablet, TAKE 1 TABLET BY MOUTH IN  THE EVENING  predniSONE (DELTASONE) 20 MG tablet, Take 2 tablets (40 mg) by mouth daily  SUMAtriptan (IMITREX) 25 MG tablet, Take 1 tablet (25 mg) by mouth at onset of headache for migraine May repeat in 2 hours. Max 8 tablets/24 hours.  triamcinolone (KENALOG) 0.1 % external cream, APPLY SPARINGLY TOPICALLY TO THE AFFECTED AREA THREE TIMES DAILY FOR UP TO 14 DAYS  triamterene-HCTZ (MAXZIDE) 75-50 MG tablet, TAKE 1 TABLET BY MOUTH  DAILY  VITAMIN C 500 MG OR TABS, 1-2 tablets daily  VITAMIN D PO, Take by mouth daily as needed    No current facility-administered medications on file prior to visit.      Social History     Tobacco Use     Smoking status: Former     Packs/day: 0.00     Years: 30.00     Pack years: 0.00      Types: Cigarettes     Quit date: 1996     Years since quittin.2     Smokeless tobacco: Never   Vaping Use     Vaping Use: Never used   Substance Use Topics     Alcohol use: Not Currently     Comment: wine 1-2 times per year     Drug use: No       ROS:  General: negative for fever  Skin: As above     Physcial Exam:  BP (!) 159/91 (BP Location: Left arm, Patient Position: Sitting, Cuff Size: Adult Large)   Pulse 80   Temp 98.1  F (36.7  C) (Tympanic)   Wt 128.8 kg (284 lb)   SpO2 96%   BMI 48.37 kg/m      GENERAL: alert, no acute distress  EYES: conjunctival clear  RESP: Regular breathing rate  NEURO: awake .  Vagina: Left panty line crease/groin is with an open sore 1 a centimeter by half a centimeter.  Some mild surrounding erythema.  Left lower pubic area is with a 3 mm pustule consistent with folliculitis.   DARIUS BonnerC

## 2023-03-17 NOTE — TELEPHONE ENCOUNTER
OK to schedule a clinic visit at first available, non urgent  Otherwise if nothing is available (as I suspect) OK to go to urgent care  Zabrina Sneed PA-C

## 2023-03-17 NOTE — TELEPHONE ENCOUNTER
Tried calling pt, left VM that can be seen in clinic or UC    Also responded in 3/14/23 Darell Lopez, ALYSSAN RN  [facility]

## 2023-03-30 ENCOUNTER — OFFICE VISIT (OUTPATIENT)
Dept: FAMILY MEDICINE | Facility: CLINIC | Age: 68
End: 2023-03-30
Payer: COMMERCIAL

## 2023-03-30 VITALS
OXYGEN SATURATION: 93 % | HEIGHT: 65 IN | HEART RATE: 82 BPM | DIASTOLIC BLOOD PRESSURE: 84 MMHG | SYSTOLIC BLOOD PRESSURE: 134 MMHG | WEIGHT: 279 LBS | TEMPERATURE: 98.4 F | BODY MASS INDEX: 46.48 KG/M2 | RESPIRATION RATE: 18 BRPM

## 2023-03-30 DIAGNOSIS — N18.32 STAGE 3B CHRONIC KIDNEY DISEASE (H): ICD-10-CM

## 2023-03-30 DIAGNOSIS — I10 HYPERTENSION GOAL BP (BLOOD PRESSURE) < 130/80: ICD-10-CM

## 2023-03-30 DIAGNOSIS — E66.01 MORBID OBESITY (H): ICD-10-CM

## 2023-03-30 DIAGNOSIS — J30.2 SEASONAL ALLERGIC RHINITIS, UNSPECIFIED TRIGGER: ICD-10-CM

## 2023-03-30 DIAGNOSIS — J06.9 UPPER RESPIRATORY TRACT INFECTION, UNSPECIFIED TYPE: ICD-10-CM

## 2023-03-30 DIAGNOSIS — J45.30 MILD PERSISTENT ASTHMA WITHOUT COMPLICATION: ICD-10-CM

## 2023-03-30 DIAGNOSIS — J01.10 ACUTE NON-RECURRENT FRONTAL SINUSITIS: Primary | ICD-10-CM

## 2023-03-30 LAB
DEPRECATED S PYO AG THROAT QL EIA: NEGATIVE
FLUAV AG SPEC QL IA: NEGATIVE
FLUBV AG SPEC QL IA: NEGATIVE
GROUP A STREP BY PCR: NOT DETECTED

## 2023-03-30 PROCEDURE — 99214 OFFICE O/P EST MOD 30 MIN: CPT | Performed by: NURSE PRACTITIONER

## 2023-03-30 PROCEDURE — 87804 INFLUENZA ASSAY W/OPTIC: CPT | Performed by: NURSE PRACTITIONER

## 2023-03-30 PROCEDURE — 87651 STREP A DNA AMP PROBE: CPT | Performed by: NURSE PRACTITIONER

## 2023-03-30 RX ORDER — HYDROXYZINE HYDROCHLORIDE 25 MG/1
25 TABLET, FILM COATED ORAL 3 TIMES DAILY PRN
Qty: 30 TABLET | Refills: 0 | Status: SHIPPED | OUTPATIENT
Start: 2023-03-30 | End: 2023-11-21

## 2023-03-30 NOTE — PROGRESS NOTES
Assessment & Plan     Acute non-recurrent frontal sinusitis  Will treat with antibiotics for presumed sinus infection and history of asthma exacerbations.  She has a reported allergy to penicillins but this is from many years ago and symptoms were hives.  She has several other antibiotic allergies which limits her options.  She was agreeable to a course of Augmentin and I will prescribe hydroxyzine as needed if pruritus develops.  Okay to continue Mucinex and Tylenol.  Discussed it would be okay to do Sudafed for couple days as well despite her hypertension she is well controlled today.  - amoxicillin-clavulanate (AUGMENTIN) 875-125 MG tablet; Take 1 tablet by mouth 2 times daily  - hydrOXYzine (ATARAX) 25 MG tablet; Take 1 tablet (25 mg) by mouth 3 times daily as needed for itching    Upper respiratory tract infection, unspecified type  Home COVID test negative, flu and strep also negative.  - Influenza A & B Antigen - Clinic Collect  - Streptococcus A Rapid Screen w/Reflex to PCR - Clinic Collect  - Group A Streptococcus PCR Throat Swab    Morbid obesity (H)  Complicates matters.    Stage 3b chronic kidney disease (H)  Avoid NSAIDs.          Seasonal allergic rhinitis, unspecified trigger  Taking Claritin and Flonase daily.  Has not helped her symptoms.    Mild persistent asthma without complication  Stable overall no dyspnea.  Continue inhalers and I think we can hold off on his own as I am not appreciate any wheezing on her exam.    Hypertension goal BP (blood pressure) < 130/80  Avoid NSAIDs.  Discussed would be okay to use Sudafed for couple days as her blood pressure is well controlled.      Ordering of each unique test  Prescription drug management  40 minutes spent by me on the date of the encounter doing chart review, history and exam, documentation and further activities per the note       BMI:   Estimated body mass index is 47.05 kg/m  as calculated from the following:    Height as of this encounter:  "1.64 m (5' 4.57\").    Weight as of this encounter: 126.6 kg (279 lb).   Weight management plan: Discussed healthy diet and exercise guidelines        SUZY Monroe CNP  [facility]    Bea Williamson is a 67 year old, presenting for the following health issues:  Cough, Headache, and Chest Pain  No flowsheet data found.  History of Present Illness       Headaches:   Since the patient's last clinic visit, headaches are: worsened  The patient is getting headaches:  It won't go away right now  She is not able to do normal daily activities when she has a migraine.  The patient is taking the following rescue/relief medications:  Sumatriptan (Imitrex)   Patient states \"I get some relief\" from the rescue/relief medications.   The patient is taking the following medications to prevent migraines:  Other  In the past 4 weeks, the patient has gone to an Urgent Care or Emergency Room 0 times times due to headaches.    Reason for visit:  I have a cold and its tge same way that seems to come wvery 4 to 5 months. Its alwa u s the same. My nose is plugged up my head too. Coughing to the point my chest hurts. My head feels like it us going to blow up. Cant sleep.  Symptom onset:  3-7 days ago  Symptoms include:  Chest hurts when i cough and my nose just started yesterday where i can blow them.  Symptom intensity:  Severe  Symptom progression:  Worsening  Had these symptoms before:  Yes  Has tried/received treatment for these symptoms:  Yes  Previous treatment was successful:  Yes  Prior treatment description:  You need to look at my chart  What makes it worse:  Laying down because then it seems like i cant breath  What makes it better:  At this point no    She eats 0-1 servings of fruits and vegetables daily.She consumes 0 sweetened beverage(s) daily.She exercises with enough effort to increase her heart rate 9 or less minutes per day.  She exercises with enough effort to increase her heart rate 3 " "or less days per week.   She is taking medications regularly.       Started Saturday/sunday night.  Using some mucinex.  Using tylenol, vitamin C.  No fevers.    Diarrhea for one day   Sore throat.    Feels very congested in her head, frontal headache.    COVID Test this AM was negitive.        Review of Systems   Constitutional, HEENT, cardiovascular, pulmonary, gi and gu systems are negative, except as otherwise noted.      Objective    /84 (BP Location: Right arm, Patient Position: Sitting, Cuff Size: Adult Large)   Pulse 82   Temp 98.4  F (36.9  C) (Temporal)   Resp 18   Ht 1.64 m (5' 4.57\")   Wt 126.6 kg (279 lb)   SpO2 93%   BMI 47.05 kg/m    Body mass index is 47.05 kg/m .  Physical Exam   GENERAL: healthy, alert and no distress  EYES: Eyes grossly normal to inspection, PERRL and conjunctivae and sclerae normal  HENT: ear canals and TM's normal, nose and mouth without ulcers or lesions  NECK: no adenopathy, no asymmetry, masses, or scars and thyroid normal to palpation  RESP: lungs clear to auscultation - no rales, rhonchi or wheezes  CV: regular rate and rhythm, normal S1 S2, no S3 or S4, no murmur, click or rub, no peripheral edema and peripheral pulses strong                    "

## 2023-04-02 ENCOUNTER — HEALTH MAINTENANCE LETTER (OUTPATIENT)
Age: 68
End: 2023-04-02

## 2023-04-06 ENCOUNTER — NURSE TRIAGE (OUTPATIENT)
Dept: NURSING | Facility: CLINIC | Age: 68
End: 2023-04-06
Payer: COMMERCIAL

## 2023-04-07 NOTE — TELEPHONE ENCOUNTER
"Patient calling reports feeling chest congestion and possible difficulty breathing. Patient reports feeling like something is in the throat but denies sore throat, severe difficulty breathing and stridor. Patient reports wheezing but denies wheezing that can be heard from across the room. Advised per protocol to see a provider within 2 weeks with patient agreeable to the plan.     Bryanna Wade RN 04/07/23 12:14 AM   [facility] Triage Nurse Advisor    Reason for Disposition    [1] MILD longstanding difficulty breathing AND [2]  SAME as normal    Additional Information    Negative: SEVERE difficulty breathing (e.g., struggling for each breath, speaks in single words)    Negative: [1] Breathing stopped AND [2] hasn't returned    Negative: Wheezing started suddenly after medicine, an allergic food or bee sting    Negative: Passed out (i.e., lost consciousness, collapsed and was not responding)    Negative: Difficult to awaken or acting confused (e.g., disoriented, slurred speech)    Negative: Bluish (or gray) lips or face now    Negative: Choking on something    Negative: Stridor    Negative: Slow, shallow and weak breathing    Negative: Sounds like a life-threatening emergency to the triager    Negative: Chest pain    Negative: [1] Wheezing (high pitched whistling sound) AND [2] previous asthma attacks or use of asthma medicines    Negative: [1] Difficulty breathing AND [2] only present when coughing    Negative: [1] Difficulty breathing AND [2] only from stuffy or runny nose    Negative: [1] Difficulty breathing AND [2] within 14 days of COVID-19 Exposure    Negative: History of prior \"blood clot\" in leg or lungs (i.e., deep vein thrombosis, pulmonary embolism)    Negative: Drooling or spitting out saliva (because can't swallow)    Negative: Wheezing can be heard across the room    Negative: [1] MODERATE difficulty breathing (e.g., speaks in phrases, SOB even at rest, pulse 100-120) AND [2] NEW-onset or WORSE than " "normal    Negative: Oxygen level (e.g., pulse oximetry) 90 percent or lower    Negative: History of inherited increased risk of blood clots (e.g., Factor 5 Leiden, Anti-thrombin 3, Protein C or Protein S deficiency, Prothrombin mutation)    Negative: Major surgery in the past month    Negative: Hip or leg fracture (broken bone) in past month (or had cast on leg or ankle in past month)    Negative: Illness requiring prolonged bedrest in past month (e.g., immobilization, long hospital stay)    Negative: Long-distance travel in past month (e.g., car, bus, train, plane; with trip lasting 6 or more hours)    Negative: Cancer treatment in past six months (or has cancer now)    Negative: Extra heart beats OR irregular heart beating  (i.e., \"palpitations\")    Negative: Fever > 103 F (39.4 C)    Negative: [1] Fever > 101 F (38.3 C) AND [2] age > 60 years    Negative: [1] Fever > 100.0 F (37.8 C) AND [2] bedridden (e.g., nursing home patient, CVA, chronic illness, recovering from surgery)    Negative: [1] Fever > 100.0 F (37.8 C) AND [2] diabetes mellitus or weak immune system (e.g., HIV positive, cancer chemo, splenectomy, organ transplant, chronic steroids)    Negative: [1] Periods where breathing stops and then resumes normally AND [2] bedridden (e.g., nursing home patient, CVA)    Negative: Pregnant or postpartum (from 0 to 6 weeks after delivery)    Negative: Patient sounds very sick or weak to the triager    Negative: [1] MILD difficulty breathing (e.g., minimal/no SOB at rest, SOB with walking, pulse <100) AND [2] NEW-onset or WORSE than normal    Negative: [1] Longstanding difficulty breathing (e.g., CHF, COPD, emphysema) AND [2] WORSE than normal    Negative: [1] Longstanding difficulty breathing AND [2] not responding to usual therapy    Negative: Continuous (nonstop) coughing interferes with work, school, or sleeping    Negative: Oxygen level (e.g., pulse oximetry) 91 to 94 percent    Negative: [1] MODERATE " longstanding difficulty breathing (e.g., speaks in phrases, SOB even at rest, pulse 100-120) AND [2] SAME as normal    Protocols used: BREATHING DIFFICULTY-A-AH

## 2023-06-12 ENCOUNTER — MYC MEDICAL ADVICE (OUTPATIENT)
Dept: FAMILY MEDICINE | Facility: CLINIC | Age: 68
End: 2023-06-12
Payer: COMMERCIAL

## 2023-06-12 DIAGNOSIS — K21.9 GASTROESOPHAGEAL REFLUX DISEASE, UNSPECIFIED WHETHER ESOPHAGITIS PRESENT: ICD-10-CM

## 2023-06-12 DIAGNOSIS — J45.30 MILD PERSISTENT ASTHMA WITHOUT COMPLICATION: ICD-10-CM

## 2023-06-12 ASSESSMENT — ASTHMA QUESTIONNAIRES
QUESTION_2 LAST FOUR WEEKS HOW OFTEN HAVE YOU HAD SHORTNESS OF BREATH: NOT AT ALL
ACT_TOTALSCORE: 21
QUESTION_4 LAST FOUR WEEKS HOW OFTEN HAVE YOU USED YOUR RESCUE INHALER OR NEBULIZER MEDICATION (SUCH AS ALBUTEROL): ONE OR TWO TIMES PER DAY
QUESTION_3 LAST FOUR WEEKS HOW OFTEN DID YOUR ASTHMA SYMPTOMS (WHEEZING, COUGHING, SHORTNESS OF BREATH, CHEST TIGHTNESS OR PAIN) WAKE YOU UP AT NIGHT OR EARLIER THAN USUAL IN THE MORNING: NOT AT ALL
QUESTION_5 LAST FOUR WEEKS HOW WOULD YOU RATE YOUR ASTHMA CONTROL: WELL CONTROLLED
QUESTION_1 LAST FOUR WEEKS HOW MUCH OF THE TIME DID YOUR ASTHMA KEEP YOU FROM GETTING AS MUCH DONE AT WORK, SCHOOL OR AT HOME: NONE OF THE TIME
ACT_TOTALSCORE: 21

## 2023-06-12 NOTE — TELEPHONE ENCOUNTER
Annuity Ellipta was refilled for a one year supply on 1/19/23.    Order resent to pharmacy.    ACT sent via [employer].    ALYSSA VelazcoN, RN-[facility]

## 2023-06-15 ENCOUNTER — MYC MEDICAL ADVICE (OUTPATIENT)
Dept: FAMILY MEDICINE | Facility: CLINIC | Age: 68
End: 2023-06-15
Payer: COMMERCIAL

## 2023-06-15 RX ORDER — FAMOTIDINE 20 MG/1
20 TABLET, FILM COATED ORAL DAILY PRN
Qty: 30 TABLET | Refills: 5 | Status: SHIPPED | OUTPATIENT
Start: 2023-06-15 | End: 2023-06-16

## 2023-06-16 RX ORDER — FAMOTIDINE 20 MG/1
20 TABLET, FILM COATED ORAL DAILY PRN
Qty: 90 TABLET | Refills: 1 | Status: SHIPPED | OUTPATIENT
Start: 2023-06-16 | End: 2023-08-08

## 2023-06-16 NOTE — TELEPHONE ENCOUNTER
Dr. Serrano:  Please review message thread.  Sending as an FYI, no action needed    Em PEGUERO RN  [facility]

## 2023-06-16 NOTE — TELEPHONE ENCOUNTER
Writer waited 20 minutes in queue to speak with pharmacy.  They state pt is eligible for 90# fills which is why they held the prescription - we had sent 30# with 5 refills.  Resent as 90# + 1 and notified patient via [employer].    Em PEGUERO RN  [facility]

## 2023-07-12 NOTE — TELEPHONE ENCOUNTER
Writer responded via [employer].    ALYSSA VelazcoN, RN-BC  [facility]

## 2023-07-16 DIAGNOSIS — J45.30 MILD PERSISTENT ASTHMA WITHOUT COMPLICATION: ICD-10-CM

## 2023-07-16 RX ORDER — ALBUTEROL SULFATE 90 UG/1
AEROSOL, METERED RESPIRATORY (INHALATION)
Qty: 17 G | Refills: 1 | Status: SHIPPED | OUTPATIENT
Start: 2023-07-16 | End: 2023-10-17

## 2023-07-17 NOTE — TELEPHONE ENCOUNTER
"Last Written Prescription Date:  9/12/22  Last Fill Quantity: 17,  # refills: 0   Last office visit provider:  3/30/23     Requested Prescriptions   Pending Prescriptions Disp Refills     albuterol (PROAIR HFA/PROVENTIL HFA/VENTOLIN HFA) 108 (90 Base) MCG/ACT inhaler [Pharmacy Med Name: ALBUTEROL HFA 90MCG/ACT (PA)] 17 g 6     Sig: INHALE 1 TO 2 INHALATIONS  BY MOUTH INTO THE LUNGS  EVERY 6 HOURS       Asthma Maintenance Inhalers - Anticholinergics Passed - 7/16/2023  8:29 AM        Passed - Patient is age 12 years or older        Passed - Asthma control assessment score within normal limits in last 6 months     Please review ACT score.           Passed - Medication is active on med list        Passed - Recent (6 mo) or future (30 days) visit within the authorizing provider's specialty     Patient had office visit in the last 6 months or has a visit in the next 30 days with authorizing provider or within the authorizing provider's specialty.  See \"Patient Info\" tab in inbasket, or \"Choose Columns\" in Meds & Orders section of the refill encounter.           Short-Acting Beta Agonist Inhalers Protocol  Passed - 7/16/2023  8:29 AM        Passed - Patient is age 12 or older        Passed - Asthma control assessment score within normal limits in last 6 months     Please review ACT score.           Passed - Medication is active on med list        Passed - Recent (6 mo) or future (30 days) visit within the authorizing provider's specialty     Patient had office visit in the last 6 months or has a visit in the next 30 days with authorizing provider or within the authorizing provider's specialty.  See \"Patient Info\" tab in inbasket, or \"Choose Columns\" in Meds & Orders section of the refill encounter.                 Gypsy Montgomery RN 07/16/23 7:15 PM  "

## 2023-08-07 ENCOUNTER — MYC MEDICAL ADVICE (OUTPATIENT)
Dept: FAMILY MEDICINE | Facility: CLINIC | Age: 68
End: 2023-08-07
Payer: COMMERCIAL

## 2023-08-07 DIAGNOSIS — K21.9 GASTROESOPHAGEAL REFLUX DISEASE, UNSPECIFIED WHETHER ESOPHAGITIS PRESENT: ICD-10-CM

## 2023-08-07 DIAGNOSIS — M25.561 ACUTE PAIN OF RIGHT KNEE: Primary | ICD-10-CM

## 2023-08-07 NOTE — TELEPHONE ENCOUNTER
I signed ortho referral.    Her heartburn medication is famotidine (Pepcid) which we sent to [facility] in [place] on 6/16/2023:

## 2023-08-07 NOTE — TELEPHONE ENCOUNTER
Pended sports med referral    Do not see a heartburn medication on her list to refill or in last office visit note, will wait and see if she knows which medication she is referring to    MARCUS Ramires RN  [facility]

## 2023-08-08 RX ORDER — FAMOTIDINE 20 MG/1
20 TABLET, FILM COATED ORAL DAILY PRN
Qty: 90 TABLET | Refills: 1 | Status: SHIPPED | OUTPATIENT
Start: 2023-08-08

## 2023-08-17 ENCOUNTER — OFFICE VISIT (OUTPATIENT)
Dept: ORTHOPEDICS | Facility: CLINIC | Age: 68
End: 2023-08-17
Attending: FAMILY MEDICINE
Payer: COMMERCIAL

## 2023-08-17 ENCOUNTER — ANCILLARY PROCEDURE (OUTPATIENT)
Dept: GENERAL RADIOLOGY | Facility: CLINIC | Age: 68
End: 2023-08-17
Attending: ORTHOPAEDIC SURGERY
Payer: COMMERCIAL

## 2023-08-17 VITALS
WEIGHT: 279 LBS | SYSTOLIC BLOOD PRESSURE: 134 MMHG | HEART RATE: 75 BPM | DIASTOLIC BLOOD PRESSURE: 76 MMHG | BODY MASS INDEX: 47.05 KG/M2 | OXYGEN SATURATION: 97 %

## 2023-08-17 DIAGNOSIS — E66.813 CLASS 3 SEVERE OBESITY WITH BODY MASS INDEX (BMI) OF 45.0 TO 49.9 IN ADULT, UNSPECIFIED OBESITY TYPE, UNSPECIFIED WHETHER SERIOUS COMORBIDITY PRESENT (H): ICD-10-CM

## 2023-08-17 DIAGNOSIS — M25.561 ACUTE PAIN OF RIGHT KNEE: ICD-10-CM

## 2023-08-17 DIAGNOSIS — M17.11 PRIMARY OSTEOARTHRITIS OF RIGHT KNEE: ICD-10-CM

## 2023-08-17 DIAGNOSIS — E66.01 CLASS 3 SEVERE OBESITY WITH BODY MASS INDEX (BMI) OF 45.0 TO 49.9 IN ADULT, UNSPECIFIED OBESITY TYPE, UNSPECIFIED WHETHER SERIOUS COMORBIDITY PRESENT (H): ICD-10-CM

## 2023-08-17 DIAGNOSIS — M25.561 ACUTE PAIN OF RIGHT KNEE: Primary | ICD-10-CM

## 2023-08-17 PROCEDURE — 73562 X-RAY EXAM OF KNEE 3: CPT | Mod: TC | Performed by: RADIOLOGY

## 2023-08-17 PROCEDURE — 20610 DRAIN/INJ JOINT/BURSA W/O US: CPT | Mod: 50 | Performed by: ORTHOPAEDIC SURGERY

## 2023-08-17 PROCEDURE — 99203 OFFICE O/P NEW LOW 30 MIN: CPT | Mod: 25 | Performed by: ORTHOPAEDIC SURGERY

## 2023-08-17 RX ORDER — LIDOCAINE HYDROCHLORIDE 10 MG/ML
4 INJECTION, SOLUTION EPIDURAL; INFILTRATION; INTRACAUDAL; PERINEURAL
Status: SHIPPED | OUTPATIENT
Start: 2023-08-17

## 2023-08-17 RX ORDER — METHYLPREDNISOLONE ACETATE 80 MG/ML
80 INJECTION, SUSPENSION INTRA-ARTICULAR; INTRALESIONAL; INTRAMUSCULAR; SOFT TISSUE
Status: SHIPPED | OUTPATIENT
Start: 2023-08-17

## 2023-08-17 RX ADMIN — LIDOCAINE HYDROCHLORIDE 4 ML: 10 INJECTION, SOLUTION EPIDURAL; INFILTRATION; INTRACAUDAL; PERINEURAL at 15:41

## 2023-08-17 RX ADMIN — METHYLPREDNISOLONE ACETATE 80 MG: 80 INJECTION, SUSPENSION INTRA-ARTICULAR; INTRALESIONAL; INTRAMUSCULAR; SOFT TISSUE at 15:41

## 2023-08-17 ASSESSMENT — PAIN SCALES - GENERAL: PAINLEVEL: MODERATE PAIN (5)

## 2023-08-17 NOTE — PATIENT INSTRUCTIONS
AFTER VISIT SUMMARY    [facility] CORTISONE Injection Discharge Instructions    You may shower, however avoid swimming, tub baths or hot tubs for 24 hours following your procedure    You may have a mild to moderate increase in pain for several days following the injection.    It may take up to 14 days for the steroid medication to start working although you may feel the effect as early as a few days after the procedure.    You may use ice packs for 10-15 minutes, 3 to 4 times a day at the injection site for comfort    You may use anti-inflammatory medications (such as Ibuprofen or Aleve or Advil) or Tylenol for pain control if necessary    If you were fasting, you may resume your normal diet and medications after the procedure    If you have diabetes, check your blood sugar more frequently than usual as your blood sugar may be higher than normal for 10-14 days following a steroid injection. Contact your doctor who manages your diabetes if your blood sugar is higher than usual      If you experience any of the following, call [facility] [phone]  -Fever over 100 degree F  -Swelling, bleeding, redness, drainage, warmth at the injection site  - New or worsening pain

## 2023-08-17 NOTE — PROGRESS NOTES
HISTORY OF PRESENT ILLNESS    Teri Bain is a 67 year old female who is seen in consultation at the request of Dr. Serrano for evaluation of  chronic right knee pain.    Left knee also painful, but not quite as bad.    No known injury. Used to play sports     HPI: right knee. It hurts to touch one part of it. To get up and down is very painful.     Present symptoms:   Pain if sits a lot then gets up  Standing for long periods and long walks painful  Just took a long trip in the car, and pain was worse a couple weeks ago..    Cracks and pops.  Lateral pain is worst.  No catching, locking or giving-way.       Treatments tried in the past: NSAIDs and tylenol  History of corticosteroid injection many years ago bilateral knees.      Other PMH:   Past Medical History:   Diagnosis Date    Allergic rhinitis     Chronic kidney disease (CKD), stage III (moderate) (H)     Chronic venous insufficiency     Classic migraines     fiorinal and darvocet prn    Diabetes mellitus, type 2 (H) 1998    age 42    Dysplasia of cervix     treated with hysterectomy    GERD (gastroesophageal reflux disease)     History of tobacco use     quit 1/97    Hyperlipidemia LDL goal <100     Hypertension goal BP (blood pressure) < 140/90     Mild persistent asthma     Osteoarthritis     LT ankle & L>R knee, hands    Trochanteric bursitis     Vitamin D deficiency 2/15/2010        Surgical:  has a past surgical history that includes hysterectomy, pap no longer indicated (1/90); REMOVAL GALLBLADDER (1977); LIGATE FALLOPIAN TUBE (1977); surgical history of -  (1987); DILATION/CURETTAGE DIAG/THER NON OB (5/89); Colonoscopy (11/04); DEXA, BONE DENSITY, AXIAL SKEL (11/04); and colonoscopy (1/5/15).    Family Hx:  family history includes Arthritis in her mother; Asthma in her brother, daughter, daughter, father, grandchild, grandchild, grandchild, grandchild, and grandchild; Cancer in her brother; Cerebrovascular Disease in her mother; Coronary Artery  Disease in her brother and mother; Deep Vein Thrombosis (DVT) in her daughter; Depression in her daughter; Diabetes in her father, maternal grandfather, and mother; Hypertension in her father and mother; Lung Cancer in her brother; Obesity in her brother, father, and mother; Pacemaker in her brother.    Social Hx:  reports that she quit smoking about 26 years ago. Her smoking use included cigarettes. She has never used smokeless tobacco. She reports that she does not currently use alcohol. She reports that she does not use drugs.  She travels a lot. She is in the [employer]--a lot of standing.    REVIEW OF SYSTEMS:    CONSTITUTIONAL:  NEGATIVE for fever, chills, change in weight  INTEGUMENTARY/SKIN:  NEGATIVE for worrisome rashes, moles or lesions  EYES:  NEGATIVE for vision changes or irritation  ENT/MOUTH:  NEGATIVE for ear, mouth and throat problems  RESP:  NEGATIVE for significant cough or SOB  BREAST:  NEGATIVE for masses, tenderness or discharge  CV:  NEGATIVE for chest pain, palpitations or peripheral edema  GI:  NEGATIVE for nausea, abdominal pain, heartburn, or change in bowel habits  :  Negative   MUSCULOSKELETAL:  See HPI above  NEURO:  NEGATIVE for weakness, dizziness or paresthesias  ENDOCRINE:  NEGATIVE for temperature intolerance, skin/hair changes  HEME/ALLERGY/IMMUNE:  NEGATIVE for bleeding problems  PSYCHIATRIC:  NEGATIVE for changes in mood or affect    PHYSICAL EXAM:  /76 (BP Location: Right arm, Patient Position: Sitting, Cuff Size: Adult Regular)   Pulse 75   Wt 126.6 kg (279 lb)   SpO2 97%   BMI 47.05 kg/m     GENERAL APPEARANCE: healthy, alert, and no distress   SKIN: no suspicious lesions or rashes  NEURO: Normal strength and tone, mentation intact, and speech normal  VASCULAR:  good pulses, and cappillary refill   LYMPH: no lymphadenopathy   PSYCH:  mentation appears normal and affect normal/bright  RESP: no increased work of breathing     KNEE EXAM:   Gait: somewhat  "waddling.  Alignment: mild varus right   Squat: not tested .    Patellofemoral joint: mild crepitations in the patellofemoral joints.  Effusion: mild bilateral   ROM: 0-130  Tender: medial joint line and lateral joint line bilateral   Masses: none  Ligaments:  Lachman's Stable, Anterior and posterior drawer stable, stable to varus and valgus stress.  no pain with Varus/Valgus stress testing.    KNEE RIGHT THREE VIEWS   8/23/2021 2:55 PM   Moderate to advanced tricompartmental degenerative changes  fairly similar to the prior study. There is a 1.1 cm calcification in  the suprapatellar region that is new since the  comparison study  suspicious for loose body. Probable small joint effusion.    Xrays right knee from today: complete medial joint space loss. Varus tilt. Moderate lateral and patellofemoral joint degenerative changes.     7/19/22 venous US of bilateral lower extremities:  No deep venous thrombosis demonstrated in either leg.       Impression: Osteoarthritis severe right knee, likely in left knee as well.    Plan:    * reviewed imaging studies with patient, showing arthritis. Arthritis is wearing of the cartilage due to longstanding \"wear and tear\" or can follow an injury to the joint.    Discussed typical symptoms of arthritic pain is pain aggravated with weight bearing activities, stiffness, relieved by sitting or rest. Swelling may be associated. It is common to have some grinding and popping in the knee with arthritis.    Workup for degenerative knee arthrosis and pain, typically starts with plain xrays of the knee. Xrays are usually all that is needed for evaluation of ongoing knee pain for arthritis, as they show the bony anatomy well and underlying degenerative changes. An MRI is not usually needed in cases of degenerative knee pain and arthritis, as degenerative cartilage and meniscal changes seen on MRI are expected, given findings on xray. MRI may be indicated if the arthritis is mild and there " are mechanical symptoms such as locking or catching in the knee, or an acute knee injury.    Discussed various treatments for degenerative arthrosis of the knee, including nonoperative and operative approaches. Nonoperative approaches, which are exhausted prior to operative treatment, include doing nothing and living with the pain as patient has been doing, activity modification (avoid aggravating activities), physical therapy and strengthening exercises, weight loss, anti-inflammatory medications, bracing, ambulatory aids (cane, walker) and injections. Once these have been tried and are deemed unsuccessful with a good effort, and the patient is an appropriate candidate, the next treatment would be knee arthroplasty or replacement. Depending on the location of the arthritis, knee replacement can be partial (one side of the knee affected by arthritis) or a total knee arthroplasty (all 3 compartments). The risks, perceived benefits and perioperative rehabilitation expectations of knee arthroplasty were discussed in detail. Also discussed that approximately 10% of patients that undergo knee arthroplasty are not happy following surgery and may have worse pain or no improvement in pain, contrary to their preoperative expectations.    The right knee needs to be replaced. But at this time, nonoperative treatment will be pursued.  Her BMI is well over our limit of 40.   I explained to her the significantly increased complication rate with arthroplasty in the morbidly obese.  she was understanding of this fact and wants to take steps to lose weight so she can decrease her risks.  Our goal is to get her under a BMI of 40, but I think we could do this at 42 or even 43.   We talked about diet changes is probably being the most important factor to losing weight, and I suggested that she speak with her primary care provider about specific strategies.    Nutrition consult placed. Lower extremity strengthening is important. I  talked to her about finding exercises that do not necessarily involve prolonged weightbearing and certainly not high impact activities.  Water exercise and upper body exercises are important as well.    Physical therapy ordered and  for right   Corticosteroid injection bilateral knee:  With the patient's consent, bilateral knee(s) injected intra-articularly with 80mg of Depomedrol and 4cc of local anesthetic after sterile prep.     Return to clinic 6 months    MIRIAM Mccracken MD  Dept. Orthopedic Surgery  [facility]

## 2023-08-17 NOTE — LETTER
8/17/2023         RE: Teri Bain  [address]  [place] [address]        Dear Colleague,    Thank you for referring your patient, Teri Bain, to the [facility]. Please see a copy of my visit note below.    HISTORY OF PRESENT ILLNESS    Teri Bain is a 67 year old female who is seen in consultation at the request of Dr. Serrano for evaluation of  chronic right knee pain.    Left knee also painful, but not quite as bad.    No known injury. Used to play sports     HPI: right knee. It hurts to touch one part of it. To get up and down is very painful.     Present symptoms:   Pain if sits a lot then gets up  Standing for long periods and long walks painful  Just took a long trip in the car, and pain was worse a couple weeks ago..    Cracks and pops.  Lateral pain is worst.  No catching, locking or giving-way.       Treatments tried in the past: NSAIDs and tylenol  History of corticosteroid injection many years ago bilateral knees.      Other PMH:   Past Medical History:   Diagnosis Date     Allergic rhinitis      Chronic kidney disease (CKD), stage III (moderate) (H)      Chronic venous insufficiency      Classic migraines     fiorinal and darvocet prn     Diabetes mellitus, type 2 (H) 1998    age 42     Dysplasia of cervix     treated with hysterectomy     GERD (gastroesophageal reflux disease)      History of tobacco use     quit 1/97     Hyperlipidemia LDL goal <100      Hypertension goal BP (blood pressure) < 140/90      Mild persistent asthma      Osteoarthritis     LT ankle & L>R knee, hands     Trochanteric bursitis      Vitamin D deficiency 2/15/2010        Surgical:  has a past surgical history that includes hysterectomy, pap no longer indicated (1/90); REMOVAL GALLBLADDER (1977); LIGATE FALLOPIAN TUBE (1977); surgical history of -  (1987); DILATION/CURETTAGE DIAG/THER NON OB (5/89); Colonoscopy (11/04); DEXA, BONE DENSITY, AXIAL SKEL (11/04); and  colonoscopy (1/5/15).    Family Hx:  family history includes Arthritis in her mother; Asthma in her brother, daughter, daughter, father, grandchild, grandchild, grandchild, grandchild, and grandchild; Cancer in her brother; Cerebrovascular Disease in her mother; Coronary Artery Disease in her brother and mother; Deep Vein Thrombosis (DVT) in her daughter; Depression in her daughter; Diabetes in her father, maternal grandfather, and mother; Hypertension in her father and mother; Lung Cancer in her brother; Obesity in her brother, father, and mother; Pacemaker in her brother.    Social Hx:  reports that she quit smoking about 26 years ago. Her smoking use included cigarettes. She has never used smokeless tobacco. She reports that she does not currently use alcohol. She reports that she does not use drugs.  She travels a lot. She is in the [employer]--a lot of standing.    REVIEW OF SYSTEMS:    CONSTITUTIONAL:  NEGATIVE for fever, chills, change in weight  INTEGUMENTARY/SKIN:  NEGATIVE for worrisome rashes, moles or lesions  EYES:  NEGATIVE for vision changes or irritation  ENT/MOUTH:  NEGATIVE for ear, mouth and throat problems  RESP:  NEGATIVE for significant cough or SOB  BREAST:  NEGATIVE for masses, tenderness or discharge  CV:  NEGATIVE for chest pain, palpitations or peripheral edema  GI:  NEGATIVE for nausea, abdominal pain, heartburn, or change in bowel habits  :  Negative   MUSCULOSKELETAL:  See HPI above  NEURO:  NEGATIVE for weakness, dizziness or paresthesias  ENDOCRINE:  NEGATIVE for temperature intolerance, skin/hair changes  HEME/ALLERGY/IMMUNE:  NEGATIVE for bleeding problems  PSYCHIATRIC:  NEGATIVE for changes in mood or affect    PHYSICAL EXAM:  /76 (BP Location: Right arm, Patient Position: Sitting, Cuff Size: Adult Regular)   Pulse 75   Wt 126.6 kg (279 lb)   SpO2 97%   BMI 47.05 kg/m     GENERAL APPEARANCE: healthy, alert, and no distress   SKIN: no suspicious lesions or  "rashes  NEURO: Normal strength and tone, mentation intact, and speech normal  VASCULAR:  good pulses, and cappillary refill   LYMPH: no lymphadenopathy   PSYCH:  mentation appears normal and affect normal/bright  RESP: no increased work of breathing     KNEE EXAM:   Gait: somewhat waddling.  Alignment: mild varus right   Squat: not tested .    Patellofemoral joint: mild crepitations in the patellofemoral joints.  Effusion: mild bilateral   ROM: 0-130  Tender: medial joint line and lateral joint line bilateral   Masses: none  Ligaments:  Lachman's Stable, Anterior and posterior drawer stable, stable to varus and valgus stress.  no pain with Varus/Valgus stress testing.    KNEE RIGHT THREE VIEWS   8/23/2021 2:55 PM   Moderate to advanced tricompartmental degenerative changes  fairly similar to the prior study. There is a 1.1 cm calcification in  the suprapatellar region that is new since the  comparison study  suspicious for loose body. Probable small joint effusion.    Xrays right knee from today: complete medial joint space loss. Varus tilt. Moderate lateral and patellofemoral joint degenerative changes.     7/19/22 venous US of bilateral lower extremities:  No deep venous thrombosis demonstrated in either leg.       Impression: Osteoarthritis severe right knee, likely in left knee as well.    Plan:    * reviewed imaging studies with patient, showing arthritis. Arthritis is wearing of the cartilage due to longstanding \"wear and tear\" or can follow an injury to the joint.    Discussed typical symptoms of arthritic pain is pain aggravated with weight bearing activities, stiffness, relieved by sitting or rest. Swelling may be associated. It is common to have some grinding and popping in the knee with arthritis.    Workup for degenerative knee arthrosis and pain, typically starts with plain xrays of the knee. Xrays are usually all that is needed for evaluation of ongoing knee pain for arthritis, as they show the bony " anatomy well and underlying degenerative changes. An MRI is not usually needed in cases of degenerative knee pain and arthritis, as degenerative cartilage and meniscal changes seen on MRI are expected, given findings on xray. MRI may be indicated if the arthritis is mild and there are mechanical symptoms such as locking or catching in the knee, or an acute knee injury.    Discussed various treatments for degenerative arthrosis of the knee, including nonoperative and operative approaches. Nonoperative approaches, which are exhausted prior to operative treatment, include doing nothing and living with the pain as patient has been doing, activity modification (avoid aggravating activities), physical therapy and strengthening exercises, weight loss, anti-inflammatory medications, bracing, ambulatory aids (cane, walker) and injections. Once these have been tried and are deemed unsuccessful with a good effort, and the patient is an appropriate candidate, the next treatment would be knee arthroplasty or replacement. Depending on the location of the arthritis, knee replacement can be partial (one side of the knee affected by arthritis) or a total knee arthroplasty (all 3 compartments). The risks, perceived benefits and perioperative rehabilitation expectations of knee arthroplasty were discussed in detail. Also discussed that approximately 10% of patients that undergo knee arthroplasty are not happy following surgery and may have worse pain or no improvement in pain, contrary to their preoperative expectations.    The right knee needs to be replaced. But at this time, nonoperative treatment will be pursued.  Her BMI is well over our limit of 40.   I explained to her the significantly increased complication rate with arthroplasty in the morbidly obese.  she was understanding of this fact and wants to take steps to lose weight so she can decrease her risks.  Our goal is to get her under a BMI of 40, but I think we could do  this at 42 or even 43.   We talked about diet changes is probably being the most important factor to losing weight, and I suggested that she speak with her primary care provider about specific strategies.    Nutrition consult placed. Lower extremity strengthening is important. I talked to her about finding exercises that do not necessarily involve prolonged weightbearing and certainly not high impact activities.  Water exercise and upper body exercises are important as well.    Physical therapy ordered and  for right   Corticosteroid injection bilateral knee:  With the patient's consent, bilateral knee(s) injected intra-articularly with 80mg of Depomedrol and 4cc of local anesthetic after sterile prep.     Return to clinic 6 months    MIRIAM Mccracken MD  Dept. Orthopedic Surgery  [facility]     Large Joint Injection/Arthocentesis: bilateral knee    Date/Time: 8/17/2023 3:41 PM    Performed by: Tra De Los Santos  Authorized by: Moe Mccracken MD    Indications:  Pain and osteoarthritis  Needle Size:  22 G  Guidance: landmark guided    Approach:  Anterolateral  Location:  Knee  Laterality:  Bilateral      Medications (Right):  80 mg methylPREDNISolone 80 MG/ML; 4 mL lidocaine (PF) 1 %  Medications (Left):  80 mg methylPREDNISolone 80 MG/ML; 4 mL lidocaine (PF) 1 %  Outcome:  Tolerated well, no immediate complications  Procedure discussed: discussed risks, benefits, and alternatives    Consent Given by:  Patient  Timeout: timeout called immediately prior to procedure    Prep: patient was prepped and draped in usual sterile fashion          Again, thank you for allowing me to participate in the care of your patient.        Sincerely,        Moe Mccracken MD

## 2023-08-17 NOTE — PROGRESS NOTES
Large Joint Injection/Arthocentesis: bilateral knee    Date/Time: 8/17/2023 3:41 PM    Performed by: Tra De Los Santos  Authorized by: Moe Mccracken MD    Indications:  Pain and osteoarthritis  Needle Size:  22 G  Guidance: landmark guided    Approach:  Anterolateral  Location:  Knee  Laterality:  Bilateral      Medications (Right):  80 mg methylPREDNISolone 80 MG/ML; 4 mL lidocaine (PF) 1 %  Medications (Left):  80 mg methylPREDNISolone 80 MG/ML; 4 mL lidocaine (PF) 1 %  Outcome:  Tolerated well, no immediate complications  Procedure discussed: discussed risks, benefits, and alternatives    Consent Given by:  Patient  Timeout: timeout called immediately prior to procedure    Prep: patient was prepped and draped in usual sterile fashion

## 2023-08-27 ENCOUNTER — HEALTH MAINTENANCE LETTER (OUTPATIENT)
Age: 68
End: 2023-08-27

## 2023-09-15 DIAGNOSIS — E78.00 PURE HYPERCHOLESTEROLEMIA: ICD-10-CM

## 2023-09-15 DIAGNOSIS — I10 HYPERTENSION GOAL BP (BLOOD PRESSURE) < 140/90: ICD-10-CM

## 2023-09-18 RX ORDER — AMLODIPINE BESYLATE 5 MG/1
TABLET ORAL
Qty: 100 TABLET | Refills: 2 | OUTPATIENT
Start: 2023-09-18

## 2023-09-18 RX ORDER — PRAVASTATIN SODIUM 80 MG/1
TABLET ORAL
Qty: 90 TABLET | Refills: 0 | Status: SHIPPED | OUTPATIENT
Start: 2023-09-18 | End: 2023-12-06

## 2023-10-16 DIAGNOSIS — J45.30 MILD PERSISTENT ASTHMA WITHOUT COMPLICATION: ICD-10-CM

## 2023-10-17 DIAGNOSIS — I87.2 CHRONIC VENOUS INSUFFICIENCY: ICD-10-CM

## 2023-10-17 DIAGNOSIS — I10 HYPERTENSION GOAL BP (BLOOD PRESSURE) < 140/90: ICD-10-CM

## 2023-10-17 RX ORDER — ALBUTEROL SULFATE 90 UG/1
1-2 AEROSOL, METERED RESPIRATORY (INHALATION) EVERY 4 HOURS PRN
Qty: 34 G | Refills: 3 | Status: SHIPPED | OUTPATIENT
Start: 2023-10-17 | End: 2024-04-23

## 2023-10-18 RX ORDER — TRIAMTERENE AND HYDROCHLOROTHIAZIDE 75; 50 MG/1; MG/1
1 TABLET ORAL DAILY
Qty: 100 TABLET | Refills: 2 | OUTPATIENT
Start: 2023-10-18

## 2023-11-09 DIAGNOSIS — I10 HYPERTENSION GOAL BP (BLOOD PRESSURE) < 140/90: ICD-10-CM

## 2023-11-09 RX ORDER — AMLODIPINE BESYLATE 5 MG/1
TABLET ORAL
Qty: 90 TABLET | Refills: 0 | Status: SHIPPED | OUTPATIENT
Start: 2023-11-09 | End: 2024-01-18

## 2023-11-21 ENCOUNTER — OFFICE VISIT (OUTPATIENT)
Dept: OPTOMETRY | Facility: CLINIC | Age: 68
End: 2023-11-21
Payer: COMMERCIAL

## 2023-11-21 DIAGNOSIS — H04.123 DRY EYE SYNDROME OF BOTH EYES: ICD-10-CM

## 2023-11-21 DIAGNOSIS — Z01.00 EXAMINATION OF EYES AND VISION: Primary | ICD-10-CM

## 2023-11-21 DIAGNOSIS — H25.813 COMBINED FORMS OF AGE-RELATED CATARACT OF BOTH EYES: ICD-10-CM

## 2023-11-21 DIAGNOSIS — H52.4 PRESBYOPIA: ICD-10-CM

## 2023-11-21 DIAGNOSIS — H52.03 HYPEROPIA, BILATERAL: ICD-10-CM

## 2023-11-21 DIAGNOSIS — H52.221 REGULAR ASTIGMATISM OF RIGHT EYE: ICD-10-CM

## 2023-11-21 PROCEDURE — 92014 COMPRE OPH EXAM EST PT 1/>: CPT | Performed by: OPTOMETRIST

## 2023-11-21 PROCEDURE — 92015 DETERMINE REFRACTIVE STATE: CPT | Performed by: OPTOMETRIST

## 2023-11-21 ASSESSMENT — CONF VISUAL FIELD
OD_SUPERIOR_TEMPORAL_RESTRICTION: 0
OD_INFERIOR_TEMPORAL_RESTRICTION: 0
OD_SUPERIOR_NASAL_RESTRICTION: 0
OD_INFERIOR_NASAL_RESTRICTION: 0
OS_SUPERIOR_TEMPORAL_RESTRICTION: 0
OS_INFERIOR_TEMPORAL_RESTRICTION: 0
OS_NORMAL: 1
OD_NORMAL: 1
OS_SUPERIOR_NASAL_RESTRICTION: 0
OS_INFERIOR_NASAL_RESTRICTION: 0

## 2023-11-21 ASSESSMENT — REFRACTION_WEARINGRX
OD_CYLINDER: +2.75
OD_ADD: +2.75
OD_SPHERE: PLANO
OS_SPHERE: +2.00
SPECS_TYPE: PAL
OS_ADD: +2.75
OD_AXIS: 125

## 2023-11-21 ASSESSMENT — VISUAL ACUITY
OD_SC: 20/80
OD_CC+: -1
OS_SC: 20/125
METHOD: SNELLEN - LINEAR
OS_CC: 20/30
OD_CC: 20/50
OD_PH_CC: 20/40-1
OD_CC: 20/50
CORRECTION_TYPE: GLASSES
OS_CC: 20/50

## 2023-11-21 ASSESSMENT — TEAR MENISCUS
OS_TEAR_MENISCUS: INCREASED
OD_TEAR_MENISCUS: INCREASED

## 2023-11-21 ASSESSMENT — REFRACTION_MANIFEST
OS_ADD: +2.75
OD_CYLINDER: +2.75
METHOD_AUTOREFRACTION: 1
OS_SPHERE: +2.00
OD_SPHERE: PLANO
OS_CYLINDER: SPHERE
OD_ADD: +2.75
OD_AXIS: 110

## 2023-11-21 ASSESSMENT — KERATOMETRY
OS_AXISANGLE_DEGREES: 088
OD_K1POWER_DIOPTERS: 43.00
OS_K2POWER_DIOPTERS: 44.75
OD_AXISANGLE2_DEGREES: 013
OS_AXISANGLE2_DEGREES: 178
OD_AXISANGLE_DEGREES: 103
OD_K2POWER_DIOPTERS: 45.75
OS_K1POWER_DIOPTERS: 44.00

## 2023-11-21 ASSESSMENT — TONOMETRY
OD_IOP_MMHG: 15
IOP_METHOD: TONOPEN
OS_IOP_MMHG: 15

## 2023-11-21 ASSESSMENT — PUNCTA - ASSESSMENT
OD_PUNCTA: NORMAL
OS_PUNCTA: NORMAL

## 2023-11-21 ASSESSMENT — CUP TO DISC RATIO
OD_RATIO: 0.2
OS_RATIO: 0.2

## 2023-11-21 ASSESSMENT — SLIT LAMP EXAM - LIDS
COMMENTS: DERMATOCHALASIS
COMMENTS: DERMATOCHALASIS

## 2023-11-21 ASSESSMENT — EXTERNAL EXAM - LEFT EYE: OS_EXAM: NORMAL

## 2023-11-21 ASSESSMENT — EXTERNAL EXAM - RIGHT EYE: OD_EXAM: NORMAL

## 2023-11-21 NOTE — PROGRESS NOTES
Chief Complaint   Patient presents with    Annual Eye Exam         Last Eye Exam: 2-  Dilated Previously: Yes    What are you currently using to see?  glasses    Distance Vision Acuity: Noticed gradual change in both eyes    Near Vision Acuity: Not satisfied     Eye Comfort: watery- better than before-  Do you use eye drops? 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      Medical, surgical and family histories reviewed and updated 11/21/2023.       OBJECTIVE: See Ophthalmology exam    ASSESSMENT:    ICD-10-CM    1. Examination of eyes and vision  Z01.00 EYE EXAM (SIMPLE-NONBILLABLE)      2. 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 Dry eye syndrome of both eyes  H04.123 EYE EXAM (SIMPLE-NONBILLABLE)      4. Presbyopia  H52.4 REFRACTION      5. Hyperopia, bilateral  H52.03 REFRACTION      6. Regular astigmatism of right eye  H52.221 REFRACTION          PLAN:     Patient Instructions   You have cataracts which are affecting your vision.  A change in glasses will not give you much improvement.  A cataract evaluation can be scheduled with the eye surgeon to discuss with you the option of cataract surgery. (Patient declines)    Heat to the eyes daily for 10-15 minutes nightly with warm washcloth or reusable gel masks from the pharmacy or  [employer] heat masks can be purchased at [employer].    Artificial tears- 1 drop both eyes once before bedtime and once in the morning then 2 x day as needed.      Ocusoft lid scrubs at night.     Eyeglass prescription given.  Ok to fill if not interested in cataract evaluation.  There is a change in the astigmatism axis right eye.  Give the glasses 1-2 weeks to adjust to the new prescription.  You may get headaches or eyestrain as your eyes get used to the new prescription.  Sometimes the symptoms get worse before it gets  better.  If any problems after 1-2 weeks schedule an appointment for a recheck.      Return in 1 year for a complete eye exam or sooner if needed.    Chinedu Jung, OD

## 2023-11-21 NOTE — LETTER
11/21/2023         RE: Teri Bain  [address]  [place] [address]        Dear Colleague,    Thank you for referring your patient, Teri Bain, to the [facility]. Please see a copy of my visit note below.    Chief Complaint   Patient presents with     Annual Eye Exam         Last Eye Exam: 2-  Dilated Previously: Yes    What are you currently using to see?  glasses    Distance Vision Acuity: Noticed gradual change in both eyes    Near Vision Acuity: Not satisfied     Eye Comfort: watery- better than before-  Do you use eye drops? 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      Medical, surgical and family histories reviewed and updated 11/21/2023.       OBJECTIVE: See Ophthalmology exam    ASSESSMENT:    ICD-10-CM    1. Examination of eyes and vision  Z01.00 EYE EXAM (SIMPLE-NONBILLABLE)      2. 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 Dry eye syndrome of both eyes  H04.123 EYE EXAM (SIMPLE-NONBILLABLE)      4. Presbyopia  H52.4 REFRACTION      5. Hyperopia, bilateral  H52.03 REFRACTION      6. Regular astigmatism of right eye  H52.221 REFRACTION          PLAN:     Patient Instructions   You have cataracts which are affecting your vision.  A change in glasses will not give you much improvement.  A cataract evaluation can be scheduled with the eye surgeon to discuss with you the option of cataract surgery. (Patient declines)    Heat to the eyes daily for 10-15 minutes nightly with warm washcloth or reusable gel masks from the pharmacy or  [employer] heat masks can be purchased at [employer].    Artificial tears- 1 drop both eyes once before bedtime and once in the morning then 2 x day as needed.      Ocusoft lid scrubs at night.     Eyeglass prescription given.  Ok to fill if not interested in cataract  evaluation.  There is a change in the astigmatism axis right eye.  Give the glasses 1-2 weeks to adjust to the new prescription.  You may get headaches or eyestrain as your eyes get used to the new prescription.  Sometimes the symptoms get worse before it gets better.  If any problems after 1-2 weeks schedule an appointment for a recheck.      Return in 1 year for a complete eye exam or sooner if needed.    Chinedu Jung, OD         Again, thank you for allowing me to participate in the care of your patient.        Sincerely,        Chinedu Jung, OD

## 2023-11-21 NOTE — PATIENT INSTRUCTIONS
You have cataracts which are affecting your vision.  A change in glasses will not give you much improvement.  A cataract evaluation can be scheduled with the eye surgeon to discuss with you the option of cataract surgery. (Patient declines)    Heat to the eyes daily for 10-15 minutes nightly with warm washcloth or reusable gel masks from the pharmacy or  Envivio heat masks can be purchased at [employer].    Artificial tears- 1 drop both eyes once before bedtime and once in the morning then 2 x day as needed.      Ocusoft lid scrubs at night.     Eyeglass prescription given.  Ok to fill if not interested in cataract evaluation.  There is a change in the astigmatism axis right eye.  Give the glasses 1-2 weeks to adjust to the new prescription.  You may get headaches or eyestrain as your eyes get used to the new prescription.  Sometimes the symptoms get worse before it gets better.  If any problems after 1-2 weeks schedule an appointment for a recheck.      Return in 1 year for a complete eye exam or sooner if needed.    Chinedu Jung, OD    The affects of the dilating drops last for 4- 6 hours.  You will be more sensitive to light and vision will be blurry up close.  Do not drive if you do not feel comfortable.  Mydriatic sunglasses were given if needed.      Optometry Providers       Clinic Locations                                 Telephone Number   Dr. Ann Marie Weinstein   [facility] [phone]     [facility] Hours:                [facility] Hours:       [facility] Hours:   [address] [facility] [address]   [address]     [address] [place] [address]   [place] [address]    [place] [address]  Phone: [phone]                    Phone: [phone]     Phone: [phone]                      Monday 8:00-6:00                           Monday 8:00-6:00                          Monday 8:00-6:00              Tuesday 8:00-6:00                          Tuesday 8:00-6:00                          Tuesday 8:00-6:00              Wednesday 8:00-6:00                  Wednesday 8:00-6:00                   Wednesday 8:00-6:00      Thursday 8:00-6:00                        Thursday 8:00-6:00                         Thursday 8:00-6:00            Friday 8:00-5:00                              Friday 8:00-5:00                              Friday 8:00-5:00    [facility] Hours:   [address] [place] [address]  [phone]    Monday 9:00-6:00  Tuesday 9:00-6:00  Wednesday 9:00-6:00  Thursday 9:00-6:00  Friday 9:00-5:00  As always, Thank you for trusting us with your health care needs!

## 2023-11-22 ENCOUNTER — MYC MEDICAL ADVICE (OUTPATIENT)
Dept: OPTOMETRY | Facility: CLINIC | Age: 68
End: 2023-11-22
Payer: COMMERCIAL

## 2023-11-22 ENCOUNTER — OFFICE VISIT (OUTPATIENT)
Dept: OPTOMETRY | Facility: CLINIC | Age: 68
End: 2023-11-22
Payer: COMMERCIAL

## 2023-11-22 DIAGNOSIS — H01.133 ATOPIC DERMATITIS OF RIGHT EYELID: Primary | ICD-10-CM

## 2023-11-22 PROCEDURE — 99213 OFFICE O/P EST LOW 20 MIN: CPT

## 2023-11-22 RX ORDER — NEOMYCIN SULFATE, POLYMYXIN B SULFATE AND DEXAMETHASONE 3.5; 10000; 1 MG/ML; [USP'U]/ML; MG/ML
1 SUSPENSION/ DROPS OPHTHALMIC 2 TIMES DAILY
Qty: 5 ML | Refills: 0 | Status: SHIPPED | OUTPATIENT
Start: 2023-11-22 | End: 2024-07-22

## 2023-11-22 RX ORDER — NEOMYCIN SULFATE, POLYMYXIN B SULFATE, AND DEXAMETHASONE 3.5; 10000; 1 MG/G; [USP'U]/G; MG/G
0.5 OINTMENT OPHTHALMIC 2 TIMES DAILY
Qty: 3.5 G | Refills: 0 | Status: SHIPPED | OUTPATIENT
Start: 2023-11-22 | End: 2024-07-22

## 2023-11-22 ASSESSMENT — VISUAL ACUITY
METHOD: SNELLEN - LINEAR
OD_CC: 20/50
OD_PH_CC: 20/50
CORRECTION_TYPE: GLASSES
OD_PH_CC+: -1
OD_CC+: -2
OS_CC: 20/30

## 2023-11-22 ASSESSMENT — EXTERNAL EXAM - RIGHT EYE: OD_EXAM: NORMAL

## 2023-11-22 ASSESSMENT — EXTERNAL EXAM - LEFT EYE: OS_EXAM: NORMAL

## 2023-11-22 ASSESSMENT — TONOMETRY
OD_IOP_MMHG: 9
IOP_METHOD: TONOPEN
OS_IOP_MMHG: 12

## 2023-11-22 NOTE — LETTER
11/22/2023         RE: Teri Bain  [address]  [facility] [address]        Dear Colleague,    Thank you for referring your patient, Teri Bain, to the [facility]. Please see a copy of my visit note below.    Chief Complaint   Patient presents with     Eye Itching Both Eyes     Patient had dilation drops yesterday eyes have been itching real bad ever since. Patient used refresh tears before going to bed and her eyes itched all night long. Patient also tried warm compresses and took atarax for itching today.      Laterality: both eyes   Associated symptoms: itching, tearing, photophobia, and lid swelling   Frequency: constantly   Treatments tried: artificial tears and warm compresses   Comments: Patient had dilation drops yesterday eyes have been itching real bad ever since. Patient used refresh tears before going to bed and her eyes itched all night long. Patient also tried warm compresses and took atarax for itching today.            Medical, surgical and family histories reviewed and updated 11/22/2023.       OBJECTIVE: See Ophthalmology exam    ASSESSMENT:    ICD-10-CM    1. Atopic dermatitis of right eyelid  H01.133 neomycin-polymyxin-dexAMETHasone (MAXITROL) 3.5-63210-7.1 ophthalmic ointment     neomycin-polymyxin-dexAMETHasone (MAXITROL) 3.5-12783-6.1 SUSP ophthalmic susp         PLAN:     Patient Instructions   Atopic dermatitis, both eyes: Possible reaction to eyedrops, although previously tolerated at eye exams.  Prescribed Maxitrol ointment 2x/day for eyelids and drops 2x/day.  Initiate cool compresses and PFATs as needed for additional relief.  Tentative 1 week follow-up, discussed taper schedule as her symptoms resolve.     Shavon Thurston, DEVIN      Again, thank you for allowing me to participate in the care of your patient.        Sincerely,        Shavon Thurston OD

## 2023-11-22 NOTE — PATIENT INSTRUCTIONS
Atopic dermatitis, both eyes: Possible reaction to eyedrops, although previously tolerated at eye exams.  Prescribed Maxitrol ointment 2x/day for eyelids and drops 2x/day.  Initiate cool compresses and PFATs as needed for additional relief.  Tentative 1 week follow-up, discussed taper schedule as her symptoms resolve.

## 2023-11-22 NOTE — PROGRESS NOTES
Chief Complaint   Patient presents with    Eye Itching Both Eyes     Patient had dilation drops yesterday eyes have been itching real bad ever since. Patient used refresh tears before going to bed and her eyes itched all night long. Patient also tried warm compresses and took atarax for itching today.      Laterality: both eyes   Associated symptoms: itching, tearing, photophobia, and lid swelling   Frequency: constantly   Treatments tried: artificial tears and warm compresses   Comments: Patient had dilation drops yesterday eyes have been itching real bad ever since. Patient used refresh tears before going to bed and her eyes itched all night long. Patient also tried warm compresses and took atarax for itching today.            Medical, surgical and family histories reviewed and updated 11/22/2023.       OBJECTIVE: See Ophthalmology exam    ASSESSMENT:    ICD-10-CM    1. Atopic dermatitis of right eyelid  H01.133 neomycin-polymyxin-dexAMETHasone (MAXITROL) 3.5-50222-3.1 ophthalmic ointment     neomycin-polymyxin-dexAMETHasone (MAXITROL) 3.5-26971-0.1 SUSP ophthalmic susp         PLAN:     Patient Instructions   Atopic dermatitis, both eyes: Possible reaction to eyedrops, although previously tolerated at eye exams.  Prescribed Maxitrol ointment 2x/day for eyelids and drops 2x/day.  Initiate cool compresses and PFATs as needed for additional relief.  Tentative 1 week follow-up, discussed taper schedule as her symptoms resolve.     Shavon Thurston, OD

## 2023-11-28 ENCOUNTER — OFFICE VISIT (OUTPATIENT)
Dept: OPTOMETRY | Facility: CLINIC | Age: 68
End: 2023-11-28
Payer: COMMERCIAL

## 2023-11-28 DIAGNOSIS — H01.133 ATOPIC DERMATITIS OF RIGHT EYELID: Primary | ICD-10-CM

## 2023-11-28 DIAGNOSIS — H10.13 ALLERGIC CONJUNCTIVITIS OF BOTH EYES: ICD-10-CM

## 2023-11-28 PROCEDURE — 99213 OFFICE O/P EST LOW 20 MIN: CPT | Performed by: OPTOMETRIST

## 2023-11-28 RX ORDER — OLOPATADINE HYDROCHLORIDE 2 MG/ML
1 SOLUTION/ DROPS OPHTHALMIC DAILY
Qty: 2.5 ML | Refills: 6 | Status: CANCELLED | OUTPATIENT
Start: 2023-11-28 | End: 2024-11-27

## 2023-11-28 ASSESSMENT — ENCOUNTER SYMPTOMS
NAUSEA: 1
ABDOMINAL PAIN: 1
PARESTHESIAS: 1
SHORTNESS OF BREATH: 1
BREAST MASS: 0

## 2023-11-28 ASSESSMENT — VISUAL ACUITY
OD_PH_CC: 20/40
OD_CC: 20/50
OS_CC+: -1
CORRECTION_TYPE: GLASSES
METHOD: SNELLEN - LINEAR
OS_CC: 20/30

## 2023-11-28 ASSESSMENT — SLIT LAMP EXAM - LIDS
COMMENTS: DERMATOCHALASIS
COMMENTS: DERMATOCHALASIS

## 2023-11-28 ASSESSMENT — EXTERNAL EXAM - LEFT EYE: OS_EXAM: NORMAL

## 2023-11-28 ASSESSMENT — ACTIVITIES OF DAILY LIVING (ADL): CURRENT_FUNCTION: NO ASSISTANCE NEEDED

## 2023-11-28 ASSESSMENT — EXTERNAL EXAM - RIGHT EYE: OD_EXAM: NORMAL

## 2023-11-28 NOTE — PATIENT INSTRUCTIONS
Continue cold compresses.  Try not to touch or rub eyelids/eyes.    Non preserved artificial tears- 1 drop both eyes 4 x day.    Return in 1 year for a complete eye exam or sooner if needed.    Chinedu Jung, OD

## 2023-11-28 NOTE — PROGRESS NOTES
Red Eye Recheck    Reason:   Chief Complaint   Patient presents with    Eye Itching Both Eyes     Patient has been doing cold compresses and poly B ointment. [facility] never filled poly B drops.      Eyes feel: better    Medications used: Poly B ointment    How often: stopped 4 days ago  because made her eyes itch more     Exam 11/21/2023- used 1 drop of proparacaine and combo drop of tropicamide/phenylephrine.  Same drops were used in 2022- combo drop may have different formula.    Patient is rubbing and touching eyelids while talking.      Cathie Welch, Optometric Assistant, A.B.O.C.     OBJECTIVE:     See ophthalmology exam      ASSESSMENT:        ICD-10-CM    1. Atopic dermatitis of right eyelid  H01.133       2. Allergic conjunctivitis of both eyes  H10.13              PLAN:       Patient Instructions   Continue cold compresses.  Try not to touch or rub eyelids/eyes.    Non preserved artificial tears- 1 drop both eyes 4 x day.    Return in 1 year for a complete eye exam or sooner if needed.    Chinedu Jung, OD

## 2023-11-28 NOTE — LETTER
11/28/2023         RE: Teri Bain  [address]  [facility] [address]        Dear Colleague,    Thank you for referring your patient, Teri Bain, to the [facility]. Please see a copy of my visit note below.    Red Eye Recheck    Reason:   Chief Complaint   Patient presents with     Eye Itching Both Eyes     Patient has been doing cold compresses and poly B ointment. [facility] never filled poly B drops.      Eyes feel: better    Medications used: Poly B ointment    How often: stopped 4 days ago  because made her eyes itch more     Exam 11/21/2023- used 1 drop of proparacaine and combo drop of tropicamide/phenylephrine.  Same drops were used in 2022- combo drop may have different formula.    Patient is rubbing and touching eyelids while talking.      Cathie Welch, Optometric Assistant, A.B.O.C.     OBJECTIVE:     See ophthalmology exam      ASSESSMENT:        ICD-10-CM    1. Atopic dermatitis of right eyelid  H01.133       2. Allergic conjunctivitis of both eyes  H10.13              PLAN:       Patient Instructions   Continue cold compresses.  Try not to touch or rub eyelids/eyes.    Non preserved artificial tears- 1 drop both eyes 4 x day.    Return in 1 year for a complete eye exam or sooner if needed.    Chinedu Jung, DEVIN               Again, thank you for allowing me to participate in the care of your patient.        Sincerely,        Chinedu Jung, OD

## 2023-12-03 NOTE — PATIENT INSTRUCTIONS
"You are a candidate for the new RSV vaccine.  RSV (Respiratory Syncytial Virus) is a common respiratory virus that frequently causes the \"common cold\" in healthy young adults.  Unfortunately, it can cause a severe viral pneumonia in infants and older people - especially those with other chronic medical conditions (such as diabetes, asthma, heart disease, liver and kidney disease).  If you are on medicare you must get this vaccine at a pharmacy.  The pharmacist will let you know beforehand if there is a copay and can administer the vaccine.       You can use the triamcinolone cream behind your ears and on your ankles.  You can use over-the-counter hydrocortisone cream for your ear canals.      Patient Education   Personalized Prevention Plan  You are due for the preventive services outlined below.  Your care team is available to assist you in scheduling these services.  If you have already completed any of these items, please share that information with your care team to update in your medical record.  Health Maintenance Due   Topic Date Due    Diabetic Foot Exam  05/24/2013    RSV VACCINE (Pregnancy & 60+) (1 - 1-dose 60+ series) Never done    ANNUAL REVIEW OF HM ORDERS  06/09/2022    A1C Lab  11/09/2022    Basic Metabolic Panel  02/09/2023    Kidney Microalbumin Urine Test  05/28/2023    Cholesterol Lab  11/28/2023    Hemoglobin  08/09/2023    Asthma Action Plan - yearly  11/28/2023     Learning About Being Physically Active  What is physical activity?     Being physically active means doing any kind of activity that gets your body moving.  The types of physical activity that can help you get fit and stay healthy include:  Aerobic or \"cardio\" activities. These make your heart beat faster and make you breathe harder, such as brisk walking, riding a bike, or running. They strengthen your heart and lungs and build up your endurance.  Strength training activities. These make your muscles work against, or \"resist,\" " something. Examples include lifting weights or doing push-ups. These activities help tone and strengthen your muscles and bones.  Stretches. These let you move your joints and muscles through their full range of motion. Stretching helps you be more flexible.  Reaching a balance between these three types of physical activity is important because each one contributes to your overall fitness.  What are the benefits of being active?  Being active is one of the best things you can do for your health. It helps you to:  Feel stronger and have more energy to do all the things you like to do.  Focus better at school or work.  Feel, think, and sleep better.  Reach and stay at a healthy weight.  Lose fat and build lean muscle.  Lower your risk for serious health problems, including diabetes, heart attack, high blood pressure, and some cancers.  Keep your heart, lungs, bones, muscles, and joints strong and healthy.  How can you make being active part of your life?  Start slowly. Make it your long-term goal to get at least 30 minutes of exercise on most days of the week. Walking is a good choice. You also may want to do other activities, such as running, swimming, cycling, or playing tennis or team sports.  Pick activities that you like--ones that make your heart beat faster, your muscles stronger, and your muscles and joints more flexible. If you find more than one thing you like doing, do them all. You don't have to do the same thing every day.  Get your heart pumping every day. Any activity that makes your heart beat faster and keeps it at that rate for a while counts.  Here are some great ways to get your heart beating faster:  Go for a brisk walk, run, or hike.  Go for a swim or bike ride.  Take an online exercise class or dance.  Play a game of touch football, basketball, or soccer.  Play tennis, pickleball, or racquetball.  Climb stairs.  Even some household chores can be aerobic. Just do them at a faster pace. Raking or  "mowing the lawn, sweeping the garage, and vacuuming and cleaning your home all can help get your heart rate up.  Strengthen your muscles during the week. You don't have to lift heavy weights or grow big, bulky muscles to get stronger. Doing a few simple activities that make your muscles work against, or \"resist,\" something can help you get stronger. Aim for at least twice a week.  For example, you can:  Do push-ups or sit-ups, which use your own body weight as resistance.  Lift weights or dumbbells or use stretch bands at home or in a gym or community center.  Stretch your muscles often. Stretching will help you as you become more active. It can help you stay flexible and loosen tight muscles. It can also help improve your balance and posture and can be a great way to relax.  Be sure to stretch the muscles you'll be using when you work out. It's best to warm your muscles slightly before you stretch them. Walk or do some other light aerobic activity for a few minutes. Then start stretching.  When you stretch your muscles:  Do it slowly. Stretching is not about going fast or making sudden movements.  Don't push or bounce during a stretch.  Hold each stretch for at least 15 to 30 seconds, if you can. You should feel a stretch in the muscle, but not pain.  Breathe out as you do the stretch. Then breathe in as you hold the stretch. Don't hold your breath.  If you're worried about how more activity might affect your health, have a checkup before you start. Follow any special advice your doctor gives you for getting a smart start.  Where can you learn more?  Go to https://www.[employer].net/patiented  Enter W332 in the search box to learn more about \"Learning About Being Physically Active.\"  Current as of: June 6, 2023               Content Version: 13.8    8616-5845 [employer].   Care instructions adapted under license by your healthcare professional. If you have questions about a medical condition or this " instruction, always ask your healthcare professional. [employer] Inc[employer] disclaims any warranty or liability for your use of this information.      Learning About Dietary Guidelines  What are the Dietary Guidelines for Americans?     Dietary Guidelines for Americans provide tips for eating well and staying healthy. This helps reduce the risk for long-term (chronic) diseases.  These guidelines recommend that you:  Eat and drink the right amount for you. The U.S. government's food guide is called MyPlate. It can help you make your own well-balanced eating plan.  Try to balance your eating with your activity. This helps you stay at a healthy weight.  Drink alcohol in moderation, if at all.  Limit foods high in salt, saturated fat, trans fat, and added sugar.  These guidelines are from the U.S. Department of Agriculture and the U.S. Department of Health and Human Services. They are updated every 5 years.  What is MyPlate?  MyPlate is the U.S. government's food guide. It can help you make your own well-balanced eating plan. A balanced eating plan means that you eat enough, but not too much, and that your food gives you the nutrients you need to stay healthy.  MyPlate focuses on eating plenty of whole grains, fruits, and vegetables, and on limiting fat and sugar. It is available online at www.ChooseMyPlate.gov.  How can you get started?  If you're trying to eat healthier, you can slowly change your eating habits over time. You don't have to make big changes all at once. Start by adding one or two healthy foods to your meals each day.  Grains  Choose whole-grain breads and cereals and whole-wheat pasta and whole-grain crackers.  Vegetables  Eat a variety of vegetables every day. They have lots of nutrients and are part of a heart-healthy diet.  Fruits  Eat a variety of fruits every day. Fruits contain lots of nutrients. Choose fresh fruit instead of fruit juice.  Protein foods  Choose fish and lean poultry more  "often. Eat red meat and fried meats less often. Dried beans, tofu, and nuts are also good sources of protein.  Dairy  Choose low-fat or fat-free products from this food group. If you have problems digesting milk, try eating cheese or yogurt instead.  Fats and oils  Limit fats and oils if you're trying to cut calories. Choose healthy fats when you cook. These include canola oil and olive oil.  Where can you learn more?  Go to https://www.markedup.net/patiented  Enter D676 in the search box to learn more about \"Learning About Dietary Guidelines.\"  Current as of: February 28, 2023               Content Version: 13.8    8253-7813 [employer].   Care instructions adapted under license by your healthcare professional. If you have questions about a medical condition or this instruction, always ask your healthcare professional. [employer] disclaims any warranty or liability for your use of this information.         "

## 2023-12-04 ASSESSMENT — ASTHMA QUESTIONNAIRES: ACT_TOTALSCORE: 17

## 2023-12-05 ENCOUNTER — OFFICE VISIT (OUTPATIENT)
Dept: FAMILY MEDICINE | Facility: CLINIC | Age: 68
End: 2023-12-05
Attending: FAMILY MEDICINE
Payer: COMMERCIAL

## 2023-12-05 VITALS
HEIGHT: 64 IN | SYSTOLIC BLOOD PRESSURE: 118 MMHG | WEIGHT: 283 LBS | DIASTOLIC BLOOD PRESSURE: 72 MMHG | BODY MASS INDEX: 48.32 KG/M2 | RESPIRATION RATE: 15 BRPM | HEART RATE: 75 BPM | TEMPERATURE: 97.1 F

## 2023-12-05 DIAGNOSIS — N18.32 STAGE 3B CHRONIC KIDNEY DISEASE (H): ICD-10-CM

## 2023-12-05 DIAGNOSIS — F41.9 ANXIETY: ICD-10-CM

## 2023-12-05 DIAGNOSIS — J45.31 MILD PERSISTENT ASTHMA WITH ACUTE EXACERBATION: ICD-10-CM

## 2023-12-05 DIAGNOSIS — E78.00 PURE HYPERCHOLESTEROLEMIA: ICD-10-CM

## 2023-12-05 DIAGNOSIS — I10 HYPERTENSION GOAL BP (BLOOD PRESSURE) < 130/80: ICD-10-CM

## 2023-12-05 DIAGNOSIS — L30.9 DERMATITIS: ICD-10-CM

## 2023-12-05 DIAGNOSIS — Z00.00 ENCOUNTER FOR MEDICARE ANNUAL WELLNESS EXAM: Primary | ICD-10-CM

## 2023-12-05 DIAGNOSIS — E66.01 MORBID OBESITY (H): ICD-10-CM

## 2023-12-05 DIAGNOSIS — G43.109 MIGRAINE WITH AURA AND WITHOUT STATUS MIGRAINOSUS, NOT INTRACTABLE: ICD-10-CM

## 2023-12-05 DIAGNOSIS — R73.01 ELEVATED FASTING GLUCOSE: ICD-10-CM

## 2023-12-05 DIAGNOSIS — R11.2 NAUSEA AND VOMITING, UNSPECIFIED VOMITING TYPE: ICD-10-CM

## 2023-12-05 DIAGNOSIS — R07.89 ATYPICAL CHEST PAIN: ICD-10-CM

## 2023-12-05 LAB
ALBUMIN SERPL BCG-MCNC: 3.9 G/DL (ref 3.5–5.2)
ALP SERPL-CCNC: 62 U/L (ref 40–150)
ALT SERPL W P-5'-P-CCNC: 23 U/L (ref 0–50)
ANION GAP SERPL CALCULATED.3IONS-SCNC: 9 MMOL/L (ref 7–15)
AST SERPL W P-5'-P-CCNC: 27 U/L (ref 0–45)
BASOPHILS # BLD AUTO: 0 10E3/UL (ref 0–0.2)
BASOPHILS NFR BLD AUTO: 0 %
BILIRUB DIRECT SERPL-MCNC: 0.2 MG/DL (ref 0–0.3)
BILIRUB SERPL-MCNC: 0.7 MG/DL
BUN SERPL-MCNC: 17 MG/DL (ref 8–23)
CALCIUM SERPL-MCNC: 9.9 MG/DL (ref 8.8–10.2)
CHLORIDE SERPL-SCNC: 102 MMOL/L (ref 98–107)
CHOLEST SERPL-MCNC: 165 MG/DL
CREAT SERPL-MCNC: 1.41 MG/DL (ref 0.51–0.95)
CREAT UR-MCNC: 104 MG/DL
DEPRECATED HCO3 PLAS-SCNC: 31 MMOL/L (ref 22–29)
EGFRCR SERPLBLD CKD-EPI 2021: 40 ML/MIN/1.73M2
EOSINOPHIL # BLD AUTO: 0.3 10E3/UL (ref 0–0.7)
EOSINOPHIL NFR BLD AUTO: 6 %
ERYTHROCYTE [DISTWIDTH] IN BLOOD BY AUTOMATED COUNT: 15.6 % (ref 10–15)
FASTING STATUS PATIENT QL REPORTED: YES
GLUCOSE SERPL-MCNC: 100 MG/DL (ref 70–99)
HBA1C MFR BLD: 5.7 % (ref 0–5.6)
HCT VFR BLD AUTO: 39.5 % (ref 35–47)
HDLC SERPL-MCNC: 71 MG/DL
HGB BLD-MCNC: 13.9 G/DL (ref 11.7–15.7)
IMM GRANULOCYTES # BLD: 0 10E3/UL
IMM GRANULOCYTES NFR BLD: 0 %
LDLC SERPL CALC-MCNC: 78 MG/DL
LYMPHOCYTES # BLD AUTO: 2.1 10E3/UL (ref 0.8–5.3)
LYMPHOCYTES NFR BLD AUTO: 40 %
MCH RBC QN AUTO: 25.2 PG (ref 26.5–33)
MCHC RBC AUTO-ENTMCNC: 35.2 G/DL (ref 31.5–36.5)
MCV RBC AUTO: 72 FL (ref 78–100)
MICROALBUMIN UR-MCNC: 13.2 MG/L
MICROALBUMIN/CREAT UR: 12.69 MG/G CR (ref 0–25)
MONOCYTES # BLD AUTO: 0.6 10E3/UL (ref 0–1.3)
MONOCYTES NFR BLD AUTO: 11 %
NEUTROPHILS # BLD AUTO: 2.3 10E3/UL (ref 1.6–8.3)
NEUTROPHILS NFR BLD AUTO: 43 %
NONHDLC SERPL-MCNC: 94 MG/DL
PLATELET # BLD AUTO: 260 10E3/UL (ref 150–450)
POTASSIUM SERPL-SCNC: 4.2 MMOL/L (ref 3.4–5.3)
PROT SERPL-MCNC: 7.6 G/DL (ref 6.4–8.3)
RBC # BLD AUTO: 5.52 10E6/UL (ref 3.8–5.2)
SODIUM SERPL-SCNC: 142 MMOL/L (ref 135–145)
TRIGL SERPL-MCNC: 81 MG/DL
TSH SERPL DL<=0.005 MIU/L-ACNC: 0.85 UIU/ML (ref 0.3–4.2)
WBC # BLD AUTO: 5.3 10E3/UL (ref 4–11)

## 2023-12-05 PROCEDURE — G0439 PPPS, SUBSEQ VISIT: HCPCS | Performed by: FAMILY MEDICINE

## 2023-12-05 PROCEDURE — 83036 HEMOGLOBIN GLYCOSYLATED A1C: CPT | Performed by: FAMILY MEDICINE

## 2023-12-05 PROCEDURE — 80053 COMPREHEN METABOLIC PANEL: CPT | Performed by: FAMILY MEDICINE

## 2023-12-05 PROCEDURE — 85025 COMPLETE CBC W/AUTO DIFF WBC: CPT | Performed by: FAMILY MEDICINE

## 2023-12-05 PROCEDURE — 36415 COLL VENOUS BLD VENIPUNCTURE: CPT | Performed by: FAMILY MEDICINE

## 2023-12-05 PROCEDURE — 80061 LIPID PANEL: CPT | Performed by: FAMILY MEDICINE

## 2023-12-05 PROCEDURE — 84443 ASSAY THYROID STIM HORMONE: CPT | Performed by: FAMILY MEDICINE

## 2023-12-05 PROCEDURE — 82248 BILIRUBIN DIRECT: CPT | Performed by: FAMILY MEDICINE

## 2023-12-05 PROCEDURE — 93000 ELECTROCARDIOGRAM COMPLETE: CPT | Performed by: FAMILY MEDICINE

## 2023-12-05 PROCEDURE — 82570 ASSAY OF URINE CREATININE: CPT | Performed by: FAMILY MEDICINE

## 2023-12-05 PROCEDURE — 99214 OFFICE O/P EST MOD 30 MIN: CPT | Mod: 25 | Performed by: FAMILY MEDICINE

## 2023-12-05 PROCEDURE — 82043 UR ALBUMIN QUANTITATIVE: CPT | Performed by: FAMILY MEDICINE

## 2023-12-05 RX ORDER — TRIAMCINOLONE ACETONIDE 1 MG/G
CREAM TOPICAL 2 TIMES DAILY PRN
Qty: 30 G | Refills: 0 | Status: SHIPPED | OUTPATIENT
Start: 2023-12-05

## 2023-12-05 RX ORDER — AMLODIPINE BESYLATE 5 MG/1
TABLET ORAL
Qty: 90 TABLET | Refills: 0 | Status: CANCELLED | OUTPATIENT
Start: 2023-12-05

## 2023-12-05 RX ORDER — PRAVASTATIN SODIUM 80 MG/1
80 TABLET ORAL EVERY EVENING
Qty: 90 TABLET | Refills: 0 | Status: CANCELLED | OUTPATIENT
Start: 2023-12-05

## 2023-12-05 RX ORDER — RESPIRATORY SYNCYTIAL VIRUS VACCINE 120MCG/0.5
0.5 KIT INTRAMUSCULAR ONCE
Qty: 1 EACH | Refills: 0 | Status: CANCELLED | OUTPATIENT
Start: 2023-12-05 | End: 2023-12-05

## 2023-12-05 RX ORDER — HYDROXYZINE HYDROCHLORIDE 10 MG/1
TABLET, FILM COATED ORAL
Qty: 30 TABLET | Refills: 7 | Status: SHIPPED | OUTPATIENT
Start: 2023-12-05

## 2023-12-05 RX ORDER — SUMATRIPTAN 25 MG/1
25 TABLET, FILM COATED ORAL
Qty: 12 TABLET | Refills: 0 | Status: SHIPPED | OUTPATIENT
Start: 2023-12-05

## 2023-12-05 RX ORDER — FAMOTIDINE 20 MG/1
20 TABLET, FILM COATED ORAL DAILY PRN
Qty: 90 TABLET | Refills: 1 | Status: CANCELLED | OUTPATIENT
Start: 2023-12-05

## 2023-12-05 ASSESSMENT — ENCOUNTER SYMPTOMS
DIZZINESS: 0
SORE THROAT: 0
NERVOUS/ANXIOUS: 0
HEADACHES: 1
BREAST MASS: 0
FEVER: 0
NAUSEA: 1
PARESTHESIAS: 1
SHORTNESS OF BREATH: 1
FREQUENCY: 0
ABDOMINAL PAIN: 1
UNEXPECTED WEIGHT CHANGE: 0

## 2023-12-05 ASSESSMENT — ACTIVITIES OF DAILY LIVING (ADL): CURRENT_FUNCTION: NO ASSISTANCE NEEDED

## 2023-12-05 ASSESSMENT — PAIN SCALES - GENERAL: PAINLEVEL: NO PAIN (0)

## 2023-12-05 NOTE — LETTER
My Asthma Action Plan    Name: Teri Bain   YOB: 1955  Date: 12/5/2023   My doctor: Norma Serrano MD   My clinic: [facility]        My Control Medicine: Fluticasone furoate (Arnuity Ellipta) -  200 mcg 1puff daily  My Rescue Medicine: Albuterol (Proair/Ventolin/Proventil HFA) 2-4 puffs EVERY 4 HOURS as needed. Use a spacer if recommended by your provider.   My Asthma Severity:   Mild Persistent  Know your asthma triggers:   upper respiratory infections  strong odors and fumes            GREEN ZONE   Good Control  I feel good  No cough or wheeze  Can work, sleep and play without asthma symptoms       Take your asthma control medicine every day.     If exercise triggers your asthma, take your rescue medication  15 minutes before exercise or sports, and  During exercise if you have asthma symptoms  Spacer to use with inhaler: If you have a spacer, make sure to use it with your inhaler             YELLOW ZONE Getting Worse  I have ANY of these:  I do not feel good  Cough or wheeze  Chest feels tight  Wake up at night   Keep taking your Green Zone medications  Start taking your rescue medicine:  every 20 minutes for up to 1 hour. Then every 4 hours for 24-48 hours.  If you stay in the Yellow Zone for more than 12-24 hours, contact your doctor.  If you do not return to the Green Zone in 12-24 hours or you get worse, start taking your oral steroid medicine if prescribed by your provider.           RED ZONE Medical Alert - Get Help  I have ANY of these:  I feel awful  Medicine is not helping  Breathing getting harder  Trouble walking or talking  Nose opens wide to breathe       Take your rescue medicine NOW  If your provider has prescribed an oral steroid medicine, start taking it NOW  Call your doctor NOW  If you are still in the Red Zone after 20 minutes and you have not reached your doctor:  Take your rescue medicine again and  Call 911 or go to the emergency room  right away    See your regular doctor within 2 weeks of an Emergency Room or Urgent Care visit for follow-up treatment.          Annual Reminders:  Meet with Asthma Educator,  Flu Shot in the Fall, consider Pneumonia Vaccination for patients with asthma (aged 19 and older).    Pharmacy:    [id number] - [place], [place] - [address] AT [facility] OF  [address]  [employer] - A MAIL ORDER [facility]  [facility] ([facility] - [place], [place] - [address] [place] [address]  [facility] - [place] [id number]  [id number] [place], [place] - [address]    Electronically signed by Norma Serrano MD   Date: 12/05/23                      Asthma Triggers  How To Control Things That Make Your Asthma Worse    Triggers are things that make your asthma worse.  Look at the list below to help you find your triggers and what you can do about them.  You can help prevent asthma flare-ups by staying away from your triggers.      Trigger                                                          What you can do   Cigarette Smoke  Tobacco smoke can make asthma worse. Do not allow smoking in your home, car or around you.  Be sure no one smokes at a child s day care or school.  If you smoke, ask your health care provider for ways to help you quit.  Ask family members to quit too.  Ask your health care provider for a referral to Quit Plan to help you quit smoking, or call 9-436-731-PLAN.     Colds, Flu, Bronchitis  These are common triggers of asthma. Wash your hands often.  Don t touch your eyes, nose or mouth.  Get a flu shot every year.     Dust Mites  These are tiny bugs that live in cloth or carpet. They are too small to see. Wash sheets and blankets in hot water every week.   Encase pillows and mattress in dust mite proof covers.  Avoid having carpet if you can. If you have carpet, vacuum weekly.   Use a dust mask  and HEPA vacuum.   Pollen and Outdoor Mold  Some people are allergic to trees, grass, or weed pollen, or molds. Try to keep your windows closed.  Limit time out doors when pollen count is high.   Ask you health care provider about taking medicine during allergy season.     Animal Dander  Some people are allergic to skin flakes, urine or saliva from pets with fur or feathers. Keep pets with fur or feathers out of your home.    If you can t keep the pet outdoors, then keep the pet out of your bedroom.  Keep the bedroom door closed.  Keep pets off cloth furniture and away from stuffed toys.     Mice, Rats, and Cockroaches   Some people are allergic to the waste from these pests.   Cover food and garbage.  Clean up spills and food crumbs.  Store grease in the refrigerator.   Keep food out of the bedroom.   Indoor Mold  This can be a trigger if your home has high moisture. Fix leaking faucets, pipes, or other sources of water.   Clean moldy surfaces.  Dehumidify basement if it is damp and smelly.   Smoke, Strong Odors, and Sprays  These can reduce air quality. Stay away from strong odors and sprays, such as perfume, powder, hair spray, paints, smoke incense, paint, cleaning products, candles and new carpet.   Exercise or Sports  Some people with asthma have this trigger. Be active!  Ask your doctor about taking medicine before sports or exercise to prevent symptoms.    Warm up for 5-10 minutes before and after sports or exercise.     Other Triggers of Asthma  Cold air:  Cover your nose and mouth with a scarf.  Sometimes laughing or crying can be a trigger.  Some medicines and food can trigger asthma.

## 2023-12-05 NOTE — PROGRESS NOTES
"SUBJECTIVE:   Teri is a 68 year old, presenting for the following:  Annual Visit (AWV)        12/5/2023    10:02 AM   Additional Questions   Roomed by Zabrina Ochoa       Are you in the first 12 months of your Medicare coverage?  No    Healthy Habits:     In general, how would you rate your overall health?  Good    Frequency of exercise:  None    Do you usually eat at least 4 servings of fruit and vegetables a day, include whole grains    & fiber and avoid regularly eating high fat or \"junk\" foods?  No    Taking medications regularly:  Yes    Medication side effects:  Muscle aches    Ability to successfully perform activities of daily living:  No assistance needed    Home Safety:  No safety concerns identified    Hearing Impairment:  No hearing concerns    In the past 6 months, have you been bothered by leaking of urine?  No    In general, how would you rate your overall mental or emotional health?  Good    She has an extensively positive ROS (see below) including:  GI - for past 6 months she gets nausea/vomiting in the morning after she eats breakfast.  LLQ pain when she goes to bed.  Pressing over her LLQ helps.  Usually passes stool daily, sometimes 2-3 times/day.  Sometimes she has fecal incontinence.  No blood in the stool.    CV - Chest heaviness while at rest (sitting).  Lasts 5 minutes.  Improves with ASA.  Never extional.  This occurs every couple weeks.  No associated shortness of breath, palpitations, dizziness, palpitations.  Sometimes has racing heart at other times.      Neuro - Tingling and itching intermittently in extremities.  Moves around - can occur in one hand or one foot.   Also longstanding numbness in bottom of left foot.    Derm - itchy rash behind ears, in ear canals, and on ankles.    Asthma Follow-Up  She has a longstanding history of mild persistent asthma.   She uses Arnuity 200 for daily asthma controller.   Was ACT completed today?  Yes        12/4/2023    10:55 AM   ACT Total Scores "   ACT TOTAL SCORE (Goal Greater than or Equal to 20) 17   In the past 12 months, how many times did you visit the emergency room for your asthma without being admitted to the hospital? 0   In the past 12 months, how many times were you hospitalized overnight because of your asthma? 0     How many days per week do you miss taking your asthma controller medication?  0  Please describe any recent triggers for your asthma:  Recent exacerbation last week - improving after she started taking albuterol  Previously had not had an exacerbation for over 6 months.  Have you had any Emergency Room Visits, Urgent Care Visits, or Hospital Admissions since your last office visit?  No      Today's PHQ-2 Score:       12/4/2023    10:52 AM   PHQ-2 ( 1999 [employer])   Q1: Little interest or pleasure in doing things 0   Q2: Feeling down, depressed or hopeless 0   PHQ-2 Score 0   Q1: Little interest or pleasure in doing things Not at all   Q2: Feeling down, depressed or hopeless Not at all   PHQ-2 Score 0       Have you ever done Advance Care Planning? (For example, a Health Directive, POLST, or a discussion with a medical provider or your loved ones about your wishes): No, advance care planning information given to patient to review.  Patient plans to discuss their wishes with loved ones or provider.         Fall risk  Fallen 2 or more times in the past year?: No  Any fall with injury in the past year?: No    Cognitive Screening   1) Repeat 3 items (Leader, Season, Table)    2) Clock draw: NORMAL  3) 3 item recall: Recalls 3 objects  Results: 3 items recalled: COGNITIVE IMPAIRMENT LESS LIKELY    Mini-CogTM Copyright SUGEY Ndiaye. Licensed by the author for use in [facility]; reprinted with permission (remi@.[facility]). All rights reserved.        Reviewed and updated as needed this visit by clinical staff   Tobacco  Allergies  Meds  Problems             Reviewed and updated as needed this visit by Provider    Allergies  Meds   Problems            Social History     Tobacco Use     Smoking status: Former     Packs/day: 0.00     Years: 30.00     Additional pack years: 0.00     Total pack years: 0.00     Types: Cigarettes     Quit date: 1996     Years since quittin.9     Smokeless tobacco: Never   Substance Use Topics     Alcohol use: Not Currently     Comment: wine 1-2 times per year             2023     2:13 PM   Alcohol Use   Prescreen: >3 drinks/day or >7 drinks/week? No       Do you have a current opioid prescription? No  Do you use any other controlled substances or medications that are not prescribed by a provider? None    Current providers sharing in care for this patient include: Patient Care Team:  Norma Serrano MD as PCP - General (Family Medicine)  Norma Serrano MD as Assigned PCP  Moe Mccracken MD as Assigned Musculoskeletal Provider  Shavon Thurston OD as Assigned Surgical Provider    The following health maintenance items are reviewed in Epic and correct as of today:  Health Maintenance   Topic Date Due     DIABETIC FOOT EXAM  2013     RSV VACCINE (Pregnancy & 60+) (1 - 1-dose 60+ series) Never done     ANNUAL REVIEW OF HM ORDERS  2022     A1C  2022     BMP  2023     MICROALBUMIN  2023     LIPID  2023     HEMOGLOBIN  2023     ASTHMA ACTION PLAN  2023     COVID-19 Vaccine (2023- season) 2023     ASTHMA CONTROL TEST  2024     EYE EXAM  2024     MEDICARE ANNUAL WELLNESS VISIT  2024     FALL RISK ASSESSMENT  2024     COLORECTAL CANCER SCREENING  2025     MAMMO SCREENING  2025     ADVANCE CARE PLANNING  2028     DEXA  2030     DTAP/TDAP/TD IMMUNIZATION (3 - Td or Tdap) 2032     PARATHYROID  Completed     PHOSPHORUS  Completed     HEPATITIS C SCREENING  Completed     MIGRAINE ACTION PLAN  Completed     PHQ-2 (once per calendar year)  Completed     INFLUENZA VACCINE  Completed      "Pneumococcal Vaccine: 65+ Years  Completed     URINALYSIS  Completed     ALK PHOS  Completed     ZOSTER IMMUNIZATION  Completed     IPV IMMUNIZATION  Aged Out     HPV IMMUNIZATION  Aged Out     MENINGITIS IMMUNIZATION  Aged Out     RSV MONOCLONAL ANTIBODY  Aged Out     TSH W/FREE T4 REFLEX  Discontinued     BP Readings from Last 3 Encounters:   12/05/23 (!) 149/84   08/17/23 134/76   03/30/23 134/84    Wt Readings from Last 3 Encounters:   12/05/23 128.4 kg (283 lb)   08/17/23 126.6 kg (279 lb)   03/30/23 126.6 kg (279 lb)                     11/1/2021     9:00 AM   Breast CA Risk Assessment (FHS-7)   Do you have a family history of breast, colon, or ovarian cancer? No / Unknown     Mammogram Screening: Recommended mammography every 1-2 years with patient discussion and risk factor consideration  Pertinent mammograms are reviewed under the imaging tab.    Review of Systems   Constitutional:  Negative for fever and unexpected weight change.   HENT:  Positive for tinnitus. Negative for sore throat.    Respiratory:  Positive for shortness of breath.    Cardiovascular:  Positive for chest pain.   Gastrointestinal:  Positive for abdominal pain and nausea.   Endocrine: Negative for polyuria.   Breasts:  Negative for tenderness, breast mass and discharge.   Genitourinary:  Negative for frequency, pelvic pain, vaginal bleeding and vaginal discharge.   Skin:  Positive for rash.   Neurological:  Positive for headaches and paresthesias. Negative for dizziness.   Psychiatric/Behavioral:  Negative for mood changes. The patient is not nervous/anxious.          OBJECTIVE:   BP (!) 149/84 (BP Location: Right arm, Patient Position: Sitting, Cuff Size: Adult Large)   Pulse 75   Temp 97.1  F (36.2  C) (Temporal)   Resp 15   Ht 1.63 m (5' 4.17\")   Wt 128.4 kg (283 lb)   BMI 48.32 kg/m   Estimated body mass index is 48.32 kg/m  as calculated from the following:    Height as of this encounter: 1.63 m (5' 4.17\").    Weight as of " this encounter: 128.4 kg (283 lb).  Physical Exam  GENERAL: healthy, alert and no distress  EYES: Eyes grossly normal to inspection, conjunctivae and sclerae normal  HENT: ear canals and TM's normal  NECK: no adenopathy, no asymmetry, masses, or scars and thyroid normal to palpation  RESP: lungs clear to auscultation - no rales, rhonchi or wheezes  CV: regular rate and rhythm, normal S1 S2, no S3 or S4, no murmur, click or rub, no peripheral edema  ABDOMEN: soft, no hepatosplenomegaly, no masses, mild tenderness to palpation over LLQ laterally just above iliac crest  MS: no gross musculoskeletal defects noted, no edema  SKIN: no suspicious lesions, lichenified plaques behind ears and on ankles  NEURO: Grossly normal strength and tone, mentation intact and speech normal  PSYCH: mentation appears normal, affect normal/bright    Diagnostic Test Results:  EKG - by my interpretation shows sinus bradycardia (HR 54), normal axis, normal intervals, no acute ST/T changes c/w ischemia, no LVH by voltage criteria, possible LAE which is new     ASSESSMENT / PLAN:     Encounter for Medicare annual wellness exam  Reviewed/updated Health Maintenance     Hypertension goal BP (blood pressure) < 130/80  stable/well controlled - Continue current medication regimen.   - BASIC METABOLIC PANEL  - Albumin Random Urine Quantitative with Creat Ratio    Stage 3b chronic kidney disease (H)  - BASIC METABOLIC PANEL  - Albumin Random Urine Quantitative with Creat Ratio  - CBC with platelets and differential    Pure hypercholesterolemia  Continue pravastatin.  Awaiting lipid results to determine if dose change is indicated.     - Lipid panel reflex to direct LDL Non-fasting  - TSH with free T4 reflex    Elevated fasting glucose  - HEMOGLOBIN A1C    BMI > 40 (H)  Severe obesity impacting chronic conditions including HTN.    Mild persistent asthma with acute exacerbation  Current exacerbation but previously well controlled.  Continue Arnuity for  controller inhaler.  Albuterol PRN.    - fluticasone (ARNUITY ELLIPTA) 200 MCG/ACT inhaler  Dispense: 90 each; Refill: 1    Anxiety  She uses hydroxyzine infrequently but likes to have it on hand.    - hydrOXYzine HCl (ATARAX) 10 MG tablet  Dispense: 30 tablet; Refill: 7    Migraine with aura and without status migrainosus, not intractable  - SUMAtriptan (IMITREX) 25 MG tablet  Dispense: 12 tablet; Refill: 0      In addition to her chronic conditions she has multiple new complaints including the following and we have arranged for Teri to return to see me later this week to discuss further:  Dermatitis  On ankles and behind ears.  Discussed that for ear canals I recommend OTC hydrocortisone cream.  - triamcinolone (KENALOG) 0.1 % external cream  Dispense: 30 g; Refill: 0    Atypical chest pain  Brief episodes at rest in patient with cardiac risk factors (CKD, HTN, Obesity).  Ddx considered includes: coronary disease, valve disease (aortic stenosis or insufficiency), cardiomyopathy, pericarditis, pulmonary hypertension, aortic dissection, PE, pneumonia, pleuritis, ptx, lung tumor, GERD, esophageal spasm or rupture, PUD, Paulina-Lindo tear, pancreatitis, biliary disease, costochondritis, thoracic radiculopathy, zoster, and anxiety.  We started work-up with labs and EKG today.  EKG is reassuring although I may consider echo vs stress echo on her return.    - BASIC METABOLIC PANEL  - EKG 12-lead complete w/read - Clinics  - CBC with platelets and differential  - Hepatic panel (Albumin, ALT, AST, Bili, Alk Phos, TP)    Nausea and vomiting, unspecified vomiting type  Chronic daily AM nausea, often with vomiting, in absence of upper abdominal pain.  We'll start work-up with the following labs and consider EGD.  - BASIC METABOLIC PANEL  - CBC with platelets and differential  - Hepatic panel (Albumin, ALT, AST, Bili, Alk Phos, TP)       COUNSELING:  Reviewed preventive health counseling, as reflected in patient  "instructions      BMI:   Estimated body mass index is 48.32 kg/m  as calculated from the following:    Height as of this encounter: 1.63 m (5' 4.17\").    Weight as of this encounter: 128.4 kg (283 lb).   Weight management plan: Discussed healthy diet and exercise guidelines  Wt Readings from Last 5 Encounters:   12/05/23 128.4 kg (283 lb)   08/17/23 126.6 kg (279 lb)   03/30/23 126.6 kg (279 lb)   03/17/23 128.8 kg (284 lb)   02/01/23 127 kg (280 lb)        She reports that she quit smoking about 26 years ago. Her smoking use included cigarettes. She has never used smokeless tobacco.      Appropriate preventive services were discussed with this patient, including applicable screening as appropriate for fall prevention, nutrition, physical activity, Tobacco-use cessation, weight loss and cognition.  Checklist reviewing preventive services available has been given to the patient.    Reviewed patients plan of care and provided an AVS. The Basic Care Plan (routine screening as documented in Health Maintenance) for Teri meets the Care Plan requirement. This Care Plan has been established and reviewed with the Patient.    Norma Serrano MD  [facility]      Identified Health Risks:  I have reviewed Opioid Use Disorder and Substance Use Disorder risk factors and made any needed referrals.   She is at risk for lack of exercise and has been provided with information to increase physical activity for the benefit of her well-being.  The patient was counseled and encouraged to consider modifying their diet and eating habits. She was provided with information on recommended healthy diet options.  "

## 2023-12-06 RX ORDER — PRAVASTATIN SODIUM 80 MG/1
80 TABLET ORAL EVERY EVENING
Qty: 90 TABLET | Refills: 3 | Status: SHIPPED | OUTPATIENT
Start: 2023-12-06 | End: 2024-06-06

## 2023-12-06 NOTE — RESULT ENCOUNTER NOTE
García Williamson,  Your results look ok.  Everything is either normal, stable, or acceptable.  We can discuss this more tomorrow.  I sent authorization to your pharmacy to fill the pravastatin at the same dose for another year.      Norma Serrano MD

## 2023-12-07 ENCOUNTER — OFFICE VISIT (OUTPATIENT)
Dept: FAMILY MEDICINE | Facility: CLINIC | Age: 68
End: 2023-12-07
Payer: COMMERCIAL

## 2023-12-07 VITALS
SYSTOLIC BLOOD PRESSURE: 135 MMHG | HEART RATE: 73 BPM | DIASTOLIC BLOOD PRESSURE: 81 MMHG | WEIGHT: 284.9 LBS | HEIGHT: 64 IN | OXYGEN SATURATION: 97 % | BODY MASS INDEX: 48.64 KG/M2 | TEMPERATURE: 97 F | RESPIRATION RATE: 16 BRPM

## 2023-12-07 DIAGNOSIS — R11.2 NAUSEA AND VOMITING, UNSPECIFIED VOMITING TYPE: ICD-10-CM

## 2023-12-07 DIAGNOSIS — R00.2 PALPITATIONS: ICD-10-CM

## 2023-12-07 DIAGNOSIS — R10.32 LLQ ABDOMINAL PAIN: ICD-10-CM

## 2023-12-07 DIAGNOSIS — R20.2 PARESTHESIAS: ICD-10-CM

## 2023-12-07 DIAGNOSIS — R07.89 ATYPICAL CHEST PAIN: Primary | ICD-10-CM

## 2023-12-07 DIAGNOSIS — R94.31 ABNORMAL FINDING ON EKG: ICD-10-CM

## 2023-12-07 PROCEDURE — 99215 OFFICE O/P EST HI 40 MIN: CPT | Performed by: FAMILY MEDICINE

## 2023-12-07 RX ORDER — RESPIRATORY SYNCYTIAL VIRUS VACCINE 120MCG/0.5
0.5 KIT INTRAMUSCULAR ONCE
Qty: 1 EACH | Refills: 0 | Status: CANCELLED | OUTPATIENT
Start: 2023-12-07 | End: 2023-12-07

## 2023-12-07 NOTE — PROGRESS NOTES
Assessment & Plan     Atypical chest pain  Abnormal finding on EKG  Relatively brief episodes of resting chest pain.  Longstanding HTN and EKG showing LAE.  I'd like to get an echo.    - Echocardiogram Complete    Palpitations  No worrisome associated findings.  I am considering a ziopatch.      Nausea and vomiting, unspecified vomiting type  Concerning symptoms of chronic painless nausea/vomiting in the am after eating breakfast.  Infectious etiology unlikely given duration of symptoms. She is s/p cholecystectomy.  I wonder about gastritis although she describes no dyspeptic symptoms.  I would like to do a 30-day trial of daily PPI.  I've asked her to schedule a virtual visit (a scheduled video or telephone visit) in 4 weeks to discuss if and how that has impacted her symptoms.  We'll consider EGD vs GI referral if symptoms persist.    - omeprazole (PRILOSEC) 20 MG DR capsule  Dispense: 30 capsule; Refill: 0    LLQ abdominal pain  Brief episodes are likely benign.  Patient instructed to notify me if this symptom worsens.    Paresthesias  Also puzzling symptom of intermittent itching and tingling of single extremity (hand or foot).   She'll monitor this as well and notify me if it worsens.        I spent a total of 43 minutes on the day of the visit.   Time spent by me doing chart review, history and exam, documentation and further activities per the note      Norma Serrano MD  [facility]      Bea Williamson is a 68 year old, presenting for the following health issues:  Follow Up (EKG) and Hypertension        12/7/2023     4:00 PM   Additional Questions   Roomed by molly   Accompanied by self       History of Present Illness       Hypertension: She presents for follow up of hypertension.  She does not check blood pressure  regularly outside of the clinic. Outside blood pressures have been over 140/90. She does not follow a low salt diet.     Vascular Disease:  She presents for  follow up of vascular disease.     She never takes nitroglycerin. She is not taking daily aspirin.    Reason for visit:  To go over the concerns listed abd also dr. Serrano request me to return.    She eats 0-1 servings of fruits and vegetables daily.She consumes 0 sweetened beverage(s) daily.She exercises with enough effort to increase her heart rate 9 or less minutes per day.  She exercises with enough effort to increase her heart rate 3 or less days per week.   She is taking medications regularly.     She is here to follow-up on multiple issues brought up at her annual preventive visit earlier this week including:  GI - for past 6 months she gets nausea/vomiting in the morning after she eats breakfast.  LLQ pain when she goes to bed.  Pressing over her LLQ helps.  Usually passes stool daily, sometimes 2-3 times/day.  Sometimes she has fecal incontinence - most recently during the summer while she was travelling.  No blood in the stool.  She does have episodes of heartburn that are not related to breakfast and are relieved with famotidine that she uses PRN.     CV - Chest heaviness while at rest (sitting).  Also x 6 months.  Lasts 5 minutes.  Improves with ASA.  Never extional.  This occurs every couple weeks.  No associated shortness of breath, palpitations, dizziness, palpitations.  Sometimes has racing heart at other times - also non-exertional.  That also lasts minutes and is not associated with the chest heaviness.  Orthopnea - 1 pillow for her entire life.       Neuro - Tingling and itching intermittently in extremities x 2 years.  Moves around - can occur in one hand or one foot.   Lasts about 30 minutes.  Starts in the fingertips and then spreads up to the palm.  Intensely itchy but no visible rash.  She scratches and puts an ice pack on which help.  No associated weakness or speech difficulty.      Also longstanding pain and numbness in bottom of left foot.  This is in a well-circumscribed area of the  "ball of her left foot and tends to occur when she's standing for a long time.    Headaches up to several times per day - lasting no more than 10 minutes.  Right-sided and either frontal or occipital.  Severe but resolves quickly.  Different from her migraines.  Has had these briefer headaches for 3+ years.     She was also having an asthma exacerbation that was gradually improving.  Today she states her asthma symptoms continue to improve.      Review of Systems   as above       Objective    /81 (BP Location: Right arm, Patient Position: Sitting, Cuff Size: Adult Large)   Pulse 73   Temp 97  F (36.1  C) (Temporal)   Resp 16   Ht 1.625 m (5' 3.98\")   Wt 129.2 kg (284 lb 14.4 oz)   SpO2 97%   BMI 48.94 kg/m    Body mass index is 48.94 kg/m .  Physical Exam   GENERAL: healthy, alert and no distress  RESP: lungs clear to auscultation - no rales, rhonchi or wheezes  CV: regular rate and rhythm, normal S1 S2, no S3 or S4, no murmur, click or rub, no peripheral edema  ABDOMEN: soft, mild tenderness to palpation over RLQ and right iliac crest, LLQ with no rebound or guarding, no hepatosplenomegaly, no masses  MS: no gross musculoskeletal defects noted, no edema  HIP:  left hip with full and painless ROM.  Negative Stinchfield.  Negative YULISA.     SKIN: no suspicious lesions or rashes, particularly of her palms or feet  NEURO:  No focal deficits noted, No facial asymmetry, Equal movement of all 4 extremities, Strength grossly intact   FEET:  No lesions or deformities noted other than marked pes planus.  Skin is warm and dry without rashes.  Pedal pulses are strong.  Light touch sensation is intact.    PSYCH: mentation appears normal, affect normal/bright    Office Visit on 12/05/2023   Component Date Value Ref Range Status    Hemoglobin A1C 12/05/2023 5.7 (H)  0.0 - 5.6 % Final    Normal <5.7%   Prediabetes 5.7-6.4%    Diabetes 6.5% or higher     Note: Adopted from ADA consensus guidelines.    Sodium 12/05/2023 " 142  135 - 145 mmol/L Final    Reference intervals for this test were updated on 09/26/2023 to more accurately reflect our healthy population. There may be differences in the flagging of prior results with similar values performed with this method. Interpretation of those prior results can be made in the context of the updated reference intervals.     Potassium 12/05/2023 4.2  3.4 - 5.3 mmol/L Final    Chloride 12/05/2023 102  98 - 107 mmol/L Final    Carbon Dioxide (CO2) 12/05/2023 31 (H)  22 - 29 mmol/L Final    Anion Gap 12/05/2023 9  7 - 15 mmol/L Final    Urea Nitrogen 12/05/2023 17.0  8.0 - 23.0 mg/dL Final    Creatinine 12/05/2023 1.41 (H)  0.51 - 0.95 mg/dL Final    GFR Estimate 12/05/2023 40 (L)  >60 mL/min/1.73m2 Final    Calcium 12/05/2023 9.9  8.8 - 10.2 mg/dL Final    Glucose 12/05/2023 100 (H)  70 - 99 mg/dL Final    Creatinine Urine mg/dL 12/05/2023 104.0  mg/dL Final    The reference ranges have not been established in urine creatinine. The results should be integrated into the clinical context for interpretation.    Albumin Urine mg/L 12/05/2023 13.2  mg/L Final    The reference ranges have not been established in urine albumin. The results should be integrated into the clinical context for interpretation.    Albumin Urine mg/g Cr 12/05/2023 12.69  0.00 - 25.00 mg/g Cr Final    Microalbuminuria is defined as an albumin:creatinine ratio of 17 to 299 for males and 25 to 299 for females. A ratio of albumin:creatinine of 300 or higher is indicative of overt proteinuria.  Due to biologic variability, positive results should be confirmed by a second, first-morning random or 24-hour timed urine specimen. If there is discrepancy, a third specimen is recommended. When 2 out of 3 results are in the microalbuminuria range, this is evidence for incipient nephropathy and warrants increased efforts at glucose control, blood pressure control, and institution of therapy with an angiotensin-converting-enzyme (ACE)  inhibitor (if the patient can tolerate it).      Cholesterol 12/05/2023 165  <200 mg/dL Final    Triglycerides 12/05/2023 81  <150 mg/dL Final    Direct Measure HDL 12/05/2023 71  >=50 mg/dL Final    LDL Cholesterol Calculated 12/05/2023 78  <=100 mg/dL Final    Non HDL Cholesterol 12/05/2023 94  <130 mg/dL Final    Patient Fasting > 8hrs? 12/05/2023 Yes   Final    Protein Total 12/05/2023 7.6  6.4 - 8.3 g/dL Final    Albumin 12/05/2023 3.9  3.5 - 5.2 g/dL Final    Bilirubin Total 12/05/2023 0.7  <=1.2 mg/dL Final    Alkaline Phosphatase 12/05/2023 62  40 - 150 U/L Final    Reference intervals for this test were updated on 11/14/2023 to more accurately reflect our healthy population. There may be differences in the flagging of prior results with similar values performed with this method. Interpretation of those prior results can be made in the context of the updated reference intervals.    AST 12/05/2023 27  0 - 45 U/L Final    Reference intervals for this test were updated on 6/12/2023 to more accurately reflect our healthy population. There may be differences in the flagging of prior results with similar values performed with this method. Interpretation of those prior results can be made in the context of the updated reference intervals.    ALT 12/05/2023 23  0 - 50 U/L Final    Reference intervals for this test were updated on 6/12/2023 to more accurately reflect our healthy population. There may be differences in the flagging of prior results with similar values performed with this method. Interpretation of those prior results can be made in the context of the updated reference intervals.      Bilirubin Direct 12/05/2023 0.20  0.00 - 0.30 mg/dL Final    TSH 12/05/2023 0.85  0.30 - 4.20 uIU/mL Final    WBC Count 12/05/2023 5.3  4.0 - 11.0 10e3/uL Final    RBC Count 12/05/2023 5.52 (H)  3.80 - 5.20 10e6/uL Final    Hemoglobin 12/05/2023 13.9  11.7 - 15.7 g/dL Final    Hematocrit 12/05/2023 39.5  35.0 - 47.0 %  Final    MCV 12/05/2023 72 (L)  78 - 100 fL Final    MCH 12/05/2023 25.2 (L)  26.5 - 33.0 pg Final    MCHC 12/05/2023 35.2  31.5 - 36.5 g/dL Final    RDW 12/05/2023 15.6 (H)  10.0 - 15.0 % Final    Platelet Count 12/05/2023 260  150 - 450 10e3/uL Final    % Neutrophils 12/05/2023 43  % Final    % Lymphocytes 12/05/2023 40  % Final    % Monocytes 12/05/2023 11  % Final    % Eosinophils 12/05/2023 6  % Final    % Basophils 12/05/2023 0  % Final    % Immature Granulocytes 12/05/2023 0  % Final    Absolute Neutrophils 12/05/2023 2.3  1.6 - 8.3 10e3/uL Final    Absolute Lymphocytes 12/05/2023 2.1  0.8 - 5.3 10e3/uL Final    Absolute Monocytes 12/05/2023 0.6  0.0 - 1.3 10e3/uL Final    Absolute Eosinophils 12/05/2023 0.3  0.0 - 0.7 10e3/uL Final    Absolute Basophils 12/05/2023 0.0  0.0 - 0.2 10e3/uL Final    Absolute Immature Granulocytes 12/05/2023 0.0  <=0.4 10e3/uL Final

## 2023-12-29 ENCOUNTER — ANCILLARY PROCEDURE (OUTPATIENT)
Dept: CARDIOLOGY | Facility: CLINIC | Age: 68
End: 2023-12-29
Attending: FAMILY MEDICINE
Payer: COMMERCIAL

## 2023-12-29 DIAGNOSIS — R94.31 ABNORMAL FINDING ON EKG: ICD-10-CM

## 2023-12-29 DIAGNOSIS — R07.89 ATYPICAL CHEST PAIN: ICD-10-CM

## 2023-12-29 LAB — LVEF ECHO: NORMAL

## 2023-12-29 PROCEDURE — 93306 TTE W/DOPPLER COMPLETE: CPT | Performed by: INTERNAL MEDICINE

## 2023-12-31 PROBLEM — I51.89 DIASTOLIC DYSFUNCTION: Status: ACTIVE | Noted: 2023-12-31

## 2023-12-31 NOTE — RESULT ENCOUNTER NOTE
García Williamson,  Your echocardiogram shows that your heart valves, atrial chambers, and pumping function are normal.      There is some thickening of the muscular walls of your left ventricle which is typically a result of longstanding high blood pressure.  This can lead to diastolic heart failure so it is imperative that we keep your blood pressure under control to prevent any worsening of the diastolic dysfunction.        We can discuss this further at the upcoming virtual visit we have in January.  Norma Serrano MD

## 2024-01-04 ENCOUNTER — TRANSFERRED RECORDS (OUTPATIENT)
Dept: HEALTH INFORMATION MANAGEMENT | Facility: CLINIC | Age: 69
End: 2024-01-04
Payer: COMMERCIAL

## 2024-01-14 ASSESSMENT — ASTHMA QUESTIONNAIRES
ACT_TOTALSCORE: 23
QUESTION_1 LAST FOUR WEEKS HOW MUCH OF THE TIME DID YOUR ASTHMA KEEP YOU FROM GETTING AS MUCH DONE AT WORK, SCHOOL OR AT HOME: NONE OF THE TIME
QUESTION_2 LAST FOUR WEEKS HOW OFTEN HAVE YOU HAD SHORTNESS OF BREATH: NOT AT ALL
QUESTION_3 LAST FOUR WEEKS HOW OFTEN DID YOUR ASTHMA SYMPTOMS (WHEEZING, COUGHING, SHORTNESS OF BREATH, CHEST TIGHTNESS OR PAIN) WAKE YOU UP AT NIGHT OR EARLIER THAN USUAL IN THE MORNING: NOT AT ALL
QUESTION_4 LAST FOUR WEEKS HOW OFTEN HAVE YOU USED YOUR RESCUE INHALER OR NEBULIZER MEDICATION (SUCH AS ALBUTEROL): ONCE A WEEK OR LESS
QUESTION_5 LAST FOUR WEEKS HOW WOULD YOU RATE YOUR ASTHMA CONTROL: WELL CONTROLLED
ACT_TOTALSCORE: 23

## 2024-01-17 ENCOUNTER — VIRTUAL VISIT (OUTPATIENT)
Dept: FAMILY MEDICINE | Facility: CLINIC | Age: 69
End: 2024-01-17
Payer: COMMERCIAL

## 2024-01-17 DIAGNOSIS — I10 HYPERTENSION GOAL BP (BLOOD PRESSURE) < 140/90: ICD-10-CM

## 2024-01-17 DIAGNOSIS — G47.62 NOCTURNAL LEG CRAMPS: ICD-10-CM

## 2024-01-17 DIAGNOSIS — M79.672 LEFT FOOT PAIN: ICD-10-CM

## 2024-01-17 DIAGNOSIS — R10.13 DYSPEPSIA: Primary | ICD-10-CM

## 2024-01-17 PROCEDURE — 99214 OFFICE O/P EST MOD 30 MIN: CPT | Mod: 95 | Performed by: FAMILY MEDICINE

## 2024-01-17 NOTE — PROGRESS NOTES
"Teri is a 68 year old who is being evaluated via a billable video visit.      How would you like to obtain your AVS? MyChart  If the video visit is dropped, the invitation should be resent by: Text to cell phone: [phone]  Will anyone else be joining your video visit? No          Assessment & Plan     Dyspepsia  Resolved with one-month of PPI.  Discussed that she can use famotidine (which she has for her GERD) for any recurrence of the nausea and dyspepsia symptoms.  If it does not respond to H2 blocker then she can step up to a 2-week course of PPI.  If symptoms do not respond to that she should notify me.      Left foot pain  I recommended she see podiatry.    - Orthopedic  Referral    Nocturnal leg cramps  Discussed that these are hard to treat.  We'll check a magnesium level when I see her next.  She can try a small amount of tonic water (with quinine) at bedtime - no more than 3 ounces.      I spent a total of 39 minutes on the day of the visit.   Time spent by me doing chart review, history and exam, documentation and further activities per the note          Subjective   Teri is a 68 year old, presenting for the following health issues:  RECHECK (Follow Up )      1/17/2024     7:39 AM   Additional Questions   Roomed by Zabrina AUGUSTIN     Atypical CP Follow-Up - episodes persist and the timing seems to be related to using the Arnuity inhaler.  Lasts minutes.  Started going to the gym last week.  No exertional symptoms. Has been walking on the track.  Doing some upper body strength training.  Starting plan given to her by a .  Off and on gets brief palpitations not correlating with the chest pain.       Nausea/Vomiting Follow-Up - Did a one-month trial of PPI.  Her symptoms improved.  LLQ pain improved as well.  She just finished the omeprazole a week ago.    Headaches Follow-Up - better.  Hasn't had one for over a week.    Bothered by nocturnal leg cramps - bad \"Zana horse\" " in legs.      Objective           Vitals:  No vitals were obtained today due to virtual visit.    Physical Exam   GENERAL: alert and no distress  EYES: Eyes grossly normal to inspection.  No discharge or erythema, or obvious scleral/conjunctival abnormalities.  RESP: No audible wheeze, cough, or visible cyanosis.    SKIN: Visible skin clear. No significant rash, abnormal pigmentation or lesions.  NEURO: Cranial nerves grossly intact.  Mentation and speech appropriate for age.  PSYCH: Appropriate affect, tone, and pace of words          Video-Visit Details    Type of service:  Video Visit   Video Start Time: 9:05 AM  Video End Time:9:38 AM  33 minutes on video  Originating Location (pt. Location): Home  Distant Location (provider location):  Off-site  Platform used for Video Visit: Althea  Signed Electronically by: Norma Serrano MD

## 2024-01-17 NOTE — PATIENT INSTRUCTIONS
"For your heartburn and acid stomach (dyspepsia) you can use the famotidine (Pepcid) as needed.  Take that daily for a couple weeks and see if it calms down.  If that doesn't help then take over-the-counter Prilosec 20 mg daily for 2 weeks.  If that doesn't calm it down let me know as we'll need ot consider an upper endoscopy.    The mild hypertrophy does not look like a hereditary cardiomyopathy (often known as hypertrophic obstructive cardiomyopathy).  It is due to your longstanding hypertension.  The best way to prevent it from progressing is to keep your blood pressure well controlled.      For foot pain you can see [facility], [address], [address], [place] [address], [phone]     Hypertension is a disease of the blood vessels.  It is not a reflection of mental tension or stress.      Hypertension means that the blood flowing through your arteries is causing an abnormal amount of pressure or strain on the walls of the blood vessels.  Over time this causes the blood vessels to become stiff and diseased.  During this time there are usually no symptoms.  Symptoms occur later, once that damage has been done.  That is why hypertension is considered a \"silent killer.\"    Over time this constant pressure on the blood vessels leads to vascular disease and poor blood flow to the major organs.  This can lead to heart disease (when the blood vessels to the heart are involved), stroke (when the blood vessels to the brain are involved), kidney failure (when the blood vessels to the kidneys are involved), and leg pain/inability to walk (when the blood vessels to the legs are involved).      Normal blood pressure is defined as < 120/80.  An ideal blood pressure would be 110/70.  The average person's blood pressure increases 5-8 mmHg with each decade of life.      Hypertension is the major risk factor for chronic kidney disease and stroke.  A systolic blood pressure (the upper number) of 150 is " associated with an 8-fold increased risk of stroke compared to a systolic blood pressure of 110.      Hypertension can be treated with diet, exercise, and medication.      The DASH diet can help lower blood pressure.  It is a plant-based diet that focuses on fruits, vegetables, whole grains, low-fat dairy products, and very little meat.  It includes one serving of nuts, seeds, or legumes per day.  Sodium intake is limited to 2400 mg per day and replaced by foods that are high in potassium (bananas, potatoes, melons, avocados, and tomatoes) or high in calcium (low-fat dairy).      Decreasing alcohol consumption also improves blood pressure; drinking should be limited to no more than one serving of alcohol per day.       Exercising 150 minutes per week (30 minutes/day for 5 days/week) and avoiding weight gain also improves blood pressure.  If overweight, a weight loss of 10 pounds can reduce the systolic blood pressure by 5 mmHg.

## 2024-01-18 RX ORDER — AMLODIPINE BESYLATE 5 MG/1
TABLET ORAL
Qty: 90 TABLET | Refills: 3 | Status: SHIPPED | OUTPATIENT
Start: 2024-01-18

## 2024-02-01 ENCOUNTER — ANCILLARY PROCEDURE (OUTPATIENT)
Dept: GENERAL RADIOLOGY | Facility: CLINIC | Age: 69
End: 2024-02-01
Attending: PODIATRIST
Payer: COMMERCIAL

## 2024-02-01 ENCOUNTER — OFFICE VISIT (OUTPATIENT)
Dept: PODIATRY | Facility: CLINIC | Age: 69
End: 2024-02-01
Attending: FAMILY MEDICINE
Payer: COMMERCIAL

## 2024-02-01 DIAGNOSIS — M79.672 LEFT FOOT PAIN: ICD-10-CM

## 2024-02-01 DIAGNOSIS — M72.2 PLANTAR FASCIITIS OF LEFT FOOT: Primary | ICD-10-CM

## 2024-02-01 DIAGNOSIS — M21.42 PES PLANUS OF LEFT FOOT: ICD-10-CM

## 2024-02-01 PROCEDURE — 99204 OFFICE O/P NEW MOD 45 MIN: CPT | Performed by: PODIATRIST

## 2024-02-01 PROCEDURE — 73630 X-RAY EXAM OF FOOT: CPT | Mod: LT | Performed by: RADIOLOGY

## 2024-02-01 NOTE — PROGRESS NOTES
Past Medical History:   Diagnosis Date    Allergic rhinitis     Chronic kidney disease (CKD), stage III (moderate) (H)     Chronic venous insufficiency     Classic migraines     fiorinal and darvocet prn    Diabetes mellitus, type 2 (H) 1998    age 42    Dysplasia of cervix     treated with hysterectomy    GERD (gastroesophageal reflux disease)     History of tobacco use     quit 1/97    Hyperlipidemia LDL goal <100     Hypertension goal BP (blood pressure) < 140/90     Mild persistent asthma     Osteoarthritis     LT ankle & L>R knee, hands    Trochanteric bursitis     Vitamin D deficiency 02/15/2010     Patient Active Problem List   Diagnosis    Classical migraine    Trochanteric bursitis    Allergic rhinitis    Osteoarthritis    Chronic venous insufficiency    Stage 3b chronic kidney disease (H)    Mild persistent asthma    Vitamin D deficiency    Hypertension goal BP (blood pressure) < 130/80    Pure hypercholesterolemia    Health Care Home    GERD (gastroesophageal reflux disease)    Advanced directives, counseling/discussion    History of acute renal failure    BMI > 40 (H)    Osteoarthritis of both sternoclavicular joints    Bilateral lower extremity pain    Combined forms of age-related cataract of both eyes    Diastolic dysfunction    Nocturnal leg cramps    Dyspepsia     Past Surgical History:   Procedure Laterality Date    COLONOSCOPY  11/2004    [facility]    COLONOSCOPY  01/05/2015    [facility] [place] [facility]    HC DILATION/CURETTAGE DIAG/THER NON OB  05/1989    [facility]    HC REMOVAL GALLBLADDER  1977    HYSTERECTOMY, PAP NO LONGER INDICATED  01/1990    Hysterectomy, Vaginal, ovaries intact    SURGICAL HISTORY OF -   1987    cystoscopy    ZZC LIGATE FALLOPIAN TUBE  1977     Social History     Socioeconomic History    Marital status:      Spouse name: Fred    Number of children: 2    Years of education: 13    Highest education level: Not on file   Occupational History    Occupation: Customer Service      Employer: RETIRED     Comment: [employer]   Tobacco Use    Smoking status: Former     Packs/day: 0.00     Years: 30.00     Additional pack years: 0.00     Total pack years: 0.00     Types: Cigarettes     Quit date: 1996     Years since quittin.1    Smokeless tobacco: Never   Vaping Use    Vaping Use: Never used   Substance and Sexual Activity    Alcohol use: Not Currently     Comment: wine 1-2 times per year    Drug use: Never    Sexual activity: Yes     Partners: Male     Birth control/protection: None   Other Topics Concern     Service Not Asked    Blood Transfusions Not Asked    Caffeine Concern Not Asked    Occupational Exposure Not Asked    Hobby Hazards Not Asked    Sleep Concern Not Asked    Stress Concern Not Asked    Weight Concern Not Asked    Special Diet Not Asked    Back Care Not Asked    Exercise Not Asked    Bike Helmet Not Asked    Seat Belt Not Asked    Self-Exams Not Asked    Parent/sibling w/ CABG, MI or angioplasty before 65F 55M? No   Social History Narrative    Not on file     Social Determinants of Health     Financial Resource Strain: Low Risk  (2024)    Financial Resource Strain     Within the past 12 months, have you or your family members you live with been unable to get utilities (heat, electricity) when it was really needed?: No   Food Insecurity: Low Risk  (2024)    Food Insecurity     Within the past 12 months, did you worry that your food would run out before you got money to buy more?: No     Within the past 12 months, did the food you bought just not last and you didn t have money to get more?: No   Transportation Needs: Low Risk  (2024)    Transportation Needs     Within the past 12 months, has lack of transportation kept you from medical appointments, getting your medicines, non-medical meetings or appointments, work, or from getting things that you need?: No   Physical Activity: Inactive (2021)    Exercise Vital Sign     Days of Exercise per  Week: 0 days     Minutes of Exercise per Session: 0 min   Stress: Stress Concern Present (11/8/2021)    [employer] [place] [employer] - Occupational Stress Questionnaire     Feeling of Stress : To some extent   Social Connections: Socially Integrated (11/8/2021)    Social Connection and Isolation Panel [NHANES]     Frequency of Communication with Friends and Family: More than three times a week     Frequency of Social Gatherings with Friends and Family: Twice a week     Attends Yazidism Services: More than 4 times per year     Active Member of Clubs or Organizations: Yes     Attends Club or Organization Meetings: Not on file     Marital Status:    Interpersonal Safety: Low Risk  (12/5/2023)    Interpersonal Safety     Do you feel physically and emotionally safe where you currently live?: Yes     Within the past 12 months, have you been hit, slapped, kicked or otherwise physically hurt by someone?: No     Within the past 12 months, have you been humiliated or emotionally abused in other ways by your partner or ex-partner?: No   Housing Stability: Low Risk  (1/9/2024)    Housing Stability     Do you have housing? : Yes     Are you worried about losing your housing?: No     Family History   Problem Relation Age of Onset    Coronary Artery Disease Mother     Diabetes Mother     Hypertension Mother     Arthritis Mother     Cerebrovascular Disease Mother         multiple    Obesity Mother         My whole family and 2 daughters    Diabetes Father     Hypertension Father     Asthma Father     Obesity Father     Asthma Brother     Obesity Brother         Heart abd cancer    Coronary Artery Disease Brother         Passed 12/16/2020    Pacemaker Brother     Lung Cancer Brother     Cancer Brother     Other Cancer Brother         Lung cancer    Diabetes Maternal Grandfather     Depression Daughter     Asthma Daughter         Both daughters & 5 grand children    Deep Vein Thrombosis (DVT) Daughter         x 1     Asthma Daughter         5 grand kids    Asthma Grandchild     Asthma Grandchild     Asthma Grandchild     Asthma Grandchild     Asthma Grandchild          Lab Results   Component Value Date    A1C 5.7 12/05/2023    A1C 6.1 08/09/2022    A1C 5.8 10/03/2012    A1C 5.9 05/24/2012    A1C 6.0 07/01/2011    A1C 5.9 10/21/2010    A1C 5.9 02/11/2010           Subjective findings- 68-year-old presents for left arch pain.  Relates has been present for months to a year or so duration, no injuries, bothers her when she stands for a long period of time or is on her feet a lot, she relates she does get some numbness and tingling in the area as well at times, relates she has had a stress fracture before on the right foot.  Relates no specific injuries, no specific treatment.    Objective findings- DP and PT are 2 out of 4 left.  Has a flatfoot type left, dorsally contracted laterally deviated digits 2 through 5, laterally deviated hallux with dorsomedial first MPJ prominence.  Relates that hurts in the plantar central arch along the plantar fascia course plantar hurts, no pain on palpation today, no pain or range of motion.  No erythema, no drainage, no odor, no calor, no gross tendon voids left foot.  X-rays 3 views left foot reviewed with patient in clinic today there is no fracture, joint and cortical margins are intact, contracted laterally deviated digits, increased talar declination angle, decreased calcaneal inclination angle, joint space narrowing subchondral sclerosis and spurring of the talonavicular and navicular cuneiform joints and tarsometatarsal joints noted.    Assessment and plan- Plantar Fasciitis left, flatfoot, intermittent Neuritis and Osteoarthritic changes on x-ray.  Diagnosis and treatment options discussed with the patient.  She opted for no physical therapy today.  Prescription for custom orthotics given and patient is given the phone number address orthotics and prosthetics lab for these.  Return to  clinic and see me in 6 to 8 weeks if symptoms persist.                    Moderate level of medical decision making.

## 2024-02-01 NOTE — LETTER
2/1/2024         RE: Teri Bain  [address]  [facility] [address]        Dear Colleague,    Thank you for referring your patient, Teri Bain, to the [facility]. Please see a copy of my visit note below.    Past Medical History:   Diagnosis Date     Allergic rhinitis      Chronic kidney disease (CKD), stage III (moderate) (H)      Chronic venous insufficiency      Classic migraines     fiorinal and darvocet prn     Diabetes mellitus, type 2 (H) 1998    age 42     Dysplasia of cervix     treated with hysterectomy     GERD (gastroesophageal reflux disease)      History of tobacco use     quit 1/97     Hyperlipidemia LDL goal <100      Hypertension goal BP (blood pressure) < 140/90      Mild persistent asthma      Osteoarthritis     LT ankle & L>R knee, hands     Trochanteric bursitis      Vitamin D deficiency 02/15/2010     Patient Active Problem List   Diagnosis     Classical migraine     Trochanteric bursitis     Allergic rhinitis     Osteoarthritis     Chronic venous insufficiency     Stage 3b chronic kidney disease (H)     Mild persistent asthma     Vitamin D deficiency     Hypertension goal BP (blood pressure) < 130/80     Pure hypercholesterolemia     Health Care Home     GERD (gastroesophageal reflux disease)     Advanced directives, counseling/discussion     History of acute renal failure     BMI > 40 (H)     Osteoarthritis of both sternoclavicular joints     Bilateral lower extremity pain     Combined forms of age-related cataract of both eyes     Diastolic dysfunction     Nocturnal leg cramps     Dyspepsia     Past Surgical History:   Procedure Laterality Date     COLONOSCOPY  11/2004    [facility]     [facility]OSCOPY  01/05/2015    [facility]     HC DILATION/CURETTAGE DIAG/THER NON OB  05/1989    [facility]     HC REMOVAL GALLBLADDER  1977     HYSTERECTOMY, PAP NO LONGER INDICATED  01/1990    Hysterectomy, Vaginal, ovaries intact     SURGICAL HISTORY OF -   1987     cystoscopy     ZZC LIGATE FALLOPIAN TUBE       Social History     Socioeconomic History     Marital status:      Spouse name: Fred     Number of children: 2     Years of education: 13     Highest education level: Not on file   Occupational History     Occupation: Customer Service     Employer: RETIRED     Comment: [employer]   Tobacco Use     Smoking status: Former     Packs/day: 0.00     Years: 30.00     Additional pack years: 0.00     Total pack years: 0.00     Types: Cigarettes     Quit date: 1996     Years since quittin.1     Smokeless tobacco: Never   Vaping Use     Vaping Use: Never used   Substance and Sexual Activity     Alcohol use: Not Currently     Comment: wine 1-2 times per year     Drug use: Never     Sexual activity: Yes     Partners: Male     Birth control/protection: None   Other Topics Concern      Service Not Asked     Blood Transfusions Not Asked     Caffeine Concern Not Asked     Occupational Exposure Not Asked     Hobby Hazards Not Asked     Sleep Concern Not Asked     Stress Concern Not Asked     Weight Concern Not Asked     Special Diet Not Asked     Back Care Not Asked     Exercise Not Asked     Bike Helmet Not Asked     Seat Belt Not Asked     Self-Exams Not Asked     Parent/sibling w/ CABG, MI or angioplasty before 65F 55M? No   Social History Narrative     Not on file     Social Determinants of Health     Financial Resource Strain: Low Risk  (2024)    Financial Resource Strain      Within the past 12 months, have you or your family members you live with been unable to get utilities (heat, electricity) when it was really needed?: No   Food Insecurity: Low Risk  (2024)    Food Insecurity      Within the past 12 months, did you worry that your food would run out before you got money to buy more?: No      Within the past 12 months, did the food you bought just not last and you didn t have money to get more?: No   Transportation Needs: Low Risk   (1/9/2024)    Transportation Needs      Within the past 12 months, has lack of transportation kept you from medical appointments, getting your medicines, non-medical meetings or appointments, work, or from getting things that you need?: No   Physical Activity: Inactive (11/8/2021)    Exercise Vital Sign      Days of Exercise per Week: 0 days      Minutes of Exercise per Session: 0 min   Stress: Stress Concern Present (11/8/2021)    [employer] [place] [employer]ational [employer] - Occupational Stress Questionnaire      Feeling of Stress : To some extent   Social Connections: Socially Integrated (11/8/2021)    Social Connection and Isolation Panel [NHANES]      Frequency of Communication with Friends and Family: More than three times a week      Frequency of Social Gatherings with Friends and Family: Twice a week      Attends Roman Catholic Services: More than 4 times per year      Active Member of Clubs or Organizations: Yes      Attends Club or Organization Meetings: Not on file      Marital Status:    Interpersonal Safety: Low Risk  (12/5/2023)    Interpersonal Safety      Do you feel physically and emotionally safe where you currently live?: Yes      Within the past 12 months, have you been hit, slapped, kicked or otherwise physically hurt by someone?: No      Within the past 12 months, have you been humiliated or emotionally abused in other ways by your partner or ex-partner?: No   Housing Stability: Low Risk  (1/9/2024)    Housing Stability      Do you have housing? : Yes      Are you worried about losing your housing?: No     Family History   Problem Relation Age of Onset     Coronary Artery Disease Mother      Diabetes Mother      Hypertension Mother      Arthritis Mother      Cerebrovascular Disease Mother         multiple     Obesity Mother         My whole family and 2 daughters     Diabetes Father      Hypertension Father      Asthma Father      Obesity Father      Asthma Brother      Obesity Brother          Heart abd cancer     Coronary Artery Disease Brother         Passed 12/16/2020     Pacemaker Brother      Lung Cancer Brother      Cancer Brother      Other Cancer Brother         Lung cancer     Diabetes Maternal Grandfather      Depression Daughter      Asthma Daughter         Both daughters & 5 grand children     Deep Vein Thrombosis (DVT) Daughter         x 1     Asthma Daughter         5 grand kids     Asthma Grandchild      Asthma Grandchild      Asthma Grandchild      Asthma Grandchild      Asthma Grandchild          Lab Results   Component Value Date    A1C 5.7 12/05/2023    A1C 6.1 08/09/2022    A1C 5.8 10/03/2012    A1C 5.9 05/24/2012    A1C 6.0 07/01/2011    A1C 5.9 10/21/2010    A1C 5.9 02/11/2010           Subjective findings- 68-year-old presents for left arch pain.  Relates has been present for months to a year or so duration, no injuries, bothers her when she stands for a long period of time or is on her feet a lot, she relates she does get some numbness and tingling in the area as well at times, relates she has had a stress fracture before on the right foot.  Relates no specific injuries, no specific treatment.    Objective findings- DP and PT are 2 out of 4 left.  Has a flatfoot type left, dorsally contracted laterally deviated digits 2 through 5, laterally deviated hallux with dorsomedial first MPJ prominence.  Relates that hurts in the plantar central arch along the plantar fascia course plantar hurts, no pain on palpation today, no pain or range of motion.  No erythema, no drainage, no odor, no calor, no gross tendon voids left foot.  X-rays 3 views left foot reviewed with patient in clinic today there is no fracture, joint and cortical margins are intact, contracted laterally deviated digits, increased talar declination angle, decreased calcaneal inclination angle, joint space narrowing subchondral sclerosis and spurring of the talonavicular and navicular cuneiform joints and tarsometatarsal  joints noted.    Assessment and plan- Plantar Fasciitis left, flatfoot, intermittent Neuritis and Osteoarthritic changes on x-ray.  Diagnosis and treatment options discussed with the patient.  She opted for no physical therapy today.  Prescription for custom orthotics given and patient is given the phone number address orthotics and prosthetics lab for these.  Return to clinic and see me in 6 to 8 weeks if symptoms persist.                    Moderate level of medical decision making.      Again, thank you for allowing me to participate in the care of your patient.        Sincerely,        Gomez Kaur DPM   03-Dec-2021 15:50

## 2024-02-01 NOTE — NURSING NOTE
Teri Bain's chief complaint for this visit includes:  Chief Complaint   Patient presents with    Consult     Pain in the left foot, started some home exercises, pain on the bottom of the foot, experience numbness when standing and walking for extended period of time      PCP: Norma Serrano    Referring Provider:  Norma Serrano MD  [address]  [place] [address]    There were no vitals taken for this visit.  Data Unavailable     Do you need any medication refills at today's visit? NO    Allergies   Allergen Reactions    Maxitrol [Neomycin-Polymyxin-Dexameth] Itching     Eye ointment-    Tropicamide-Phenylephrine Itching     Combo dilating drops Tropicamide 1%/Phenylephrine 2.5%    Benazepril Rash    Claritin [Loratadine]     Lisinopril Rash    Losartan      MILDRED - avoid ACEi/ARBs    Morphine And Related Nausea    Tetracycline Nausea    Ceftin [Cefuroxime] Itching and Rash    Penicillins Rash       Diann Kelly, KOURTNEYN

## 2024-03-24 ENCOUNTER — HEALTH MAINTENANCE LETTER (OUTPATIENT)
Age: 69
End: 2024-03-24

## 2024-04-08 ENCOUNTER — NURSE TRIAGE (OUTPATIENT)
Dept: FAMILY MEDICINE | Facility: CLINIC | Age: 69
End: 2024-04-08
Payer: COMMERCIAL

## 2024-04-08 NOTE — TELEPHONE ENCOUNTER
Symptoms    Describe your symptoms: Big LT toe hurts    Any pain: Yes: When walking has to walk on side of foot if bare foot. Is unsure if this is gout     How long have you been having symptoms: Saturday night      Have you been seen for this:  No    Appointment offered?: No    Triage offered?: Yes: Unavailable at time of call       Preferred Pharmacy:   [id number] - [place], [place] - [address] [facility] [address]  [address]  [place] [address]  Phone: [phone] Fax: [phone]        Could we send this information to you in [address] or would you prefer to receive a phone call?:   Patient would prefer a phone call   Okay to leave a detailed message?: Yes at Home number on file [phone] (home)

## 2024-04-09 ENCOUNTER — OFFICE VISIT (OUTPATIENT)
Dept: FAMILY MEDICINE | Facility: CLINIC | Age: 69
End: 2024-04-09
Payer: COMMERCIAL

## 2024-04-09 VITALS
HEART RATE: 55 BPM | WEIGHT: 289 LBS | DIASTOLIC BLOOD PRESSURE: 87 MMHG | TEMPERATURE: 96.9 F | HEIGHT: 65 IN | SYSTOLIC BLOOD PRESSURE: 143 MMHG | OXYGEN SATURATION: 97 % | BODY MASS INDEX: 48.15 KG/M2 | RESPIRATION RATE: 15 BRPM

## 2024-04-09 DIAGNOSIS — M79.675 PAIN OF TOE OF LEFT FOOT: Primary | ICD-10-CM

## 2024-04-09 DIAGNOSIS — M72.2 PLANTAR FASCIITIS OF LEFT FOOT: ICD-10-CM

## 2024-04-09 PROCEDURE — 99213 OFFICE O/P EST LOW 20 MIN: CPT | Performed by: FAMILY MEDICINE

## 2024-04-09 ASSESSMENT — PAIN SCALES - GENERAL: PAINLEVEL: MILD PAIN (3)

## 2024-04-09 NOTE — TELEPHONE ENCOUNTER
Nurse Triage SBAR    Is this a 2nd Level Triage? NO    Situation: Patient called clinic, complains of RT toe pain     Background: Patient states RT pain started this past Saturday. Pain is still present today but mild compare to Saturday. Patient does not know what is causing pain, states possibly gout, but has no diagnosis of gout in patient history.     Assessment: RT toe pain no redness, itchiness or rash present. Toe is still swollen. Patient state pain currently is mild. Patient is able to walk with shoes on but experience pain when barefoot. Patient denies fever or other leg pain.     Protocol Recommended Disposition:   See in Office Within 3 Days    Recommendation: Start taking some pain medications like Tylenol or Ibuprofen to help with pain.  Patient should be seen in office in 3 days. RN was able to schedule patient to come in today to see Dr. Serrano.     Does the patient meet one of the following criteria for ADS visit consideration? 16+ years old, with an [facility] PCP     TIP  Providers, please consider if this condition is appropriate for management at one of our Acute and Diagnostic Services sites.     If patient is a good candidate, please use dotphrase <dot>triageresponse and select Refer to ADS to document.      Reason for Disposition   MODERATE pain (e.g., interferes with normal activities, limping) and present > 3 days    Additional Information   Negative: Followed an injury   Negative: Caused by an animal bite   Negative: Wound looks infected   Negative: Caused by frostbite   Negative: Athlete's Foot suspected (i.e., itchy red rash in web space between toes)   Negative: Foot pain is main symptom   Negative: Foot is cool or blue in comparison to other foot   Negative: Purple or black skin on toe  (Exception: Person remembers bruising the toe from an injury.)   Negative: Patient sounds very sick or weak to the triager   Negative: SEVERE pain (e.g., excruciating, unable to do any normal activities)  "and not improved after 2 hours of pain medicine   Negative: Looks like a boil, infected sore, deep ulcer, or other infected rash (spreading redness, pus)   Negative: Yellow pus seen in skin around toenail (cuticle area), or pus seen under toenail   Negative: Numbness in one foot (i.e., loss of sensation)    Answer Assessment - Initial Assessment Questions  1. ONSET: \"When did the pain start?\"       Saturday, April 6th   2. LOCATION: \"Where is the pain located?\"   (e.g., around nail, entire toe, at foot joint)       Big LT toe, pain around the joint area   3. PAIN: \"How bad is the pain?\"    (Scale 1-10; or mild, moderate, severe)    -  MILD (1-3): doesn't interfere with normal activities     -  MODERATE (4-7): interferes with normal activities (e.g., work or school) or awakens from sleep, limping     -  SEVERE (8-10): excruciating pain, unable to do any normal activities, unable to walk      Mild pain   4. APPEARANCE: \"What does the toe look like?\" (e.g., redness, swelling, bruising, pallor)      Swollen   5. CAUSE: \"What do you think is causing the toe pain?\"      Patient states possible gout, but no diagnosis of gout   6. OTHER SYMPTOMS: \"Do you have any other symptoms?\" (e.g., leg pain, rash, fever, numbness)      No other symptoms   7. PREGNANCY: \"Is there any chance you are pregnant?\" \"When was your last menstrual period?\"      No    Protocols used: Toe Pain-A-VIPUL Mccann, MSN, RN   [facility]     "

## 2024-04-09 NOTE — PROGRESS NOTES
"  Assessment & Plan     Pain of toe of left foot  She is concerned about possible gout and we discussed that gout (or any inflammatory arthritis) is highly unlikely given her presentation (no tenderness to palpation, erythema, warmth, or edema).  Her focal toe pain was nontraumatic so an xray is not indicated.  Her toe pain is resolving spontaneously.    Plantar fasciitis of left foot  I did recommend that she follow through and get orthotics as recommended by podiatry for her plantar fasciitis as she is planning a trip to [place] and [place] in June.  I also demonstrated calf and foot stretches and recommended ice massage using frozen bottle of water.         Subjective   Teri is a 68 year old, presenting for the following health issues:  Musculoskeletal Problem (Right Toe Pain for the last 3 days )        4/9/2024    11:22 AM   Additional Questions   Roomed by Zabrina Ochoa     History of Present Illness       Reason for visit:  Toe pain left foot big toe  Symptom onset:  1-3 days ago    She eats 0-1 servings of fruits and vegetables daily.She consumes 0 sweetened beverage(s) daily.She exercises with enough effort to increase her heart rate 20 to 29 minutes per day.  She exercises with enough effort to increase her heart rate 3 or less days per week.   She is taking medications regularly.       No injury.  Started Saturday evening.  No focal redness, warmth or swelling.  No fevers.  No new joint pain - has chronic hand (CMC) OA  Getting better.  Saw podiatry 2 months ago for plantar fasciitis and was told she has mid-foot OA  Is worried about gout; her father had gout.     ROS as above       Objective    BP (!) 143/87 (BP Location: Right arm, Patient Position: Sitting, Cuff Size: Adult Large)   Pulse 55   Temp 96.9  F (36.1  C) (Temporal)   Resp 15   Ht 1.651 m (5' 5\")   Wt 131.1 kg (289 lb)   SpO2 97%   BMI 48.09 kg/m    Body mass index is 48.09 kg/m .  Physical Exam   GEN:  no apparent distress  FOOT:  left " foot with no swelling, no warmth, no redness. no ecchymoses.  No focal tenderness to palpation over the great toe MTP joint although bunion deformity is noted.  No focal tenderness to palpation over the great toe PIP joint.  FROM of great toe joints            Signed Electronically by: Norma Serrano MD

## 2024-04-23 DIAGNOSIS — J45.30 MILD PERSISTENT ASTHMA WITHOUT COMPLICATION: ICD-10-CM

## 2024-04-23 RX ORDER — ALBUTEROL SULFATE 90 UG/1
AEROSOL, METERED RESPIRATORY (INHALATION)
Qty: 54 G | Refills: 0 | Status: SHIPPED | OUTPATIENT
Start: 2024-04-23 | End: 2024-07-02

## 2024-05-22 NOTE — TELEPHONE ENCOUNTER
Please return to the ED with new or worsening symptoms. Follow up with PCP for recheck.      Reason for Call:  Medication or medication refill:    Do you use a [facility]?  Name of the pharmacy and phone number for the current request:  [id number] - [place], [place] - [address] AT [facility] OF  [address]    Name of the medication requested: hydrOXYzine (ATARAX) 10 MG tablet    Other request: NA    Can we leave a detailed message on this number? Not Applicable    Phone number patient can be reached at: Other phone number: FAX:[phone]    Best Time: ANY    Call taken on 7/12/2021 at 11:12 AM by Yessenia Loja

## 2024-05-28 DIAGNOSIS — I87.2 CHRONIC VENOUS INSUFFICIENCY: ICD-10-CM

## 2024-05-28 DIAGNOSIS — I10 HYPERTENSION GOAL BP (BLOOD PRESSURE) < 140/90: ICD-10-CM

## 2024-05-29 RX ORDER — TRIAMTERENE AND HYDROCHLOROTHIAZIDE 75; 50 MG/1; MG/1
1 TABLET ORAL DAILY
Qty: 100 TABLET | Refills: 3 | Status: SHIPPED | OUTPATIENT
Start: 2024-05-29 | End: 2024-07-22

## 2024-05-30 ENCOUNTER — ALLIED HEALTH/NURSE VISIT (OUTPATIENT)
Dept: FAMILY MEDICINE | Facility: CLINIC | Age: 69
End: 2024-05-30
Payer: COMMERCIAL

## 2024-05-30 DIAGNOSIS — Z23 ENCOUNTER FOR IMMUNIZATION: Primary | ICD-10-CM

## 2024-05-30 PROCEDURE — 91320 SARSCV2 VAC 30MCG TRS-SUC IM: CPT

## 2024-05-30 PROCEDURE — 90480 ADMN SARSCOV2 VAC 1/ONLY CMP: CPT

## 2024-05-30 PROCEDURE — 99207 PR NO CHARGE NURSE ONLY: CPT

## 2024-05-30 NOTE — PROGRESS NOTES
"Prior to immunization administration, verified patients identity using patient s name and date of birth. Please see Immunization Activity for additional information.     Screening Questionnaire for Adult Immunization    Are you sick today?   {:703969}   Do you have allergies to medications, food, a vaccine component or latex?   {:423186}   Have you ever had a serious reaction after receiving a vaccination?   {:507828}   Do you have a long-term health problem with heart, lung, kidney, or metabolic disease (e.g., diabetes), asthma, a blood disorder, no spleen, complement component deficiency, a cochlear implant, or a spinal fluid leak?  Are you on long-term aspirin therapy?   {:461598}   Do you have cancer, leukemia, HIV/AIDS, or any other immune system problem?   {:731637}   Do you have a parent, brother, or sister with an immune system problem?   {:001502}   In the past 3 months, have you taken medications that affect  your immune system, such as prednisone, other steroids, or anticancer drugs; drugs for the treatment of rheumatoid arthritis, Crohn s disease, or psoriasis; or have you had radiation treatments?   {:153646}   Have you had a seizure, or a brain or other nervous system problem?   {:191602}   During the past year, have you received a transfusion of blood or blood    products, or been given immune (gamma) globulin or antiviral drug?   {:470931}   For women: Are you pregnant or is there a chance you could become       pregnant during the next month?   {:943901}   Have you received any vaccinations in the past 4 weeks?   {:190363}     Immunization questionnaire { :214777::\"answers were all negative.\"}    I have reviewed the following standing orders: {Adult Vaccine Standing Order:423820}    Patient instructed to remain in clinic for 15 minutes afterwards, and to report any adverse reactions.     Screening performed by Nae Godinez MA on 5/30/2024 at 10:08 AM.    "

## 2024-05-30 NOTE — PROGRESS NOTES
Prior to immunization administration, verified patients identity using patient s name and date of birth. Please see Immunization Activity for additional information.     Screening Questionnaire for Adult Immunization    Are you sick today?   No   Do you have allergies to medications, food, a vaccine component or latex?   Yes   Have you ever had a serious reaction after receiving a vaccination?   No   Do you have a long-term health problem with heart, lung, kidney, or metabolic disease (e.g., diabetes), asthma, a blood disorder, no spleen, complement component deficiency, a cochlear implant, or a spinal fluid leak?  Are you on long-term aspirin therapy?   Yes   Do you have cancer, leukemia, HIV/AIDS, or any other immune system problem?   No   Do you have a parent, brother, or sister with an immune system problem?   No   In the past 3 months, have you taken medications that affect  your immune system, such as prednisone, other steroids, or anticancer drugs; drugs for the treatment of rheumatoid arthritis, Crohn s disease, or psoriasis; or have you had radiation treatments?   No   Have you had a seizure, or a brain or other nervous system problem?   No   During the past year, have you received a transfusion of blood or blood    products, or been given immune (gamma) globulin or antiviral drug?   No   For women: Are you pregnant or is there a chance you could become       pregnant during the next month?   No   Have you received any vaccinations in the past 4 weeks?   No     Immunization questionnaire was positive for at least one answer.  Notified Tra .    I have reviewed the following standing orders:   This patient is due and qualifies for the Covid-19 vaccine.     Click here for COVID-19 Standing Order    I have reviewed the vaccines inclusion and exclusion criteria; There were concerns regarding the following exclusions, Asthma and food allergies. I have brought them to a provider who approved  vaccination.    Patient instructed to remain in clinic for 15 minutes afterwards, and to report any adverse reactions.     Screening performed by Nae Godinez MA on 5/30/2024 at 10:09 AM.

## 2024-06-03 NOTE — PROGRESS NOTES
HISTORY OF PRESENT ILLNESS    Teri Bain is a 68 year old female seen for follow up of severe right knee osteoarthritis, and suspected left knee osteoarthritis  High BMI    Injection(s) last time?: bilateral knees, 8/17/23.  Length of effectiveness: still working. But some pain creeping in.  Worst on right.   She is going on a cruise in a week.    Viscosupplementation injections: no  Nsaids, tylenol.    Physical therapy: no  : no  Weight loss?: no.      KNEE EXAM:   Severe crepitations in right knee  Alignment: unchanged      Impression:     ICD-10-CM    1. BMI > 40 (H)  E66.01 Adult Comprehensive Weight Management  Referral         Osteoarthritis    Plan:  repeat the injections today:  With the patient's consent, bilateral knee(s) injected intra-articularly with 80mg of Depomedrol and 4cc of local anesthetic after sterile prep.  Weight management consult placed    Return to clinic as needed     X-rays to be done next visit?: yes, bilateral knees.     MIRIAM Mccracken MD  Dept. Orthopedic Surgery  [facility]

## 2024-06-04 ENCOUNTER — OFFICE VISIT (OUTPATIENT)
Dept: ORTHOPEDICS | Facility: CLINIC | Age: 69
End: 2024-06-04
Payer: COMMERCIAL

## 2024-06-04 VITALS
HEIGHT: 65 IN | BODY MASS INDEX: 48.45 KG/M2 | DIASTOLIC BLOOD PRESSURE: 89 MMHG | OXYGEN SATURATION: 97 % | HEART RATE: 92 BPM | WEIGHT: 290.8 LBS | SYSTOLIC BLOOD PRESSURE: 158 MMHG

## 2024-06-04 DIAGNOSIS — E66.01 CLASS 3 SEVERE OBESITY WITH BODY MASS INDEX (BMI) OF 45.0 TO 49.9 IN ADULT, UNSPECIFIED OBESITY TYPE, UNSPECIFIED WHETHER SERIOUS COMORBIDITY PRESENT (H): ICD-10-CM

## 2024-06-04 DIAGNOSIS — E66.813 CLASS 3 SEVERE OBESITY WITH BODY MASS INDEX (BMI) OF 45.0 TO 49.9 IN ADULT, UNSPECIFIED OBESITY TYPE, UNSPECIFIED WHETHER SERIOUS COMORBIDITY PRESENT (H): ICD-10-CM

## 2024-06-04 DIAGNOSIS — M17.11 PRIMARY OSTEOARTHRITIS OF RIGHT KNEE: Primary | ICD-10-CM

## 2024-06-04 PROCEDURE — 20610 DRAIN/INJ JOINT/BURSA W/O US: CPT | Mod: 50 | Performed by: ORTHOPAEDIC SURGERY

## 2024-06-04 RX ORDER — LIDOCAINE HYDROCHLORIDE 10 MG/ML
4 INJECTION, SOLUTION EPIDURAL; INFILTRATION; INTRACAUDAL; PERINEURAL
Status: SHIPPED | OUTPATIENT
Start: 2024-06-04

## 2024-06-04 RX ORDER — METHYLPREDNISOLONE ACETATE 80 MG/ML
80 INJECTION, SUSPENSION INTRA-ARTICULAR; INTRALESIONAL; INTRAMUSCULAR; SOFT TISSUE
Status: SHIPPED | OUTPATIENT
Start: 2024-06-04

## 2024-06-04 RX ADMIN — METHYLPREDNISOLONE ACETATE 80 MG: 80 INJECTION, SUSPENSION INTRA-ARTICULAR; INTRALESIONAL; INTRAMUSCULAR; SOFT TISSUE at 09:45

## 2024-06-04 RX ADMIN — LIDOCAINE HYDROCHLORIDE 4 ML: 10 INJECTION, SOLUTION EPIDURAL; INFILTRATION; INTRACAUDAL; PERINEURAL at 09:45

## 2024-06-04 ASSESSMENT — PAIN SCALES - GENERAL: PAINLEVEL: MODERATE PAIN (5)

## 2024-06-04 NOTE — PROGRESS NOTES
Large Joint Injection/Arthocentesis: bilateral knee    Date/Time: 6/4/2024 9:45 AM    Performed by: Tra De Los Santos  Authorized by: Moe Mccracken MD    Indications:  Pain and osteoarthritis  Needle Size:  22 G  Guidance: landmark guided    Approach:  Anterolateral  Location:  Knee  Laterality:  Bilateral      Medications (Right):  80 mg methylPREDNISolone 80 MG/ML; 4 mL lidocaine (PF) 1 %  Medications (Left):  80 mg methylPREDNISolone 80 MG/ML; 4 mL lidocaine (PF) 1 %  Outcome:  Tolerated well, no immediate complications  Procedure discussed: discussed risks, benefits, and alternatives    Consent Given by:  Patient  Timeout: timeout called immediately prior to procedure    Prep: patient was prepped and draped in usual sterile fashion

## 2024-06-04 NOTE — PATIENT INSTRUCTIONS
AFTER VISIT SUMMARY    [facility] CORTISONE Injection Discharge Instructions    You may shower, however avoid swimming, tub baths or hot tubs for 24 hours following your procedure    You may have a mild to moderate increase in pain for several days following the injection.    It may take up to 14 days for the steroid medication to start working although you may feel the effect as early as a few days after the procedure.    You may use ice packs for 10-15 minutes, 3 to 4 times a day at the injection site for comfort    You may use anti-inflammatory medications (such as Ibuprofen or Aleve or Advil) or Tylenol for pain control if necessary    If you were fasting, you may resume your normal diet and medications after the procedure    If you have diabetes, check your blood sugar more frequently than usual as your blood sugar may be higher than normal for 10-14 days following a steroid injection. Contact your doctor who manages your diabetes if your blood sugar is higher than usual      If you experience any of the following, call [facility] [phone]  -Fever over 100 degree F  -Swelling, bleeding, redness, drainage, warmth at the injection site  - New or worsening pain

## 2024-06-04 NOTE — LETTER
6/4/2024         RE: Teri Bain  [address]  [place] [address]        Dear Colleague,    Thank you for referring your patient, Teri Bain, to the [facility]. Please see a copy of my visit note below.    HISTORY OF PRESENT ILLNESS    Teri Bain is a 68 year old female seen for follow up of severe right knee osteoarthritis, and suspected left knee osteoarthritis  High BMI    Injection(s) last time?: bilateral knees, 8/17/23.  Length of effectiveness: still working. But some pain creeping in.  Worst on right.   She is going on a cruise in a week.    Viscosupplementation injections: no  Nsaids, tylenol.    Physical therapy: no  : no  Weight loss?: no.      KNEE EXAM:   Severe crepitations in right knee  Alignment: unchanged      Impression:     ICD-10-CM    1. BMI > 40 (H)  E66.01 Adult Comprehensive Weight Management  Referral         Osteoarthritis    Plan:  repeat the injections today:  With the patient's consent, bilateral knee(s) injected intra-articularly with 80mg of Depomedrol and 4cc of local anesthetic after sterile prep.  Weight management consult placed    Return to clinic as needed     X-rays to be done next visit?: yes, bilateral knees.     MIRIAM Mccracken MD  Dept. Orthopedic Surgery  [facility]    Large Joint Injection/Arthocentesis: bilateral knee    Date/Time: 6/4/2024 9:45 AM    Performed by: Tra De Los Santos  Authorized by: Moe Mccracken MD    Indications:  Pain and osteoarthritis  Needle Size:  22 G  Guidance: landmark guided    Approach:  Anterolateral  Location:  Knee  Laterality:  Bilateral      Medications (Right):  80 mg methylPREDNISolone 80 MG/ML; 4 mL lidocaine (PF) 1 %  Medications (Left):  80 mg methylPREDNISolone 80 MG/ML; 4 mL lidocaine (PF) 1 %  Outcome:  Tolerated well, no immediate complications  Procedure discussed: discussed risks, benefits, and alternatives    Consent Given by:   Patient  Timeout: timeout called immediately prior to procedure    Prep: patient was prepped and draped in usual sterile fashion          Again, thank you for allowing me to participate in the care of your patient.        Sincerely,        Moe Mccracken MD

## 2024-06-06 DIAGNOSIS — E78.00 PURE HYPERCHOLESTEROLEMIA: ICD-10-CM

## 2024-06-06 RX ORDER — PRAVASTATIN SODIUM 80 MG/1
80 TABLET ORAL EVERY EVENING
Qty: 90 TABLET | Refills: 1 | Status: SHIPPED | OUTPATIENT
Start: 2024-06-06 | End: 2024-07-22

## 2024-06-06 NOTE — TELEPHONE ENCOUNTER
Medication Question or Refill        What medication are you calling about (include dose and sig)?: pravastatin (PRAVACHOL) 80 MG tablet     Preferred Pharmacy:       [facility] - [place], [place] - [address]  [address]  [facility] [address]  [facility] [address]  Phone: [phone] Fax: [phone]      Controlled Substance Agreement on file:   CSA -- Patient Level:    CSA: None found at the patient level.       Who prescribed the medication?: Dr. Serrano    Do you need a refill? Yes    When did you use the medication last? Out of medication.    Patient offered an appointment? No    Do you have any questions or concerns?  Yes: Patient requesting for prescription renewal. Patient will be traveling out of country in 2 week and would like to receive refill before traveling. Patient state prescription takes about 1 and half week to receive prescriptions refills from [facility]. If patient does not receive prescription in the mail by 06/17/24 or 6/18 patient will call again to have prescription send to [facility] pharmacy.      Could we send this information to you in [facility] or would you prefer to receive a phone call?:   Patient would prefer a phone call   Okay to leave a detailed message?: Yes at Cell number on file:    Telephone Information:   Mobile [phone]

## 2024-06-06 NOTE — TELEPHONE ENCOUNTER
Remaining two refills sent to mail order.  Please notify patient.  ALYSSA SungN, PHN, RN  [facility]  [phone]

## 2024-07-01 DIAGNOSIS — J45.30 MILD PERSISTENT ASTHMA WITHOUT COMPLICATION: ICD-10-CM

## 2024-07-02 ENCOUNTER — NURSE TRIAGE (OUTPATIENT)
Dept: FAMILY MEDICINE | Facility: CLINIC | Age: 69
End: 2024-07-02
Payer: COMMERCIAL

## 2024-07-02 RX ORDER — ALBUTEROL SULFATE 90 UG/1
AEROSOL, METERED RESPIRATORY (INHALATION)
Qty: 36 G | Refills: 4 | Status: SHIPPED | OUTPATIENT
Start: 2024-07-02

## 2024-07-02 NOTE — TELEPHONE ENCOUNTER
Due to uncontrolled hypertension I do not recommend an oral (or nasal) decongestant.  She can try Mucinex (plain) or Mucinex DM.    I should probably also see her for a hypertension follow-up visit (before her medicare annual wellness visit that is scheduled for Dec).    BP Readings from Last 3 Encounters:   06/04/24 (!) 158/89   04/09/24 (!) 143/87   12/07/23 135/81

## 2024-07-02 NOTE — TELEPHONE ENCOUNTER
"Dr. Serrano-Please review and advise if patient could perhaps try a decongestant, such as Pseudoephedrine for a few days to help reduce congestion?  Writer would usually also recommend breathing steam, staying well hydrated, sleeping with head elevated, etc.    Patient called back:  At home COVID-19 test negative  Has the Arnuity Ellipta inhaler and took it today; helped \"open up\" her chest  Has Albuterol inhaler but has not used it  Can she take a decongestant?  Flies out morning of 7/5/24  Has head cold symptoms  Flonase does not help nasal congestion  Has not done nasal rinses because they do not work    Writer recommended patient go ahead and use Albuterol inhaler as directed.  Writer will get further input from Dr. Serrano and patient will be notified of clinician's recommendation.    Patient verbalized understanding and in agreement with plan.    Thank you!  ALYSSA VelazcoN, RN-[facility]        "

## 2024-07-02 NOTE — TELEPHONE ENCOUNTER
Reviewed provider response with pt.  She made an HTN appt with PCP for July 2024.  JACLYN Branham

## 2024-07-02 NOTE — TELEPHONE ENCOUNTER
Nurse Triage SBAR    Is this a 2nd Level Triage? NO    Situation: Patient reports sx of viral illness starting 6/27    Background: Patient just returned home from [place] today. History of asthma.    Assessment:   Reports congestion with chest tightness. Denies chest pain or difficulty breathing.  Had chills at symptom onset as well as cough and sore throat but these have resolved.     Protocol Recommended Disposition:   Home Care    Recommendation: Advised patient to use inhaler and take home covid test and contact clinic back with results. She is agreeable.    Routed to clinic nurse pool as FYI     Does the patient meet one of the following criteria for ADS visit consideration? 16+ years old, with an [facility] PCP     TIP  Providers, please consider if this condition is appropriate for management at one of our Acute and Diagnostic Services sites.     If patient is a good candidate, please use dotphrase <dot>triageresponse and select Refer to ADS to document.      Reason for Disposition   Colds with no complications    Additional Information   Negative: SEVERE difficulty breathing (e.g., struggling for each breath, speaks in single words)   Negative: Very weak (can't stand)   Negative: Sounds like a life-threatening emergency to the triager   Negative: Difficulty breathing and not from stuffy nose (e.g., not relieved by cleaning out the nose)   Negative: Runny nose is caused by pollen or other allergies   Negative: Cough is main symptom   Negative: Sore throat is main symptom   Negative: Patient sounds very sick or weak to the triager   Negative: Fever > 103 F (39.4 C)   Negative: Fever > 101 F (38.3 C) and over 60 years of age   Negative: Fever > 100.0 F (37.8 C) and has diabetes mellitus or a weak immune system (e.g., HIV positive, cancer chemotherapy, organ transplant, splenectomy, chronic steroids)   Negative: Fever > 100.0 F (37.8 C) and bedridden (e.g., CVA, chronic illness, recovering from surgery)    "Negative: Fever present > 3 days (72 hours)   Negative: Fever returns after gone for over 24 hours and symptoms worse or not improved   Negative: Sinus pain (not just congestion) and fever   Negative: Earache   Negative: Sinus congestion (pressure, fullness) present > 10 days   Negative: Nasal discharge present > 10 days   Negative: Using nasal washes and pain medicine > 24 hours and sinus pain (lower forehead, cheekbone, or eye) persists   Negative: Patient wants to be seen   Negative: Sore throat present > 5 days    Answer Assessment - Initial Assessment Questions  1. ONSET: \"When did the nasal discharge start?\"       Congestion started Thurs 6/27  2. AMOUNT: \"How much discharge is there?\"       *No Answer*  3. COUGH: \"Do you have a cough?\" If Yes, ask: \"Describe the color of your sputum\" (clear, white, yellow, green)      Had a cough, this is improved  4. RESPIRATORY DISTRESS: \"Describe your breathing.\"       *No Answer*  5. FEVER: \"Do you have a fever?\" If Yes, ask: \"What is your temperature, how was it measured, and when did it start?\"      No   6. SEVERITY: \"Overall, how bad are you feeling right now?\" (e.g., doesn't interfere with normal activities, staying home from school/work, staying in bed)       *No Answer*  7. OTHER SYMPTOMS: \"Do you have any other symptoms?\" (e.g., sore throat, earache, wheezing, vomiting)      Sore throat first few days, had chills but these sx have resolved. She now has congestion and chest tightness. Denies chest pain or SOB.  8. PREGNANCY: \"Is there any chance you are pregnant?\" \"When was your last menstrual period?\"      no    Protocols used: Common Cold-A-[place]    "

## 2024-07-16 DIAGNOSIS — E78.00 PURE HYPERCHOLESTEROLEMIA: ICD-10-CM

## 2024-07-17 ASSESSMENT — ASTHMA QUESTIONNAIRES
ACT_TOTALSCORE: 19
QUESTION_5 LAST FOUR WEEKS HOW WOULD YOU RATE YOUR ASTHMA CONTROL: WELL CONTROLLED
ACT_TOTALSCORE: 19
QUESTION_1 LAST FOUR WEEKS HOW MUCH OF THE TIME DID YOUR ASTHMA KEEP YOU FROM GETTING AS MUCH DONE AT WORK, SCHOOL OR AT HOME: A LITTLE OF THE TIME
QUESTION_2 LAST FOUR WEEKS HOW OFTEN HAVE YOU HAD SHORTNESS OF BREATH: ONCE OR TWICE A WEEK
QUESTION_4 LAST FOUR WEEKS HOW OFTEN HAVE YOU USED YOUR RESCUE INHALER OR NEBULIZER MEDICATION (SUCH AS ALBUTEROL): TWO OR THREE TIMES PER WEEK
QUESTION_3 LAST FOUR WEEKS HOW OFTEN DID YOUR ASTHMA SYMPTOMS (WHEEZING, COUGHING, SHORTNESS OF BREATH, CHEST TIGHTNESS OR PAIN) WAKE YOU UP AT NIGHT OR EARLIER THAN USUAL IN THE MORNING: ONCE OR TWICE

## 2024-07-18 RX ORDER — PRAVASTATIN SODIUM 80 MG/1
80 TABLET ORAL EVERY EVENING
Qty: 100 TABLET | Refills: 2 | OUTPATIENT
Start: 2024-07-18

## 2024-07-22 ENCOUNTER — OFFICE VISIT (OUTPATIENT)
Dept: FAMILY MEDICINE | Facility: CLINIC | Age: 69
End: 2024-07-22
Payer: COMMERCIAL

## 2024-07-22 VITALS
RESPIRATION RATE: 16 BRPM | HEIGHT: 64 IN | OXYGEN SATURATION: 96 % | HEART RATE: 80 BPM | SYSTOLIC BLOOD PRESSURE: 126 MMHG | TEMPERATURE: 97.6 F | WEIGHT: 278 LBS | BODY MASS INDEX: 47.46 KG/M2 | DIASTOLIC BLOOD PRESSURE: 80 MMHG

## 2024-07-22 DIAGNOSIS — E66.01 MORBID OBESITY (H): ICD-10-CM

## 2024-07-22 DIAGNOSIS — R74.8 ELEVATED CK: ICD-10-CM

## 2024-07-22 DIAGNOSIS — I87.2 CHRONIC VENOUS INSUFFICIENCY: ICD-10-CM

## 2024-07-22 DIAGNOSIS — J45.30 MILD PERSISTENT ASTHMA WITHOUT COMPLICATION: ICD-10-CM

## 2024-07-22 DIAGNOSIS — I10 HYPERTENSION GOAL BP (BLOOD PRESSURE) < 130/80: Primary | ICD-10-CM

## 2024-07-22 DIAGNOSIS — R25.2 LEG CRAMPS: ICD-10-CM

## 2024-07-22 DIAGNOSIS — M17.0 PRIMARY OSTEOARTHRITIS OF BOTH KNEES: ICD-10-CM

## 2024-07-22 DIAGNOSIS — N18.32 STAGE 3B CHRONIC KIDNEY DISEASE (H): ICD-10-CM

## 2024-07-22 LAB
ANION GAP SERPL CALCULATED.3IONS-SCNC: 8 MMOL/L (ref 7–15)
BUN SERPL-MCNC: 23.3 MG/DL (ref 8–23)
CALCIUM SERPL-MCNC: 9.6 MG/DL (ref 8.8–10.4)
CHLORIDE SERPL-SCNC: 104 MMOL/L (ref 98–107)
CK SERPL-CCNC: 223 U/L (ref 26–192)
CREAT SERPL-MCNC: 1.56 MG/DL (ref 0.51–0.95)
EGFRCR SERPLBLD CKD-EPI 2021: 36 ML/MIN/1.73M2
GLUCOSE SERPL-MCNC: 66 MG/DL (ref 70–99)
HCO3 SERPL-SCNC: 29 MMOL/L (ref 22–29)
MAGNESIUM SERPL-MCNC: 2.1 MG/DL (ref 1.7–2.3)
PHOSPHATE SERPL-MCNC: 2.8 MG/DL (ref 2.5–4.5)
POTASSIUM SERPL-SCNC: 4.1 MMOL/L (ref 3.4–5.3)
SODIUM SERPL-SCNC: 141 MMOL/L (ref 135–145)

## 2024-07-22 PROCEDURE — 80048 BASIC METABOLIC PNL TOTAL CA: CPT | Performed by: FAMILY MEDICINE

## 2024-07-22 PROCEDURE — 84100 ASSAY OF PHOSPHORUS: CPT | Performed by: FAMILY MEDICINE

## 2024-07-22 PROCEDURE — 99214 OFFICE O/P EST MOD 30 MIN: CPT | Performed by: FAMILY MEDICINE

## 2024-07-22 PROCEDURE — 36415 COLL VENOUS BLD VENIPUNCTURE: CPT | Performed by: FAMILY MEDICINE

## 2024-07-22 PROCEDURE — 83735 ASSAY OF MAGNESIUM: CPT | Performed by: FAMILY MEDICINE

## 2024-07-22 PROCEDURE — 82550 ASSAY OF CK (CPK): CPT | Performed by: FAMILY MEDICINE

## 2024-07-22 PROCEDURE — G2211 COMPLEX E/M VISIT ADD ON: HCPCS | Performed by: FAMILY MEDICINE

## 2024-07-22 RX ORDER — TRIAMTERENE AND HYDROCHLOROTHIAZIDE 75; 50 MG/1; MG/1
1 TABLET ORAL DAILY
Qty: 100 TABLET | Refills: 3 | Status: SHIPPED | OUTPATIENT
Start: 2024-07-22

## 2024-07-22 RX ORDER — RESPIRATORY SYNCYTIAL VIRUS VACCINE 120MCG/0.5
0.5 KIT INTRAMUSCULAR ONCE
Qty: 1 EACH | Refills: 0 | Status: CANCELLED | OUTPATIENT
Start: 2024-07-22 | End: 2024-07-22

## 2024-07-22 NOTE — PROGRESS NOTES
Assessment & Plan     Hypertension goal BP (blood pressure) < 130/80  Just at goal.  We considered adding spironolactone but decided to have her continue her current regimen for now (Maxzide + amlodipine).  - triamterene-HCTZ (MAXZIDE) 75-50 MG tablet  Dispense: 100 tablet; Refill: 3  - Basic metabolic panel  (Ca, Cl, CO2, Creat, Gluc, K, Na, BUN)    Stage 3b chronic kidney disease (H)  - Basic metabolic panel  (Ca, Cl, CO2, Creat, Gluc, K, Na, BUN)    Chronic venous insufficiency  Discussed relevant anatomy, pathophysiology, and etiology of this condition, including anatomy/physiology of the veins which must return blood to heart against gravity.  Discussed risk factors for chronic venous insufficiency and exacerbating factors including amlodipine and prolonged standing in one place.  Reviewed treatment options, including elevation, compression stockings, and diuretics.  Discussed that we could adjust her anti-hypertensive regimen to use a lower dose of amlodipine but she is hesitant to make a change at this time as the amlodipine is working well to control her BP.  - triamterene-HCTZ (MAXZIDE) 75-50 MG tablet  Dispense: 100 tablet; Refill: 3  - Compression Sleeve/Stocking Order for DME - ONLY FOR DME    Leg cramps  I gave her the Up to Date handout on nocturnal leg cramps although she gets muscle spasms in the daytime as well.    - CK total  - Magnesium  - Phosphorus  - Basic metabolic panel  (Ca, Cl, CO2, Creat, Gluc, K, Na, BUN)    Mild persistent asthma without complication  Recent exacerbation which is improving - Continue current medication regimen.     BMI > 40 (H)  She has lost some weight since ortho advised her last month that she needs to lose weight prior to TKA for her knee OA.  Wt Readings from Last 3 Encounters:   07/22/24 126.1 kg (278 lb)   06/04/24 131.9 kg (290 lb 12.8 oz)   04/09/24 131.1 kg (289 lb)   Estimated body mass index is 47.46 kg/m  as calculated from the following:    Height as of  "this encounter: 1.63 m (5' 4.17\").    Weight as of this encounter: 126.1 kg (278 lb).     The longitudinal plan of care for the diagnosis(es)/condition(s) as documented were addressed during this visit. Due to the added complexity in care, I will continue to support Teri in the subsequent management and with ongoing continuity of care.    Subjective   Teri is a 68 year old, presenting for the following health issues:  Hypertension        7/22/2024    10:38 AM   Additional Questions   Roomed by Linda     History of Present Illness       Hypertension: She presents for follow up of hypertension.  She does not check blood pressure  regularly outside of the clinic. Outside blood pressures have been over 140/90. She does not follow a low salt diet.     She eats 0-1 servings of fruits and vegetables daily.She consumes 0 sweetened beverage(s) daily.She exercises with enough effort to increase her heart rate 30 to 60 minutes per day.  She exercises with enough effort to increase her heart rate 4 days per week.   She is taking medications regularly.     Hypertension Follow-Up   BP Readings from Last 3 Encounters:   07/22/24 126/80   06/04/24 (!) 158/89   04/09/24 (!) 143/87          Asthma Follow-Up  She has a longstanding history of mild persistent asthma.   She uses Arnuity Ellipta for daily asthma controller.   Was in [place] recently on vacation.  Lots of walking - 15,000 steps per day.  Asthma was worse due to prevalence of stray cats (to which she is allergic) and prevalence of tobacco smoke exposure.  Was ACT completed today?  Yes        7/17/2024     1:36 PM   ACT Total Scores   ACT TOTAL SCORE (Goal Greater than or Equal to 20) 19   In the past 12 months, how many times did you visit the emergency room for your asthma without being admitted to the hospital? 0   In the past 12 months, how many times were you hospitalized overnight because of your asthma? 0         Leg Cramps  Not necessarily nocturnal.  She " "gets [employer] Horses in her calves frequently.  Sometimes in her ribs.          Objective    /80   Pulse 80   Temp 97.6  F (36.4  C) (Temporal)   Resp 16   Ht 1.63 m (5' 4.17\")   Wt 126.1 kg (278 lb)   SpO2 96%   BMI 47.46 kg/m    Body mass index is 47.46 kg/m .  Physical Exam   GENERAL: healthy, alert and no distress  EYES: Eyes grossly normal to inspection, conjunctivae and sclerae normal  RESP: lungs clear to auscultation - no rales, rhonchi or wheezes  CV: regular rate and rhythm, normal S1 S2, no S3 or S4, no murmur, click or rub  SKIN: no suspicious lesions or rashes  NEURO: Grossly normal strength and tone, mentation intact and speech normal  PSYCH: mentation appears normal, affect normal/bright        Signed Electronically by: Norma Serrano MD    "

## 2024-07-23 NOTE — RESULT ENCOUNTER NOTE
García Williamson,  Your basic metabolic panel results (blood salts, blood sugar, and kidney function) and your magnesium and phosphorus levels are stable.  (Actually your blood sugar was quite low but you didn't seem to be feeling unwell.)      Your CK (muscle enzyme) level is a little elevated.  I recommend that you discontinue the pravastatin and let's recheck the CK level in about a month.  I placed the order for that and you can schedule a lab-only appointment mid-August.      Also, I see that Ortho did refer you to the [facility].  I think that is a good idea as you do have fairly advanced knee osteoarthritis and if you eventually need surgery (a knee replacement) your recovery will be easier if you weigh less.  You can call [phone] to schedule that. (I do see that you've lost some weight on your own which is excellent!)    Norma Serrano MD

## 2024-07-29 ENCOUNTER — TELEPHONE (OUTPATIENT)
Dept: FAMILY MEDICINE | Facility: CLINIC | Age: 69
End: 2024-07-29
Payer: COMMERCIAL

## 2024-07-29 DIAGNOSIS — J45.31 MILD PERSISTENT ASTHMA WITH ACUTE EXACERBATION: ICD-10-CM

## 2024-07-29 RX ORDER — FLUTICASONE FUROATE 200 UG/1
1 POWDER RESPIRATORY (INHALATION) DAILY
Qty: 90 EACH | Refills: 3 | Status: SHIPPED | OUTPATIENT
Start: 2024-07-29 | End: 2025-07-29

## 2024-07-29 NOTE — TELEPHONE ENCOUNTER
Patient did read the results.    Written by Norma Serrano MD on 7/22/2024  7:14 PM CDT  Seen by patient Teri MAYES Taya on 7/23/2024 10:20 AM    Leigh Ordonez RN  [facility]

## 2024-07-29 NOTE — TELEPHONE ENCOUNTER
Please call patient to review result note and plan.  I received notification of unread [employer] message with the following plan:    Hi Teri,  Your basic metabolic panel results (blood salts, blood sugar, and kidney function) and your magnesium and phosphorus levels are stable.  (Actually your blood sugar was quite low but you didn't seem to be feeling unwell.)       Your CK (muscle enzyme) level is a little elevated.  I recommend that you discontinue the pravastatin and let's recheck the CK level in about a month.  I placed the order for that and you can schedule a lab-only appointment mid-August.       Also, I see that Ortho did refer you to the [facility].  I think that is a good idea as you do have fairly advanced knee osteoarthritis and if you eventually need surgery (a knee replacement) your recovery will be easier if you weigh less.  You can call [phone] to schedule that. (I do see that you've lost some weight on your own which is excellent!)     Norma Serrano MD   Written by Norma Serrano MD on 7/22/2024  7:14 PM CDT

## 2024-08-27 ENCOUNTER — LAB (OUTPATIENT)
Dept: LAB | Facility: CLINIC | Age: 69
End: 2024-08-27
Payer: COMMERCIAL

## 2024-08-27 DIAGNOSIS — R74.8 ELEVATED CK: ICD-10-CM

## 2024-08-27 DIAGNOSIS — R25.2 LEG CRAMPS: ICD-10-CM

## 2024-08-27 LAB — CK SERPL-CCNC: 201 U/L (ref 26–192)

## 2024-08-27 PROCEDURE — 82550 ASSAY OF CK (CPK): CPT

## 2024-08-27 PROCEDURE — 36415 COLL VENOUS BLD VENIPUNCTURE: CPT

## 2024-08-30 ENCOUNTER — TRANSFERRED RECORDS (OUTPATIENT)
Dept: HEALTH INFORMATION MANAGEMENT | Facility: CLINIC | Age: 69
End: 2024-08-30

## 2024-09-11 ENCOUNTER — TELEPHONE (OUTPATIENT)
Dept: FAMILY MEDICINE | Facility: CLINIC | Age: 69
End: 2024-09-11
Payer: COMMERCIAL

## 2024-09-11 NOTE — RESULT ENCOUNTER NOTE
García Williamson,  I apologize for not commenting sooner on this CK level.      It is still mildly elevated and so I do recommend that you continue to avoid the pravastatin and I'd like to see you in clinic next month to reassess your muscle symptoms and recheck labs.      I will have a  reach out to you to set that up.    Norma Serrano MD

## 2024-09-11 NOTE — TELEPHONE ENCOUNTER
"Please schedule patient to see me in person in a month for follow-up on muscle pain and elevated CK.      OK to use \"APPROVAL REQ\" slot.  "

## 2024-10-09 ASSESSMENT — ASTHMA QUESTIONNAIRES
QUESTION_1 LAST FOUR WEEKS HOW MUCH OF THE TIME DID YOUR ASTHMA KEEP YOU FROM GETTING AS MUCH DONE AT WORK, SCHOOL OR AT HOME: NONE OF THE TIME
ACT_TOTALSCORE: 24
QUESTION_3 LAST FOUR WEEKS HOW OFTEN DID YOUR ASTHMA SYMPTOMS (WHEEZING, COUGHING, SHORTNESS OF BREATH, CHEST TIGHTNESS OR PAIN) WAKE YOU UP AT NIGHT OR EARLIER THAN USUAL IN THE MORNING: NOT AT ALL
ACT_TOTALSCORE: 24
QUESTION_2 LAST FOUR WEEKS HOW OFTEN HAVE YOU HAD SHORTNESS OF BREATH: NOT AT ALL
QUESTION_4 LAST FOUR WEEKS HOW OFTEN HAVE YOU USED YOUR RESCUE INHALER OR NEBULIZER MEDICATION (SUCH AS ALBUTEROL): NOT AT ALL
QUESTION_5 LAST FOUR WEEKS HOW WOULD YOU RATE YOUR ASTHMA CONTROL: WELL CONTROLLED

## 2024-10-12 ENCOUNTER — TRANSFERRED RECORDS (OUTPATIENT)
Dept: MULTI SPECIALTY CLINIC | Facility: CLINIC | Age: 69
End: 2024-10-12
Payer: COMMERCIAL

## 2024-10-14 ENCOUNTER — OFFICE VISIT (OUTPATIENT)
Dept: FAMILY MEDICINE | Facility: CLINIC | Age: 69
End: 2024-10-14
Payer: COMMERCIAL

## 2024-10-14 VITALS
HEART RATE: 57 BPM | HEIGHT: 64 IN | DIASTOLIC BLOOD PRESSURE: 85 MMHG | BODY MASS INDEX: 46.44 KG/M2 | RESPIRATION RATE: 16 BRPM | SYSTOLIC BLOOD PRESSURE: 130 MMHG | TEMPERATURE: 97.6 F | OXYGEN SATURATION: 99 % | WEIGHT: 272 LBS

## 2024-10-14 DIAGNOSIS — E66.01 MORBID OBESITY (H): ICD-10-CM

## 2024-10-14 DIAGNOSIS — M79.10 MYALGIA: ICD-10-CM

## 2024-10-14 DIAGNOSIS — R21 FACIAL RASH: ICD-10-CM

## 2024-10-14 DIAGNOSIS — R74.8 ELEVATED CK: Primary | ICD-10-CM

## 2024-10-14 LAB
ALT SERPL W P-5'-P-CCNC: 30 U/L (ref 0–50)
AST SERPL W P-5'-P-CCNC: 32 U/L (ref 0–45)
BASOPHILS # BLD AUTO: 0 10E3/UL (ref 0–0.2)
BASOPHILS NFR BLD AUTO: 1 %
CK SERPL-CCNC: 295 U/L (ref 26–192)
EOSINOPHIL # BLD AUTO: 0.3 10E3/UL (ref 0–0.7)
EOSINOPHIL NFR BLD AUTO: 5 %
ERYTHROCYTE [DISTWIDTH] IN BLOOD BY AUTOMATED COUNT: 15.2 % (ref 10–15)
ERYTHROCYTE [SEDIMENTATION RATE] IN BLOOD BY WESTERGREN METHOD: 21 MM/HR (ref 0–30)
HCT VFR BLD AUTO: 42 % (ref 35–47)
HGB BLD-MCNC: 14.2 G/DL (ref 11.7–15.7)
IMM GRANULOCYTES # BLD: 0 10E3/UL
IMM GRANULOCYTES NFR BLD: 0 %
LDH SERPL L TO P-CCNC: 132 U/L (ref 0–250)
LYMPHOCYTES # BLD AUTO: 2.2 10E3/UL (ref 0.8–5.3)
LYMPHOCYTES NFR BLD AUTO: 43 %
MCH RBC QN AUTO: 24.6 PG (ref 26.5–33)
MCHC RBC AUTO-ENTMCNC: 33.8 G/DL (ref 31.5–36.5)
MCV RBC AUTO: 73 FL (ref 78–100)
MONOCYTES # BLD AUTO: 0.5 10E3/UL (ref 0–1.3)
MONOCYTES NFR BLD AUTO: 10 %
NEUTROPHILS # BLD AUTO: 2.1 10E3/UL (ref 1.6–8.3)
NEUTROPHILS NFR BLD AUTO: 41 %
PLATELET # BLD AUTO: 274 10E3/UL (ref 150–450)
RBC # BLD AUTO: 5.78 10E6/UL (ref 3.8–5.2)
WBC # BLD AUTO: 5.1 10E3/UL (ref 4–11)

## 2024-10-14 PROCEDURE — 85652 RBC SED RATE AUTOMATED: CPT | Performed by: FAMILY MEDICINE

## 2024-10-14 PROCEDURE — 84460 ALANINE AMINO (ALT) (SGPT): CPT | Performed by: FAMILY MEDICINE

## 2024-10-14 PROCEDURE — 84450 TRANSFERASE (AST) (SGOT): CPT | Performed by: FAMILY MEDICINE

## 2024-10-14 PROCEDURE — 36415 COLL VENOUS BLD VENIPUNCTURE: CPT | Performed by: FAMILY MEDICINE

## 2024-10-14 PROCEDURE — 99000 SPECIMEN HANDLING OFFICE-LAB: CPT | Performed by: FAMILY MEDICINE

## 2024-10-14 PROCEDURE — 83615 LACTATE (LD) (LDH) ENZYME: CPT | Performed by: FAMILY MEDICINE

## 2024-10-14 PROCEDURE — G2211 COMPLEX E/M VISIT ADD ON: HCPCS | Performed by: FAMILY MEDICINE

## 2024-10-14 PROCEDURE — 82085 ASSAY OF ALDOLASE: CPT | Mod: 90 | Performed by: FAMILY MEDICINE

## 2024-10-14 PROCEDURE — 86038 ANTINUCLEAR ANTIBODIES: CPT | Performed by: FAMILY MEDICINE

## 2024-10-14 PROCEDURE — 85025 COMPLETE CBC W/AUTO DIFF WBC: CPT | Performed by: FAMILY MEDICINE

## 2024-10-14 PROCEDURE — 82550 ASSAY OF CK (CPK): CPT | Performed by: FAMILY MEDICINE

## 2024-10-14 PROCEDURE — 99214 OFFICE O/P EST MOD 30 MIN: CPT | Performed by: FAMILY MEDICINE

## 2024-10-14 PROCEDURE — 90480 ADMN SARSCOV2 VAC 1/ONLY CMP: CPT | Performed by: FAMILY MEDICINE

## 2024-10-14 PROCEDURE — 91320 SARSCV2 VAC 30MCG TRS-SUC IM: CPT | Performed by: FAMILY MEDICINE

## 2024-10-14 ASSESSMENT — PAIN SCALES - GENERAL: PAINLEVEL: NO PAIN (0)

## 2024-10-14 NOTE — PROGRESS NOTES
Assessment & Plan     Elevated CK  Myalgia  Unclear etiology/diagnosis.  We'll start work-up for possible auto-immune/CTD disorder such as polymyositis.    - CK total  - Aldolase  - AST  - ALT  - Lactate Dehydrogenase  - ESR: Erythrocyte sedimentation rate  - Anti Nuclear Jessica IgG by IFA with Reflex  - CBC with platelets and differential    BMI > 40 (H)  I offered to prescribe weight-loss medication.  Discussed R/B/SE of GLP-1 agonists including benefits of moderate weight loss and potential decrease in cardiovascular events, rare risks of MILDRED with dehydration, gallbladder disease, pancreatitis, and medullary thyroid cancer.  Reviewed potential GI side effects including nausea, vomiting, diarrhea and advised patient stay well-hydrated while on this medication and to notify clinic for mass in neck, dysphagia, severe upper abdominal pain.     - Semaglutide-Weight Management (WEGOVY) 0.25 MG/0.5ML pen  Dispense: 2 mL; Refill: 0  - Semaglutide-Weight Management (WEGOVY) 0.5 MG/0.5ML pen  Dispense: 2 mL; Refill: 0    She'll see me in 2 months for her medicare annual wellness visit (preventive visit) and we'll assess for further dose increases at that time.      Bea Williamson is a 69 year old, presenting for the following health issues:  Follow Up (Follow Up )        10/14/2024    10:13 AM   Additional Questions   Roomed by Zabrina Ochoa     History of Present Illness       Reason for visit:  Follow up and more blood work  Symptom onset:  More than a month  Symptoms include:  Crapping in my legs and ribs  Symptom intensity:  Severe  Symptom progression:  Improving  Had these symptoms before:  No  What makes it worse:  Dont know  What makes it better:  Banana's  lost of water ice packs.   She is taking medications regularly.     Myalgias Follow-Up   Muscle aches in torso and abdomen and legs (thighs and calves) and shoulders.    CK was slightly elevated.  She stopped statin and CK came down a bit but was still  "elevated.  Myalgias are gradually improving.  Fingers cramp and lock up.  No red, hot, swollen joints.  Longstanding asthma  Uses famotidine for GERD  No esophageal dysphagia but does aspirate easily while drinking fluids.  No rashes.    Obesity  She's been going to a weight loss clinic.  She was referred to [facility] but can't afford the co-pays.    Wt Readings from Last 3 Encounters:   10/14/24 123.4 kg (272 lb)   07/22/24 126.1 kg (278 lb)   06/04/24 131.9 kg (290 lb 12.8 oz)            Objective    /85 (BP Location: Right arm, Patient Position: Sitting, Cuff Size: Adult Large)   Pulse 57   Temp 97.6  F (36.4  C) (Temporal)   Resp 16   Ht 1.626 m (5' 4\")   Wt 123.4 kg (272 lb)   SpO2 99%   BMI 46.69 kg/m    Body mass index is 46.69 kg/m .  Physical Exam   GENERAL: healthy, alert and no distress  EYES: Eyes grossly normal to inspection, conjunctivae and sclerae normal  RESP: lungs clear to auscultation - no rales, rhonchi or wheezes  CV: regular rate and rhythm, normal S1 S2, no S3 or S4, no murmur, click or rub  ABDOMEN: soft, nontender, no hepatosplenomegaly, no masses  MS: no gross musculoskeletal defects noted, no edema, redness, or tenderness over joints  SKIN: dark rash/discoloration around bilateral eyes and on her cheeks - appears to be hyperpigmentation (as opposed to violaceous)  NEURO: Grossly normal strength and tone  PSYCH: mentation appears normal, affect normal/bright        Signed Electronically by: Norma Serrano MD    "

## 2024-10-15 LAB — ANA SER QL IF: NEGATIVE

## 2024-10-16 ENCOUNTER — MYC MEDICAL ADVICE (OUTPATIENT)
Dept: FAMILY MEDICINE | Facility: CLINIC | Age: 69
End: 2024-10-16
Payer: COMMERCIAL

## 2024-10-16 LAB — ALDOLASE SERPL-CCNC: 5.4 U/L

## 2024-10-16 NOTE — TELEPHONE ENCOUNTER
Spoke with patient and is okay with not going through with the PA's needed for the wegovy.    If patient would like to try later on, she will talk at her next visit.    Zabrina Ivy VF

## 2024-10-18 NOTE — RESULT ENCOUNTER NOTE
García Williamson,  This is super puzzling.  Your CK remains elevated.  You are not on any of the medications that I know of that can cause this.  The rest of your rheumatology labs are normal.     I would like you to have a consultation with a rheumatologist to see if they recommend any further evaluation or testing.  I placed a referral for that. The [facility]  will call you to coordinate your care as prescribed by your provider. A representative will call you within 1 business day to help schedule your appointment, or you may contact the  Representative at [phone].    In the mean time let me know if any of your muscle/joint pains worsen or you develop other new symptoms.  Norma Serrano MD

## 2024-10-24 ENCOUNTER — TELEPHONE (OUTPATIENT)
Dept: RHEUMATOLOGY | Facility: CLINIC | Age: 69
End: 2024-10-24
Payer: COMMERCIAL

## 2024-10-24 NOTE — TELEPHONE ENCOUNTER
Patient confirmed scheduled appointment:  Date: October 31st, 2024  Time: 10am  Visit type: New Rheumatology  Provider: Dr. Gilbert  Location: [place]  Testing/imaging: NA  Additional notes: Scheduled referral

## 2024-10-29 DIAGNOSIS — I10 HYPERTENSION GOAL BP (BLOOD PRESSURE) < 140/90: ICD-10-CM

## 2024-10-30 RX ORDER — AMLODIPINE BESYLATE 5 MG/1
TABLET ORAL
Qty: 100 TABLET | Refills: 2 | OUTPATIENT
Start: 2024-10-30

## 2024-10-31 ENCOUNTER — OFFICE VISIT (OUTPATIENT)
Dept: RHEUMATOLOGY | Facility: CLINIC | Age: 69
End: 2024-10-31
Payer: COMMERCIAL

## 2024-10-31 VITALS
HEART RATE: 62 BPM | SYSTOLIC BLOOD PRESSURE: 133 MMHG | DIASTOLIC BLOOD PRESSURE: 86 MMHG | BODY MASS INDEX: 47.03 KG/M2 | WEIGHT: 274 LBS | OXYGEN SATURATION: 96 %

## 2024-10-31 DIAGNOSIS — R21 FACIAL RASH: ICD-10-CM

## 2024-10-31 DIAGNOSIS — M79.10 MYALGIA: ICD-10-CM

## 2024-10-31 DIAGNOSIS — R74.8 ELEVATED CK: ICD-10-CM

## 2024-10-31 PROCEDURE — 99205 OFFICE O/P NEW HI 60 MIN: CPT | Performed by: INTERNAL MEDICINE

## 2024-10-31 ASSESSMENT — PAIN SCALES - GENERAL: PAINLEVEL_OUTOF10: MODERATE PAIN (4)

## 2024-10-31 NOTE — PROGRESS NOTES
New patient referred by Dr. Norma Serrano from for evaluation of myalgias and elevated CK.    Teri is 69 years old and she describes having leg cramps in the calves when walking especially on high heels.  However she has them all so at nighttime.  In addition she has spasms around her lower chest.  She was on a cholesterol medication for years but this was stopped just 2 months ago with the consideration that perhaps this cholesterol agent could contribute to myalgia.  This prompted a CK check on several occasions and was slightly elevated at each time point.  CK was checked 2 weeks ago and it was 295, 2 months ago it was 201 and 3 months ago it was 223.  Specifically she denies any muscle weakness.  She uses a flight of stairs to get out of her apartment.   She denies any difficulty sleeping or waking up not well rested, but she does snore apparently.  She is not aware of any apnea episodes.  She also has low back pain that gets worse with standing for prolonged periods of time.  It improves when she sits down.    Social history  Distant history of smoking  She is physically active and she likes traveling    Past medical history  Hypercholesterolemia  Mild asthma  Obesity  GERD  Hypertension  Osteoarthritis of both knees  Allergic rhinitis  Chronic kidney disease, with a GFR around 36    Family history  There is no autoimmune disease in family but her mother did have arthritis although it was unclear what kind.    Physical examination  Vital signs were stable but her BMI is 47  Joint examination shows that she has no active synovitis in the hands, wrist, elbows, shoulders with full range of motion and normal strength.  Lower extremity exam shows that she has no active synovitis in the knees ankles or feet.  She has excellent muscle strength in the lower extremities.  There is evidence of osteoarthritis in both knees, ankles are normal but she does have bunions bilaterally as well as collapsed arches.  She has a  waddle in her gait, but no limp.    I reviewed her blood test results and she has a CBC that shows a normal white count hemoglobin and platelets but a low MCV.  Chemistry shows a low GFR that is chronic in the 36-40 range.  She had mild elevation of her CK at the highest 295.    Impression/plan  Her main complaint is muscle cramps and muscle pain.  She does not have any muscle weakness.  Muscle weakness would be a primary concern for inflammatory myopathy or statin induced myopathy.  1.  She has good muscle strength throughout. She has mild elevation of her CK.  It is unlikely that she has an autoimmune myopathy.  2.  I wonder if she could have undiagnosed obstructive sleep apnea.  This condition is frequently produces low elevation of CK levels and also may cause muscle cramps and spasms.  Given her BMI of 47 she is at high risk for having obstructive sleep apnea.  I suggest her primary physician consider ordering a sleep study.  3.  An alternative common explanation is statin induced myalgias with mild CK elevation that normalized after discontinuation of the statin.  Holding the statin for at least 3 months is important.  Perhaps a repeat of her lipid profile may demonstrate that she does not have to restart statin.  4.  I suggest that she repeat her CK and December 2024 if still elevated or higher we might consider proceeding with a myositis panel and an antibody against the HMG Co. a reductase enzyme, which is the hallmark for statin induced myopathy.  Again this seems very unlikely in the absence of muscle weakness.  5.  The etiology of the reduced kidney function is unclear to me.  6.  She has significant osteoarthritis of both knees with the right knee worse than left.  7.  Obesity with a BMI of 47.  8.  Follow-up visit if needed after her blood test in the beginning of December.    60 minutes spent on the date of the encounter doing chart review, history and exam, documentation and further activities per  the note.

## 2024-10-31 NOTE — NURSING NOTE
Chief Complaint   Patient presents with    New Patient      - Elevated CK   Myalgia   - Facial rash       Vitals:    10/31/24 0900   BP: 133/86   BP Location: Left arm   Patient Position: Sitting   Cuff Size: Adult Large   Pulse: 62   SpO2: 96%   Weight: 124.3 kg (274 lb)       Body mass index is 47.03 kg/m .    Donita Corona, MIREILLE

## 2024-12-09 ENCOUNTER — PATIENT SELF-TRIAGE (OUTPATIENT)
Dept: URGENT CARE | Facility: CLINIC | Age: 69
End: 2024-12-09
Payer: COMMERCIAL

## 2024-12-09 ENCOUNTER — PATIENT OUTREACH (OUTPATIENT)
Dept: GASTROENTEROLOGY | Facility: CLINIC | Age: 69
End: 2024-12-09
Payer: COMMERCIAL

## 2024-12-09 DIAGNOSIS — Z12.11 SPECIAL SCREENING FOR MALIGNANT NEOPLASMS, COLON: ICD-10-CM

## 2024-12-09 DIAGNOSIS — Z12.11 SPECIAL SCREENING FOR MALIGNANT NEOPLASMS, COLON: Primary | ICD-10-CM

## 2024-12-09 SDOH — HEALTH STABILITY: PHYSICAL HEALTH: ON AVERAGE, HOW MANY MINUTES DO YOU ENGAGE IN EXERCISE AT THIS LEVEL?: 30 MIN

## 2024-12-09 SDOH — HEALTH STABILITY: PHYSICAL HEALTH: ON AVERAGE, HOW MANY DAYS PER WEEK DO YOU ENGAGE IN MODERATE TO STRENUOUS EXERCISE (LIKE A BRISK WALK)?: 3 DAYS

## 2024-12-09 ASSESSMENT — ASTHMA QUESTIONNAIRES
QUESTION_1 LAST FOUR WEEKS HOW MUCH OF THE TIME DID YOUR ASTHMA KEEP YOU FROM GETTING AS MUCH DONE AT WORK, SCHOOL OR AT HOME: NONE OF THE TIME
QUESTION_4 LAST FOUR WEEKS HOW OFTEN HAVE YOU USED YOUR RESCUE INHALER OR NEBULIZER MEDICATION (SUCH AS ALBUTEROL): ONCE A WEEK OR LESS
QUESTION_5 LAST FOUR WEEKS HOW WOULD YOU RATE YOUR ASTHMA CONTROL: COMPLETELY CONTROLLED
QUESTION_2 LAST FOUR WEEKS HOW OFTEN HAVE YOU HAD SHORTNESS OF BREATH: NOT AT ALL
ACT_TOTALSCORE: 20
QUESTION_3 LAST FOUR WEEKS HOW OFTEN DID YOUR ASTHMA SYMPTOMS (WHEEZING, COUGHING, SHORTNESS OF BREATH, CHEST TIGHTNESS OR PAIN) WAKE YOU UP AT NIGHT OR EARLIER THAN USUAL IN THE MORNING: FOUR OR MORE NIGHTS A WEEK
ACT_TOTALSCORE: 20

## 2024-12-09 ASSESSMENT — SOCIAL DETERMINANTS OF HEALTH (SDOH): HOW OFTEN DO YOU GET TOGETHER WITH FRIENDS OR RELATIVES?: THREE TIMES A WEEK

## 2024-12-09 NOTE — PROGRESS NOTES
"[facility] Screening Colonoscopy Referral Review    Patient meets the inclusion criteria for screening colonoscopy standing order.    Ordering/Referring Provider:  Norma Serrano      BMI: Estimated body mass index is 47.03 kg/m  as calculated from the following:    Height as of 10/14/24: 1.626 m (5' 4\").    Weight as of 10/31/24: 124.3 kg (274 lb).     Sedation:  Does patient have any of the following conditions affecting sedation?  No medical conditions affecting sedation.    Previous Scopes:  Any previous recommendations or follow up needs based on previous scope?  na / No recommendations.    Medical Concerns to Postpone Order:  Does patient have any of the following medical concerns that should postpone/delay colonoscopy referral?  No medical conditions affecting colonoscopy referral.    Final Referral Details:  Based on patient's medical history patient is appropriate for referral order with moderate sedation. If patient's BMI > 50 do not schedule in [facility].  "

## 2024-12-12 ENCOUNTER — OFFICE VISIT (OUTPATIENT)
Dept: FAMILY MEDICINE | Facility: CLINIC | Age: 69
End: 2024-12-12
Attending: FAMILY MEDICINE
Payer: COMMERCIAL

## 2024-12-12 VITALS
HEIGHT: 64 IN | BODY MASS INDEX: 45.56 KG/M2 | OXYGEN SATURATION: 97 % | WEIGHT: 266.9 LBS | RESPIRATION RATE: 16 BRPM | SYSTOLIC BLOOD PRESSURE: 136 MMHG | DIASTOLIC BLOOD PRESSURE: 79 MMHG | HEART RATE: 64 BPM | TEMPERATURE: 97.5 F

## 2024-12-12 DIAGNOSIS — Z00.00 ENCOUNTER FOR MEDICARE ANNUAL WELLNESS EXAM: Primary | ICD-10-CM

## 2024-12-12 DIAGNOSIS — E66.01 MORBID OBESITY (H): ICD-10-CM

## 2024-12-12 DIAGNOSIS — E78.00 PURE HYPERCHOLESTEROLEMIA: ICD-10-CM

## 2024-12-12 DIAGNOSIS — Z12.11 SCREEN FOR COLON CANCER: ICD-10-CM

## 2024-12-12 DIAGNOSIS — G43.109 MIGRAINE WITH AURA AND WITHOUT STATUS MIGRAINOSUS, NOT INTRACTABLE: ICD-10-CM

## 2024-12-12 DIAGNOSIS — I10 HYPERTENSION GOAL BP (BLOOD PRESSURE) < 130/80: ICD-10-CM

## 2024-12-12 DIAGNOSIS — L30.9 DERMATITIS: ICD-10-CM

## 2024-12-12 DIAGNOSIS — K21.9 GASTROESOPHAGEAL REFLUX DISEASE, UNSPECIFIED WHETHER ESOPHAGITIS PRESENT: ICD-10-CM

## 2024-12-12 DIAGNOSIS — I87.2 CHRONIC VENOUS INSUFFICIENCY: ICD-10-CM

## 2024-12-12 DIAGNOSIS — F41.9 ANXIETY: ICD-10-CM

## 2024-12-12 DIAGNOSIS — N18.32 STAGE 3B CHRONIC KIDNEY DISEASE (H): ICD-10-CM

## 2024-12-12 DIAGNOSIS — M79.10 MYALGIA: ICD-10-CM

## 2024-12-12 LAB
ALBUMIN SERPL BCG-MCNC: 4 G/DL (ref 3.5–5.2)
ALP SERPL-CCNC: 62 U/L (ref 40–150)
ALT SERPL W P-5'-P-CCNC: 28 U/L (ref 0–50)
ANION GAP SERPL CALCULATED.3IONS-SCNC: 9 MMOL/L (ref 7–15)
AST SERPL W P-5'-P-CCNC: 31 U/L (ref 0–45)
BASOPHILS # BLD AUTO: 0 10E3/UL (ref 0–0.2)
BASOPHILS NFR BLD AUTO: 0 %
BILIRUB SERPL-MCNC: 0.6 MG/DL
BUN SERPL-MCNC: 23.9 MG/DL (ref 8–23)
CALCIUM SERPL-MCNC: 9.9 MG/DL (ref 8.8–10.4)
CHLORIDE SERPL-SCNC: 102 MMOL/L (ref 98–107)
CHOLEST SERPL-MCNC: 223 MG/DL
CK SERPL-CCNC: 224 U/L (ref 26–192)
CREAT SERPL-MCNC: 1.49 MG/DL (ref 0.51–0.95)
CREAT UR-MCNC: 121 MG/DL
EGFRCR SERPLBLD CKD-EPI 2021: 37 ML/MIN/1.73M2
EOSINOPHIL # BLD AUTO: 0.4 10E3/UL (ref 0–0.7)
EOSINOPHIL NFR BLD AUTO: 7 %
ERYTHROCYTE [DISTWIDTH] IN BLOOD BY AUTOMATED COUNT: 15.1 % (ref 10–15)
FASTING STATUS PATIENT QL REPORTED: YES
FASTING STATUS PATIENT QL REPORTED: YES
GLUCOSE SERPL-MCNC: 89 MG/DL (ref 70–99)
HCO3 SERPL-SCNC: 29 MMOL/L (ref 22–29)
HCT VFR BLD AUTO: 40.5 % (ref 35–47)
HDLC SERPL-MCNC: 67 MG/DL
HGB BLD-MCNC: 13.9 G/DL (ref 11.7–15.7)
IMM GRANULOCYTES # BLD: 0 10E3/UL
IMM GRANULOCYTES NFR BLD: 0 %
LDLC SERPL CALC-MCNC: 143 MG/DL
LYMPHOCYTES # BLD AUTO: 2.6 10E3/UL (ref 0.8–5.3)
LYMPHOCYTES NFR BLD AUTO: 50 %
MCH RBC QN AUTO: 24.6 PG (ref 26.5–33)
MCHC RBC AUTO-ENTMCNC: 34.3 G/DL (ref 31.5–36.5)
MCV RBC AUTO: 72 FL (ref 78–100)
MICROALBUMIN UR-MCNC: 19.7 MG/L
MICROALBUMIN/CREAT UR: 16.28 MG/G CR (ref 0–25)
MONOCYTES # BLD AUTO: 0.5 10E3/UL (ref 0–1.3)
MONOCYTES NFR BLD AUTO: 10 %
NEUTROPHILS # BLD AUTO: 1.7 10E3/UL (ref 1.6–8.3)
NEUTROPHILS NFR BLD AUTO: 33 %
NONHDLC SERPL-MCNC: 156 MG/DL
PLATELET # BLD AUTO: 240 10E3/UL (ref 150–450)
POTASSIUM SERPL-SCNC: 3.8 MMOL/L (ref 3.4–5.3)
PROT SERPL-MCNC: 7.6 G/DL (ref 6.4–8.3)
RBC # BLD AUTO: 5.64 10E6/UL (ref 3.8–5.2)
SODIUM SERPL-SCNC: 140 MMOL/L (ref 135–145)
TRIGL SERPL-MCNC: 64 MG/DL
TSH SERPL DL<=0.005 MIU/L-ACNC: 0.76 UIU/ML (ref 0.3–4.2)
WBC # BLD AUTO: 5.1 10E3/UL (ref 4–11)

## 2024-12-12 RX ORDER — FAMOTIDINE 20 MG/1
20 TABLET, FILM COATED ORAL DAILY PRN
Qty: 90 TABLET | Refills: 1 | Status: SHIPPED | OUTPATIENT
Start: 2024-12-12

## 2024-12-12 RX ORDER — SUMATRIPTAN SUCCINATE 25 MG/1
25 TABLET ORAL
Qty: 12 TABLET | Refills: 0 | Status: SHIPPED | OUTPATIENT
Start: 2024-12-12

## 2024-12-12 RX ORDER — HYDROXYZINE HYDROCHLORIDE 10 MG/1
TABLET, FILM COATED ORAL
Qty: 30 TABLET | Refills: 7 | Status: SHIPPED | OUTPATIENT
Start: 2024-12-12

## 2024-12-12 RX ORDER — AMLODIPINE BESYLATE 5 MG/1
TABLET ORAL
Qty: 90 TABLET | Refills: 4 | Status: SHIPPED | OUTPATIENT
Start: 2024-12-12

## 2024-12-12 RX ORDER — TRIAMCINOLONE ACETONIDE 1 MG/G
CREAM TOPICAL 2 TIMES DAILY PRN
Qty: 30 G | Refills: 0 | Status: SHIPPED | OUTPATIENT
Start: 2024-12-12

## 2024-12-12 RX ORDER — TRIAMTERENE AND HYDROCHLOROTHIAZIDE 75; 50 MG/1; MG/1
1 TABLET ORAL DAILY
Qty: 100 TABLET | Refills: 4 | Status: SHIPPED | OUTPATIENT
Start: 2024-12-12

## 2024-12-12 NOTE — PATIENT INSTRUCTIONS
"You are a candidate for the new RSV vaccine.  RSV (Respiratory Syncytial Virus) is a common respiratory virus that frequently causes the \"common cold\" in healthy young adults.  Unfortunately, it can cause a severe viral pneumonia in infants and older people - especially those with other chronic medical conditions (such as diabetes, asthma, heart disease, liver and kidney disease).  If you are on medicare you must get this vaccine at a pharmacy.  The pharmacist will let you know beforehand if there is a copay and can administer the vaccine.       Patient Education   Preventive Care Advice   This is general advice given by our system to help you stay healthy. However, your care team may have specific advice just for you. Please talk to your care team about your preventive care needs.  Nutrition  Eat 5 or more servings of fruits and vegetables each day.  Try wheat bread, brown rice and whole grain pasta (instead of white bread, rice, and pasta).  Get enough calcium and vitamin D. Check the label on foods and aim for 100% of the RDA (recommended daily allowance).  Lifestyle  Exercise at least 150 minutes each week  (30 minutes a day, 5 days a week).  Do muscle strengthening activities 2 days a week. These help control your weight and prevent disease.  No smoking.  Wear sunscreen to prevent skin cancer.  Have a dental exam and cleaning every 6 months.  Yearly exams  See your health care team every year to talk about:  Any changes in your health.  Any medicines your care team has prescribed.  Preventive care, family planning, and ways to prevent chronic diseases.  Shots (vaccines)   HPV shots (up to age 26), if you've never had them before.  Hepatitis B shots (up to age 59), if you've never had them before.  COVID-19 shot: Get this shot when it's due.  Flu shot: Get a flu shot every year.  Tetanus shot: Get a tetanus shot every 10 years.  Pneumococcal, hepatitis A, and RSV shots: Ask your care team if you need these based " on your risk.  Shingles shot (for age 50 and up)  General health tests  Diabetes screening:  Starting at age 35, Get screened for diabetes at least every 3 years.  If you are younger than age 35, ask your care team if you should be screened for diabetes.  Cholesterol test: At age 39, start having a cholesterol test every 5 years, or more often if advised.  Bone density scan (DEXA): At age 50, ask your care team if you should have this scan for osteoporosis (brittle bones).  Hepatitis C: Get tested at least once in your life.  STIs (sexually transmitted infections)  Before age 24: Ask your care team if you should be screened for STIs.  After age 24: Get screened for STIs if you're at risk. You are at risk for STIs (including HIV) if:  You are sexually active with more than one person.  You don't use condoms every time.  You or a partner was diagnosed with a sexually transmitted infection.  If you are at risk for HIV, ask about PrEP medicine to prevent HIV.  Get tested for HIV at least once in your life, whether you are at risk for HIV or not.  Cancer screening tests  Cervical cancer screening: If you have a cervix, begin getting regular cervical cancer screening tests starting at age 21.  Breast cancer scan (mammogram): If you've ever had breasts, begin having regular mammograms starting at age 40. This is a scan to check for breast cancer.  Colon cancer screening: It is important to start screening for colon cancer at age 45.  Have a colonoscopy test every 10 years (or more often if you're at risk) Or, ask your provider about stool tests like a FIT test every year or Cologuard test every 3 years.  To learn more about your testing options, visit:   .  For help making a decision, visit:   https://bit.ly/vo83732.  Prostate cancer screening test: If you have a prostate, ask your care team if a prostate cancer screening test (PSA) at age 55 is right for you.  Lung cancer screening: If you are a current or former smoker  ages 50 to 80, ask your care team if ongoing lung cancer screenings are right for you.  For informational purposes only. Not to replace the advice of your health care provider. Copyright   2023 [facility] [employer]. All rights reserved. Clinically reviewed by the [facility] Transitions Program. [employer] [id number] - REV 01/24.

## 2024-12-12 NOTE — PROGRESS NOTES
Preventive Care Visit  [facility]  Norma Serrano MD, Family Medicine  Dec 12, 2024      Assessment & Plan     Encounter for Medicare annual wellness exam  Reviewed/updated Health Maintenance     Screen for colon cancer  - Colonoscopy Screening  Referral    BMI > 40 (H)  She has lost 25# in the past 6 months.  She notes improvement of her knee pain with the weight loss.    Pure hypercholesterolemia  Holding statin for now.  Indication is CKD3b.     - Lipid panel reflex to direct LDL Fasting    Stage 3b chronic kidney disease (H)  Presumably secondary to longstanding HTN.  Awaiting creatinine from today's labs.  Consider referral to Nephrology.   - Albumin Random Urine Quantitative with Creat Ratio    Hypertension goal BP (blood pressure) < 130/80  stable/well controlled - Continue current medication regimen.   - triamterene-HCTZ (MAXZIDE) 75-50 MG tablet  Dispense: 100 tablet; Refill: 4    Chronic venous insufficiency  - triamterene-HCTZ (MAXZIDE) 75-50 MG tablet  Dispense: 100 tablet; Refill: 4    Migraine with aura and without status migrainosus, not intractable  Infrequent need/use of sumatriptan.    - SUMAtriptan (IMITREX) 25 MG tablet  Dispense: 12 tablet; Refill: 0    Gastroesophageal reflux disease, unspecified whether esophagitis present  stable/well controlled on famotidine.    - famotidine (PEPCID) 20 MG tablet  Dispense: 90 tablet; Refill: 1    Anxiety  She uses the hydroxyzine sparingly - one fill in the past year.  - hydrOXYzine HCl (ATARAX) 10 MG tablet  Dispense: 30 tablet; Refill: 7    Dermatitis  - triamcinolone (KENALOG) 0.1 % external cream  Dispense: 30 g; Refill: 0      Counseling  Appropriate preventive services were addressed with this patient via screening, questionnaire, or discussion as appropriate for fall prevention, nutrition, physical activity, Tobacco-use cessation, social engagement, weight loss and cognition.  Checklist reviewing preventive  services available has been given to the patient.  Reviewed patient's diet, addressing concerns and/or questions.   She is at risk for lack of exercise and has been provided with information to increase physical activity for the benefit of her well-being.   Patient reported safety concerns were addressed today.    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    HLD - Still staying off statin and notes that her leg cramps and knee pain are better.  Her CK was mildly elevated on multiple occasions, even after discontinuing the statin.  She saw Rheumatology 6 weeks ago and Dr. Gilbert did not have concern for a significant myopathy but he wanted to do some additional labs at this time.    Asthma Follow-Up  She has a longstanding history of mild persistent asthma.   She uses [employer] Ellipta for daily asthma controller.  She notes she had a recent URI but needed to use her albuterol MDI only once.   Was ACT completed today?  Yes        12/9/2024     1:03 PM   ACT Total Scores   ACT TOTAL SCORE (Goal Greater than or Equal to 20) 20    In the past 12 months, how many times did you visit the emergency room for your asthma without being admitted to the hospital? 0    In the past 12 months, how many times were you hospitalized overnight because of your asthma? 0        Patient-reported        Health Care Directive  Patient does not have a Health Care Directive: Discussed advance care planning with patient; information given to patient to review.      12/9/2024   General Health   How would you rate your overall physical health? Good   Feel stress (tense, anxious, or unable to sleep) Not at all            12/9/2024   Nutrition   Diet: Low salt    Low fat/cholesterol        Multiple values from one day are sorted in reverse-chronological order         12/9/2024   Exercise   Days per week of moderate/strenous exercise 3 days   Average minutes spent exercising at this level 30 min            12/9/2024   Social Factors   Frequency of gathering with friends or relatives Three times a week   Worry food won't last until get money to buy more No   Food not last or not have enough money for food? No   Do you have housing? (Housing is defined as stable permanent housing and does not include staying ouside in a car, in a tent, in an abandoned building, in an overnight shelter, or couch-surfing.) Yes   Are you worried about losing your housing? No   Lack of transportation? No   Unable to get utilities (heat,electricity)? No            12/9/2024   Fall Risk   Fallen 2 or more times in the past year? No     No    Trouble with walking or balance? No     No        Patient-reported    Multiple values from one day are sorted in reverse-chronological order          12/9/2024   Activities of Daily Living- Home Safety   Needs help with the following daily activites None of the above   Safety concerns in the home Poor lighting            12/9/2024   Dental   Dentist two times every year? Yes            12/9/2024   Hearing Screening   Hearing concerns? None of the above            12/9/2024   Driving Risk Screening   Patient/family members have concerns about driving No            12/9/2024   General Alertness/Fatigue Screening   Have you been more tired than usual lately? No            12/9/2024   Urinary Incontinence Screening   Bothered by leaking urine in past 6 months No            12/9/2024   TB Screening   Were you born outside of the [place]? No            Today's PHQ-2 Score:       12/12/2024    10:05 AM   PHQ-2 ( 1999 [employer])   Q1: Little interest or pleasure in doing things 1    Q2: Feeling down, depressed or hopeless 0    PHQ-2 Score 1    Q1: Little interest or pleasure in doing things Several days    Q2: Feeling down, depressed or hopeless Not at all   PHQ-2 Score 1       Patient-reported           2024   Substance Use   Alcohol more than 3/day or more than 7/wk No   Do you have a current opioid prescription? No   How severe/bad is pain from 1 to 10? 5/10   Do you use any other substances recreationally? No        Social History     Tobacco Use    Smoking status: Former     Current packs/day: 0.00     Types: Cigarettes     Quit date: 1966     Years since quittin.9    Smokeless tobacco: Never   Vaping Use    Vaping status: Never Used   Substance Use Topics    Alcohol use: Not Currently     Comment: wine 1-2 times per year    Drug use: Never          Mammogram Screening - Mammogram every 1-2 years updated in Health Maintenance based on mutual decision making      History of abnormal Pap smear: No - age 65 or older with adequate negative prior screening test results (3 consecutive negative cytology results, 2 consecutive negative cotesting results, or 2 consecutive negative HrHPV test results within 10 years, with the most recent test occurring within the recommended screening interval for the test used)        2005    12:00 AM   PAP / HPV   PAP (Historical) NIL      ASCVD Risk   The 10-year ASCVD risk score (Donald TABOR, et al., 2019) is: 11.5%    Values used to calculate the score:      Age: 69 years      Sex: Female      Is Non- : Yes      Diabetic: No      Tobacco smoker: No      Systolic Blood Pressure: 136 mmHg      Is BP treated: Yes      HDL Cholesterol: 71 mg/dL      Total Cholesterol: 165 mg/dL          Reviewed and updated as needed this visit by Provider   Tobacco  Allergies  Meds  Problems  Med Hx  Surg Hx  Fam Hx     Sexual Activity          BP Readings from Last 3 Encounters:   24 136/79   10/31/24 133/86   10/14/24 130/85    Wt Readings from Last 3 Encounters:   24 121.1 kg (266 lb 14.4 oz)   10/31/24 124.3 kg (274 lb)    10/14/24 123.4 kg (272 lb)         Wt Readings from Last 10 Encounters:   24 121.1 kg (266 lb 14.4 oz)   10/31/24 124.3 kg (274 lb)   10/14/24 123.4 kg (272 lb)   24 126.1 kg (278 lb)   24 131.9 kg (290 lb 12.8 oz)   24 131.1 kg (289 lb)   23 129.2 kg (284 lb 14.4 oz)   23 128.4 kg (283 lb)   23 126.6 kg (279 lb)   23 126.6 kg (279 lb)            Patient Active Problem List   Diagnosis    Classical migraine    Trochanteric bursitis    Allergic rhinitis    Osteoarthritis    Chronic venous insufficiency    Stage 3b chronic kidney disease (H)    Mild persistent asthma    Vitamin D deficiency    Hypertension goal BP (blood pressure) < 130/80    Pure hypercholesterolemia    GERD (gastroesophageal reflux disease)    History of acute renal failure    BMI > 40 (H)    Osteoarthritis of both sternoclavicular joints    Bilateral lower extremity pain    Combined forms of age-related cataract of both eyes    Diastolic dysfunction    Nocturnal leg cramps    Dyspepsia    Primary osteoarthritis of both knees     Past Surgical History:   Procedure Laterality Date    COLONOSCOPY  2004    [facility] [place] [facility]    [facility]OSCOPY  2015    [facility]    [facility] DILATION/CURETTAGE DIAG/THER NON OB  1989    D & C    HC REMOVAL GALLBLADDER      HYSTERECTOMY, PAP NO LONGER INDICATED  1990    Hysterectomy, Vaginal, ovaries intact    SURGICAL HISTORY OF -       cystoscopy    ZZC LIGATE FALLOPIAN TUBE         Social History     Tobacco Use    Smoking status: Former     Current packs/day: 0.00     Types: Cigarettes     Quit date: 1966     Years since quittin.9    Smokeless tobacco: Never   Substance Use Topics    Alcohol use: Not Currently     Comment: wine 1-2 times per year     Family History   Problem Relation Age of Onset    Coronary Artery Disease Mother     Diabetes Mother     Hypertension Mother     Arthritis Mother     Cerebrovascular Disease Mother         multiple     Obesity Mother         My whole family and 2 daughters    Diabetes Father     Hypertension Father     Asthma Father     Obesity Father     Asthma Brother     Obesity Brother         Heart abd cancer    Coronary Artery Disease Brother         Passed 12/16/2020    Pacemaker Brother     Lung Cancer Brother     Cancer Brother     Other Cancer Brother         Lung cancer    Diabetes Maternal Grandfather     Depression Daughter     Asthma Daughter         Both daughters & 5 grand children    Deep Vein Thrombosis (DVT) Daughter         x 1    Asthma Daughter         5 grand kids    Asthma Grandchild     Asthma Grandchild     Asthma Grandchild     Asthma Grandchild     Asthma Grandchild          Current providers sharing in care for this patient include:  Patient Care Team:  Norma Serrano MD as PCP - General (Family Medicine)  Chinedu Jung OD as Assigned Surgical Provider  Norma Serrano MD as Assigned PCP  Moe Mccracken MD as Assigned Musculoskeletal Provider  Brayden Gilbert MD as Assigned Rheumatology Provider    The following health maintenance items are reviewed in Epic and correct as of today:  Health Maintenance   Topic Date Due    RSV VACCINE (1 - Risk 60-74 years 1-dose series) Never done    ANNUAL REVIEW OF HM ORDERS  06/09/2022    LIPID  12/05/2024    MICROALBUMIN  12/05/2024    COVID-19 Vaccine (7 - 2024-25 season) 12/09/2024    ASTHMA ACTION PLAN  12/05/2024    COLORECTAL CANCER SCREENING  01/05/2025    BMP  01/22/2025    ASTHMA CONTROL TEST  06/12/2025    HEMOGLOBIN  10/14/2025    MEDICARE ANNUAL WELLNESS VISIT  12/12/2025    FALL RISK ASSESSMENT  12/12/2025    MAMMO SCREENING  10/12/2026    GLUCOSE  07/22/2027    ADVANCE CARE PLANNING  12/12/2029    DEXA  12/29/2030    DTAP/TDAP/TD IMMUNIZATION (3 - Td or Tdap) 11/28/2032    PARATHYROID  Completed    PHOSPHORUS  Completed    HEPATITIS C SCREENING  Completed    MIGRAINE ACTION PLAN  Completed    PHQ-2 (once per calendar year)  Completed     "INFLUENZA VACCINE  Completed    Pneumococcal Vaccine: 65+ Years  Completed    URINALYSIS  Completed    ALK PHOS  Completed    ZOSTER IMMUNIZATION  Completed    HPV IMMUNIZATION  Aged Out    MENINGITIS IMMUNIZATION  Aged Out    RSV MONOCLONAL ANTIBODY  Aged Out    TSH W/FREE T4 REFLEX  Discontinued            Objective    Exam  /79   Pulse 64   Temp 97.5  F (36.4  C) (Temporal)   Resp 16   Ht 1.617 m (5' 3.66\")   Wt 121.1 kg (266 lb 14.4 oz)   SpO2 97%   BMI 46.30 kg/m     Estimated body mass index is 46.3 kg/m  as calculated from the following:    Height as of this encounter: 1.617 m (5' 3.66\").    Weight as of this encounter: 121.1 kg (266 lb 14.4 oz).    Physical Exam  GENERAL: healthy, alert and no distress  EYES: Eyes grossly normal to inspection, conjunctivae and sclerae normal  HENT: ear canals and TM's normal  NECK: no adenopathy, no asymmetry, masses, or scars and thyroid normal to palpation  RESP: lungs clear to auscultation - no rales, rhonchi or wheezes  CV: regular rate and rhythm, normal S1 S2, no S3 or S4, no murmur, click or rub  ABDOMEN: soft, nontender, no hepatosplenomegaly, no masses  MS: no gross musculoskeletal defects noted, no edema  SKIN: no suspicious lesions or rashes  NEURO: Grossly normal strength and tone, mentation intact and speech normal  PSYCH: mentation appears normal, affect normal/bright         12/12/2024   Mini Cog   Clock Draw Score 2 Normal   3 Item Recall 3 objects recalled   Mini Cog Total Score 5                 Signed Electronically by: Norma Serrano MD    "

## 2025-01-09 ENCOUNTER — TELEPHONE (OUTPATIENT)
Dept: FAMILY MEDICINE | Facility: CLINIC | Age: 70
End: 2025-01-09
Payer: COMMERCIAL

## 2025-01-09 NOTE — TELEPHONE ENCOUNTER
Reason for Call:  Appointment Request    Patient requesting this type of appt: Pre-op    Requested provider: Norma Serrano    Reason patient unable to be scheduled: Not within requested timeframe    When does patient want to be seen/preferred time:  having colonoscopy 03/10/2025 and needs pre op    Comments: n/a    Could we send this information to you in [place] or would you prefer to receive a phone call?:   Patient would prefer a phone call   Okay to leave a detailed message?: Yes at Cell number on file:    Telephone Information:   Mobile [phone]       Call taken on 1/9/2025 at 2:51 PM by WAGNER GLEASON

## 2025-02-04 ENCOUNTER — TRANSFERRED RECORDS (OUTPATIENT)
Dept: HEALTH INFORMATION MANAGEMENT | Facility: CLINIC | Age: 70
End: 2025-02-04

## 2025-02-11 ENCOUNTER — OFFICE VISIT (OUTPATIENT)
Dept: FAMILY MEDICINE | Facility: CLINIC | Age: 70
End: 2025-02-11
Payer: COMMERCIAL

## 2025-02-11 VITALS
HEART RATE: 69 BPM | RESPIRATION RATE: 16 BRPM | BODY MASS INDEX: 43.82 KG/M2 | OXYGEN SATURATION: 99 % | HEIGHT: 65 IN | WEIGHT: 263 LBS | DIASTOLIC BLOOD PRESSURE: 82 MMHG | SYSTOLIC BLOOD PRESSURE: 133 MMHG | TEMPERATURE: 97.3 F

## 2025-02-11 DIAGNOSIS — I10 HYPERTENSION GOAL BP (BLOOD PRESSURE) < 130/80: ICD-10-CM

## 2025-02-11 DIAGNOSIS — Z01.818 PREOP GENERAL PHYSICAL EXAM: Primary | ICD-10-CM

## 2025-02-11 DIAGNOSIS — E66.01 MORBID OBESITY (H): ICD-10-CM

## 2025-02-11 DIAGNOSIS — J45.30 MILD PERSISTENT ASTHMA WITHOUT COMPLICATION: ICD-10-CM

## 2025-02-11 DIAGNOSIS — N18.32 STAGE 3B CHRONIC KIDNEY DISEASE (H): ICD-10-CM

## 2025-02-11 DIAGNOSIS — I51.89 DIASTOLIC DYSFUNCTION: ICD-10-CM

## 2025-02-11 DIAGNOSIS — Z12.11 SCREENING FOR COLON CANCER: ICD-10-CM

## 2025-02-11 PROCEDURE — 99214 OFFICE O/P EST MOD 30 MIN: CPT | Performed by: FAMILY MEDICINE

## 2025-02-11 PROCEDURE — G2211 COMPLEX E/M VISIT ADD ON: HCPCS | Performed by: FAMILY MEDICINE

## 2025-02-11 ASSESSMENT — PAIN SCALES - GENERAL: PAINLEVEL_OUTOF10: NO PAIN (0)

## 2025-02-11 NOTE — PATIENT INSTRUCTIONS
How to Take Your Medication Before Surgery  Preoperative Medication Instructions   Antiplatelet or Anticoagulation Medication Instructions   - We reviewed the medication list and the patient is not on an antiplatelet or anticoagulation medications.    Additional Medication Instructions  Take all scheduled medications on the day of surgery   - ibuprofen (Advil, Motrin): DO NOT TAKE 1 day before surgery.        Patient Education   Preparing for Your Surgery  For Adults  Getting started  In most cases, a nurse will call to review your health history and instructions. They will give you an arrival time based on your scheduled surgery time. Please be ready to share:  Your doctor's clinic name and phone number  Your medical, surgical, and anesthesia history  A list of allergies and sensitivities  A list of medicines, including herbal treatments and over-the-counter drugs  Whether the patient has a legal guardian (ask how to send us the papers in advance)  Note: You may not receive a call if you were seen at our PAC (Preoperative Assessment Center).  Please tell us if you're pregnant--or if there's any chance you might be pregnant. Some surgeries may injure a fetus (unborn baby), so they require a pregnancy test. Surgeries that are safe for a fetus don't always need a test, and you can choose whether to have one.   Preparing for surgery  Within 10 to 30 days of surgery: Have a pre-op exam (sometimes called an H&P, or History and Physical). This can be done at a clinic or pre-operative center.  If you're having a , you may not need this exam. Talk to your care team.  At your pre-op exam, talk to your care team about all medicines you take. (This includes CBD oil and any drugs, such as THC, marijuana, and other forms of cannabis.) If you need to stop any medicine before surgery, ask when to start taking it again.  This is for your safety. Many medicines and drugs can make you bleed too much during surgery. Some  change how well surgery (anesthesia) drugs work.  Call your insurance company to let them know you're having surgery. (If you don't have insurance, call [phone].)  Call your clinic if there's any change in your health. This includes a scrape or scratch near the surgery site, or any signs of a cold (sore throat, runny nose, cough, rash, fever).  Eating and drinking guidelines  For your safety: Unless your surgeon tells you otherwise, follow the guidelines below.  Eat and drink as normal until 8 hours before you arrive for surgery. After that, no food or milk. You can spit out gum when you arrive.  Drink clear liquids until 2 hours before you arrive. These are liquids you can see through, like water, Gatorade, and Propel Water. They also include plain black coffee and tea (no cream or milk).  No alcohol for 24 hours before you arrive. The night before surgery, stop any drinks that contain THC.  If your care team tells you to take medicine on the morning of surgery, it's okay to take it with a sip of water. No other medicines or drugs are allowed (including CBD oil)--follow your care team's instructions.  If you have questions the day of surgery, call your hospital or surgery center.   Preventing infection  Shower or bathe the night before and the morning of surgery. Follow the instructions your clinic gave you. (If no instructions, use regular soap.)  Don't shave or clip hair near your surgery site. We'll remove the hair if needed.  Don't smoke or vape the morning of surgery. No chewing tobacco for 6 hours before you arrive. A nicotine patch is okay. You may spit out nicotine gum when you arrive.  For some surgeries, the surgeon will tell you to fully quit smoking and nicotine.  We will make every effort to keep you safe from infection. We will:  Clean our hands often with soap and water (or an alcohol-based hand rub).  Clean the skin at your surgery site with a special soap that kills germs.  Give you a special  gown to keep you warm. (Cold raises the risk of infection.)  Wear hair covers, masks, gowns, and gloves during surgery.  Give antibiotic medicine, if prescribed. Not all surgeries need this medicine.  What to bring on the day of surgery  Photo ID and insurance card  Copy of your health care directive, if you have one  Glasses and hearing aids (bring cases)  You can't wear contacts during surgery  Inhaler and eye drops, if you use them (tell us about these when you arrive)  CPAP machine or breathing device, if you use them  A few personal items, if spending the night  If you have . . .  A pacemaker, ICD (cardiac defibrillator), or other implant: Bring the ID card.  An implanted stimulator: Bring the remote control.  A legal guardian: Bring a copy of the certified (court-stamped) guardianship papers.  Please remove any jewelry, including body piercings. Leave jewelry and other valuables at home.  If you're going home the day of surgery  You must have a responsible adult drive you home. They should stay with you overnight as well.  If you don't have someone to stay with you, and you aren't safe to go home alone, we may keep you overnight. Insurance often won't pay for this.  After surgery  If it's hard to control your pain or you need more pain medicine, please call your surgeon's office.  Questions?   If you have any questions for your care team, list them here:   ____________________________________________________________________________________________________________________________________________________________________________________________________________________________________________________________  For informational purposes only. Not to replace the advice of your health care provider. Copyright   2003, 2019 [facility]. All rights reserved. Clinically reviewed by Junito Germain MD. [employer] [id number] - REV 08/24.

## 2025-02-11 NOTE — PROGRESS NOTES
Preoperative Evaluation  [id number] [facility]  [address]  [place] [address]  Phone: [phone]  Fax: [phone]  Primary Provider: Norma Serrano MD  Pre-op Performing Provider: Norma Serrano MD  Feb 11, 2025 2/6/2025   Surgical Information   What procedure is being done? Colonoscopy   Facility or Hospital where procedure/surgery will be performed: [facility]   Who is doing the procedure / surgery? Don't know   Date of surgery / procedure: March 10, 2025   Time of surgery / procedure: 10 am   Where do you plan to recover after surgery? at home with family     Fax number for surgical facility: [phone]    Assessment & Plan     The proposed surgical procedure is considered LOW risk.    Preop general physical exam  Screening for colon cancer  Proceed with colonoscopy    Co-morbid conditions to consider during procedure:  Mild persistent asthma without complication  Hypertension goal BP (blood pressure) < 130/80  Diastolic dysfunction - currently euvolemic  Stage 3b chronic kidney disease (H)  BMI > 40 (H) - severe obesity in patient with recent intentional weight loss     - No identified additional risk factors other than previously addressed        Preoperative Medication Instructions  Antiplatelet or Anticoagulation Medication Instructions   - We reviewed the medication list and the patient is not on an antiplatelet or anticoagulation medications.    Additional Medication Instructions  Take all scheduled medications on the day of surgery   - ibuprofen (Advil, Motrin): DO NOT TAKE 1 day before surgery.     Recommendation  Approval given to proceed with proposed procedure, without further diagnostic evaluation.        I spent a total of 31 minutes on the day of the visit.   Time spent by me today doing chart review, history and exam, documentation and further activities per the note  The longitudinal plan of care for the  diagnosis(es)/condition(s) as documented were addressed during this visit. Due to the added complexity in care, I will continue to support Aubrie in the subsequent management and with ongoing continuity of care.      Bea Alfred is a 69 year old, presenting for the following:  Pre-Op Exam          2/11/2025    11:34 AM   Additional Questions   Roomed by Zabrina Ochoa     HPI related to upcoming procedure: screening colonoscopy - no history of polyps        2/6/2025   Pre-Op Questionnaire   Have you ever had a heart attack or stroke? No   Have you ever had surgery on your heart or blood vessels, such as a stent placement, a coronary artery bypass, or surgery on an artery in your head, neck, heart, or legs? No   Do you have chest pain with activity? No   Do you have a history of heart failure? (!) YES - diastolic dysfunction (HFpEF)   Do you currently have a cold, bronchitis or symptoms of other infection? No   Do you have a cough, shortness of breath, or wheezing? No   Do you or anyone in your family have previous history of blood clots? (!) YES - daughter had DVT   Do you or does anyone in your family have a serious bleeding problem such as prolonged bleeding following surgeries or cuts? No   Have you ever had problems with anemia or been told to take iron pills? No   Have you had any abnormal blood loss such as black, tarry or bloody stools, or abnormal vaginal bleeding? No   Have you ever had a blood transfusion? No   Are you willing to have a blood transfusion if it is medically needed before, during, or after your surgery? Yes   Have you or any of your relatives ever had problems with anesthesia? No   Do you have sleep apnea, excessive snoring or daytime drowsiness? No   Do you have any artifical heart valves or other implanted medical devices like a pacemaker, defibrillator, or continuous glucose monitor? No   Do you have artificial joints? No   Are you allergic to latex? (!) YES     Health Care  Directive  Patient does not have a Health Care Directive: Discussed advance care planning with patient; information given to patient to review.    Preoperative Review of    reviewed - no record of controlled substances prescribed.      Status of Chronic Conditions:  See problem list for active medical problems.  Problems all longstanding and stable, except as noted/documented.  See ROS for pertinent symptoms related to these conditions.    Patient Active Problem List    Diagnosis Date Noted    Hypertension goal BP (blood pressure) < 130/80      Priority: High    Pure hypercholesterolemia      Priority: High    Stage 3b chronic kidney disease (H)      Priority: High    Primary osteoarthritis of both knees 07/22/2024     Priority: Medium     Severe by x-ray      Nocturnal leg cramps 01/17/2024     Priority: Medium    Dyspepsia 01/17/2024     Priority: Medium    Diastolic dysfunction 12/31/2023     Priority: Medium     On echo 12/2023      Combined forms of age-related cataract of both eyes 02/16/2022     Priority: Medium    Bilateral lower extremity pain 11/24/2021     Priority: Medium    Osteoarthritis of both sternoclavicular joints 06/18/2019     Priority: Medium     R>L      BMI > 40 (H) 06/28/2018     Priority: Medium    History of acute renal failure 01/06/2017     Priority: Medium     From use of ARB, occurred twice and should not be re-challenged (per nephrology)      GERD (gastroesophageal reflux disease)      Priority: Medium    Vitamin D deficiency 02/15/2010     Priority: Medium    Mild persistent asthma      Priority: Medium    Chronic venous insufficiency      Priority: Medium    Osteoarthritis      Priority: Medium     LT ankle & L>R knee      Classical migraine      Priority: Medium     fiorinal and darvocet prn      Trochanteric bursitis      Priority: Medium    Allergic rhinitis      Priority: Medium      Past Medical History:   Diagnosis Date    Allergic rhinitis     Chronic kidney disease  (CKD), stage III (moderate) (H)     Chronic venous insufficiency     Classic migraines     fiorinal and darvocet prn    Diabetes mellitus, type 2 (H) 1998    age 42    Dysplasia of cervix     treated with hysterectomy    GERD (gastroesophageal reflux disease)     History of tobacco use     quit 1/97    Hyperlipidemia LDL goal <100     Hypertension goal BP (blood pressure) < 140/90     Mild persistent asthma     Osteoarthritis     LT ankle & L>R knee, hands    Trochanteric bursitis     Vitamin D deficiency 02/15/2010     Past Surgical History:   Procedure Laterality Date    COLONOSCOPY  11/2004    [facility] [place] [facility]    COLONOSCOPY  01/05/2015    [facility] [place] [facility]    HC DILATION/CURETTAGE DIAG/THER NON OB  05/1989    [facility]    HC REMOVAL GALLBLADDER  1977    HYSTERECTOMY, PAP NO LONGER INDICATED  01/1990    Hysterectomy, Vaginal, ovaries intact    SURGICAL HISTORY OF -   1987    cystoscopy    ZZC LIGATE FALLOPIAN TUBE  1977     Current Outpatient Medications   Medication Sig Dispense Refill    albuterol (2.5 MG/3ML) 0.083% neb solution Take 1 vial (2.5 mg) by nebulization every 6 hours as needed for shortness of breath / dyspnea or wheezing 30 vial 1    albuterol (PROAIR HFA/PROVENTIL HFA/VENTOLIN HFA) 108 (90 Base) MCG/ACT inhaler USE 1 TO 2 INHALATIONS BY MOUTH  EVERY 4 HOURS AS NEEDED FOR  SHORTNESS OF BREATH, WHEEZING OR COUGH 36 g 4    amLODIPine (NORVASC) 5 MG tablet TAKE 1 TABLET BY MOUTH  DAILY 90 tablet 4    ARNUITY ELLIPTA 200 MCG/ACT inhaler INHALE 1 PUFF INTO THE LUNGS DAILY 90 each 3    BLACK ELDERBERRY,WHALEN-FLOWER, PO Elder Whalen      cetirizine (ZYRTEC) 10 MG tablet Take 1 tablet (10 mg) by mouth every evening 30 tablet 0    Cyanocobalamin (VITAMIN B 12 PO) Take 1 tablet by mouth daily      diclofenac (VOLTAREN) 1 % topical gel Apply 2 g topically 4 times daily 50 g 0    famotidine (PEPCID) 20 MG tablet Take 1 tablet (20 mg) by mouth daily as needed (heartburn). 90 tablet 1    hydrOXYzine HCl (ATARAX) 10 MG tablet  "TAKE 1 TO 2 TABLETS(10 TO 20 MG) BY MOUTH THREE TIMES DAILY AS NEEDED FOR ANXIETY 30 tablet 7    Ibuprofen (ADVIL PO) ADVIL PRN      MULTI-VITAMIN OR TABS   0    Multiple Vitamins-Minerals (ZINC PO) ZINC      SUMAtriptan (IMITREX) 25 MG tablet Take 1 tablet (25 mg) by mouth at onset of headache for migraine. May repeat in 2 hours. Max 8 tablets/24 hours. 12 tablet 0    triamcinolone (KENALOG) 0.1 % external cream Apply topically 2 times daily as needed for irritation. behind ears and on ankles.  Do not use on one area for more than 2 weeks per month. 30 g 0    triamterene-HCTZ (MAXZIDE) 75-50 MG tablet Take 1 tablet by mouth daily. 100 tablet 4    VITAMIN C 500 MG OR TABS 1-2 tablets daily 60 0    VITAMIN D PO Take by mouth daily as needed         Allergies   Allergen Reactions    Maxitrol [Neomycin-Polymyxin-Dexameth] Itching     Eye ointment-    Tropicamide-Phenylephrine Itching     Combo dilating drops Tropicamide 1%/Phenylephrine 2.5%    Benazepril Rash    Claritin [Loratadine]     Lisinopril Rash    Losartan      MILDRED - avoid ACEi/ARBs    Morphine And Codeine Nausea    Tetracycline Nausea    Ceftin [Cefuroxime] Itching and Rash    Penicillins Rash        Social History     Tobacco Use    Smoking status: Former     Current packs/day: 0.00     Types: Cigarettes     Quit date: 1966     Years since quittin.1    Smokeless tobacco: Never   Substance Use Topics    Alcohol use: Not Currently     Comment: wine 1-2 times per year       History   Drug Use Unknown             Review of Systems  Constitutional, HEENT, cardiovascular, pulmonary, GI, , musculoskeletal, neuro, skin, endocrine and psych systems are negative, except as otherwise noted.    Objective    /82 (BP Location: Right arm, Patient Position: Sitting, Cuff Size: Adult Large)   Pulse 69   Temp 97.3  F (36.3  C) (Temporal)   Resp 16   Ht 1.651 m (5' 5\")   Wt 119.3 kg (263 lb)   SpO2 99%   BMI 43.77 kg/m     Estimated body mass index " "is 43.77 kg/m  as calculated from the following:    Height as of this encounter: 1.651 m (5' 5\").    Weight as of this encounter: 119.3 kg (263 lb).  Physical Exam  GENERAL: healthy, alert and no distress  EYES: Eyes grossly normal to inspection, conjunctivae and sclerae normal  HENT: ear canals and TM's normal  NECK: no adenopathy, no asymmetry, masses, or scars and thyroid normal to palpation  RESP: lungs clear to auscultation - no rales, rhonchi or wheezes  CV: regular rate and rhythm, normal S1 S2, no S3 or S4, no murmur, click or rub  ABDOMEN: soft, nontender, no hepatosplenomegaly, no masses  MS: no gross musculoskeletal defects noted, no edema  SKIN: no suspicious lesions or rashes  NEURO: Grossly normal strength and tone, mentation intact and speech normal  PSYCH: mentation appears normal, affect normal/bright    Recent Labs   Lab Test 24  1353 10/14/24  1204 24  1155   HGB 13.9 14.2  --     274  --      --  141   POTASSIUM 3.8  --  4.1   CR 1.49*  --  1.56*         Diagnostics  No labs were ordered during this visit.   No EKG required for low risk surgery (cataract, skin procedure, breast biopsy, etc).    [id number] bradycardia, normal axis, normal intervals, no acute ST/T changes c/w ischemia, no LVH by voltage criteria, unchanged from previous tracings     Revised Cardiac Risk Index (RCRI)  The patient has the following serious cardiovascular risks for perioperative complications:   - No serious cardiac risks = 0 points     RCRI Interpretation: 0 points: Class I (very low risk - 0.4% complication rate)       Signed Electronically by: Norma Serrano MD  A copy of this evaluation report is provided to the requesting physician.         "

## 2025-02-27 ENCOUNTER — OFFICE VISIT (OUTPATIENT)
Dept: OPTOMETRY | Facility: CLINIC | Age: 70
End: 2025-02-27
Payer: COMMERCIAL

## 2025-02-27 DIAGNOSIS — H25.813 COMBINED FORMS OF AGE-RELATED CATARACT OF BOTH EYES: ICD-10-CM

## 2025-02-27 DIAGNOSIS — H04.123 DRY EYE SYNDROME OF BOTH EYES: ICD-10-CM

## 2025-02-27 DIAGNOSIS — H52.03 HYPEROPIA, BILATERAL: ICD-10-CM

## 2025-02-27 DIAGNOSIS — H10.13 ALLERGIC CONJUNCTIVITIS OF BOTH EYES: ICD-10-CM

## 2025-02-27 DIAGNOSIS — H52.4 PRESBYOPIA: ICD-10-CM

## 2025-02-27 DIAGNOSIS — H52.221 REGULAR ASTIGMATISM OF RIGHT EYE: ICD-10-CM

## 2025-02-27 DIAGNOSIS — Z01.00 EXAMINATION OF EYES AND VISION: Primary | ICD-10-CM

## 2025-02-27 ASSESSMENT — REFRACTION_WEARINGRX
OD_CYLINDER: +2.75
OS_CYLINDER: SPHERE
OD_ADD: +2.75
OD_AXIS: 110
OS_ADD: +2.75
OS_SPHERE: +2.00
OD_SPHERE: PLANO

## 2025-02-27 ASSESSMENT — VISUAL ACUITY
OS_CC: 20/25
CORRECTION_TYPE: GLASSES
OD_SC: 20/60
OD_SC+: -2
OD_CC+: -2
METHOD: SNELLEN - LINEAR
OD_CC: 20/40
OS_SC: 20/70
OS_CC: 20/40
OD_CC: 20/25

## 2025-02-27 ASSESSMENT — EXTERNAL EXAM - LEFT EYE: OS_EXAM: NORMAL

## 2025-02-27 ASSESSMENT — PUNCTA - ASSESSMENT
OS_PUNCTA: NORMAL
OD_PUNCTA: NORMAL

## 2025-02-27 ASSESSMENT — REFRACTION_MANIFEST
OD_SPHERE: +0.75
OD_AXIS: 113
OD_ADD: +2.75
OS_AXIS: 149
OS_SPHERE: +2.00
METHOD_AUTOREFRACTION: 1
OS_ADD: +2.75
OD_CYLINDER: +1.50
OS_CYLINDER: +0.25

## 2025-02-27 ASSESSMENT — CONF VISUAL FIELD
OS_SUPERIOR_NASAL_RESTRICTION: 0
OS_INFERIOR_NASAL_RESTRICTION: 0
OD_INFERIOR_TEMPORAL_RESTRICTION: 0
OS_NORMAL: 1
OD_SUPERIOR_TEMPORAL_RESTRICTION: 0
OS_INFERIOR_TEMPORAL_RESTRICTION: 0
OD_NORMAL: 1
OD_SUPERIOR_NASAL_RESTRICTION: 0
OD_INFERIOR_NASAL_RESTRICTION: 0
OS_SUPERIOR_TEMPORAL_RESTRICTION: 0

## 2025-02-27 ASSESSMENT — TONOMETRY
IOP_METHOD: ICARE
OD_IOP_MMHG: 16.9
OS_IOP_MMHG: 17.7

## 2025-02-27 ASSESSMENT — KERATOMETRY
OD_AXISANGLE_DEGREES: 101
OS_K2POWER_DIOPTERS: 45.00
OS_K1POWER_DIOPTERS: 44.00
OS_AXISANGLE2_DEGREES: 177
OD_K2POWER_DIOPTERS: 45.75
OD_AXISANGLE2_DEGREES: 011
OD_K1POWER_DIOPTERS: 43.25
OS_AXISANGLE_DEGREES: 087

## 2025-02-27 ASSESSMENT — CUP TO DISC RATIO
OS_RATIO: 0.2
OD_RATIO: 0.2

## 2025-02-27 ASSESSMENT — TEAR MENISCUS
OS_TEAR_MENISCUS: INCREASED
OD_TEAR_MENISCUS: INCREASED

## 2025-02-27 ASSESSMENT — SLIT LAMP EXAM - LIDS
COMMENTS: DERMATOCHALASIS- NOT VIS SIG
COMMENTS: DERMATOCHALASIS- NOT VIS SIG

## 2025-02-27 ASSESSMENT — EXTERNAL EXAM - RIGHT EYE: OD_EXAM: NORMAL

## 2025-02-27 NOTE — PROGRESS NOTES
Chief Complaint   Patient presents with    Annual Eye Exam         Last Eye Exam: 11- - May have had a reaction to combo dilating eye drop last visit- use Tropicamide only.   Dilated Previously: Yes    What are you currently using to see?  glasses       Distance Vision Acuity: Noticed gradual change in both eyes    Near Vision Acuity: Satisfied with vision while reading  with glasses    Eye Comfort: watery  Do you use eye drops? : Yes: refresh not often   Occupation or Hobbies: none/retired  Fred- sees Dr. Santos as glaucoma suspect    Noticing eyes not opening as big right eye vs left eye in pictures    Cathie Welch Optometric Assistant, A.B.O.C.      Medical, surgical and family histories reviewed and updated 2/27/2025.       OBJECTIVE: See Ophthalmology exam    ASSESSMENT:    ICD-10-CM    1. Examination of eyes and vision  Z01.00 EYE EXAM (SIMPLE-NONBILLABLE)      2. 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 Dry eye syndrome of both eyes  H04.123 EYE EXAM (SIMPLE-NONBILLABLE)      4. Allergic conjunctivitis of both eyes  H10.13 EYE EXAM (SIMPLE-NONBILLABLE)      5. Presbyopia  H52.4 REFRACTION      6. Hyperopia, bilateral  H52.03 REFRACTION      7. Regular astigmatism of right eye  H52.221 REFRACTION          PLAN:     Patient Instructions   You have cataracts which are affecting your vision.  A change in glasses will not give you much improvement.  A cataract evaluation can be scheduled with the eye surgeon to discuss with you the option of cataract surgery. (Patient declines)    Heat to the eyes daily for 10-15 minutes nightly with warm washcloth or reusable gel masks from the pharmacy or  "[employer]" heat masks can be purchased at Amazon.    Artificial tears- 1 drop both eyes once before bedtime and once in the morning then 2 x day as needed.      OTC Pataday or Lastacaft to be used once daily for itchy eyes.  Use as needed.    Eyeglass  prescription given.    Return in 1 year for a complete eye exam or sooner if needed.    Chinedu Jung, OD

## 2025-02-27 NOTE — LETTER
2/27/2025      Teri Bain  [address]  [facility] [address]      Dear Colleague,    Thank you for referring your patient, Teri Bain, to the [facility]. Please see a copy of my visit note below.    Chief Complaint   Patient presents with     Annual Eye Exam         Last Eye Exam: 11- - May have had a reaction to combo dilating eye drop last visit- use Tropicamide only.   Dilated Previously: Yes    What are you currently using to see?  glasses       Distance Vision Acuity: Noticed gradual change in both eyes    Near Vision Acuity: Satisfied with vision while reading  with glasses    Eye Comfort: watery  Do you use eye drops? : Yes: refresh not often   Occupation or Hobbies: none/retired  Fred- sees Dr. Santos as glaucoma suspect    Noticing eyes not opening as big right eye vs left eye in pictures    Cathie Welch Optometric Assistant, A.B.O.C.      Medical, surgical and family histories reviewed and updated 2/27/2025.       OBJECTIVE: See Ophthalmology exam    ASSESSMENT:    ICD-10-CM    1. Examination of eyes and vision  Z01.00 EYE EXAM (SIMPLE-NONBILLABLE)      2. 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 Dry eye syndrome of both eyes  H04.123 EYE EXAM (SIMPLE-NONBILLABLE)      4. Allergic conjunctivitis of both eyes  H10.13 EYE EXAM (SIMPLE-NONBILLABLE)      5. Presbyopia  H52.4 REFRACTION      6. Hyperopia, bilateral  H52.03 REFRACTION      7. Regular astigmatism of right eye  H52.221 REFRACTION          PLAN:     Patient Instructions   You have cataracts which are affecting your vision.  A change in glasses will not give you much improvement.  A cataract evaluation can be scheduled with the eye surgeon to discuss with you the option of cataract surgery. (Patient declines)    Heat to the eyes daily for 10-15 minutes nightly with warm washcloth or reusable gel masks from the pharmacy or   Osmel heat masks can be purchased at Amazon.    Artificial tears- 1 drop both eyes once before bedtime and once in the morning then 2 x day as needed.      OTC Pataday or Lastacaft to be used once daily for itchy eyes.  Use as needed.    Eyeglass prescription given.    Return in 1 year for a complete eye exam or sooner if needed.    Chinedu Jung, OD         Again, thank you for allowing me to participate in the care of your patient.        Sincerely,        Chinedu Jung, OD    Electronically signed

## 2025-02-27 NOTE — PATIENT INSTRUCTIONS
You have cataracts which are affecting your vision.  A change in glasses will not give you much improvement.  A cataract evaluation can be scheduled with the eye surgeon to discuss with you the option of cataract surgery. (Patient declines)    Heat to the eyes daily for 10-15 minutes nightly with warm washcloth or reusable gel masks from the pharmacy or  Osmel heat masks can be purchased at Amazon.    Artificial tears- 1 drop both eyes once before bedtime and once in the morning then 2 x day as needed.      OTC Pataday or Lastacaft to be used once daily for itchy eyes.  Use as needed.    Eyeglass prescription given.    Return in 1 year for a complete eye exam or sooner if needed.    Chinedu Jung, OD    The affects of the dilating drops last for 4- 6 hours.  You will be more sensitive to light and vision will be blurry up close.  Do not drive if you do not feel comfortable.  Mydriatic sunglasses were given if needed.    There is a combination of three treatments which can greatly improve symptoms of dry eyes.     Artificial tears  Heat (eyes closed)  Eyelid and eyelash cleansing (eyes closed)     Use one drop of artificial tears both eyes 4 x daily.  Once in the morning, lunch, dinner and bedtime. Continue to use the drops regardless if your eyes are comfortable or not.  Artificial tears work best as a preventative and not as well after your eyes are starting to bother you.  It may take 4- 6 weeks of using the drops before you notice improvement.  If after that time you are still having problems schedule an appointment for an evaluation and discussion of different treatments such as Restasis or Xiidra.  Dry eyes are a chronic condition and you may have more symptoms at certain times of the year.    Excess tearing can be due to the right tears not working properly or a blockage in the tear drainage system.  You can try using artificial tears 1 drop both eyes 4 x day.  If the excess tearing is bothersome after 4-6 weeks  of treatment then we can send you for further testing.  This would entail a referral to our oculoplastic specialist Dr. Jose Brantley at the [facility]-[phone].    Recommended brands are:    Systane Complete  Systane Ultra  Systane Balance  Refresh Advanced Optive  Refresh Relieva  Blink    Recommended brands for contact lens wearers are:    Systane contacts  Refresh contacts  Blink contacts    If you are using drops more than 4 x day or have sensitivities to preservatives I recommend non preserved artificial tears.  These come in 1 use vials.  They can be used every 1-2 hours.  Do not reuse the vials.    Recommended brands are:    Refresh Optive Jhonny-3  Systane- preservative free  Refresh-  preservative free  Blink- preservative free    Gels or ointment can be used at night.    Recommended brands are:    Systane Gel  Refresh Gel  Blink Gel  Genteal Gel    Systane night time (ointment)  Refresh Celluvisc  Refresh PM (ointment)    Optase dry eye spray.  Spray to eyelids 3-4 x daily.  This can be purchased on Amazon.      Visine, Clear Eyes or Murine (drops that get the red out) can irritate the eyes and cause a rebound effect where the eyes become more red and you end up using more drops.  Avoid drops containing tetrahydrozoline, naphazoline, phenylephrine, oxymetazoline.      OTC Lumify is a newer product that gives immediate redness relief without the rebound effect.  Use as needed to take the redness out.    Artificial tears may be used with other drops (such as allergy, glaucoma, antibiotics) around the same time.  Be sure to wait 5 minutes in between drops.    Heat to the eyelids can also improve your symptoms of dry eyes.  Osmel heat masks can be purchased at Amazon to be used nightly for 10-15 minutes.  Other options are gel masks that can be put in the microwave and purchased at most pharmacies.      Tea Tree Oil eyelid cleansers recommended are Ocusoft Oust foam cleanser to cleanse  eyelids/lashes at night and in the am. Other options are Blephadex or Cliradex eyelid wipes.  KEEP EYES CLOSED when using these products.  These can be purchased on amazon.com   A good product for make up remover with tea tree oil is WeLoveEyes.  This can be found at www[employer] or [employer].    Other good eyelid cleansers have hypochlorous which removes excess bacteria and is safe around the eyes. Products are Avenova, Ocusoft Hypochlor or Heyedrate. Spray solution onto cotton pad, close eyes and gently apply to eyelids and eyelashes using side to side motion.  You can also KEEP EYES CLOSED spray and rub into eyelashes.  You do not need to rinse it off. Use morning and evening. These products can be found on Amazon.  You can check with your local pharmacy and see if they can order if for you if they don't have it.    Other brands of eyelid cleansing wipes are:    Ocusoft wipes  Systane wipes    A great eye make up line is [email or web address]        Optometry Providers       Clinic Locations                                 Telephone Number   Dr. Ann Marie Wray    [facility] [phone]     [facility] Hours:                [facility] [place] [facility] Hours:       [facility] Hours:   [address]   [address]     [address] [place] [address]   [place] [address]    [place] [address]  Phone: [phone]                    Phone: [phone]     Phone: [phone]                      Monday 8:00-6:00                          Monday 8:00-6:00                          Monday 8:00-6:00              Tuesday 8:00-6:00                          Tuesday 8:00-6:00                          Tuesday 8:00-6:00              Wednesday 8:00-6:00                  Wednesday 8:00-6:00                    Wednesday 8:00-6:00      Thursday 8:00-6:00                        Thursday 8:00-6:00                         Thursday 8:00-6:00            Friday 8:00-5:00                              Friday 8:00-5:00                              Friday 8:00-5:00    [facility] Hours:   [address] [place] [address]  [phone]    Monday 9:00-6:00  Tuesday 9:00-6:00  Wednesday 9:00-6:00  Thursday 9:00-6:00  Friday 9:00-5:00  As always, Thank you for trusting us with your health care needs!

## 2025-03-03 ENCOUNTER — TELEPHONE (OUTPATIENT)
Dept: OPTOMETRY | Facility: CLINIC | Age: 70
End: 2025-03-03

## 2025-03-03 ENCOUNTER — OFFICE VISIT (OUTPATIENT)
Dept: OPTOMETRY | Facility: CLINIC | Age: 70
End: 2025-03-03
Payer: COMMERCIAL

## 2025-03-03 DIAGNOSIS — H01.119 ALLERGIC CONTACT DERMATITIS OF EYELID: Primary | ICD-10-CM

## 2025-03-03 PROCEDURE — 99207 PR NO CHARGE LOS: CPT | Performed by: OPTOMETRIST

## 2025-03-03 ASSESSMENT — PUNCTA - ASSESSMENT
OS_PUNCTA: NORMAL
OD_PUNCTA: NORMAL

## 2025-03-03 ASSESSMENT — CUP TO DISC RATIO
OS_RATIO: 0.2
OD_RATIO: 0.2

## 2025-03-03 ASSESSMENT — VISUAL ACUITY
OD_CC: 20/40
METHOD: SNELLEN - LINEAR
OS_CC: 20/25
CORRECTION_TYPE: GLASSES

## 2025-03-03 ASSESSMENT — TEAR MENISCUS
OS_TEAR_MENISCUS: INCREASED
OD_TEAR_MENISCUS: INCREASED

## 2025-03-03 ASSESSMENT — EXTERNAL EXAM - LEFT EYE: OS_EXAM: NORMAL

## 2025-03-03 ASSESSMENT — EXTERNAL EXAM - RIGHT EYE: OD_EXAM: NORMAL

## 2025-03-03 NOTE — LETTER
3/3/2025      Teri Bain  [address]  [facility] [address]      Dear Colleague,    Thank you for referring your patient, Teri Bain, to the [facility]. Please see a copy of my visit note below.    Chief Complaint   Patient presents with     Eye Pain     Patient had reaction to dilation drops. Pain was 10/10 now no pain. Cream patient was given made it worse     Eye Pain  Laterality: In both eyes   Pain scale: 0/10   Duration: 5 days   Course: gradually improving   Associated symptoms: redness, itching, photophobia, tearing, and swelling   Comments: Patient had reaction to dilation drops. Pain was 10/10 now no pain. Cream patient was given made it worse     Erythromycin ointment-    In the past has used Kenalog cream prescribed by primary to use for dermatitis behind ears and ankles- has not had a reaction to that.    Using Pataday- no itching with that and feels good.    Non preserved artificial tears- keeps in fridge    Medical, surgical and family histories reviewed and updated 3/3/2025.       OBJECTIVE: See Ophthalmology exam    ASSESSMENT:    ICD-10-CM    1. Allergic contact dermatitis of eyelid  H01.119          PLAN:     Patient Instructions   Continue cold packs.  Do not touch or rub your eyelids.    Ok to use Pataday if no sensitivity.    Non preserved artificial tears- 1 drop both eyes 2-4 x day.    Ok to try Kenalog cream sparingly on eyelids if no reaction.    If sensitivities continue then referral to allergist.    Return in 1 year for a complete eye exam or sooner if needed.    Chinedu Jung, DEVIN           Again, thank you for allowing me to participate in the care of your patient.        Sincerely,        Chinedu Jung, OD    Electronically signed

## 2025-03-03 NOTE — PATIENT INSTRUCTIONS
Continue cold packs.  Do not touch or rub your eyelids.    Ok to use Pataday if no sensitivity.    Non preserved artificial tears- 1 drop both eyes 2-4 x day.    Ok to try Kenalog cream sparingly on eyelids if no reaction.    If sensitivities continue then referral to allergist.    Return in 1 year for a complete eye exam or sooner if needed.    Chinedu Jung, OD

## 2025-03-03 NOTE — TELEPHONE ENCOUNTER
[facility] Call Center    Phone Message    May a detailed message be left on voicemail: yes     Reason for Call: Other: Patient called to make appointment for today with Dr. Jung per provider request to patient if itchy eyelid not getting better. Patient cannot do Wednesdays appointments.    Please call patient back at [phone]. Thank you      Action Taken: Message routed to:  Other: [facility]    Travel Screening: Not Applicable

## 2025-03-03 NOTE — PROGRESS NOTES
Chief Complaint   Patient presents with    Eye Pain     Patient had reaction to dilation drops. Pain was 10/10 now no pain. Cream patient was given made it worse     Eye Pain  Laterality: In both eyes   Pain scale: 0/10   Duration: 5 days   Course: gradually improving   Associated symptoms: redness, itching, photophobia, tearing, and swelling   Comments: Patient had reaction to dilation drops. Pain was 10/10 now no pain. Cream patient was given made it worse     Erythromycin ointment-    In the past has used Kenalog cream prescribed by primary to use for dermatitis behind ears and ankles- has not had a reaction to that.    Using Pataday- no itching with that and feels good.    Non preserved artificial tears- keeps in fridge    Medical, surgical and family histories reviewed and updated 3/3/2025.       OBJECTIVE: See Ophthalmology exam    ASSESSMENT:    ICD-10-CM    1. Allergic contact dermatitis of eyelid  H01.119          PLAN:     Patient Instructions   Continue cold packs.  Do not touch or rub your eyelids.    Ok to use Pataday if no sensitivity.    Non preserved artificial tears- 1 drop both eyes 2-4 x day.    Ok to try Kenalog cream sparingly on eyelids if no reaction.    If sensitivities continue then referral to allergist.    Return in 1 year for a complete eye exam or sooner if needed.    Chinedu Jung, OD

## 2025-03-10 ENCOUNTER — ANESTHESIA EVENT (OUTPATIENT)
Dept: GASTROENTEROLOGY | Facility: CLINIC | Age: 70
End: 2025-03-10
Payer: COMMERCIAL

## 2025-03-10 ENCOUNTER — HOSPITAL ENCOUNTER (OUTPATIENT)
Facility: CLINIC | Age: 70
Discharge: HOME | End: 2025-03-10
Attending: INTERNAL MEDICINE | Admitting: INTERNAL MEDICINE
Payer: COMMERCIAL

## 2025-03-10 ENCOUNTER — ANESTHESIA (OUTPATIENT)
Dept: GASTROENTEROLOGY | Facility: CLINIC | Age: 70
End: 2025-03-10
Payer: COMMERCIAL

## 2025-03-10 VITALS
OXYGEN SATURATION: 98 % | DIASTOLIC BLOOD PRESSURE: 82 MMHG | WEIGHT: 263 LBS | BODY MASS INDEX: 43.82 KG/M2 | HEART RATE: 67 BPM | HEIGHT: 65 IN | RESPIRATION RATE: 7 BRPM | SYSTOLIC BLOOD PRESSURE: 113 MMHG

## 2025-03-10 LAB — COLONOSCOPY: NORMAL

## 2025-03-10 PROCEDURE — 250N000011 HC RX IP 250 OP 636: Performed by: REGISTERED NURSE

## 2025-03-10 PROCEDURE — 370N000017 HC ANESTHESIA TECHNICAL FEE, PER MIN: Performed by: INTERNAL MEDICINE

## 2025-03-10 PROCEDURE — 258N000003 HC RX IP 258 OP 636: Performed by: REGISTERED NURSE

## 2025-03-10 PROCEDURE — 999N000010 HC STATISTIC ANES STAT CODE-CRNA PER MINUTE: Performed by: INTERNAL MEDICINE

## 2025-03-10 PROCEDURE — 250N000009 HC RX 250: Performed by: REGISTERED NURSE

## 2025-03-10 PROCEDURE — 45385 COLONOSCOPY W/LESION REMOVAL: CPT | Performed by: INTERNAL MEDICINE

## 2025-03-10 PROCEDURE — 88305 TISSUE EXAM BY PATHOLOGIST: CPT | Mod: TC | Performed by: INTERNAL MEDICINE

## 2025-03-10 RX ORDER — SODIUM CHLORIDE, SODIUM LACTATE, POTASSIUM CHLORIDE, CALCIUM CHLORIDE 600; 310; 30; 20 MG/100ML; MG/100ML; MG/100ML; MG/100ML
INJECTION, SOLUTION INTRAVENOUS CONTINUOUS PRN
Status: DISCONTINUED | OUTPATIENT
Start: 2025-03-10 | End: 2025-03-10

## 2025-03-10 RX ORDER — NALOXONE HYDROCHLORIDE 0.4 MG/ML
0.2 INJECTION, SOLUTION INTRAMUSCULAR; INTRAVENOUS; SUBCUTANEOUS
Status: CANCELLED | OUTPATIENT
Start: 2025-03-10

## 2025-03-10 RX ORDER — PROPOFOL 10 MG/ML
INJECTION, EMULSION INTRAVENOUS CONTINUOUS PRN
Status: DISCONTINUED | OUTPATIENT
Start: 2025-03-10 | End: 2025-03-10

## 2025-03-10 RX ORDER — PROCHLORPERAZINE MALEATE 5 MG/1
5 TABLET ORAL EVERY 6 HOURS PRN
Status: CANCELLED | OUTPATIENT
Start: 2025-03-10

## 2025-03-10 RX ORDER — NALOXONE HYDROCHLORIDE 0.4 MG/ML
0.4 INJECTION, SOLUTION INTRAMUSCULAR; INTRAVENOUS; SUBCUTANEOUS
Status: CANCELLED | OUTPATIENT
Start: 2025-03-10

## 2025-03-10 RX ORDER — FLUMAZENIL 0.1 MG/ML
0.2 INJECTION, SOLUTION INTRAVENOUS
Status: CANCELLED | OUTPATIENT
Start: 2025-03-10 | End: 2025-03-10

## 2025-03-10 RX ORDER — ONDANSETRON 2 MG/ML
4 INJECTION INTRAMUSCULAR; INTRAVENOUS EVERY 6 HOURS PRN
Status: CANCELLED | OUTPATIENT
Start: 2025-03-10

## 2025-03-10 RX ORDER — ONDANSETRON 4 MG/1
4 TABLET, ORALLY DISINTEGRATING ORAL EVERY 6 HOURS PRN
Status: CANCELLED | OUTPATIENT
Start: 2025-03-10

## 2025-03-10 RX ORDER — PROPOFOL 10 MG/ML
INJECTION, EMULSION INTRAVENOUS PRN
Status: DISCONTINUED | OUTPATIENT
Start: 2025-03-10 | End: 2025-03-10

## 2025-03-10 RX ORDER — LIDOCAINE HYDROCHLORIDE 20 MG/ML
INJECTION, SOLUTION INFILTRATION; PERINEURAL PRN
Status: DISCONTINUED | OUTPATIENT
Start: 2025-03-10 | End: 2025-03-10

## 2025-03-10 RX ADMIN — PROPOFOL 150 MCG/KG/MIN: 10 INJECTION, EMULSION INTRAVENOUS at 10:19

## 2025-03-10 RX ADMIN — PROPOFOL 30 MG: 10 INJECTION, EMULSION INTRAVENOUS at 10:19

## 2025-03-10 RX ADMIN — SODIUM CHLORIDE, POTASSIUM CHLORIDE, SODIUM LACTATE AND CALCIUM CHLORIDE: 600; 310; 30; 20 INJECTION, SOLUTION INTRAVENOUS at 10:18

## 2025-03-10 RX ADMIN — LIDOCAINE HYDROCHLORIDE 50 MG: 20 INJECTION, SOLUTION INFILTRATION; PERINEURAL at 10:19

## 2025-03-10 ASSESSMENT — ACTIVITIES OF DAILY LIVING (ADL)
ADLS_ACUITY_SCORE: 41
ADLS_ACUITY_SCORE: 41

## 2025-03-10 NOTE — ANESTHESIA CARE TRANSFER NOTE
Patient: Teri Bain    Procedure: Procedure(s):  Colonoscopy       Diagnosis: Screening for colon cancer [Z12.11]  Diagnosis Additional Information: No value filed.    Anesthesia Type:   MAC     Note:    Oropharynx: oropharynx clear of all foreign objects and spontaneously breathing  Level of Consciousness: awake  Oxygen Supplementation: room air    Independent Airway: airway patency satisfactory and stable  Dentition: dentition unchanged  Vital Signs Stable: post-procedure vital signs reviewed and stable    Patient transferred to: Phase II    Handoff Report: Identifed the Patient, Identified the Reponsible Provider, Reviewed the pertinent medical history, Discussed the surgical course, Reviewed Intra-OP anesthesia mangement and issues during anesthesia, Set expectations for post-procedure period and Allowed opportunity for questions and acknowledgement of understanding      Vitals:  Vitals Value Taken Time   /76 03/10/25 1051   Temp     Pulse 81 03/10/25 1053   Resp 26 03/10/25 1053   SpO2 98 % 03/10/25 1053   Vitals shown include unfiled device data.    Electronically Signed By: SUZY Saul CRNA  March 10, 2025  10:53 AM

## 2025-03-10 NOTE — ANESTHESIA PREPROCEDURE EVALUATION
Anesthesia Pre-Procedure Evaluation    Patient: Teri Bain   MRN: [id number] : 1955        Procedure : Procedure(s):  Colonoscopy          Past Medical History:   Diagnosis Date    Allergic rhinitis     Chronic kidney disease (CKD), stage III (moderate) (H)     Chronic venous insufficiency     Classic migraines     fiorinal and darvocet prn    Dysplasia of cervix     treated with hysterectomy    GERD (gastroesophageal reflux disease)     History of tobacco use     quit     Hyperlipidemia LDL goal <100     Hypertension goal BP (blood pressure) < 140/90     Mild persistent asthma     Osteoarthritis     LT ankle & L>R knee, hands    Trochanteric bursitis     Vitamin D deficiency 02/15/2010      Past Surgical History:   Procedure Laterality Date    COLONOSCOPY  2004    [facility]    COL[facility]OSCOPY  2015    [facility]    [facility] DILATION/CURETTAGE DIAG/THER NON OB  1989    [facility]    HC REMOVAL GALLBLADDER      HYSTERECTOMY, PAP NO LONGER INDICATED  1990    Hysterectomy, Vaginal, ovaries intact    SURGICAL HISTORY OF -       cystoscopy    ZZC LIGATE FALLOPIAN TUBE        Allergies   Allergen Reactions    Maxitrol [Neomycin-Polymyxin-Dexameth] Itching     Eye ointment-    Tropicamide-Phenylephrine Itching     Combo dilating drops Tropicamide 1%/Phenylephrine 2.5%    Benazepril Rash    Claritin [Loratadine]     Erythromycin Itching     Eye Ointment    Lisinopril Rash    Losartan      MILDRED - avoid ACEi/ARBs    Morphine And Codeine Nausea    Tetracycline Nausea    Ceftin [Cefuroxime] Itching and Rash    Penicillins Rash      Social History     Tobacco Use    Smoking status: Former     Current packs/day: 0.00     Types: Cigarettes     Quit date: 1966     Years since quittin.2    Smokeless tobacco: Never   Substance Use Topics    Alcohol use: Not Currently     Comment: wine 1-2 times per year      Wt Readings from Last 1 Encounters:   03/10/25 119.3 kg (263 lb)        Anesthesia  "Evaluation            ROS/MED HX  ENT/Pulmonary:     (+)           allergic rhinitis,           asthma                  Neurologic:       Cardiovascular:     (+) Dyslipidemia hypertension- -   -  - -                                      METS/Exercise Tolerance:     Hematologic:       Musculoskeletal:   (+)  arthritis,             GI/Hepatic:     (+) GERD,                   Renal/Genitourinary:     (+) renal disease,             Endo:     (+)               Obesity,       Psychiatric/Substance Use:       Infectious Disease:       Malignancy:       Other:            Physical Exam    Airway        Mallampati: II   TM distance: > 3 FB   Neck ROM: full   Mouth opening: > 3 cm    Respiratory Devices and Support         Dental       (+) Minor Abnormalities - some fillings, tiny chips      Cardiovascular   cardiovascular exam normal          Pulmonary   pulmonary exam normal                OUTSIDE LABS:  CBC:   Lab Results   Component Value Date    WBC 5.1 12/12/2024    WBC 5.1 10/14/2024    HGB 13.9 12/12/2024    HGB 14.2 10/14/2024    HCT 40.5 12/12/2024    HCT 42.0 10/14/2024     12/12/2024     10/14/2024     BMP:   Lab Results   Component Value Date     12/12/2024     07/22/2024    POTASSIUM 3.8 12/12/2024    POTASSIUM 4.1 07/22/2024    CHLORIDE 102 12/12/2024    CHLORIDE 104 07/22/2024    CO2 29 12/12/2024    CO2 29 07/22/2024    BUN 23.9 (H) 12/12/2024    BUN 23.3 (H) 07/22/2024    CR 1.49 (H) 12/12/2024    CR 1.56 (H) 07/22/2024    GLC 89 12/12/2024    GLC 66 (L) 07/22/2024     COAGS: No results found for: \"PTT\", \"INR\", \"FIBR\"  POC: No results found for: \"BGM\", \"HCG\", \"HCGS\"  HEPATIC:   Lab Results   Component Value Date    ALBUMIN 4.0 12/12/2024    PROTTOTAL 7.6 12/12/2024    ALT 28 12/12/2024    AST 31 12/12/2024    ALKPHOS 62 12/12/2024    BILITOTAL 0.6 12/12/2024     OTHER:   Lab Results   Component Value Date    A1C 5.7 (H) 12/05/2023    YURIDIA 9.9 12/12/2024    PHOS 2.8 07/22/2024    MAG " "2.1 07/22/2024    TSH 0.76 12/12/2024    T4 0.88 01/21/2008    CRP 12.6 (H) 06/14/2019    SED 21 10/14/2024       Anesthesia Plan    ASA Status:  3    NPO Status:  NPO Appropriate    Anesthesia Type: MAC.     - Reason for MAC: immobility needed              Consents    Anesthesia Plan(s) and associated risks, benefits, and realistic alternatives discussed. Questions answered and patient/representative(s) expressed understanding.     - Discussed: Risks, Benefits and Alternatives for the PROCEDURE were discussed     - Discussed with:  Patient            Postoperative Care            Comments:               Valerio Concepcion MD    I have reviewed the pertinent notes and labs in the chart from the past 30 days and (re)examined the patient.  Any updates or changes from those notes are reflected in this note.    Clinically Significant Risk Factors Present on Admission                   # Hypertension: Noted on problem list           # Severe Obesity: Estimated body mass index is 44.45 kg/m  as calculated from the following:    Height as of this encounter: 1.638 m (5' 4.5\").    Weight as of this encounter: 119.3 kg (263 lb).       # Asthma: noted on problem list          "

## 2025-03-10 NOTE — ANESTHESIA POSTPROCEDURE EVALUATION
Patient: Teri Bain    Procedure: Procedure(s):  Colonoscopy       Anesthesia Type:  MAC    Note:  Disposition: Outpatient   Postop Pain Control: Uneventful            Sign Out: Well controlled pain   PONV: No   Neuro/Psych: Uneventful            Sign Out: Acceptable/Baseline neuro status   Airway/Respiratory: Uneventful            Sign Out: Acceptable/Baseline resp. status   CV/Hemodynamics: Uneventful            Sign Out: Acceptable CV status   Other NRE: NONE   DID A NON-ROUTINE EVENT OCCUR? No           Last vitals:  Vitals Value Taken Time   /86 03/10/25 1110   Temp     Pulse 60 03/10/25 1114   Resp 16 03/10/25 1114   SpO2 97 % 03/10/25 1113   Vitals shown include unfiled device data.    Electronically Signed By: Valerio Concepcion MD  March 10, 2025  1:44 PM

## 2025-03-11 LAB
PATH REPORT.COMMENTS IMP SPEC: NORMAL
PATH REPORT.COMMENTS IMP SPEC: NORMAL
PATH REPORT.FINAL DX SPEC: NORMAL
PATH REPORT.GROSS SPEC: NORMAL
PATH REPORT.MICROSCOPIC SPEC OTHER STN: NORMAL
PATH REPORT.RELEVANT HX SPEC: NORMAL
PHOTO IMAGE: NORMAL

## 2025-03-11 PROCEDURE — 88305 TISSUE EXAM BY PATHOLOGIST: CPT | Mod: 26 | Performed by: PATHOLOGY

## 2025-03-18 DIAGNOSIS — J45.30 MILD PERSISTENT ASTHMA WITHOUT COMPLICATION: ICD-10-CM

## 2025-03-19 RX ORDER — ALBUTEROL SULFATE 90 UG/1
INHALANT RESPIRATORY (INHALATION)
Qty: 51 G | Refills: 2 | Status: SHIPPED | OUTPATIENT
Start: 2025-03-19

## 2025-03-24 PROBLEM — D12.6 ADENOMATOUS POLYP OF COLON: Status: ACTIVE | Noted: 2025-03-24

## 2025-03-25 ENCOUNTER — PATIENT OUTREACH (OUTPATIENT)
Dept: GASTROENTEROLOGY | Facility: CLINIC | Age: 70
End: 2025-03-25
Payer: COMMERCIAL

## 2025-04-13 ENCOUNTER — HEALTH MAINTENANCE LETTER (OUTPATIENT)
Age: 70
End: 2025-04-13

## 2025-04-19 DIAGNOSIS — J45.30 MILD PERSISTENT ASTHMA WITHOUT COMPLICATION: ICD-10-CM

## 2025-04-20 RX ORDER — ALBUTEROL SULFATE 90 UG/1
INHALANT RESPIRATORY (INHALATION)
Qty: 17 G | Refills: 1 | Status: SHIPPED | OUTPATIENT
Start: 2025-04-20

## 2025-05-09 NOTE — PROGRESS NOTES
- Patient notes blood sugars have been stable, following with Dr. Carmona weekly   - Last growth: 35+4 4002g, >97%, vertex, normal KARUNA  - Plan for repeat growth week of  at 38 wga to determine fetal weight to allow patient make an informed decision about mode of delivery however wants to go ahead to schedule  today as she anticipates EFW >4500g and recommendation for  at that EFW given T1DM and increased risk of shoulder dystocia  - Continue 2x/week ANT    García Williamson,  This is all nice and stable.  Did you get the aerospan?     Norma Serrano MD

## 2025-05-13 ENCOUNTER — PATIENT OUTREACH (OUTPATIENT)
Dept: CARE COORDINATION | Facility: CLINIC | Age: 70
End: 2025-05-13
Payer: COMMERCIAL

## 2025-06-14 DIAGNOSIS — J45.30 MILD PERSISTENT ASTHMA WITHOUT COMPLICATION: ICD-10-CM

## 2025-06-15 RX ORDER — ALBUTEROL SULFATE 90 UG/1
INHALANT RESPIRATORY (INHALATION)
Qty: 18 G | Refills: 0 | Status: SHIPPED | OUTPATIENT
Start: 2025-06-15

## 2025-06-16 ENCOUNTER — OFFICE VISIT (OUTPATIENT)
Dept: URGENT CARE | Facility: URGENT CARE | Age: 70
End: 2025-06-16
Payer: COMMERCIAL

## 2025-06-16 ENCOUNTER — RESULTS FOLLOW-UP (OUTPATIENT)
Dept: URGENT CARE | Facility: URGENT CARE | Age: 70
End: 2025-06-16

## 2025-06-16 ENCOUNTER — NURSE TRIAGE (OUTPATIENT)
Dept: FAMILY MEDICINE | Facility: CLINIC | Age: 70
End: 2025-06-16
Payer: COMMERCIAL

## 2025-06-16 VITALS
BODY MASS INDEX: 43.4 KG/M2 | WEIGHT: 256.8 LBS | DIASTOLIC BLOOD PRESSURE: 84 MMHG | HEART RATE: 71 BPM | OXYGEN SATURATION: 97 % | RESPIRATION RATE: 16 BRPM | TEMPERATURE: 97.6 F | SYSTOLIC BLOOD PRESSURE: 144 MMHG

## 2025-06-16 DIAGNOSIS — N18.32 STAGE 3B CHRONIC KIDNEY DISEASE (H): ICD-10-CM

## 2025-06-16 DIAGNOSIS — M10.9 ACUTE GOUTY ARTHRITIS: Primary | ICD-10-CM

## 2025-06-16 DIAGNOSIS — I10 HYPERTENSION GOAL BP (BLOOD PRESSURE) < 130/80: ICD-10-CM

## 2025-06-16 LAB
ANION GAP SERPL CALCULATED.3IONS-SCNC: 2 MMOL/L (ref 3–14)
BASOPHILS # BLD AUTO: 0 10E3/UL (ref 0–0.2)
BASOPHILS NFR BLD AUTO: 1 %
BUN SERPL-MCNC: 20 MG/DL (ref 7–30)
CALCIUM SERPL-MCNC: 10.2 MG/DL (ref 8.5–10.1)
CHLORIDE BLD-SCNC: 107 MMOL/L (ref 94–109)
CO2 SERPL-SCNC: 29 MMOL/L (ref 20–32)
CREAT SERPL-MCNC: 1.8 MG/DL (ref 0.52–1.04)
EGFRCR SERPLBLD CKD-EPI 2021: 30 ML/MIN/1.73M2
EOSINOPHIL # BLD AUTO: 0.2 10E3/UL (ref 0–0.7)
EOSINOPHIL NFR BLD AUTO: 4 %
ERYTHROCYTE [DISTWIDTH] IN BLOOD BY AUTOMATED COUNT: 14.5 % (ref 10–15)
GLUCOSE BLD-MCNC: 93 MG/DL (ref 70–99)
HCT VFR BLD AUTO: 40.2 % (ref 35–47)
HGB BLD-MCNC: 14.2 G/DL (ref 11.7–15.7)
IMM GRANULOCYTES # BLD: 0 10E3/UL
IMM GRANULOCYTES NFR BLD: 0 %
LYMPHOCYTES # BLD AUTO: 2.1 10E3/UL (ref 0.8–5.3)
LYMPHOCYTES NFR BLD AUTO: 38 %
MCH RBC QN AUTO: 24.9 PG (ref 26.5–33)
MCHC RBC AUTO-ENTMCNC: 35.3 G/DL (ref 31.5–36.5)
MCV RBC AUTO: 71 FL (ref 78–100)
MONOCYTES # BLD AUTO: 0.6 10E3/UL (ref 0–1.3)
MONOCYTES NFR BLD AUTO: 11 %
NEUTROPHILS # BLD AUTO: 2.4 10E3/UL (ref 1.6–8.3)
NEUTROPHILS NFR BLD AUTO: 46 %
PLATELET # BLD AUTO: 235 10E3/UL (ref 150–450)
POTASSIUM BLD-SCNC: 5 MMOL/L (ref 3.4–5.3)
RBC # BLD AUTO: 5.7 10E6/UL (ref 3.8–5.2)
SODIUM SERPL-SCNC: 138 MMOL/L (ref 135–145)
WBC # BLD AUTO: 5.3 10E3/UL (ref 4–11)

## 2025-06-16 PROCEDURE — 3077F SYST BP >= 140 MM HG: CPT | Performed by: EMERGENCY MEDICINE

## 2025-06-16 PROCEDURE — 80048 BASIC METABOLIC PNL TOTAL CA: CPT | Performed by: EMERGENCY MEDICINE

## 2025-06-16 PROCEDURE — 86140 C-REACTIVE PROTEIN: CPT | Performed by: EMERGENCY MEDICINE

## 2025-06-16 PROCEDURE — 84550 ASSAY OF BLOOD/URIC ACID: CPT | Performed by: EMERGENCY MEDICINE

## 2025-06-16 PROCEDURE — 3078F DIAST BP <80 MM HG: CPT | Performed by: EMERGENCY MEDICINE

## 2025-06-16 PROCEDURE — 36415 COLL VENOUS BLD VENIPUNCTURE: CPT | Performed by: EMERGENCY MEDICINE

## 2025-06-16 PROCEDURE — 99214 OFFICE O/P EST MOD 30 MIN: CPT | Performed by: EMERGENCY MEDICINE

## 2025-06-16 PROCEDURE — 85025 COMPLETE CBC W/AUTO DIFF WBC: CPT | Performed by: EMERGENCY MEDICINE

## 2025-06-16 RX ORDER — PREDNISONE 20 MG/1
TABLET ORAL
Qty: 20 TABLET | Refills: 0 | Status: SHIPPED | OUTPATIENT
Start: 2025-06-16

## 2025-06-16 NOTE — TELEPHONE ENCOUNTER
"    S-(situation): left foot, great toe pain started on 6/14/25.     B-(background): Had this once before. Had negative gout test.    A-(assessment): left foot. Great toe pain. Pain also will include near toes. Swelling. No redness. Pain is positional. Can't put foot in closed shoe. Pt has bunions on both feet.    R-(recommendations): Be seen today. No appts  at  today. Recommended . PT would want to go to [facility]. Sent call to scheduling. See if  has any openings. Otherwise, reviewed UC hours at , too.  Carrol, Triage RN  [facility]/ [phone]                   Reason for Disposition   SEVERE pain (e.g., excruciating, unable to do any normal activities) and not improved after 2 hours of pain medicine    Additional Information   Negative: Followed an injury   Negative: Wound looks infected (e.g., spreading redness, pus)   Negative: Caused by an animal bite   Negative: Caused by frostbite   Negative: Athlete's Foot suspected (i.e., itchy red rash in web space between toes)   Negative: Foot pain is main symptom   Negative: Foot is cool or blue in comparison to other foot   Negative: Purple or black skin on toe  (Exception: Person remembers bruising the toe from an injury.)   Negative: Patient sounds very sick or weak to the triager    Answer Assessment - Initial Assessment Questions  1. ONSET: \"When did the pain start?\"       6/14/25  2. LOCATION: \"Where is the pain located?\"   (e.g., around nail, entire toe, at foot joint)       Left foot. Gfeat toe.  3. PAIN: \"How bad is the pain?\"    (Scale 1-10; or mild, moderate, severe)      Pain shoots up to ankle. Severe pain. Can't put foot in closed shoe.  4. APPEARANCE: \"What does the toe look like?\" (e.g., redness, swelling, bruising, pallor)      Swelling. Big toe and other toes are swollen. No redness.  5. CAUSE: \"What do you think is causing the toe pain?\"      Not sure.  6. OTHER SYMPTOMS: \"Do you have any other symptoms?\" (e.g., " "leg pain, rash, fever, numbness)      Foot was hot to touch, but not now. Shooting pain to ankle from toe.  7. PREGNANCY: \"Is there any chance you are pregnant?\" \"When was your last menstrual period?\"      no    Protocols used: Toe Pain-A-OH    "

## 2025-06-16 NOTE — PROGRESS NOTES
Urgent Care Clinic Visit    Chief Complaint   Patient presents with    Toe Pain     Onset Saturday morning, awaken by pain - left foot big toe, swollen, red and pain when walking.               6/16/2025    12:29 PM   Additional Questions   Roomed by Scarlett Turner   Accompanied by self

## 2025-06-16 NOTE — PROGRESS NOTES
Assessment & Plan     Diagnosis:    ICD-10-CM    1. Acute gouty arthritis  M10.9 predniSONE (DELTASONE) 20 MG tablet     Basic metabolic panel  (Ca, Cl, CO2, Creat, Gluc, K, Na, BUN)     CRP, inflammation     CBC with platelets and differential     Uric acid     Basic metabolic panel  (Ca, Cl, CO2, Creat, Gluc, K, Na, BUN)     CRP, inflammation     CBC with platelets and differential     Uric acid      2. Stage 3b chronic kidney disease (H)  N18.32 Basic metabolic panel  (Ca, Cl, CO2, Creat, Gluc, K, Na, BUN)     CRP, inflammation     CBC with platelets and differential     Uric acid     Basic metabolic panel  (Ca, Cl, CO2, Creat, Gluc, K, Na, BUN)     CRP, inflammation     CBC with platelets and differential     Uric acid      3. Hypertension goal BP (blood pressure) < 130/80  I10         Medical Decision Making:  Teri Bain is a 69 year old female who presents for evaluation of painful joint.  The history, physical exam and supporting data are consistent with gout.  There is a long history of the same and pt admits this is what it feels like.  No fevers, chills, or night sweats, no joint migration, no tracking or concern for cellulitis.   There do not appear at this time to be any complications including necrotizing fascitis, lymphangitis, lymphadenitis, abscess, osteomyelitis, sepsis, or shock.  The patient is not immunosuppressed.  Labs show CKD is slightly decreased from baseline with creatinine of 1.80; GFR 30. CBC unremarkable. CRP and uric acid level elevated as noted below consistent with acute gouty arthritis. Supportive outpatient management is indicated with Prednisone taper  Close follow-up of primary care physician to ensure no progression and rapid resolution.  Patient verbalizes understanding and agreement with the plan including reasons to go to the ER. All questions answered.     Doc Valdovinos PA-C  [facility]    Subjective     Teri Bain is a 69 year old female who  presents to clinic today for the following health issues:  Chief Complaint   Patient presents with    Toe Pain     Onset Saturday morning, awaken by pain - left foot big toe, swollen, red and pain when walking.        HPI  States that Saturday morning she awoke with left great toe pain, had no injury to it.  She notes it was a little bit swollen, quite painful when walking.  No history of gout, but states that her father used to get this and her symptoms seem very similar.  Denies any injury to the foot, numbness or  tingling in the foot, red streaks, fevers rashes or other concerns.    Review of Systems    See HPI    Objective      Vitals: BP (!) 144/84 (BP Location: Left arm, Patient Position: Sitting, Cuff Size: Adult Large)   Pulse 71   Temp 97.6  F (36.4  C) (Tympanic)   Resp 16   Wt 116.5 kg (256 lb 12.8 oz)   SpO2 97%   BMI 43.40 kg/m        Patient Vitals for the past 24 hrs:   BP Temp Temp src Pulse Resp SpO2 Weight   06/16/25 1244 (!) 144/84 -- -- -- -- -- --   06/16/25 1228 (!) 144/72 97.6  F (36.4  C) Tympanic 71 16 97 % 116.5 kg (256 lb 12.8 oz)       Vital signs reviewed by: Doc Valdovinos PA-C    Physical Exam   Constitutional: Patient is alert and cooperative. No acute distress.  Neurological: Alert and oriented x3.  Skin: Left foot: Swelling, tenderness to palpation over the left first MTP joint.  No erythema, warmth, fluctuance areas of pointing.  No lymphangitis.  Distal CMS intact.  Psychiatric:The patient has a normal mood and affect.     Labs/Imaging:  Results for orders placed or performed in visit on 06/16/25   Basic metabolic panel  (Ca, Cl, CO2, Creat, Gluc, K, Na, BUN)     Status: Abnormal   Result Value Ref Range    Sodium 138 135 - 145 mmol/L    Potassium 5.0 3.4 - 5.3 mmol/L    Chloride 107 94 - 109 mmol/L    Carbon Dioxide (CO2) 29 20 - 32 mmol/L    Anion Gap 2 (L) 3 - 14 mmol/L    Urea Nitrogen 20 7 - 30 mg/dL    Creatinine 1.80 (H) 0.52 - 1.04 mg/dL    GFR Estimate 30 (L) >60  mL/min/1.73m2    Calcium 10.2 (H) 8.5 - 10.1 mg/dL    Glucose 93 70 - 99 mg/dL   CRP, inflammation     Status: Abnormal   Result Value Ref Range    CRP Inflammation 42.80 (H) <5.00 mg/L   Uric acid     Status: Abnormal   Result Value Ref Range    Uric Acid 8.4 (H) 2.4 - 5.7 mg/dL   CBC with platelets and differential     Status: Abnormal   Result Value Ref Range    WBC Count 5.3 4.0 - 11.0 10e3/uL    RBC Count 5.70 (H) 3.80 - 5.20 10e6/uL    Hemoglobin 14.2 11.7 - 15.7 g/dL    Hematocrit 40.2 35.0 - 47.0 %    MCV 71 (L) 78 - 100 fL    MCH 24.9 (L) 26.5 - 33.0 pg    MCHC 35.3 31.5 - 36.5 g/dL    RDW 14.5 10.0 - 15.0 %    Platelet Count 235 150 - 450 10e3/uL    % Neutrophils 46 %    % Lymphocytes 38 %    % Monocytes 11 %    % Eosinophils 4 %    % Basophils 1 %    % Immature Granulocytes 0 %    Absolute Neutrophils 2.4 1.6 - 8.3 10e3/uL    Absolute Lymphocytes 2.1 0.8 - 5.3 10e3/uL    Absolute Monocytes 0.6 0.0 - 1.3 10e3/uL    Absolute Eosinophils 0.2 0.0 - 0.7 10e3/uL    Absolute Basophils 0.0 0.0 - 0.2 10e3/uL    Absolute Immature Granulocytes 0.0 <=0.4 10e3/uL   CBC with platelets and differential     Status: Abnormal    Narrative    The following orders were created for panel order CBC with platelets and differential.  Procedure                               Abnormality         Status                     ---------                               -----------         ------                     CBC with platelets and ...[[id number]]  Abnormal            Final result                 Please view results for these tests on the individual orders.       Doc Valdovinos PA-C, June 16, 2025

## 2025-06-17 LAB
CRP SERPL-MCNC: 42.8 MG/L
URATE SERPL-MCNC: 8.4 MG/DL (ref 2.4–5.7)

## 2025-06-24 DIAGNOSIS — I10 HYPERTENSION GOAL BP (BLOOD PRESSURE) < 130/80: Primary | ICD-10-CM

## 2025-06-24 RX ORDER — AMLODIPINE BESYLATE 5 MG/1
TABLET ORAL
Qty: 90 TABLET | Refills: 4 | OUTPATIENT
Start: 2025-06-24

## 2025-06-24 NOTE — TELEPHONE ENCOUNTER
Patient called stating that she took her last pill of the Amlodipine and is out. Writer will route request to refill team and to PCP for further review.      Okay to call or leave detail russ. Yvette message is okay as well.   [phone]

## 2025-06-25 RX ORDER — AMLODIPINE BESYLATE 5 MG/1
TABLET ORAL
Qty: 90 TABLET | Refills: 1 | Status: SHIPPED | OUTPATIENT
Start: 2025-06-25

## 2025-06-25 NOTE — TELEPHONE ENCOUNTER
Resent remaining refills on file to Shy Paredes, BSN, PHN, AMB-BC (she/her)  [facility] RN

## 2025-06-26 ENCOUNTER — ANCILLARY PROCEDURE (OUTPATIENT)
Dept: GENERAL RADIOLOGY | Facility: CLINIC | Age: 70
End: 2025-06-26
Attending: EMERGENCY MEDICINE
Payer: COMMERCIAL

## 2025-06-26 ENCOUNTER — OFFICE VISIT (OUTPATIENT)
Dept: URGENT CARE | Facility: URGENT CARE | Age: 70
End: 2025-06-26
Payer: COMMERCIAL

## 2025-06-26 VITALS
RESPIRATION RATE: 18 BRPM | SYSTOLIC BLOOD PRESSURE: 136 MMHG | BODY MASS INDEX: 43.71 KG/M2 | WEIGHT: 256 LBS | TEMPERATURE: 98.6 F | DIASTOLIC BLOOD PRESSURE: 72 MMHG | HEART RATE: 93 BPM | HEIGHT: 64 IN | OXYGEN SATURATION: 96 %

## 2025-06-26 DIAGNOSIS — J06.9 UPPER RESPIRATORY TRACT INFECTION, UNSPECIFIED TYPE: Primary | ICD-10-CM

## 2025-06-26 DIAGNOSIS — J06.9 UPPER RESPIRATORY TRACT INFECTION, UNSPECIFIED TYPE: ICD-10-CM

## 2025-06-26 RX ORDER — BENZONATATE 100 MG/1
100 CAPSULE ORAL 3 TIMES DAILY PRN
Qty: 21 CAPSULE | Refills: 0 | Status: SHIPPED | OUTPATIENT
Start: 2025-06-26

## 2025-06-26 ASSESSMENT — PAIN SCALES - GENERAL: PAINLEVEL_OUTOF10: MODERATE PAIN (6)

## 2025-06-26 NOTE — PROGRESS NOTES
"Assessment & Plan     Diagnosis:    ICD-10-CM    1. Upper respiratory tract infection, unspecified type  J06.9 XR Chest 2 Views     benzonatate (TESSALON) 100 MG capsule          Medical Decision Making:  Teri Bain is a 69 year old female who presents for evaluation of cough, congestion, right sided rib pain with cough.  This is consistent with an upper respiratory tract infection.  There is no signs at this point of serious bacterial infection such as OM, RPA, epiglottitis, PTA, strep pharyngitis, pneumonia, meningitis, bacteremia, serious bacterial infection.      Given rib pain and recent steroid burst, I did a CXR to eval for pneumonia. This shows no acute infiltrate or effusion. No obvious rib fracture on my read. There are no signs of complications of pneumonia at this point such as septic shock, bacteremia, empyema, hypoxia, respiratory failure or compromise.     There are no gastrointestinal symptoms at this point and no signs of dehydration.  Close followup with PCP is indicated.  Go to ED for fever > 102 F, protracted vomiting, worsening shortness of breath, chest pain or other concerns.  Patient verbalized understanding and agreement with the plan was discharged in stable condition.      Doc Valdovinos PA-C  [facility]    Subjective     Teri Bain is a 69 year old female who presents to clinic today for the following health issues:  Chief Complaint   Patient presents with    Cold Symptoms     Cold symptoms beginning Thursday; states symptoms are not improving. Patient reports \"unbearable\" congestion when laying down. Patient states it feels like she cracked a rib from a coughing fit this morning.     Cough       HPI  Patient has had about a week ago she started having slight cough, congestion, seems worse when laying down.  She states she is coughing a bunch of flexion but cracked a rib on the right side after a coughing fit this morning.  No difficulty swallowing or " "breathing, shortness of breath at rest or with minimal exertion.  She does have a history of asthma, but has not had to use her inhaler more and is not feeling she is wheezing a lot.  Chest pain does not get worse with exertion, sometimes with deep breaths but mainly just with coughing on the right side of her rib.  No leg swelling, history of blood clots in the legs or lungs or recent surgeries.  She just finished a course of prednisone for gout flareup.  No nausea, vomiting, diarrhea, lightheadedness, sore throat ear pain or other concerns.    Review of Systems    See HPI    Objective      Vitals: /72 (BP Location: Left arm, Patient Position: Sitting, Cuff Size: Adult Large)   Pulse 93   Temp 98.6  F (37  C) (Oral)   Resp 18   Ht 1.632 m (5' 4.25\")   Wt 116.1 kg (256 lb)   SpO2 96%   BMI 43.60 kg/m      Patient Vitals for the past 24 hrs:   BP Temp Temp src Pulse Resp SpO2 Height Weight   06/26/25 1033 136/72 -- -- -- -- -- -- --   06/26/25 1027 (!) 148/90 98.6  F (37  C) Oral 93 18 96 % 1.632 m (5' 4.25\") 116.1 kg (256 lb)       Vital signs reviewed by: Doc Valdovinos PA-C    Physical Exam   Constitutional: Patient is alert and cooperative. No acute distress.  Ears: Right TM is normal. Left TM is normal. External ear canals are normal.  Mouth: Mucous membranes are moist. Normal tongue and tonsil. Posterior oropharynx is clear.  Cardiovascular: Regular rate and rhythm  Pulmonary/Chest: Lungs are clear to auscultation throughout. Effort normal. No respiratory distress. No wheezes, rales or rhonchi.  Neurological: Alert and oriented x3.  Skin: No rash noted on visualized skin.  Psychiatric:The patient has a normal mood and affect.     Labs/Imaging:  Results for orders placed or performed in visit on 06/26/25   XR Chest 2 Views     Status: None    Narrative    EXAM: XR CHEST 2 VIEWS  LOCATION: [facility]  DATE: 6/26/2025    INDICATION:  Upper respiratory tract infection, " unspecified type  COMPARISON: 12/13/2019      Impression    IMPRESSION: Mild low lung volumes. No focal consolidation, pleural effusion or pneumothorax. Similar cardiomediastinal silhouette.     Reading per radiology      Doc Valdovinos PA-C, June 26, 2025

## 2025-06-26 NOTE — PROGRESS NOTES
"Urgent Care Clinic Visit    Chief Complaint   Patient presents with    Cold Symptoms     Cold symptoms beginning Thursday; states symptoms are not improving. Patient reports \"unbearable\" congestion when laying down. Patient states it feels like she cracked a rib from a coughing fit this morning.               6/26/2025    10:27 AM   Additional Questions   Roomed by SPENCER Joel   Accompanied by Self     Wisam Andrade LPN    "

## 2025-07-02 DIAGNOSIS — J45.30 MILD PERSISTENT ASTHMA WITHOUT COMPLICATION: ICD-10-CM

## 2025-07-03 RX ORDER — ALBUTEROL SULFATE 90 UG/1
INHALANT RESPIRATORY (INHALATION)
Qty: 17 G | Refills: 9 | OUTPATIENT
Start: 2025-07-03

## 2025-07-10 ASSESSMENT — ASTHMA QUESTIONNAIRES
ACT_TOTALSCORE: 15
QUESTION_3 LAST FOUR WEEKS HOW OFTEN DID YOUR ASTHMA SYMPTOMS (WHEEZING, COUGHING, SHORTNESS OF BREATH, CHEST TIGHTNESS OR PAIN) WAKE YOU UP AT NIGHT OR EARLIER THAN USUAL IN THE MORNING: FOUR OR MORE NIGHTS A WEEK
QUESTION_4 LAST FOUR WEEKS HOW OFTEN HAVE YOU USED YOUR RESCUE INHALER OR NEBULIZER MEDICATION (SUCH AS ALBUTEROL): ONE OR TWO TIMES PER DAY
QUESTION_1 LAST FOUR WEEKS HOW MUCH OF THE TIME DID YOUR ASTHMA KEEP YOU FROM GETTING AS MUCH DONE AT WORK, SCHOOL OR AT HOME: NONE OF THE TIME
QUESTION_2 LAST FOUR WEEKS HOW OFTEN HAVE YOU HAD SHORTNESS OF BREATH: ONCE OR TWICE A WEEK
QUESTION_5 LAST FOUR WEEKS HOW WOULD YOU RATE YOUR ASTHMA CONTROL: SOMEWHAT CONTROLLED

## 2025-07-15 ENCOUNTER — OFFICE VISIT (OUTPATIENT)
Dept: FAMILY MEDICINE | Facility: CLINIC | Age: 70
End: 2025-07-15
Payer: COMMERCIAL

## 2025-07-15 VITALS
OXYGEN SATURATION: 99 % | HEIGHT: 64 IN | TEMPERATURE: 97.2 F | BODY MASS INDEX: 43.48 KG/M2 | SYSTOLIC BLOOD PRESSURE: 134 MMHG | HEART RATE: 58 BPM | DIASTOLIC BLOOD PRESSURE: 82 MMHG | RESPIRATION RATE: 20 BRPM | WEIGHT: 254.7 LBS

## 2025-07-15 DIAGNOSIS — I10 HYPERTENSION GOAL BP (BLOOD PRESSURE) < 130/80: ICD-10-CM

## 2025-07-15 DIAGNOSIS — M10.9 ACUTE GOUT OF LEFT FOOT, UNSPECIFIED CAUSE: Primary | ICD-10-CM

## 2025-07-15 LAB — URATE SERPL-MCNC: 9.2 MG/DL (ref 2.4–5.7)

## 2025-07-15 PROCEDURE — 3075F SYST BP GE 130 - 139MM HG: CPT | Performed by: FAMILY MEDICINE

## 2025-07-15 PROCEDURE — 3079F DIAST BP 80-89 MM HG: CPT | Performed by: FAMILY MEDICINE

## 2025-07-15 PROCEDURE — 99214 OFFICE O/P EST MOD 30 MIN: CPT | Performed by: FAMILY MEDICINE

## 2025-07-15 PROCEDURE — 36415 COLL VENOUS BLD VENIPUNCTURE: CPT | Performed by: FAMILY MEDICINE

## 2025-07-15 PROCEDURE — 84550 ASSAY OF BLOOD/URIC ACID: CPT | Performed by: FAMILY MEDICINE

## 2025-07-15 PROCEDURE — G2211 COMPLEX E/M VISIT ADD ON: HCPCS | Performed by: FAMILY MEDICINE

## 2025-07-15 RX ORDER — SPIRONOLACTONE 25 MG/1
25 TABLET ORAL DAILY
Qty: 30 TABLET | Refills: 0 | Status: SHIPPED | OUTPATIENT
Start: 2025-07-15

## 2025-07-15 RX ORDER — SPIRONOLACTONE 25 MG/1
25 TABLET ORAL DAILY
Qty: 90 TABLET | OUTPATIENT
Start: 2025-07-15

## 2025-07-15 NOTE — PATIENT INSTRUCTIONS
"Stop the triamterene/HCTZ and start spironolactone.      FYI:  My schedule has been booking out very far in advance (2 months).  I apologize for the lack of timely access.  If you need to be seen for a chronic condition or preventive (wellness) visit, please be sure to schedule that appointment 2-3 months in advance.  If you have a concern that you feel cannot wait until my next available appointment (such as a hospital follow-up, pre-op, or new symptom of concern) please call clinic at [phone] and say \"my care team.\"  If you are still told that I have no availability please ask to speak to a [facility]  or Nurse who can often offer you an appointment with me within a week or so.  Or you can send me a [employer] message.  The trick is to avoid speaking with central scheduling.    "

## 2025-07-15 NOTE — PROGRESS NOTES
Assessment & Plan     Acute gout of left foot, unspecified cause  First gout attack one month ago - with elevated uric acid at time of gout episode.  We'll recheck uric acid today now that the acute attack has subsided.  Consider starting allopurinol.  I reviewed low-purine diet and gave her handouts on gout.    - Uric acid    Hypertension goal BP (blood pressure) < 130/80  I recommended that we discontinue the HCTZ given her new diagnosis of gout.  She does like that the diuretic effect helps control her ankle swelling.  We'll have her switch to spironolactone which should have less risk for exacerbating gout.  We will need to recheck BP and BMP in 2 weeks.  - spironolactone (ALDACTONE) 25 MG tablet  Dispense: 30 tablet; Refill: 0    MED REC REQUIRED  Post Medication Reconciliation Status:  Discharge medications reconciled and changed, see notes/orders      Follow-up   in 2 weeks for BP and BMP recheck:  Jul 31, 2025 12:00 PM  (Arrive by 11:40 AM)  Provider Visit with Norma Serrano MD  [facility] ([facility] ) [phone]           The longitudinal plan of care for the diagnosis(es)/condition(s) as documented were addressed during this visit. Due to the added complexity in care, I will continue to support Aubrie in the subsequent management and with ongoing continuity of care.    Norma Serrano MD  [facility]        Bea Alfred is a 69 year old, presenting for the following health issues:  ER F/U      7/15/2025    11:50 AM   Additional Questions   Roomed by molly   Accompanied by self     HPI        ED/ Followup:    Facility:  [facility]  Date of visit: 6/16/2025  Reason for visit: toe pain  Current Status: its better      First episode of gout  Left great toe  Sudden onset of pain, swelling  Unable to wear a shoe for 3 days  Dad had gout  Resolved with prednisone taper      "  Objective    /82   Pulse 58   Temp 97.2  F (36.2  C) (Temporal)   Resp 20   Ht 1.62 m (5' 3.78\")   Wt 115.5 kg (254 lb 11.2 oz)   SpO2 99%   BMI 44.02 kg/m    Body mass index is 44.02 kg/m .  Physical Exam   GEN:  no apparent distress  Left foot: no swelling or redness of great toe    Office Visit on 06/16/2025   Component Date Value Ref Range Status    Sodium 06/16/2025 138  135 - 145 mmol/L Final    Potassium 06/16/2025 5.0  3.4 - 5.3 mmol/L Final    Chloride 06/16/2025 107  94 - 109 mmol/L Final    Carbon Dioxide (CO2) 06/16/2025 29  20 - 32 mmol/L Final    Anion Gap 06/16/2025 2 (L)  3 - 14 mmol/L Final    Urea Nitrogen 06/16/2025 20  7 - 30 mg/dL Final    Creatinine 06/16/2025 1.80 (H)  0.52 - 1.04 mg/dL Final    GFR Estimate 06/16/2025 30 (L)  >60 mL/min/1.73m2 Final    Calcium 06/16/2025 10.2 (H)  8.5 - 10.1 mg/dL Final    Glucose 06/16/2025 93  70 - 99 mg/dL Final    CRP Inflammation 06/16/2025 42.80 (H)  <5.00 mg/L Final    Uric Acid 06/16/2025 8.4 (H)  2.4 - 5.7 mg/dL Final    WBC Count 06/16/2025 5.3  4.0 - 11.0 10e3/uL Final    RBC Count 06/16/2025 5.70 (H)  3.80 - 5.20 10e6/uL Final    Hemoglobin 06/16/2025 14.2  11.7 - 15.7 g/dL Final    Hematocrit 06/16/2025 40.2  35.0 - 47.0 % Final    MCV 06/16/2025 71 (L)  78 - 100 fL Final    MCH 06/16/2025 24.9 (L)  26.5 - 33.0 pg Final    MCHC 06/16/2025 35.3  31.5 - 36.5 g/dL Final    RDW 06/16/2025 14.5  10.0 - 15.0 % Final    Platelet Count 06/16/2025 235  150 - 450 10e3/uL Final    % Neutrophils 06/16/2025 46  % Final    % Lymphocytes 06/16/2025 38  % Final    % Monocytes 06/16/2025 11  % Final    % Eosinophils 06/16/2025 4  % Final    % Basophils 06/16/2025 1  % Final    % Immature Granulocytes 06/16/2025 0  % Final    Absolute Neutrophils 06/16/2025 2.4  1.6 - 8.3 10e3/uL Final    Absolute Lymphocytes 06/16/2025 2.1  0.8 - 5.3 10e3/uL Final    Absolute Monocytes 06/16/2025 0.6  0.0 - 1.3 10e3/uL Final    Absolute Eosinophils 06/16/2025 0.2  " 0.0 - 0.7 10e3/uL Final    Absolute Basophils 06/16/2025 0.0  0.0 - 0.2 10e3/uL Final    Absolute Immature Granulocytes 06/16/2025 0.0  <=0.4 10e3/uL Final           Signed Electronically by: Norma Serrano MD

## 2025-07-18 DIAGNOSIS — J45.30 MILD PERSISTENT ASTHMA WITHOUT COMPLICATION: ICD-10-CM

## 2025-07-20 RX ORDER — ALBUTEROL SULFATE 90 UG/1
INHALANT RESPIRATORY (INHALATION)
Qty: 17 G | Refills: 2 | Status: SHIPPED | OUTPATIENT
Start: 2025-07-20

## 2025-07-31 ENCOUNTER — OFFICE VISIT (OUTPATIENT)
Dept: FAMILY MEDICINE | Facility: CLINIC | Age: 70
End: 2025-07-31
Payer: COMMERCIAL

## 2025-07-31 VITALS
TEMPERATURE: 97.6 F | DIASTOLIC BLOOD PRESSURE: 83 MMHG | OXYGEN SATURATION: 99 % | RESPIRATION RATE: 17 BRPM | WEIGHT: 253 LBS | HEART RATE: 66 BPM | BODY MASS INDEX: 43.73 KG/M2 | SYSTOLIC BLOOD PRESSURE: 129 MMHG

## 2025-07-31 DIAGNOSIS — I10 HYPERTENSION GOAL BP (BLOOD PRESSURE) < 130/80: ICD-10-CM

## 2025-07-31 DIAGNOSIS — M10.9 ACUTE GOUTY ARTHRITIS: Primary | ICD-10-CM

## 2025-07-31 DIAGNOSIS — M1A.3720 CHRONIC GOUT DUE TO RENAL IMPAIRMENT INVOLVING TOE OF LEFT FOOT WITHOUT TOPHUS: ICD-10-CM

## 2025-07-31 DIAGNOSIS — N18.32 STAGE 3B CHRONIC KIDNEY DISEASE (H): ICD-10-CM

## 2025-07-31 LAB
ANION GAP SERPL CALCULATED.3IONS-SCNC: 11 MMOL/L (ref 7–15)
BUN SERPL-MCNC: 20.9 MG/DL (ref 8–23)
CALCIUM SERPL-MCNC: 9.9 MG/DL (ref 8.8–10.4)
CHLORIDE SERPL-SCNC: 104 MMOL/L (ref 98–107)
CREAT SERPL-MCNC: 1.34 MG/DL (ref 0.51–0.95)
EGFRCR SERPLBLD CKD-EPI 2021: 42 ML/MIN/1.73M2
GLUCOSE SERPL-MCNC: 86 MG/DL (ref 70–99)
HCO3 SERPL-SCNC: 25 MMOL/L (ref 22–29)
POTASSIUM SERPL-SCNC: 4.2 MMOL/L (ref 3.4–5.3)
SODIUM SERPL-SCNC: 140 MMOL/L (ref 135–145)

## 2025-07-31 RX ORDER — ALLOPURINOL 100 MG/1
50 TABLET ORAL DAILY
Qty: 45 TABLET | Refills: 0 | Status: SHIPPED | OUTPATIENT
Start: 2025-07-31

## 2025-07-31 RX ORDER — PREDNISONE 20 MG/1
TABLET ORAL
Qty: 20 TABLET | Refills: 0 | Status: SHIPPED | OUTPATIENT
Start: 2025-07-31

## 2025-07-31 ASSESSMENT — PAIN SCALES - GENERAL: PAINLEVEL_OUTOF10: NO PAIN (0)

## 2025-07-31 NOTE — PROGRESS NOTES
Assessment & Plan     Acute gouty arthritis  Second episode of acute gout - same joint (left great toe).  We'll re-treat this acute gout with prednisone (avoiding colchicine and NSAIDs due to CKD).  I gave her more information on low-purine diet.  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  Notify me of gout flare upon initiating allopurinol.   - allopurinol (ZYLOPRIM) 100 MG tablet  Dispense: 45 tablet; Refill: 0  - Adult Nephrology  Referral    Stage 3b chronic kidney disease (H)  Borderline Stage 4 and now with gout.  I referred her back to nephrology.    - Adult Nephrology  Referral    Hypertension goal BP (blood pressure) < 130/80  I instructed her to discontinue HCTZ 2 weeks ago due to new dx of gout.  I replaced that with spironolactone.  She does note increased ankle swelling on the amlodipine+spironolactone combo (compared to the amlodipine+HCTZ combo).  We'll monitor K+ closely.  We could consider switching back to HCTZ if her uric acid becomes controlled with allopurinol.  - Basic metabolic panel  (Ca, Cl, CO2, Creat, Gluc, K, Na, BUN)  - Adult Nephrology  Referral      Follow-up   She'll see me in 2 months for allopurinol follow-up.    Oct 02, 2025 12:00 PM  (Arrive by 11:40 AM)  Provider Visit with Norma Serrano MD  [facility] ([facility] ) [phone]           I spent a total of 35 minutes on the day of the visit.   Time spent by me today doing chart review, history and exam, documentation and further activities per the note    The longitudinal plan of care for the diagnosis(es)/condition(s) as documented were addressed during this visit. Due to the added  complexity in care, I will continue to support Aubrie in the subsequent management and with ongoing continuity of care.    Norma Serrano MD  [facility]    Bea Alfred is a 69 year old, presenting for the following health issues:  Arthritis and Hypertension      7/31/2025    11:46 AM   Additional Questions   Roomed by Jovanny Ochoa     Arthritis    History of Present Illness       Reason for visit:  Followup on gout and new blood pressure medication and to talk about a medication to start for my gout.         Started having gout symptoms in left great toe a few days ago: pain, redness, swelling  Took some ibuprofen.        Objective    /83 (BP Location: Right arm, Patient Position: Sitting, Cuff Size: Adult Large)   Pulse 66   Temp 97.6  F (36.4  C) (Temporal)   Resp 17   Wt 114.8 kg (253 lb)   SpO2 99%   BMI 43.73 kg/m    Body mass index is 43.73 kg/m .  Physical Exam   GEN:  no apparent distress  FOOT: left great toe with marked edema, erythema and tenderness to palpation localized to 1st MTP joint    Office Visit on 07/15/2025   Component Date Value Ref Range Status    Uric Acid 07/15/2025 9.2 (H)  2.4 - 5.7 mg/dL Final           Signed Electronically by: Norma Serrano MD

## 2025-07-31 NOTE — PATIENT INSTRUCTIONS
Start back on the prednisone.  Once you've completed the taper monitor for recurrence.  If no recurrence then start the allopurinol at a half tablet daily.

## 2025-08-01 ENCOUNTER — RESULTS FOLLOW-UP (OUTPATIENT)
Dept: FAMILY MEDICINE | Facility: CLINIC | Age: 70
End: 2025-08-01
Payer: COMMERCIAL

## 2025-08-01 DIAGNOSIS — M1A.3720 CHRONIC GOUT DUE TO RENAL IMPAIRMENT INVOLVING TOE OF LEFT FOOT WITHOUT TOPHUS: Primary | ICD-10-CM

## 2025-08-01 RX ORDER — COLCHICINE 0.6 MG/1
0.6 TABLET ORAL DAILY
Qty: 21 TABLET | Refills: 0 | Status: SHIPPED | OUTPATIENT
Start: 2025-08-01

## 2025-08-04 ENCOUNTER — PATIENT OUTREACH (OUTPATIENT)
Dept: CARE COORDINATION | Facility: CLINIC | Age: 70
End: 2025-08-04
Payer: COMMERCIAL